# Patient Record
Sex: MALE | Race: WHITE | NOT HISPANIC OR LATINO | Employment: OTHER | URBAN - METROPOLITAN AREA
[De-identification: names, ages, dates, MRNs, and addresses within clinical notes are randomized per-mention and may not be internally consistent; named-entity substitution may affect disease eponyms.]

---

## 2017-01-05 ENCOUNTER — LAB VISIT (OUTPATIENT)
Dept: LAB | Facility: HOSPITAL | Age: 52
End: 2017-01-05
Attending: UROLOGY
Payer: COMMERCIAL

## 2017-01-05 DIAGNOSIS — E29.1 MALE HYPOGONADISM: ICD-10-CM

## 2017-01-05 DIAGNOSIS — N40.0 BENIGN PROSTATIC HYPERPLASIA, PRESENCE OF LOWER URINARY TRACT SYMPTOMS UNSPECIFIED, UNSPECIFIED MORPHOLOGY: ICD-10-CM

## 2017-01-05 LAB
PROSTATE SPECIFIC ANTIGEN, TOTAL: 1.4 NG/ML
PSA FREE MFR SERPL: 10.71 %
PSA FREE SERPL-MCNC: 0.15 NG/ML
TESTOST SERPL-MCNC: 580 NG/DL

## 2017-01-05 PROCEDURE — 84403 ASSAY OF TOTAL TESTOSTERONE: CPT

## 2017-01-05 PROCEDURE — 84153 ASSAY OF PSA TOTAL: CPT

## 2017-01-05 PROCEDURE — 36415 COLL VENOUS BLD VENIPUNCTURE: CPT | Mod: PO

## 2017-01-05 PROCEDURE — 84402 ASSAY OF FREE TESTOSTERONE: CPT

## 2017-01-09 ENCOUNTER — TELEPHONE (OUTPATIENT)
Dept: UROLOGY | Facility: CLINIC | Age: 52
End: 2017-01-09

## 2017-01-09 LAB — TESTOST FREE SERPL-MCNC: 10.4 PG/ML

## 2017-01-09 NOTE — TELEPHONE ENCOUNTER
Spoke to pt and moved the apt for him and informed him of the date and time. He verbalized understanding

## 2017-01-09 NOTE — TELEPHONE ENCOUNTER
----- Message from Pierce Huerta sent at 1/9/2017 11:18 AM CST -----  Contact: Wife/Naomi Salgado called in for her  (patient-Rodrigo) and he needs to cancel his nurse appt for his injection that is now down for 1/18/17 as he will be out of town.      Patient would like to reschedule for the following week and call back number is [phone]

## 2017-01-17 RX ORDER — LOSARTAN POTASSIUM AND HYDROCHLOROTHIAZIDE 25; 100 MG/1; MG/1
TABLET ORAL
Qty: 30 TABLET | Refills: 5 | Status: SHIPPED | OUTPATIENT
Start: 2017-01-17 | End: 2017-07-12 | Stop reason: SDUPTHER

## 2017-01-26 ENCOUNTER — CLINICAL SUPPORT (OUTPATIENT)
Dept: UROLOGY | Facility: CLINIC | Age: 52
End: 2017-01-26
Payer: COMMERCIAL

## 2017-01-26 VITALS
WEIGHT: 231.25 LBS | DIASTOLIC BLOOD PRESSURE: 77 MMHG | HEIGHT: 73 IN | SYSTOLIC BLOOD PRESSURE: 118 MMHG | BODY MASS INDEX: 30.65 KG/M2 | HEART RATE: 78 BPM

## 2017-01-26 DIAGNOSIS — N52.01 ERECTILE DYSFUNCTION DUE TO ARTERIAL INSUFFICIENCY: ICD-10-CM

## 2017-01-26 DIAGNOSIS — E29.1 HYPOGONADISM IN MALE: Primary | ICD-10-CM

## 2017-01-26 PROCEDURE — 99213 OFFICE O/P EST LOW 20 MIN: CPT | Mod: 25,S$GLB,, | Performed by: UROLOGY

## 2017-01-26 PROCEDURE — 99999 PR PBB SHADOW E&M-EST. PATIENT-LVL III: CPT | Mod: PBBFAC,,, | Performed by: UROLOGY

## 2017-01-26 PROCEDURE — 96372 THER/PROPH/DIAG INJ SC/IM: CPT | Mod: S$GLB,,, | Performed by: UROLOGY

## 2017-01-26 RX ADMIN — TESTOSTERONE CYPIONATE 300 MG: 200 INJECTION, SOLUTION INTRAMUSCULAR at 04:01

## 2017-01-26 NOTE — PROGRESS NOTES
9 28 16     Urinalysis: col yellow, sg 25, pH 5, leuco -, nitrites -, prot trace, glucose 50, bili -, blood -     c-c R flank pain     Age 51, has noticed a pain off and on on the R flank and RUQ, has been there for about one month, the pain is felt for two or three days and it then goes away. It may be one week before it returns.   At times the pain occurs after eating or drinking. No fever. No nausea or vomiting.      Voids well, no problems with stream, nocturia x 1. No urgency or incontinence.      Give a hx of large kidney stone in the R kidney 20 yrs ago, underwent an eswl but the fragments apparently got stuck in the ureter and required open exploration for ureterolithotomy. Says the open surgery was complex and took 10 hours.  Pt did fine after that and has had no more problems. About 10 years later he passed another small stone and also he had one small one removed from the ureter by dr jag jensen.     At the current time he wonders whether the current pain he is having could be another stone causing trouble.     Pt is seen here for testosterone replacement and was seen by dr sharma. He comes here for testosterone shots every 3 weeks and does well with that.          IMPRESSION:  Hypogonadism, kidney stones, glycosuria, erectile dysfunction  viagra as needed, i am ordering renal ultrasound to see if there could be a stone exacerbation problem

## 2017-01-26 NOTE — MR AVS SNAPSHOT
[facility]  [address]  [facility] [address]  Phone: [phone]                  Cade Johnston   2017 2:45 PM   Clinical Support    Description:  Male : 1965   Provider:  Des Larson MD   Department:  [facility]           Reason for Visit     Follow-up     Results                To Do List           Future Appointments        Provider Department Dept Phone    2017 8:00 AM LAB, [place] [facility] [phone]    2017 10:00 AM LAB, [place] [facility] [phone]    2017 8:30 AM Joni Smith MD [facility] [phone]      Goals (5 Years of Data)     None      [facility]     [facility] Nurse [facility] -  Assistance  Registered nurses in the [facility] provide clinical advisement, health education, appointment booking, and other advisory services.  Call for this free service at [phone].             Medications           Message regarding Medications     Verify the changes and/or additions to your medication regime listed below are the same as discussed with your clinician today.  If any of these changes or additions are incorrect, please notify your healthcare provider.        STOP taking these medications     azelastine (ASTELIN) 137 mcg (0.1 %) nasal spray INHALE 1 SPRAY INTRANASALLY 2 TWO TIMES DAILY.           Verify that the below list of medications is an accurate representation of the medications you are currently taking.  If none reported, the list may be blank. If incorrect, please contact your healthcare provider. Carry this list with you in case of emergency.           Current Medications     aspirin (ENTERIC COATED ASPIRIN) 81 MG EC tablet Take 81 mg by mouth once daily. No Sig Provided    blood sugar diagnostic Strp Test q day    losartan-hydrochlorothiazide 100-25 mg (HYZAAR) 100-25 mg per tablet TAKE 1 TABLET BY MOUTH ONCE DAILY.    metformin  "(GLUCOPHAGE) 500 MG tablet TAKE 1 TABLET TWICE A DAY WITH MEALS    sildenafil (REVATIO) 20 mg Tab Take 20 mg by mouth.           Clinical Reference Information           Vital Signs - Last Recorded  Most recent update: 1/26/2017  3:15 PM by Dinora Lacy    BP Pulse Ht Wt BMI    118/77 (BP Location: Right arm, Patient Position: Sitting) 78 6' 1" (1.854 m) 104.9 kg (231 lb 4.2 oz) 30.51 kg/m2      Blood Pressure          Most Recent Value    BP  118/77      Allergies as of 1/26/2017     Codeine    Latex      Immunizations Administered on Date of Encounter - 1/26/2017     None      "

## 2017-01-26 NOTE — PROGRESS NOTES
UROLOGY [place]  1 26 17    c-c hypogonadism, nephrolithiasis    Age 51, feels generally well. Has no new issues.   Is getting testosterone replacement; comes here and gets testosterone cyp 300 mg im q 3 weeks.  Says the treatment has worked well for him.    He is also interested in using a pde5 inhibitor as needed.      Gives a hx of large kidney stone in the R kidney 20 yrs ago, underwent an eswl but the fragments apparently got stuck in the ureter and required open exploration for ureterolithotomy. Says the open surgery was complex and took 10 hours.  Pt did fine after that and has had no more problems. About 10 years later he passed another small stone and also he had one small one removed from the ureter by dr jag jensen.       psa 1.4 done three weeks ago       //IMPRESSION:  Hypogonadism, kidney stones, erectile dysfunction  Nurse can proceed with his injection  Can use revatio, since it is the same medication he had used before but more accessible economically.   RTC yearly

## 2017-02-10 RX ORDER — METFORMIN HYDROCHLORIDE 500 MG/1
TABLET ORAL
Qty: 180 TABLET | Refills: 1 | Status: SHIPPED | OUTPATIENT
Start: 2017-02-10 | End: 2017-08-14 | Stop reason: SDUPTHER

## 2017-03-17 ENCOUNTER — PATIENT OUTREACH (OUTPATIENT)
Dept: ADMINISTRATIVE | Facility: HOSPITAL | Age: 52
End: 2017-03-17
Payer: COMMERCIAL

## 2017-03-23 ENCOUNTER — CLINICAL SUPPORT (OUTPATIENT)
Dept: UROLOGY | Facility: CLINIC | Age: 52
End: 2017-03-23
Payer: COMMERCIAL

## 2017-03-23 DIAGNOSIS — E29.1 MALE HYPOGONADISM: Primary | ICD-10-CM

## 2017-03-23 PROCEDURE — 96372 THER/PROPH/DIAG INJ SC/IM: CPT | Mod: S$GLB,,, | Performed by: UROLOGY

## 2017-03-23 PROCEDURE — 99999 PR PBB SHADOW E&M-EST. PATIENT-LVL I: CPT | Mod: PBBFAC,,,

## 2017-03-23 RX ADMIN — TESTOSTERONE CYPIONATE 300 MG: 200 INJECTION, SOLUTION INTRAMUSCULAR at 08:03

## 2017-04-17 ENCOUNTER — CLINICAL SUPPORT (OUTPATIENT)
Dept: UROLOGY | Facility: CLINIC | Age: 52
End: 2017-04-17
Payer: COMMERCIAL

## 2017-04-17 DIAGNOSIS — E29.1 MALE HYPOGONADISM: Primary | ICD-10-CM

## 2017-04-17 PROCEDURE — 96372 THER/PROPH/DIAG INJ SC/IM: CPT | Mod: S$GLB,,, | Performed by: UROLOGY

## 2017-04-17 PROCEDURE — 99999 PR PBB SHADOW E&M-EST. PATIENT-LVL I: CPT | Mod: PBBFAC,,,

## 2017-04-17 RX ADMIN — TESTOSTERONE CYPIONATE 300 MG: 200 INJECTION, SOLUTION INTRAMUSCULAR at 08:04

## 2017-04-18 ENCOUNTER — PATIENT OUTREACH (OUTPATIENT)
Dept: ADMINISTRATIVE | Facility: HOSPITAL | Age: 52
End: 2017-04-18
Payer: COMMERCIAL

## 2017-04-18 NOTE — LETTER
April 18, 2017    Cade Johnston  [address] [address] [address]             [facility]  [address]  [facility] [address]  Phone: [phone] Dear Mr. Johnston:    We have tried to reach you by mychart unsuccessfully.    [facility] is committed to your overall health and would like to ensure that you are up to date on all your health maintenance testing.  Our records indicate that you are overdue for your  ANNUAL DILATED EYE EXAM.     If you have had this exam done at another facility, please let us know so we can update your record.  If you have a copy of these records, please provide a copy for us to scan into your chart, you are welcome to fax them to the fax number below.  If not, please provide that provider/facilities contact information so that we may obtain copies from that facility.   If you have an upcoming eye exam appointment at another facility, please provide them our fax number below so that they may fax the report to us.       Otherwise, please schedule this exam at your earliest convenience.     Also, you may be due for a Pneumonia immunization     REMINDER:  lab appointment 5/4/17    If you have any questions or concerns, please don't hesitate to call.    Sincerely,    Mirna Evans  Clinical Care Coordinator  [facility] [address] [place] [address]  Phone: [phone]   Fax: [phone]

## 2017-05-04 ENCOUNTER — LAB VISIT (OUTPATIENT)
Dept: LAB | Facility: HOSPITAL | Age: 52
End: 2017-05-04
Attending: INTERNAL MEDICINE
Payer: COMMERCIAL

## 2017-05-04 DIAGNOSIS — E78.5 DYSLIPIDEMIA: ICD-10-CM

## 2017-05-04 DIAGNOSIS — E11.9 CONTROLLED TYPE 2 DIABETES MELLITUS WITHOUT COMPLICATION, WITHOUT LONG-TERM CURRENT USE OF INSULIN: ICD-10-CM

## 2017-05-04 DIAGNOSIS — I10 ESSENTIAL HYPERTENSION: ICD-10-CM

## 2017-05-04 DIAGNOSIS — Z00.00 WELLNESS EXAMINATION: ICD-10-CM

## 2017-05-04 LAB
ALBUMIN SERPL BCP-MCNC: 3.8 G/DL
ALP SERPL-CCNC: 84 U/L
ALT SERPL W/O P-5'-P-CCNC: 30 U/L
ANION GAP SERPL CALC-SCNC: 10 MMOL/L
AST SERPL-CCNC: 23 U/L
BASOPHILS # BLD AUTO: 0.08 K/UL
BASOPHILS NFR BLD: 1.2 %
BILIRUB SERPL-MCNC: 1.8 MG/DL
BUN SERPL-MCNC: 17 MG/DL
CALCIUM SERPL-MCNC: 9.1 MG/DL
CHLORIDE SERPL-SCNC: 102 MMOL/L
CHOLEST/HDLC SERPL: 5.4 {RATIO}
CO2 SERPL-SCNC: 26 MMOL/L
COMPLEXED PSA SERPL-MCNC: 1.3 NG/ML
CREAT SERPL-MCNC: 1.1 MG/DL
DIFFERENTIAL METHOD: ABNORMAL
EOSINOPHIL # BLD AUTO: 0.2 K/UL
EOSINOPHIL NFR BLD: 2.8 %
ERYTHROCYTE [DISTWIDTH] IN BLOOD BY AUTOMATED COUNT: 14.5 %
EST. GFR  (AFRICAN AMERICAN): >60 ML/MIN/1.73 M^2
EST. GFR  (NON AFRICAN AMERICAN): >60 ML/MIN/1.73 M^2
GLUCOSE SERPL-MCNC: 153 MG/DL
HCT VFR BLD AUTO: 56.4 %
HDL/CHOLESTEROL RATIO: 18.4 %
HDLC SERPL-MCNC: 141 MG/DL
HDLC SERPL-MCNC: 26 MG/DL
HGB BLD-MCNC: 19.2 G/DL
LDLC SERPL CALC-MCNC: 79 MG/DL
LYMPHOCYTES # BLD AUTO: 1.3 K/UL
LYMPHOCYTES NFR BLD: 19.1 %
MCH RBC QN AUTO: 29.4 PG
MCHC RBC AUTO-ENTMCNC: 34 %
MCV RBC AUTO: 86 FL
MONOCYTES # BLD AUTO: 0.5 K/UL
MONOCYTES NFR BLD: 7.8 %
NEUTROPHILS # BLD AUTO: 4.7 K/UL
NEUTROPHILS NFR BLD: 68.4 %
NONHDLC SERPL-MCNC: 115 MG/DL
PLATELET # BLD AUTO: 136 K/UL
PMV BLD AUTO: 10.1 FL
POTASSIUM SERPL-SCNC: 4.7 MMOL/L
PROT SERPL-MCNC: 7.3 G/DL
RBC # BLD AUTO: 6.53 M/UL
SODIUM SERPL-SCNC: 138 MMOL/L
TRIGL SERPL-MCNC: 180 MG/DL
WBC # BLD AUTO: 6.81 K/UL

## 2017-05-04 PROCEDURE — 83036 HEMOGLOBIN GLYCOSYLATED A1C: CPT

## 2017-05-04 PROCEDURE — 36415 COLL VENOUS BLD VENIPUNCTURE: CPT | Mod: PO

## 2017-05-04 PROCEDURE — 80053 COMPREHEN METABOLIC PANEL: CPT

## 2017-05-04 PROCEDURE — 84153 ASSAY OF PSA TOTAL: CPT

## 2017-05-04 PROCEDURE — 80061 LIPID PANEL: CPT

## 2017-05-04 PROCEDURE — 85025 COMPLETE CBC W/AUTO DIFF WBC: CPT

## 2017-05-05 LAB
ESTIMATED AVG GLUCOSE: 140 MG/DL
HBA1C MFR BLD HPLC: 6.5 %

## 2017-05-11 ENCOUNTER — OFFICE VISIT (OUTPATIENT)
Dept: FAMILY MEDICINE | Facility: CLINIC | Age: 52
End: 2017-05-11
Payer: COMMERCIAL

## 2017-05-11 ENCOUNTER — HOSPITAL ENCOUNTER (OUTPATIENT)
Dept: RADIOLOGY | Facility: HOSPITAL | Age: 52
Discharge: HOME | End: 2017-05-11
Attending: INTERNAL MEDICINE
Payer: COMMERCIAL

## 2017-05-11 VITALS
HEART RATE: 69 BPM | WEIGHT: 234.13 LBS | BODY MASS INDEX: 31.03 KG/M2 | OXYGEN SATURATION: 98 % | HEIGHT: 73 IN | SYSTOLIC BLOOD PRESSURE: 126 MMHG | RESPIRATION RATE: 18 BRPM | DIASTOLIC BLOOD PRESSURE: 84 MMHG

## 2017-05-11 DIAGNOSIS — M79.671 PAIN IN BOTH FEET: ICD-10-CM

## 2017-05-11 DIAGNOSIS — M79.672 PAIN IN BOTH FEET: ICD-10-CM

## 2017-05-11 DIAGNOSIS — Z79.890 ENCOUNTER FOR MONITORING TESTOSTERONE REPLACEMENT THERAPY: ICD-10-CM

## 2017-05-11 DIAGNOSIS — M54.6 THORACIC BACK PAIN, UNSPECIFIED BACK PAIN LATERALITY, UNSPECIFIED CHRONICITY: Primary | ICD-10-CM

## 2017-05-11 DIAGNOSIS — E11.9 CONTROLLED TYPE 2 DIABETES MELLITUS WITHOUT COMPLICATION, WITHOUT LONG-TERM CURRENT USE OF INSULIN: ICD-10-CM

## 2017-05-11 DIAGNOSIS — E78.5 DYSLIPIDEMIA: ICD-10-CM

## 2017-05-11 DIAGNOSIS — R06.02 SOB (SHORTNESS OF BREATH): ICD-10-CM

## 2017-05-11 DIAGNOSIS — D86.0 SARCOIDOSIS OF LUNG: ICD-10-CM

## 2017-05-11 DIAGNOSIS — I10 ESSENTIAL HYPERTENSION: ICD-10-CM

## 2017-05-11 DIAGNOSIS — Z51.81 ENCOUNTER FOR MONITORING TESTOSTERONE REPLACEMENT THERAPY: ICD-10-CM

## 2017-05-11 DIAGNOSIS — M54.6 THORACIC BACK PAIN, UNSPECIFIED BACK PAIN LATERALITY, UNSPECIFIED CHRONICITY: ICD-10-CM

## 2017-05-11 DIAGNOSIS — D75.1 ERYTHROCYTOSIS: ICD-10-CM

## 2017-05-11 PROCEDURE — 3079F DIAST BP 80-89 MM HG: CPT | Mod: S$GLB,,, | Performed by: INTERNAL MEDICINE

## 2017-05-11 PROCEDURE — 4010F ACE/ARB THERAPY RXD/TAKEN: CPT | Mod: S$GLB,,, | Performed by: INTERNAL MEDICINE

## 2017-05-11 PROCEDURE — 99999 PR PBB SHADOW E&M-EST. PATIENT-LVL IV: CPT | Mod: PBBFAC,,, | Performed by: INTERNAL MEDICINE

## 2017-05-11 PROCEDURE — 72070 X-RAY EXAM THORAC SPINE 2VWS: CPT | Mod: 26,,, | Performed by: RADIOLOGY

## 2017-05-11 PROCEDURE — 72070 X-RAY EXAM THORAC SPINE 2VWS: CPT | Mod: TC,PO

## 2017-05-11 PROCEDURE — 3074F SYST BP LT 130 MM HG: CPT | Mod: S$GLB,,, | Performed by: INTERNAL MEDICINE

## 2017-05-11 PROCEDURE — 99214 OFFICE O/P EST MOD 30 MIN: CPT | Mod: S$GLB,,, | Performed by: INTERNAL MEDICINE

## 2017-05-11 PROCEDURE — 3044F HG A1C LEVEL LT 7.0%: CPT | Mod: S$GLB,,, | Performed by: INTERNAL MEDICINE

## 2017-05-11 PROCEDURE — 1160F RVW MEDS BY RX/DR IN RCRD: CPT | Mod: S$GLB,,, | Performed by: INTERNAL MEDICINE

## 2017-05-11 NOTE — MR AVS SNAPSHOT
[facility]  [address]  [place] [address]  Phone: [phone]  Fax: [phone]                  Cade Johnston   2017 8:30 AM   Office Visit    Description:  Male : 1965   Provider:  Joni Smith MD   Department:  [facility]           Reason for Visit     Neck Pain     Back Pain     Shoulder Pain     Shortness of Breath     Foot Pain           Diagnoses this Visit        Comments    Thoracic back pain, unspecified back pain laterality, unspecified chronicity    -  Primary     Pain in both feet         SOB (shortness of breath)         Sarcoidosis of lung         Controlled type 2 diabetes mellitus without complication, without long-term current use of insulin         Dyslipidemia         Essential hypertension         Encounter for monitoring testosterone replacement therapy         Erythrocytosis                To Do List           Future Appointments        Provider Department Dept Phone    2017 9:15 AM NS XR2 [facility] [phone]    5/15/2017 8:00 AM UROLOGY, NURSE [facility] [phone]    2017 7:30 AM Francis Resendiz DPM [facility] [phone]    2017 7:50 AM Joni Smith MD [facility] [phone]      Goals (5 Years of Data)     None      Follow-Up and Disposition     Return in about 8 weeks (around 2017).    Follow-up and Disposition History       These Medications        Disp Refills Start End    ipratropium-albuterol (COMBIVENT)  mcg/actuation inhaler 4 g 11 2017     Inhale 1 puff into the lungs every 4 (four) hours as needed for Shortness of Breath. Rescue - Inhalation    Pharmacy: [facility]/pharmacy #[id number] - [place] - [address] [facility]. AT [facility] [place] Ph #: [phone]         [facility]     [facility] Care Line -  Assistance  Unless otherwise directed by your provider, please contact [facility], our nurse care line that is available for 24/7 assistance.     Registered nurses in the [facility] provide: appointment scheduling, clinical advisement, health education, and other advisory services.  Call: [phone] (toll free)               Medications           Message regarding Medications     Verify the changes and/or additions to your medication regime listed below are the same as discussed with your clinician today.  If any of these changes or additions are incorrect, please notify your healthcare provider.        START taking these NEW medications        Refills    ipratropium-albuterol (COMBIVENT)  mcg/actuation inhaler 11    Sig: Inhale 1 puff into the lungs every 4 (four) hours as needed for Shortness of Breath. Rescue    Class: Normal    Route: Inhalation           Verify that the below list of medications is an accurate representation of the medications you are currently taking.  If none reported, the list may be blank. If incorrect, please contact your healthcare provider. Carry this list with you in case of emergency.           Current Medications     aspirin (ENTERIC COATED ASPIRIN) 81 MG EC tablet Take 81 mg by mouth once daily. No Sig Provided    blood sugar diagnostic Strp Test q day    losartan-hydrochlorothiazide 100-25 mg (HYZAAR) 100-25 mg per tablet TAKE 1 TABLET BY MOUTH ONCE DAILY.    metformin (GLUCOPHAGE) 500 MG tablet TAKE 1 TABLET TWICE A DAY WITH MEALS    sildenafil (REVATIO) 20 mg Tab Take 20 mg by mouth.    ipratropium-albuterol (COMBIVENT)  mcg/actuation inhaler Inhale 1 puff into the lungs every 4 (four) hours as needed for Shortness of Breath. Rescue           Clinical Reference Information           Your Vitals Were     BP Pulse Resp Height Weight SpO2    126/84 (BP Location: Left arm, Patient Position: Sitting, BP Method: Manual) 69 18 6' 1" (1.854 m) 106.2 kg (234 lb 2.1 oz) 98%    BMI                30.89 kg/m2          Blood Pressure      "     Most Recent Value    BP  126/84      Allergies as of 5/11/2017     Codeine    Latex      Immunizations Administered on Date of Encounter - 5/11/2017     None      Orders Placed During Today's Visit      Normal Orders This Visit    Ambulatory consult to Physical Therapy     Ambulatory consult to Podiatry     Future Labs/Procedures Expected by Expires    X-Ray Thoracic Spine AP Lateral  5/11/2017 5/11/2018    Phlebotomy therapeutic  As directed 5/11/2018      Language Assistance Services     ATTENTION: Language assistance services are available, free of charge. Please call [phone].      ATENCIÓN: Si habla jey, tiene a mills disposición servicios gratuitos de asistencia lingüística. Llame al [phone].     CHÚ Ý: N?u b?n nói Ti?ng Vi?t, có các d?ch v? h? tr? ngôn ng? mi?n phí dành cho b?n. G?i s? [phone].         [facility] complies with applicable Federal civil rights laws and does not discriminate on the basis of race, color, national origin, age, disability, or sex.

## 2017-05-11 NOTE — PROGRESS NOTES
Subjective:       Patient ID: Cade Johnston is a 51 y.o. male.    Chief Complaint: Neck Pain; Back Pain; Shoulder Pain (left); Shortness of Breath; and Foot Pain (bilat)    HPI Comments: Recall: Sarcoidosis, 45% lung fnx, sees pulmonary, low testosterone, Dr Larson; Sees Dr Duran cardiology.    DM -  controlled  HTN - controlled  HLD - uncontrolled ldl < 70 goal   BPH/ ED - sees Dr Larson    Erythrocytosis 2nd to testosterone and sarcoid- worsening  Complains of 6 mo upper back pain worse with sitting at computer and not improved with chiropractor  Complains of b/l foot pain      Neck Pain    Pertinent negatives include no chest pain, fever or trouble swallowing.   Back Pain   Pertinent negatives include no abdominal pain, chest pain or fever.   Shoulder Pain    Pertinent negatives include no fever.   Shortness of Breath   Associated symptoms include neck pain. Pertinent negatives include no abdominal pain, chest pain, fever, rash or vomiting.   Foot Pain   Associated symptoms include neck pain. Pertinent negatives include no abdominal pain, arthralgias, chest pain, fever, joint swelling, nausea, rash or vomiting.     Review of Systems   Constitutional: Negative for appetite change and fever.   HENT: Negative for nosebleeds and trouble swallowing.    Eyes: Negative for discharge and visual disturbance.   Respiratory: Positive for shortness of breath. Negative for choking.    Cardiovascular: Negative for chest pain and palpitations.   Gastrointestinal: Negative for abdominal pain, nausea and vomiting.   Musculoskeletal: Positive for back pain and neck pain. Negative for arthralgias and joint swelling.   Skin: Negative for rash and wound.   Neurological: Negative for dizziness and syncope.   Psychiatric/Behavioral: Negative for confusion and dysphoric mood.       Objective:      Vitals:    05/11/17 0847   BP: 126/84   Pulse:    Resp:      Physical Exam   Constitutional: He appears well-nourished.   Eyes: Conjunctivae  and EOM are normal.   Neck: Normal range of motion.   Cardiovascular: Normal rate and regular rhythm.    Pulses:       Dorsalis pedis pulses are 2+ on the right side, and 2+ on the left side.   Pulmonary/Chest: Effort normal and breath sounds normal.   Musculoskeletal:   Normal ROM bilateral   + TTP T1 spinal process   + TTP TCP b/l 1st and 2nd    Feet:   Right Foot:   Protective Sensation: 4 sites tested. 4 sites sensed.   Left Foot:   Protective Sensation: 4 sites tested. 4 sites sensed.   Neurological: No cranial nerve deficit (grossly intact).   Skin: Skin is warm and dry.   Psychiatric: He has a normal mood and affect.   Alert and orientated   Vitals reviewed.        Assessment:       1. Thoracic back pain, unspecified back pain laterality, unspecified chronicity    2. Pain in both feet    3. SOB (shortness of breath)    4. Sarcoidosis of lung    5. Controlled type 2 diabetes mellitus without complication, without long-term current use of insulin    6. Dyslipidemia    7. Essential hypertension    8. Encounter for monitoring testosterone replacement therapy    9. Erythrocytosis        Plan:       Thoracic back pain, unspecified back pain laterality, unspecified chronicity  -     Ambulatory consult to Physical Therapy  -     X-Ray Thoracic Spine AP Lateral; Future; Expected date: 5/11/17    Pain in both feet  -     Ambulatory consult to Podiatry    SOB (shortness of breath)  -     ipratropium-albuterol (COMBIVENT)  mcg/actuation inhaler; Inhale 1 puff into the lungs every 4 (four) hours as needed for Shortness of Breath. Rescue  Dispense: 4 g; Refill: 11    Sarcoidosis of lung  -     ipratropium-albuterol (COMBIVENT)  mcg/actuation inhaler; Inhale 1 puff into the lungs every 4 (four) hours as needed for Shortness of Breath. Rescue  Dispense: 4 g; Refill: 11    Controlled type 2 diabetes mellitus without complication, without long-term current use of insulin    Dyslipidemia    Essential  "hypertension    Encounter for monitoring testosterone replacement therapy  -     Phlebotomy therapeutic; Future    Erythrocytosis    Continue current plan of care  8 wk on acute issues  Will see his pulm  Rest lab at next visit for 4 mo later        Counseled on regular exercise, maintenance of a healthy weight, balanced diet rich in fruits/vegetables and lean protein, and avoidance of unhealthy habits like smoking and excessive alcohol intake.   Also, counseled on importance of being compliant with medication, health appointments, diet and exercise.     Return in about 8 weeks (around 7/6/2017).    "This note will not be shared with the patient."  "

## 2017-05-15 ENCOUNTER — CLINICAL SUPPORT (OUTPATIENT)
Dept: UROLOGY | Facility: CLINIC | Age: 52
End: 2017-05-15
Payer: COMMERCIAL

## 2017-05-15 DIAGNOSIS — R79.89 LOW SERUM TESTOSTERONE LEVEL: Primary | ICD-10-CM

## 2017-05-15 PROCEDURE — 96372 THER/PROPH/DIAG INJ SC/IM: CPT | Mod: S$GLB,,, | Performed by: UROLOGY

## 2017-05-15 PROCEDURE — 99999 PR PBB SHADOW E&M-EST. PATIENT-LVL I: CPT | Mod: PBBFAC,,,

## 2017-05-15 RX ADMIN — TESTOSTERONE CYPIONATE 300 MG: 200 INJECTION, SOLUTION INTRAMUSCULAR at 08:05

## 2017-05-16 ENCOUNTER — HOSPITAL ENCOUNTER (OUTPATIENT)
Dept: RADIOLOGY | Facility: HOSPITAL | Age: 52
Discharge: HOME | End: 2017-05-16
Attending: PODIATRIST
Payer: COMMERCIAL

## 2017-05-16 ENCOUNTER — OFFICE VISIT (OUTPATIENT)
Dept: PODIATRY | Facility: CLINIC | Age: 52
End: 2017-05-16
Payer: COMMERCIAL

## 2017-05-16 VITALS — BODY MASS INDEX: 31.01 KG/M2 | HEIGHT: 73 IN | WEIGHT: 234 LBS

## 2017-05-16 DIAGNOSIS — M79.672 FOOT PAIN, BILATERAL: ICD-10-CM

## 2017-05-16 DIAGNOSIS — M79.671 FOOT PAIN, BILATERAL: ICD-10-CM

## 2017-05-16 DIAGNOSIS — M77.9 CAPSULITIS: ICD-10-CM

## 2017-05-16 DIAGNOSIS — M21.6X9 ACQUIRED EQUINUS DEFORMITY OF FOOT, UNSPECIFIED LATERALITY: ICD-10-CM

## 2017-05-16 DIAGNOSIS — M77.9 CAPSULITIS: Primary | ICD-10-CM

## 2017-05-16 PROCEDURE — 99203 OFFICE O/P NEW LOW 30 MIN: CPT | Mod: 25,S$GLB,, | Performed by: PODIATRIST

## 2017-05-16 PROCEDURE — 1160F RVW MEDS BY RX/DR IN RCRD: CPT | Mod: S$GLB,,, | Performed by: PODIATRIST

## 2017-05-16 PROCEDURE — 73630 X-RAY EXAM OF FOOT: CPT | Mod: 50,TC,PO

## 2017-05-16 PROCEDURE — 73630 X-RAY EXAM OF FOOT: CPT | Mod: 26,50,, | Performed by: RADIOLOGY

## 2017-05-16 PROCEDURE — 29540 STRAPPING ANKLE &/FOOT: CPT | Mod: 50,S$GLB,, | Performed by: PODIATRIST

## 2017-05-16 PROCEDURE — 99999 PR PBB SHADOW E&M-EST. PATIENT-LVL III: CPT | Mod: PBBFAC,,, | Performed by: PODIATRIST

## 2017-05-16 RX ORDER — DICLOFENAC SODIUM 10 MG/G
2 GEL TOPICAL 4 TIMES DAILY
Qty: 100 G | Refills: 2 | Status: SHIPPED | OUTPATIENT
Start: 2017-05-16 | End: 2017-11-07

## 2017-05-16 NOTE — MR AVS SNAPSHOT
[facility]  [address]  [place] [address]  Phone: [phone]                  Cade Johnston   2017 7:30 AM   Office Visit    Description:  Male : 1965   Provider:  Francis Resendiz DPM   Department:  [place] [facility]           Reason for Visit     Foot Pain           Diagnoses this Visit        Comments    Capsulitis    -  Primary     Foot pain, bilateral         Acquired equinus deformity of foot, unspecified laterality                To Do List           Future Appointments        Provider Department Dept Phone    2017 8:45 AM [facility] XRFL1 [facility] [phone]    2017 8:00 AM UROLOGY, NURSE [facility] [phone]    2017 7:30 AM [facility] [phone]    2017 7:50 AM Joni Smith MD [facility] [phone]      Goals (5 Years of Data)     None      Follow-Up and Disposition     Return in about 1 month (around 2017).       These Medications        Disp Refills Start End    diclofenac sodium 1 % Gel 100 g 2 2017     Apply 2 g topically 4 (four) times daily. - Topical    Pharmacy: [facility]/pharmacy #[id number] - [place] - [address] [place] [address]. AT [facility] Ph #: [phone]         [facility] On Call     [facility] Nurse Care Line -  Assistance  Unless otherwise directed by your provider, please contact [facility], our nurse care line that is available for  assistance.     Registered nurses in the [facility] Center provide: appointment scheduling, clinical advisement, health education, and other advisory services.  Call: [phone] (toll free)               Medications           Message regarding Medications     Verify the changes and/or additions to your medication regime listed below are the same as discussed with your clinician today.  If any of these changes or additions are incorrect, please notify your  "healthcare provider.        START taking these NEW medications        Refills    diclofenac sodium 1 % Gel 2    Sig: Apply 2 g topically 4 (four) times daily.    Class: Normal    Route: Topical           Verify that the below list of medications is an accurate representation of the medications you are currently taking.  If none reported, the list may be blank. If incorrect, please contact your healthcare provider. Carry this list with you in case of emergency.           Current Medications     aspirin (ENTERIC COATED ASPIRIN) 81 MG EC tablet Take 81 mg by mouth once daily. No Sig Provided    blood sugar diagnostic Strp Test q day    ipratropium-albuterol (COMBIVENT)  mcg/actuation inhaler Inhale 1 puff into the lungs every 4 (four) hours as needed for Shortness of Breath. Rescue    losartan-hydrochlorothiazide 100-25 mg (HYZAAR) 100-25 mg per tablet TAKE 1 TABLET BY MOUTH ONCE DAILY.    metformin (GLUCOPHAGE) 500 MG tablet TAKE 1 TABLET TWICE A DAY WITH MEALS    sildenafil (REVATIO) 20 mg Tab Take 20 mg by mouth.    diclofenac sodium 1 % Gel Apply 2 g topically 4 (four) times daily.           Clinical Reference Information           Your Vitals Were     Height Weight BMI          6' 1" (1.854 m) 106.1 kg (234 lb) 30.87 kg/m2        Allergies as of 5/16/2017     Codeine    Latex      Immunizations Administered on Date of Encounter - 5/16/2017     None      Orders Placed During Today's Visit     Future Labs/Procedures Expected by Expires    X-Ray Foot Complete Bilateral  5/16/2017 5/17/2018      Language Assistance Services     ATTENTION: Language assistance services are available, free of charge. Please call [phone].      ATENCIÓN: Si nancy khanna, tiene a mills disposición servicios gratuitos de asistencia lingüística. Llame al [phone].     [facility] Ý: N?u b?n nói Ti?ng Vi?t, có các d?ch v? h? tr? ngôn ng? mi?n phí dành cho b?n. G?i s? [phone].         [facility] complies with " applicable Federal civil rights laws and does not discriminate on the basis of race, color, national origin, age, disability, or sex.

## 2017-05-16 NOTE — PROGRESS NOTES
Subjective:      Patient ID: Cade Johnston is a 51 y.o. male.    Chief Complaint: Foot Pain (bilateral)    Sharp aching deep pain both forefoot.  Gradual onset, worsening over past several weeks, aggravated by increased weight bearing, shoe gear, pressure.  No previous medical treatment.  OTC pain med not helping.      Review of Systems   Constitution: Negative for chills, diaphoresis, fever, malaise/fatigue and night sweats.   Cardiovascular: Negative for claudication, cyanosis, leg swelling and syncope.   Skin: Negative for color change, dry skin, nail changes, rash, suspicious lesions and unusual hair distribution.   Musculoskeletal: Positive for joint pain. Negative for falls, joint swelling, muscle cramps, muscle weakness and stiffness.   Gastrointestinal: Negative for constipation, diarrhea, nausea and vomiting.   Neurological: Negative for brief paralysis, disturbances in coordination, focal weakness, numbness, paresthesias, sensory change and tremors.           Objective:      Physical Exam   Constitutional: He appears well-developed and well-nourished. He is cooperative. No distress.   Cardiovascular:   Pulses:       Popliteal pulses are 2+ on the right side, and 2+ on the left side.        Dorsalis pedis pulses are 2+ on the right side, and 2+ on the left side.        Posterior tibial pulses are 2+ on the right side, and 2+ on the left side.   Capillary refill 3 seconds all toes/distal feet, all toes/both feet warm to touch.      Negative lymphadenopathy bilateral popliteal fossa and tarsal tunnel.      Negavie lower extremity edema bilateral.     Musculoskeletal:        Right ankle: Normal. He exhibits normal range of motion, no swelling, no ecchymosis, no deformity, no laceration and normal pulse. Achilles tendon normal. Achilles tendon exhibits no pain, no defect and normal Morrison's test results.   Pain to palpation inferior mtpj 2 left and right without evidence of trauma or infection.     Ankle  dorsiflexion decreased at <10 degrees bilateral with moderate increase with knee flexion bilateral.    Otherwise, Normal angle, base, station of gait. All ten toes without clubbing, cyanosis, or signs of ischemia.  No pain to palpation bilateral lower extremities.  Range of motion, stability, muscle strength, and muscle tone normal bilateral feet and legs.         Lymphadenopathy: No inguinal adenopathy noted on the right or left side.   Negative lymphadenopathy bilateral popliteal fossa and tarsal tunnel.   Neurological: He is alert. He has normal strength. He displays no atrophy and no tremor. No sensory deficit. He exhibits normal muscle tone. He displays no seizure activity. Gait normal.   Reflex Scores:       Patellar reflexes are 2+ on the right side and 2+ on the left side.       Achilles reflexes are 2+ on the right side and 2+ on the left side.  Negative tinel sign to percussion sural, superficial peroneal, deep peroneal, saphenous, and posterior tibial nerves right and left ankles and feet.     Skin: Skin is warm, dry and intact. No abrasion, no bruising, no burn, no ecchymosis, no laceration, no lesion and no rash noted. He is not diaphoretic. No cyanosis or erythema. No pallor. Nails show no clubbing.     Skin is normal age and health appropriate color, turgor, texture, and temperature bilateral lower extremities without ulceration, hyperpigmentation, discoloration, masses nodules or cords palpated.  No ecchymosis, erythema, edema, or cardinal signs of infection bilateral lower extremities.               Assessment:       Encounter Diagnoses   Name Primary?    Capsulitis Yes    Foot pain, bilateral     Acquired equinus deformity of foot, unspecified laterality          Plan:       Cade was seen today for foot pain.    Diagnoses and all orders for this visit:    Capsulitis  -     X-Ray Foot Complete Bilateral; Future    Foot pain, bilateral  -     X-Ray Foot Complete Bilateral; Future    Acquired  equinus deformity of foot, unspecified laterality  -     X-Ray Foot Complete Bilateral; Future    Other orders  -     diclofenac sodium 1 % Gel; Apply 2 g topically 4 (four) times daily.      I counseled the patient on his conditions, their implications and medical management.        Patient will stretch the tendo achilles complex three times daily as demonstrated in the office.  Literature was dispensed illustrating proper stretching technique.    I applied a plantar rest strapping to the patient's right and left foot to offload symptomatic area, support the arch, and relieve pain.    Patient will obtain over the counter arch supports and wear them in shoes whenever possible.  Athletic shoes intended for walking or running are usually best.    The patient was advised that NSAID-type medications have two very important potential side effects: gastrointestinal irritation including hemorrhage and renal injuries. He was asked to take the medication with food and to stop if he experiences any GI upset. I asked him to call for vomiting, abdominal pain or black/bloody stools. The patient expresses understanding of these issues and questions were answered.    Rx voltaren gel, xrays.    Discussed conservative treatment with shoes of adequate dimensions, material, and style to alleviate symptoms and delay or prevent surgical intervention.           Return in about 1 month (around 6/16/2017).

## 2017-05-16 NOTE — LETTER
May 16, 2017      Joni Smith MD  [address] [facility] [address] [place] [address]           [facility]  [address] [facility] [address] [place] [address]  Phone: [phone]          Patient: Cade Johnston   MR Number: [id number]   YOB: 1965   Date of Visit: 5/16/2017       Dear Dr. Joni Smith:    Thank you for referring Cade Johnston to me for evaluation. Attached you will find relevant portions of my assessment and plan of care.    If you have questions, please do not hesitate to call me. I look forward to following Cade Johnston along with you.    Sincerely,    Francis Resendiz, MUSTAPHA    Enclosure  CC:  No Recipients    If you would like to receive this communication electronically, please contact [email or web address] or [phone] to request more information on [employer] access.    For providers and/or their staff who would like to refer a patient to [facility], please contact us through our one-stop-shop provider referral line, Shai Botello, at [phone].    If you feel you have received this communication in error or would no longer like to receive these types of communications, please e-mail [email or web address]

## 2017-05-17 ENCOUNTER — TELEPHONE (OUTPATIENT)
Dept: FAMILY MEDICINE | Facility: CLINIC | Age: 52
End: 2017-05-17

## 2017-05-17 DIAGNOSIS — M54.2 NECK PAIN: Primary | ICD-10-CM

## 2017-05-17 NOTE — TELEPHONE ENCOUNTER
----- Message from Liv Morales sent at 5/17/2017  8:11 AM CDT -----  Contact: Rodrigo  Patient is calling as called yesterday and spoke to nurse; transferred call and call was disconnected; asking to be called to move forward with physical therapy. Please call [phone]. Thanks!

## 2017-05-17 NOTE — TELEPHONE ENCOUNTER
----- Message from Liliana Zaldivar sent at 5/17/2017  9:23 AM CDT -----  Contact: self   Patient want to inform your office he spoke with [facility] and they take his insurance will like for you to schedule his appointment with [facility] [place] [phone], any questions please call back at [phone]

## 2017-06-09 ENCOUNTER — TELEPHONE (OUTPATIENT)
Dept: UROLOGY | Facility: CLINIC | Age: 52
End: 2017-06-09

## 2017-06-09 NOTE — TELEPHONE ENCOUNTER
Spoke to pt and verified that the authorization is not approved yet and will call pt when we have authorization.

## 2017-06-13 ENCOUNTER — TELEPHONE (OUTPATIENT)
Dept: UROLOGY | Facility: CLINIC | Age: 52
End: 2017-06-13

## 2017-06-13 NOTE — TELEPHONE ENCOUNTER
----- Message from Pierce Huerta sent at 6/13/2017  9:43 AM CDT -----  Contact: Dede/Reconsideration Nurse for [employer]  Unsuccessful call placed to pod.  Dede called in regarding the attached patient and the testosterone injection that was denied which was scheduled for today.    Dede's call back number is [phone], extension [phone], Reference number: [id number]

## 2017-06-16 ENCOUNTER — TELEPHONE (OUTPATIENT)
Dept: UROLOGY | Facility: CLINIC | Age: 52
End: 2017-06-16

## 2017-06-16 NOTE — TELEPHONE ENCOUNTER
----- Message from Tanya Del Rio sent at 6/16/2017 12:05 PM CDT -----  Contact: Patient  Patient returned call. He can be contacted at [phone].    Thanks,  Tanya

## 2017-06-16 NOTE — TELEPHONE ENCOUNTER
Left message for pt to call office. He needs to be scheduled for an injections, insurance approved the shots.

## 2017-06-20 ENCOUNTER — OFFICE VISIT (OUTPATIENT)
Dept: PODIATRY | Facility: CLINIC | Age: 52
End: 2017-06-20
Payer: COMMERCIAL

## 2017-06-20 ENCOUNTER — CLINICAL SUPPORT (OUTPATIENT)
Dept: UROLOGY | Facility: CLINIC | Age: 52
End: 2017-06-20
Payer: COMMERCIAL

## 2017-06-20 VITALS — HEIGHT: 73 IN | BODY MASS INDEX: 31.03 KG/M2 | WEIGHT: 234.13 LBS

## 2017-06-20 DIAGNOSIS — M79.672 FOOT PAIN, BILATERAL: ICD-10-CM

## 2017-06-20 DIAGNOSIS — M77.9 CAPSULITIS: Primary | ICD-10-CM

## 2017-06-20 DIAGNOSIS — M79.671 FOOT PAIN, BILATERAL: ICD-10-CM

## 2017-06-20 DIAGNOSIS — E29.1 HYPOGONADISM IN MALE: Primary | ICD-10-CM

## 2017-06-20 DIAGNOSIS — M21.6X9 ACQUIRED EQUINUS DEFORMITY OF FOOT, UNSPECIFIED LATERALITY: ICD-10-CM

## 2017-06-20 PROCEDURE — 99213 OFFICE O/P EST LOW 20 MIN: CPT | Mod: S$GLB,,, | Performed by: PODIATRIST

## 2017-06-20 PROCEDURE — 99999 PR PBB SHADOW E&M-EST. PATIENT-LVL I: CPT | Mod: PBBFAC,,,

## 2017-06-20 PROCEDURE — 99999 PR PBB SHADOW E&M-EST. PATIENT-LVL III: CPT | Mod: PBBFAC,,, | Performed by: PODIATRIST

## 2017-06-20 PROCEDURE — 96372 THER/PROPH/DIAG INJ SC/IM: CPT | Mod: S$GLB,,, | Performed by: UROLOGY

## 2017-06-20 RX ORDER — MELOXICAM 15 MG/1
15 TABLET ORAL DAILY
Qty: 30 TABLET | Refills: 0 | Status: SHIPPED | OUTPATIENT
Start: 2017-06-20 | End: 2017-07-11

## 2017-06-20 RX ORDER — TESTOSTERONE CYPIONATE 200 MG/ML
300 INJECTION, SOLUTION INTRAMUSCULAR
Status: DISCONTINUED | OUTPATIENT
Start: 2017-07-11 | End: 2017-11-06 | Stop reason: DRUGHIGH

## 2017-06-20 RX ADMIN — TESTOSTERONE CYPIONATE 300 MG: 200 INJECTION, SOLUTION INTRAMUSCULAR at 08:06

## 2017-06-20 NOTE — PROGRESS NOTES
Subjective:      Patient ID: Cade Johnston is a 51 y.o. male.    Chief Complaint: Foot Pain (bilateral, Left is worse )    Sharp aching deep pain both forefoot.  Gradual onset, worsening over past several weeks, aggravated by increased weight bearing, shoe gear, pressure.  Very minimal improvement with stretches, otc inserts, nsaid gel  OTC pain med not helping.  Xray negative acute injury both feet.      Review of Systems   Constitution: Negative for chills, diaphoresis, fever, malaise/fatigue and night sweats.   Cardiovascular: Negative for claudication, cyanosis, leg swelling and syncope.   Skin: Negative for color change, dry skin, nail changes, rash, suspicious lesions and unusual hair distribution.   Musculoskeletal: Positive for joint pain. Negative for falls, joint swelling, muscle cramps, muscle weakness and stiffness.   Gastrointestinal: Negative for constipation, diarrhea, nausea and vomiting.   Neurological: Negative for brief paralysis, disturbances in coordination, focal weakness, numbness, paresthesias, sensory change and tremors.           Objective:      Physical Exam   Constitutional: He appears well-developed and well-nourished. He is cooperative. No distress.   Cardiovascular:   Pulses:       Popliteal pulses are 2+ on the right side, and 2+ on the left side.        Dorsalis pedis pulses are 2+ on the right side, and 2+ on the left side.        Posterior tibial pulses are 2+ on the right side, and 2+ on the left side.   Capillary refill 3 seconds all toes/distal feet, all toes/both feet warm to touch.      Negative lymphadenopathy bilateral popliteal fossa and tarsal tunnel.      Negavie lower extremity edema bilateral.     Musculoskeletal:        Right ankle: Normal. He exhibits normal range of motion, no swelling, no ecchymosis, no deformity, no laceration and normal pulse. Achilles tendon normal. Achilles tendon exhibits no pain, no defect and normal Morrison's test results.   Pain to  palpation inferior mtpj 2 left and right without evidence of trauma or infection.     Ankle dorsiflexion decreased at <10 degrees bilateral with moderate increase with knee flexion bilateral.    Otherwise, Normal angle, base, station of gait. All ten toes without clubbing, cyanosis, or signs of ischemia.  No pain to palpation bilateral lower extremities.  Range of motion, stability, muscle strength, and muscle tone normal bilateral feet and legs.         Lymphadenopathy: No inguinal adenopathy noted on the right or left side.   Negative lymphadenopathy bilateral popliteal fossa and tarsal tunnel.   Neurological: He is alert. He has normal strength. He displays no atrophy and no tremor. No sensory deficit. He exhibits normal muscle tone. He displays no seizure activity. Gait normal.   Reflex Scores:       Patellar reflexes are 2+ on the right side and 2+ on the left side.       Achilles reflexes are 2+ on the right side and 2+ on the left side.  Negative tinel sign to percussion sural, superficial peroneal, deep peroneal, saphenous, and posterior tibial nerves right and left ankles and feet.     Skin: Skin is warm, dry and intact. No abrasion, no bruising, no burn, no ecchymosis, no laceration, no lesion and no rash noted. He is not diaphoretic. No cyanosis or erythema. No pallor. Nails show no clubbing.     Skin is normal age and health appropriate color, turgor, texture, and temperature bilateral lower extremities without ulceration, hyperpigmentation, discoloration, masses nodules or cords palpated.  No ecchymosis, erythema, edema, or cardinal signs of infection bilateral lower extremities.               Assessment:       Encounter Diagnoses   Name Primary?    Capsulitis Yes    Foot pain, bilateral     Acquired equinus deformity of foot, unspecified laterality          Plan:       Cade was seen today for foot pain.    Diagnoses and all orders for this visit:    Capsulitis  -     ORTHOTIC DEVICE (DME)  -      Ambulatory consult to Physical Therapy    Foot pain, bilateral  -     ORTHOTIC DEVICE (DME)  -     Ambulatory consult to Physical Therapy    Acquired equinus deformity of foot, unspecified laterality  -     ORTHOTIC DEVICE (DME)  -     Ambulatory consult to Physical Therapy    Other orders  -     meloxicam (MOBIC) 15 MG tablet; Take 1 tablet (15 mg total) by mouth once daily.      I counseled the patient on his conditions, their implications and medical management.        Patient will stretch the tendo achilles complex three times daily as demonstrated in the office.  Literature was dispensed illustrating proper stretching technique.    Stop nsaid gel.    Continue stretches, inserts, athletic shoes.    Rx meloxicam, custom orthotics, PT.        Return in about 6 weeks (around 8/1/2017).

## 2017-06-30 ENCOUNTER — LAB VISIT (OUTPATIENT)
Dept: LAB | Facility: HOSPITAL | Age: 52
End: 2017-06-30
Attending: UROLOGY
Payer: COMMERCIAL

## 2017-06-30 DIAGNOSIS — E29.1 HYPOGONADISM IN MALE: ICD-10-CM

## 2017-06-30 LAB
ERYTHROCYTE [DISTWIDTH] IN BLOOD BY AUTOMATED COUNT: 13.6 %
HCT VFR BLD AUTO: 53 %
HGB BLD-MCNC: 18.2 G/DL
MCH RBC QN AUTO: 29.1 PG
MCHC RBC AUTO-ENTMCNC: 34.3 %
MCV RBC AUTO: 85 FL
PLATELET # BLD AUTO: 169 K/UL
PMV BLD AUTO: 10.2 FL
RBC # BLD AUTO: 6.25 M/UL
TESTOST SERPL-MCNC: 1235 NG/DL
WBC # BLD AUTO: 9.41 K/UL

## 2017-06-30 PROCEDURE — 84402 ASSAY OF FREE TESTOSTERONE: CPT

## 2017-06-30 PROCEDURE — 36415 COLL VENOUS BLD VENIPUNCTURE: CPT | Mod: PO

## 2017-06-30 PROCEDURE — 85027 COMPLETE CBC AUTOMATED: CPT

## 2017-06-30 PROCEDURE — 84403 ASSAY OF TOTAL TESTOSTERONE: CPT

## 2017-07-07 ENCOUNTER — OFFICE VISIT (OUTPATIENT)
Dept: FAMILY MEDICINE | Facility: CLINIC | Age: 52
End: 2017-07-07
Payer: COMMERCIAL

## 2017-07-07 VITALS
DIASTOLIC BLOOD PRESSURE: 78 MMHG | HEART RATE: 69 BPM | WEIGHT: 235.44 LBS | HEIGHT: 73 IN | RESPIRATION RATE: 18 BRPM | SYSTOLIC BLOOD PRESSURE: 138 MMHG | BODY MASS INDEX: 31.2 KG/M2 | OXYGEN SATURATION: 98 %

## 2017-07-07 DIAGNOSIS — I10 ESSENTIAL HYPERTENSION: Primary | ICD-10-CM

## 2017-07-07 DIAGNOSIS — E11.9 CONTROLLED TYPE 2 DIABETES MELLITUS WITHOUT COMPLICATION, WITHOUT LONG-TERM CURRENT USE OF INSULIN: ICD-10-CM

## 2017-07-07 DIAGNOSIS — R06.02 SOB (SHORTNESS OF BREATH): ICD-10-CM

## 2017-07-07 DIAGNOSIS — M54.2 NECK PAIN: ICD-10-CM

## 2017-07-07 DIAGNOSIS — E78.5 DYSLIPIDEMIA: ICD-10-CM

## 2017-07-07 PROCEDURE — 99999 PR PBB SHADOW E&M-EST. PATIENT-LVL III: CPT | Mod: PBBFAC,,, | Performed by: INTERNAL MEDICINE

## 2017-07-07 PROCEDURE — 3044F HG A1C LEVEL LT 7.0%: CPT | Mod: S$GLB,,, | Performed by: INTERNAL MEDICINE

## 2017-07-07 PROCEDURE — 4010F ACE/ARB THERAPY RXD/TAKEN: CPT | Mod: S$GLB,,, | Performed by: INTERNAL MEDICINE

## 2017-07-07 PROCEDURE — 99214 OFFICE O/P EST MOD 30 MIN: CPT | Mod: S$GLB,,, | Performed by: INTERNAL MEDICINE

## 2017-07-07 NOTE — PROGRESS NOTES
Subjective:       Patient ID: Cade Johnston is a 51 y.o. male.    Chief Complaint: Diabetes (f/u with labs prior)    Recall: Sarcoidosis, 45% lung fnx, sees pulmonary, low testosterone, Dr Larson; Sees Dr Duran cardiology.    DM -  controlled  HTN - controlled  HLD - uncontrolled ldl < 70 goal   BPH/ ED - sees Dr Larson    Erythrocytosis 2nd to testosterone and sarcoid- worsening- has apt w Dr Larson on 7/11   Upper back pain improving with PT  Complains of b/l foot pain - has apt w podiatry next week      Review of Systems   Constitutional: Negative for appetite change and fever.   HENT: Negative for nosebleeds and trouble swallowing.    Eyes: Negative for discharge and visual disturbance.   Respiratory: Negative for choking and shortness of breath.    Cardiovascular: Negative for chest pain and palpitations.   Gastrointestinal: Negative for abdominal pain, nausea and vomiting.   Musculoskeletal: Negative for arthralgias and joint swelling.   Skin: Negative for rash and wound.   Neurological: Negative for dizziness and syncope.   Psychiatric/Behavioral: Negative for confusion and dysphoric mood.       Objective:      Vitals:    07/07/17 0813   BP: 138/78   Pulse:    Resp:      Physical Exam   Constitutional: He appears well-nourished.   Eyes: Conjunctivae and EOM are normal.   Neck: Normal range of motion.   Cardiovascular: Normal rate and regular rhythm.    Pulmonary/Chest: Effort normal and breath sounds normal.   Musculoskeletal:   Normal ROM bilateral    Neurological: No cranial nerve deficit (grossly intact).   Skin: Skin is warm and dry.   Psychiatric: He has a normal mood and affect.   Alert and orientated   Vitals reviewed.        Assessment:       1. Essential hypertension    2. Controlled type 2 diabetes mellitus without complication, without long-term current use of insulin    3. Dyslipidemia    4. SOB (shortness of breath)    5. Neck pain        Plan:       Essential hypertension  -     Comprehensive  "metabolic panel; Future; Expected date: 11/05/2017    Controlled type 2 diabetes mellitus without complication, without long-term current use of insulin  -     Hemoglobin A1c; Future; Expected date: 11/05/2017    Dyslipidemia  -     Lipid panel; Future; Expected date: 11/05/2017    SOB (shortness of breath)    Neck pain    Continue current plan of care  Defer to specialist          Counseled on regular exercise, maintenance of a healthy weight, balanced diet rich in fruits/vegetables and lean protein, and avoidance of unhealthy habits like smoking and excessive alcohol intake.   Also, counseled on importance of being compliant with medication, health appointments, diet and exercise.     Return in about 4 months (around 11/7/2017).    "This note will not be shared with the patient."  "

## 2017-07-11 ENCOUNTER — CLINICAL SUPPORT (OUTPATIENT)
Dept: UROLOGY | Facility: CLINIC | Age: 52
End: 2017-07-11
Payer: COMMERCIAL

## 2017-07-11 ENCOUNTER — OFFICE VISIT (OUTPATIENT)
Dept: UROLOGY | Facility: CLINIC | Age: 52
End: 2017-07-11
Payer: COMMERCIAL

## 2017-07-11 VITALS
HEIGHT: 73 IN | DIASTOLIC BLOOD PRESSURE: 80 MMHG | SYSTOLIC BLOOD PRESSURE: 120 MMHG | WEIGHT: 231.5 LBS | HEART RATE: 87 BPM | BODY MASS INDEX: 30.68 KG/M2

## 2017-07-11 DIAGNOSIS — E29.1 HYPOGONADISM MALE: Primary | ICD-10-CM

## 2017-07-11 DIAGNOSIS — R79.89 LOW SERUM TESTOSTERONE LEVEL: Primary | ICD-10-CM

## 2017-07-11 DIAGNOSIS — N20.0 NEPHROLITHIASIS: ICD-10-CM

## 2017-07-11 PROCEDURE — 96372 THER/PROPH/DIAG INJ SC/IM: CPT | Mod: S$GLB,,, | Performed by: UROLOGY

## 2017-07-11 PROCEDURE — 99999 PR PBB SHADOW E&M-EST. PATIENT-LVL III: CPT | Mod: PBBFAC,,, | Performed by: UROLOGY

## 2017-07-11 PROCEDURE — 99214 OFFICE O/P EST MOD 30 MIN: CPT | Mod: 25,S$GLB,, | Performed by: UROLOGY

## 2017-07-11 RX ADMIN — TESTOSTERONE CYPIONATE 300 MG: 200 INJECTION, SOLUTION INTRAMUSCULAR at 03:07

## 2017-07-11 NOTE — PROGRESS NOTES
[facility]Y [place]  7 11 17    c-c     Age 71, comes in as urologic f/u. His voiding flow is good, no intermittency or need to strain. No back pain, no dysuria, no hematuria. Nocturia x 1    Has a hx of nephrolithiasis, with a hx of large kidney stone in the R kidney 20 yrs ago, underwent an eswl but the fragments apparently got stuck in the ureter and required open exploration for ureterolithotomy. Says the open surgery was complex and took 10 hours. Pt did fine after that and has had no more problems. About 10 years later he passed another small stone and also he had one small one removed from the ureter by dr jag jensen.     Pt is seen in this office for testosterone replacement and used to be seen by dr sharma. He comes here for testosterone shots every 3 weeks and does well with that.    [facility]    Surgical:  has a past surgical history that includes Ureteral exploration (1996); elbow spur removal; Extracorporeal shock wave lithotripsy (1996); removal of skin cancer; and Circumcision.    Medical:  has a past medical history of DJD (degenerative joint disease) of lumbar spine; DM type 2 (diabetes mellitus, type 2); Fatty liver; GERD (gastroesophageal reflux disease); Hepatosplenomegaly; HTN (hypertension); Mixed hyperlipidemia; Nephrolithiasis; Pulmonary sarcoidosis; Sarcoidosis of lung; and Ventricular hypokinesis.    Familial: father with CAD, mother with hypertension    Social:  for [employer]    Current Outpatient Prescriptions on File Prior to Visit   Medication Sig Dispense Refill    aspirin (ENTERIC COATED ASPIRIN) 81 MG EC tablet Take 81 mg by mouth once daily. No Sig Provided      blood sugar diagnostic Strp Test q day 100 strip 3    diclofenac sodium 1 % Gel Apply 2 g topically 4 (four) times daily. 100 g 2    ipratropium-albuterol (COMBIVENT)  mcg/actuation inhaler Inhale 1 puff into the lungs every 4 (four) hours as needed for Shortness of Breath. Rescue 4 g 11     losartan-hydrochlorothiazide 100-25 mg (HYZAAR) 100-25 mg per tablet TAKE 1 TABLET BY MOUTH ONCE DAILY. 30 tablet 5    metformin (GLUCOPHAGE) 500 MG tablet TAKE 1 TABLET TWICE A DAY WITH MEALS 180 tablet 1    sildenafil (REVATIO) 20 mg Tab Take 20 mg by mouth.       Current Facility-Administered Medications on File Prior to Visit   Medication Dose Route Frequency Provider Last Rate Last Dose    testosterone cypionate injection 300 mg  300 mg Intramuscular Q21 Days Des Larson MD   300 mg at 07/11/17 1555       REVIEW OF SYSTEMS  GENERAL: has complaints of fatigue and does a lot better on testosterone. No headaches or dizzy spells.   HEENT: vision preserved. Sinuses: has frequent congestion   CARDIOPULMONARY: No swelling of the legs; no chest pain. No shortness of breath, no wheezing.   GASTROINTESTINAL: No heartburn. Denies diarrhea; has some constipation, no blood or mucus in stools.   GENITOURINARY: Denies dysuria, bleeding or incontinence.   MUSCULOSKELETAL: some arthritic complaints in neck and feet   PSYCHIATRIC: No history of depression or anxiety.   ENDOCRINOLOGIC: No reports of sweating, cold or heat intolerance. No polyuria or polydipsia.   NEUROLOGICAL: No headache, dizziness, syncope, paralysis, ataxia, numbness or tingling in the extremities.  LYMPHATICS: No enlarged nodes. No history of splenectomy.    Pt alert, oriented, cooperative, no distress  HEENT:  wnl.  Neck: supple, no JVD, no lymphadenopathy  Chest: CV NSR, no murmurs  Lungs: normal auscultation  Abdomen flat, nontender, no organomegaly, no masses.  No hernias  Penis nl, meatus nl  Testes nl, epi nl, scrotum nl  BRIDGER: anus nl, sphincter nl tone, mucosa without lesions, prostate 30 gm, symmetric, no nodules or indurations  Extremities: no edema, peripheral pulses nl  Neuro: preserved          IMPRESSION:  Hypogonadism, kidney stones, erectile dysfunction  viagra as needed  RTC yearly

## 2017-07-12 RX ORDER — LOSARTAN POTASSIUM AND HYDROCHLOROTHIAZIDE 25; 100 MG/1; MG/1
1 TABLET ORAL DAILY
Qty: 90 TABLET | Refills: 2 | Status: SHIPPED | OUTPATIENT
Start: 2017-07-12 | End: 2018-04-04 | Stop reason: SDUPTHER

## 2017-07-14 LAB — TESTOST FREE SERPL-MCNC: 25.6 PG/ML

## 2017-08-01 ENCOUNTER — OFFICE VISIT (OUTPATIENT)
Dept: PODIATRY | Facility: CLINIC | Age: 52
End: 2017-08-01
Payer: COMMERCIAL

## 2017-08-01 ENCOUNTER — CLINICAL SUPPORT (OUTPATIENT)
Dept: UROLOGY | Facility: CLINIC | Age: 52
End: 2017-08-01
Payer: COMMERCIAL

## 2017-08-01 VITALS — WEIGHT: 231 LBS | BODY MASS INDEX: 30.62 KG/M2 | HEIGHT: 73 IN

## 2017-08-01 DIAGNOSIS — M79.671 FOOT PAIN, BILATERAL: ICD-10-CM

## 2017-08-01 DIAGNOSIS — E29.1 MALE HYPOGONADISM: Primary | ICD-10-CM

## 2017-08-01 DIAGNOSIS — M77.8 CAPSULITIS OF FOOT, UNSPECIFIED LATERALITY: Primary | ICD-10-CM

## 2017-08-01 DIAGNOSIS — M79.672 FOOT PAIN, BILATERAL: ICD-10-CM

## 2017-08-01 PROCEDURE — 96372 THER/PROPH/DIAG INJ SC/IM: CPT | Mod: S$GLB,,, | Performed by: UROLOGY

## 2017-08-01 PROCEDURE — 99999 PR PBB SHADOW E&M-EST. PATIENT-LVL I: CPT | Mod: PBBFAC,,,

## 2017-08-01 PROCEDURE — 99212 OFFICE O/P EST SF 10 MIN: CPT | Mod: S$GLB,,, | Performed by: PODIATRIST

## 2017-08-01 PROCEDURE — 99999 PR PBB SHADOW E&M-EST. PATIENT-LVL III: CPT | Mod: PBBFAC,,, | Performed by: PODIATRIST

## 2017-08-01 RX ADMIN — TESTOSTERONE CYPIONATE 300 MG: 200 INJECTION, SOLUTION INTRAMUSCULAR at 08:08

## 2017-08-01 NOTE — PROGRESS NOTES
Subjective:      Patient ID: Cade Johnston is a 51 y.o. male.    Chief Complaint: Foot Pain (bilateral)    Sharp aching deep pain both forefoot.  Gradual onset, worsening over past several weeks, aggravated by increased weight bearing, shoe gear, pressure.  Modest improvement with stretches, otc inserts, nsaid gel  OTC pain med not helping.  Xray negative acute injury both feet.  Didn't attend PT.      Review of Systems   Constitution: Negative for chills, diaphoresis, fever, malaise/fatigue and night sweats.   Cardiovascular: Negative for claudication, cyanosis, leg swelling and syncope.   Skin: Negative for color change, dry skin, nail changes, rash, suspicious lesions and unusual hair distribution.   Musculoskeletal: Positive for joint pain. Negative for falls, joint swelling, muscle cramps, muscle weakness and stiffness.   Gastrointestinal: Negative for constipation, diarrhea, nausea and vomiting.   Neurological: Negative for brief paralysis, disturbances in coordination, focal weakness, numbness, paresthesias, sensory change and tremors.           Objective:      Physical Exam   Constitutional: He appears well-developed and well-nourished. He is cooperative. No distress.   Cardiovascular:   Pulses:       Popliteal pulses are 2+ on the right side, and 2+ on the left side.        Dorsalis pedis pulses are 2+ on the right side, and 2+ on the left side.        Posterior tibial pulses are 2+ on the right side, and 2+ on the left side.   Capillary refill 3 seconds all toes/distal feet, all toes/both feet warm to touch.      Negative lymphadenopathy bilateral popliteal fossa and tarsal tunnel.      Negavie lower extremity edema bilateral.     Musculoskeletal:        Right ankle: Normal. He exhibits normal range of motion, no swelling, no ecchymosis, no deformity, no laceration and normal pulse. Achilles tendon normal. Achilles tendon exhibits no pain, no defect and normal Morrison's test results.   Minor pain to  palpation inferior mtpj 2-4 left and right without evidence of trauma or infection.     Ankle dorsiflexion decreased at <10 degrees bilateral with moderate increase with knee flexion bilateral.    Otherwise, Normal angle, base, station of gait. All ten toes without clubbing, cyanosis, or signs of ischemia.  No pain to palpation bilateral lower extremities.  Range of motion, stability, muscle strength, and muscle tone normal bilateral feet and legs.         Lymphadenopathy: No inguinal adenopathy noted on the right or left side.   Negative lymphadenopathy bilateral popliteal fossa and tarsal tunnel.   Neurological: He is alert. He has normal strength. He displays no atrophy and no tremor. No sensory deficit. He exhibits normal muscle tone. He displays no seizure activity. Gait normal.   Reflex Scores:       Patellar reflexes are 2+ on the right side and 2+ on the left side.       Achilles reflexes are 2+ on the right side and 2+ on the left side.  Negative tinel sign to percussion sural, superficial peroneal, deep peroneal, saphenous, and posterior tibial nerves right and left ankles and feet.     Skin: Skin is warm, dry and intact. No abrasion, no bruising, no burn, no ecchymosis, no laceration, no lesion and no rash noted. He is not diaphoretic. No cyanosis or erythema. No pallor. Nails show no clubbing.     Skin is normal age and health appropriate color, turgor, texture, and temperature bilateral lower extremities without ulceration, hyperpigmentation, discoloration, masses nodules or cords palpated.  No ecchymosis, erythema, edema, or cardinal signs of infection bilateral lower extremities.               Assessment:       Encounter Diagnoses   Name Primary?    Capsulitis of foot, unspecified laterality Yes    Foot pain, bilateral          Plan:       Cade was seen today for foot pain.    Diagnoses and all orders for this visit:    Capsulitis of foot, unspecified laterality  -     ORTHOTIC DEVICE  (DME)    Foot pain, bilateral      I counseled the patient on his conditions, their implications and medical management.         continue stretches, inserts, nsaid gel prn, athletic shoes when possible.    Return to PT if worsening/dissatisfied with progress/pain    Rx custom orthotics.    Return if symptoms worsen or fail to improve.

## 2017-08-14 RX ORDER — METFORMIN HYDROCHLORIDE 500 MG/1
TABLET ORAL
Qty: 180 TABLET | Refills: 1 | Status: SHIPPED | OUTPATIENT
Start: 2017-08-14 | End: 2018-02-16 | Stop reason: SDUPTHER

## 2017-08-18 ENCOUNTER — TELEPHONE (OUTPATIENT)
Dept: UROLOGY | Facility: CLINIC | Age: 52
End: 2017-08-18

## 2017-08-18 NOTE — TELEPHONE ENCOUNTER
Patient was given the billing number for inquiries. This message was also be forwarded to Margoth Jaimes LPN. He stated that she has been helping him with some insurance issues.

## 2017-08-18 NOTE — TELEPHONE ENCOUNTER
----- Message from Marie Rodriguez sent at 8/18/2017  1:00 PM CDT -----  Contact: CHRISTIAN MAYERS [[id number]]  x_  1st Request  _  2nd Request  _  3rd Request        Who: CHRISTIAN MAYERS [[id number]]    Why: patient states he would like to speak with staff in regards to his insurance     What Number to Call Back: [phone]    When to Expect a call back: (Before the end of the day)   -- if call after 3:00 call back will be tomorrow.

## 2017-08-29 ENCOUNTER — CLINICAL SUPPORT (OUTPATIENT)
Dept: UROLOGY | Facility: CLINIC | Age: 52
End: 2017-08-29
Payer: COMMERCIAL

## 2017-08-29 DIAGNOSIS — E29.1 HYPOGONADISM MALE: Primary | ICD-10-CM

## 2017-08-29 PROCEDURE — 99999 PR PBB SHADOW E&M-EST. PATIENT-LVL I: CPT | Mod: PBBFAC,,,

## 2017-08-29 PROCEDURE — 96372 THER/PROPH/DIAG INJ SC/IM: CPT | Mod: S$GLB,,, | Performed by: UROLOGY

## 2017-08-29 RX ADMIN — TESTOSTERONE CYPIONATE 300 MG: 200 INJECTION, SOLUTION INTRAMUSCULAR at 07:08

## 2017-08-29 NOTE — PROGRESS NOTES
Testosterone 300 mg injection given, see MARS. Educated patient about Adverse Medication Reaction and instructed patient to wait for 15 minutes after injection.  Patient declined to wait.

## 2017-09-26 ENCOUNTER — CLINICAL SUPPORT (OUTPATIENT)
Dept: UROLOGY | Facility: CLINIC | Age: 52
End: 2017-09-26
Payer: COMMERCIAL

## 2017-09-26 DIAGNOSIS — E29.1 HYPOGONADISM MALE: Primary | ICD-10-CM

## 2017-09-26 PROCEDURE — 96372 THER/PROPH/DIAG INJ SC/IM: CPT | Mod: S$GLB,,, | Performed by: UROLOGY

## 2017-09-26 PROCEDURE — 99999 PR PBB SHADOW E&M-EST. PATIENT-LVL I: CPT | Mod: PBBFAC,,,

## 2017-09-26 RX ADMIN — TESTOSTERONE CYPIONATE 300 MG: 200 INJECTION, SOLUTION INTRAMUSCULAR at 08:09

## 2017-10-16 ENCOUNTER — CLINICAL SUPPORT (OUTPATIENT)
Dept: UROLOGY | Facility: CLINIC | Age: 52
End: 2017-10-16
Payer: COMMERCIAL

## 2017-10-16 DIAGNOSIS — E29.1 HYPOGONADISM MALE: Primary | ICD-10-CM

## 2017-10-16 PROCEDURE — 99999 PR PBB SHADOW E&M-EST. PATIENT-LVL I: CPT | Mod: PBBFAC,,,

## 2017-10-16 PROCEDURE — 96372 THER/PROPH/DIAG INJ SC/IM: CPT | Mod: S$GLB,,, | Performed by: UROLOGY

## 2017-10-16 RX ADMIN — TESTOSTERONE CYPIONATE 300 MG: 200 INJECTION, SOLUTION INTRAMUSCULAR at 08:10

## 2017-10-16 NOTE — PROGRESS NOTES
Testosterone injection given, see MARS. Educated patient about Adverse Medication Reaction and instructed patient to wait for 15 minutes after injection.  Patient had no adverse reaction.

## 2017-10-31 ENCOUNTER — LAB VISIT (OUTPATIENT)
Dept: LAB | Facility: HOSPITAL | Age: 52
End: 2017-10-31
Attending: INTERNAL MEDICINE
Payer: COMMERCIAL

## 2017-10-31 DIAGNOSIS — E78.5 DYSLIPIDEMIA: ICD-10-CM

## 2017-10-31 DIAGNOSIS — I10 ESSENTIAL HYPERTENSION: ICD-10-CM

## 2017-10-31 DIAGNOSIS — E11.9 CONTROLLED TYPE 2 DIABETES MELLITUS WITHOUT COMPLICATION, WITHOUT LONG-TERM CURRENT USE OF INSULIN: ICD-10-CM

## 2017-10-31 LAB
ALBUMIN SERPL BCP-MCNC: 3.7 G/DL
ALP SERPL-CCNC: 89 U/L
ALT SERPL W/O P-5'-P-CCNC: 27 U/L
ANION GAP SERPL CALC-SCNC: 9 MMOL/L
AST SERPL-CCNC: 23 U/L
BILIRUB SERPL-MCNC: 1.5 MG/DL
BUN SERPL-MCNC: 14 MG/DL
CALCIUM SERPL-MCNC: 9.1 MG/DL
CHLORIDE SERPL-SCNC: 102 MMOL/L
CHOLEST SERPL-MCNC: 159 MG/DL
CHOLEST/HDLC SERPL: 5 {RATIO}
CO2 SERPL-SCNC: 28 MMOL/L
CREAT SERPL-MCNC: 1 MG/DL
EST. GFR  (AFRICAN AMERICAN): >60 ML/MIN/1.73 M^2
EST. GFR  (NON AFRICAN AMERICAN): >60 ML/MIN/1.73 M^2
ESTIMATED AVG GLUCOSE: 140 MG/DL
GLUCOSE SERPL-MCNC: 123 MG/DL
HBA1C MFR BLD HPLC: 6.5 %
HDLC SERPL-MCNC: 32 MG/DL
HDLC SERPL: 20.1 %
LDLC SERPL CALC-MCNC: 101.2 MG/DL
NONHDLC SERPL-MCNC: 127 MG/DL
POTASSIUM SERPL-SCNC: 4.2 MMOL/L
PROT SERPL-MCNC: 7.4 G/DL
SODIUM SERPL-SCNC: 139 MMOL/L
TRIGL SERPL-MCNC: 129 MG/DL

## 2017-10-31 PROCEDURE — 36415 COLL VENOUS BLD VENIPUNCTURE: CPT | Mod: PO

## 2017-10-31 PROCEDURE — 80053 COMPREHEN METABOLIC PANEL: CPT

## 2017-10-31 PROCEDURE — 83036 HEMOGLOBIN GLYCOSYLATED A1C: CPT

## 2017-10-31 PROCEDURE — 80061 LIPID PANEL: CPT

## 2017-11-06 ENCOUNTER — CLINICAL SUPPORT (OUTPATIENT)
Dept: UROLOGY | Facility: CLINIC | Age: 52
End: 2017-11-06
Payer: COMMERCIAL

## 2017-11-06 DIAGNOSIS — E29.1 MALE HYPOGONADISM: Primary | ICD-10-CM

## 2017-11-06 PROCEDURE — 96372 THER/PROPH/DIAG INJ SC/IM: CPT | Mod: S$GLB,,, | Performed by: UROLOGY

## 2017-11-06 PROCEDURE — 99999 PR PBB SHADOW E&M-EST. PATIENT-LVL II: CPT | Mod: PBBFAC,,,

## 2017-11-06 RX ORDER — TESTOSTERONE CYPIONATE 200 MG/ML
300 INJECTION, SOLUTION INTRAMUSCULAR
Status: COMPLETED | OUTPATIENT
Start: 2017-11-27 | End: 2018-03-12

## 2017-11-06 RX ADMIN — TESTOSTERONE CYPIONATE 300 MG: 200 INJECTION, SOLUTION INTRAMUSCULAR at 08:11

## 2017-11-07 ENCOUNTER — OFFICE VISIT (OUTPATIENT)
Dept: FAMILY MEDICINE | Facility: CLINIC | Age: 52
End: 2017-11-07
Payer: COMMERCIAL

## 2017-11-07 VITALS
WEIGHT: 233.44 LBS | RESPIRATION RATE: 18 BRPM | BODY MASS INDEX: 30.94 KG/M2 | DIASTOLIC BLOOD PRESSURE: 70 MMHG | HEART RATE: 69 BPM | SYSTOLIC BLOOD PRESSURE: 135 MMHG | HEIGHT: 73 IN | OXYGEN SATURATION: 96 %

## 2017-11-07 DIAGNOSIS — E78.5 DYSLIPIDEMIA: ICD-10-CM

## 2017-11-07 DIAGNOSIS — R09.82 PND (POST-NASAL DRIP): ICD-10-CM

## 2017-11-07 DIAGNOSIS — D75.1 ERYTHROCYTOSIS: ICD-10-CM

## 2017-11-07 DIAGNOSIS — E11.9 CONTROLLED TYPE 2 DIABETES MELLITUS WITHOUT COMPLICATION, WITHOUT LONG-TERM CURRENT USE OF INSULIN: ICD-10-CM

## 2017-11-07 DIAGNOSIS — Z23 NEED FOR PNEUMOCOCCAL VACCINE: ICD-10-CM

## 2017-11-07 DIAGNOSIS — I10 ESSENTIAL HYPERTENSION: Primary | ICD-10-CM

## 2017-11-07 PROCEDURE — 90686 IIV4 VACC NO PRSV 0.5 ML IM: CPT | Mod: S$GLB,,, | Performed by: INTERNAL MEDICINE

## 2017-11-07 PROCEDURE — 99999 PR PBB SHADOW E&M-EST. PATIENT-LVL III: CPT | Mod: PBBFAC,,, | Performed by: INTERNAL MEDICINE

## 2017-11-07 PROCEDURE — 90471 IMMUNIZATION ADMIN: CPT | Mod: S$GLB,,, | Performed by: INTERNAL MEDICINE

## 2017-11-07 PROCEDURE — 90732 PPSV23 VACC 2 YRS+ SUBQ/IM: CPT | Mod: S$GLB,,, | Performed by: INTERNAL MEDICINE

## 2017-11-07 PROCEDURE — 99214 OFFICE O/P EST MOD 30 MIN: CPT | Mod: 25,S$GLB,, | Performed by: INTERNAL MEDICINE

## 2017-11-07 PROCEDURE — 90472 IMMUNIZATION ADMIN EACH ADD: CPT | Mod: S$GLB,,, | Performed by: INTERNAL MEDICINE

## 2017-11-07 RX ORDER — FLUTICASONE PROPIONATE 50 MCG
2 SPRAY, SUSPENSION (ML) NASAL DAILY
Qty: 16 G | Refills: 11 | Status: SHIPPED | OUTPATIENT
Start: 2017-11-07 | End: 2020-05-12

## 2017-11-07 RX ORDER — MONTELUKAST SODIUM 10 MG/1
10 TABLET ORAL NIGHTLY
Qty: 30 TABLET | Refills: 11 | Status: SHIPPED | OUTPATIENT
Start: 2017-11-07 | End: 2018-10-30 | Stop reason: SDUPTHER

## 2017-11-07 NOTE — PROGRESS NOTES
Subjective:       Patient ID: Cade Johnston is a 51 y.o. male.    Chief Complaint: Hypertension    Recall: Sarcoidosis, 45% lung fnx, sees pulmonary, low testosterone, Dr Larson; Sees Dr Duran cardiology.    DM -  controlled  HTN - controlled  HLD - uncontrolled ldl < 70 goal   BPH/ ED - sees Dr Larson    Erythrocytosis 2nd to testosterone and sarcoid- worsening- has done a phlebotomy in past.       Review of Systems   Constitutional: Negative for appetite change and fever.   HENT: Negative for nosebleeds and trouble swallowing.    Eyes: Negative for discharge and visual disturbance.   Respiratory: Negative for choking and shortness of breath.    Cardiovascular: Negative for chest pain and palpitations.   Gastrointestinal: Negative for abdominal pain, nausea and vomiting.   Musculoskeletal: Negative for arthralgias and joint swelling.   Skin: Negative for rash and wound.   Neurological: Negative for dizziness and syncope.   Psychiatric/Behavioral: Negative for confusion and dysphoric mood.       Objective:      Vitals:    11/07/17 0724   BP: 135/70   Pulse: 69   Resp: 18     Physical Exam   Constitutional: He appears well-nourished.   Eyes: Conjunctivae and EOM are normal.   Neck: Normal range of motion.   Cardiovascular: Normal rate and regular rhythm.    Pulmonary/Chest: Effort normal and breath sounds normal.   Musculoskeletal:   Normal ROM bilateral    Neurological: No cranial nerve deficit (grossly intact).   Skin: Skin is warm and dry.   Psychiatric: He has a normal mood and affect.   Alert and orientated   Vitals reviewed.        Assessment:       1. Essential hypertension    2. Controlled type 2 diabetes mellitus without complication, without long-term current use of insulin    3. Dyslipidemia    4. Erythrocytosis    5. PND (post-nasal drip)        Plan:       Essential hypertension  -     Comprehensive metabolic panel; Future; Expected date: 05/06/2018    Controlled type 2 diabetes mellitus without  "complication, without long-term current use of insulin  -     Hemoglobin A1c; Future; Expected date: 05/06/2018  -     Microalbumin/creatinine urine ratio; Future; Expected date: 05/06/2018    Dyslipidemia  -     Lipid panel; Future; Expected date: 05/06/2018    Erythrocytosis  -     CBC auto differential; Future; Expected date: 05/06/2018    PND (post-nasal drip)  -     montelukast (SINGULAIR) 10 mg tablet; Take 1 tablet (10 mg total) by mouth every evening.  Dispense: 30 tablet; Refill: 11  -     fluticasone (FLONASE) 50 mcg/actuation nasal spray; 2 sprays by Each Nare route once daily.  Dispense: 16 g; Refill: 11    Need for pneumococcal vaccine  -     Pneumococcal Polysaccharide Vaccine (23 Valent) (SQ/IM)    Continue current plan of care          Counseled on regular exercise, maintenance of a healthy weight, balanced diet rich in fruits/vegetables and lean protein, and avoidance of unhealthy habits like smoking and excessive alcohol intake.   Also, counseled on importance of being compliant with medication, health appointments, diet and exercise.     Return in about 6 months (around 5/7/2018). annual    "This note will not be shared with the patient."  "

## 2017-11-27 ENCOUNTER — CLINICAL SUPPORT (OUTPATIENT)
Dept: UROLOGY | Facility: CLINIC | Age: 52
End: 2017-11-27
Payer: COMMERCIAL

## 2017-11-27 DIAGNOSIS — E29.1 MALE HYPOGONADISM: Primary | ICD-10-CM

## 2017-11-27 PROCEDURE — 99999 PR PBB SHADOW E&M-EST. PATIENT-LVL II: CPT | Mod: PBBFAC,,,

## 2017-11-27 PROCEDURE — 96372 THER/PROPH/DIAG INJ SC/IM: CPT | Mod: S$GLB,,, | Performed by: UROLOGY

## 2017-11-27 RX ADMIN — TESTOSTERONE CYPIONATE 300 MG: 200 INJECTION, SOLUTION INTRAMUSCULAR at 08:11

## 2017-11-27 NOTE — PROGRESS NOTES
Pt received 300 mg testosterone injection IM in LUQ of buttocks. Pt tolerated procedure well. Pt refused to stay for observation.

## 2017-12-18 ENCOUNTER — CLINICAL SUPPORT (OUTPATIENT)
Dept: UROLOGY | Facility: CLINIC | Age: 52
End: 2017-12-18
Payer: COMMERCIAL

## 2017-12-18 DIAGNOSIS — E29.1 MALE HYPOGONADISM: Primary | ICD-10-CM

## 2017-12-18 PROCEDURE — 99999 PR PBB SHADOW E&M-EST. PATIENT-LVL II: CPT | Mod: PBBFAC,,,

## 2017-12-18 PROCEDURE — 96372 THER/PROPH/DIAG INJ SC/IM: CPT | Mod: S$GLB,,, | Performed by: UROLOGY

## 2017-12-18 RX ADMIN — TESTOSTERONE CYPIONATE 300 MG: 200 INJECTION, SOLUTION INTRAMUSCULAR at 08:12

## 2017-12-18 NOTE — PROGRESS NOTES
Pt received 300 mg testosterone injection IM in RUQ of buttocks. Pt tolerated procedure well. Pt refused to stay for observation. Pt verbalized understand of the risk with leaving the clinic.

## 2018-01-08 ENCOUNTER — CLINICAL SUPPORT (OUTPATIENT)
Dept: UROLOGY | Facility: CLINIC | Age: 53
End: 2018-01-08
Payer: COMMERCIAL

## 2018-01-08 ENCOUNTER — TELEPHONE (OUTPATIENT)
Dept: UROLOGY | Facility: CLINIC | Age: 53
End: 2018-01-08

## 2018-01-08 DIAGNOSIS — E29.1 MALE HYPOGONADISM: Primary | ICD-10-CM

## 2018-01-08 PROCEDURE — 96372 THER/PROPH/DIAG INJ SC/IM: CPT | Mod: S$GLB,,, | Performed by: UROLOGY

## 2018-01-08 PROCEDURE — 99999 PR PBB SHADOW E&M-EST. PATIENT-LVL II: CPT | Mod: PBBFAC,,,

## 2018-01-08 RX ADMIN — TESTOSTERONE CYPIONATE 300 MG: 200 INJECTION, SOLUTION INTRAMUSCULAR at 02:01

## 2018-01-08 NOTE — TELEPHONE ENCOUNTER
----- Message from Richelle Herron sent at 1/8/2018 11:28 AM CST -----  Contact: Pt  Pt is calling to r/s injection and can be reached at [phone].    Thank you

## 2018-01-29 ENCOUNTER — CLINICAL SUPPORT (OUTPATIENT)
Dept: UROLOGY | Facility: CLINIC | Age: 53
End: 2018-01-29
Payer: COMMERCIAL

## 2018-01-29 DIAGNOSIS — E29.1 MALE HYPOGONADISM: Primary | ICD-10-CM

## 2018-01-29 PROCEDURE — 96372 THER/PROPH/DIAG INJ SC/IM: CPT | Mod: S$GLB,,, | Performed by: UROLOGY

## 2018-01-29 PROCEDURE — 99999 PR PBB SHADOW E&M-EST. PATIENT-LVL II: CPT | Mod: PBBFAC,,,

## 2018-01-29 RX ADMIN — TESTOSTERONE CYPIONATE 300 MG: 200 INJECTION, SOLUTION INTRAMUSCULAR at 08:01

## 2018-01-29 NOTE — PROGRESS NOTES
Pt received 300 mg testosterone injection IM in LUQ of buttocks. Pt tolerated procedure well. Pt refused to stay for observation. Pt verbalized understand of the risk with leaving the clinic.

## 2018-02-19 ENCOUNTER — CLINICAL SUPPORT (OUTPATIENT)
Dept: UROLOGY | Facility: CLINIC | Age: 53
End: 2018-02-19
Payer: COMMERCIAL

## 2018-02-19 DIAGNOSIS — E29.1 MALE HYPOGONADISM: Primary | ICD-10-CM

## 2018-02-19 PROCEDURE — 99999 PR PBB SHADOW E&M-EST. PATIENT-LVL II: CPT | Mod: PBBFAC,,,

## 2018-02-19 PROCEDURE — 96372 THER/PROPH/DIAG INJ SC/IM: CPT | Mod: S$GLB,,, | Performed by: UROLOGY

## 2018-02-19 RX ORDER — METFORMIN HYDROCHLORIDE 500 MG/1
TABLET ORAL
Qty: 180 TABLET | Refills: 1 | Status: SHIPPED | OUTPATIENT
Start: 2018-02-19 | End: 2018-08-26 | Stop reason: SDUPTHER

## 2018-02-19 RX ADMIN — TESTOSTERONE CYPIONATE 300 MG: 200 INJECTION, SOLUTION INTRAMUSCULAR at 08:02

## 2018-03-12 ENCOUNTER — CLINICAL SUPPORT (OUTPATIENT)
Dept: UROLOGY | Facility: CLINIC | Age: 53
End: 2018-03-12
Payer: COMMERCIAL

## 2018-03-12 DIAGNOSIS — E29.1 MALE HYPOGONADISM: Primary | ICD-10-CM

## 2018-03-12 PROCEDURE — 99999 PR PBB SHADOW E&M-EST. PATIENT-LVL II: CPT | Mod: PBBFAC,,,

## 2018-03-12 PROCEDURE — 96372 THER/PROPH/DIAG INJ SC/IM: CPT | Mod: S$GLB,,, | Performed by: UROLOGY

## 2018-03-12 RX ORDER — TESTOSTERONE CYPIONATE 200 MG/ML
300 INJECTION, SOLUTION INTRAMUSCULAR
Status: SHIPPED | OUTPATIENT
Start: 2018-04-02 | End: 2018-08-06

## 2018-03-12 RX ADMIN — TESTOSTERONE CYPIONATE 300 MG: 200 INJECTION, SOLUTION INTRAMUSCULAR at 09:03

## 2018-04-02 ENCOUNTER — CLINICAL SUPPORT (OUTPATIENT)
Dept: UROLOGY | Facility: CLINIC | Age: 53
End: 2018-04-02
Payer: COMMERCIAL

## 2018-04-02 DIAGNOSIS — E29.1 MALE HYPOGONADISM: Primary | ICD-10-CM

## 2018-04-02 PROCEDURE — 96372 THER/PROPH/DIAG INJ SC/IM: CPT | Mod: S$GLB,,, | Performed by: UROLOGY

## 2018-04-02 PROCEDURE — 99999 PR PBB SHADOW E&M-EST. PATIENT-LVL II: CPT | Mod: PBBFAC,,,

## 2018-04-02 RX ADMIN — TESTOSTERONE CYPIONATE 300 MG: 200 INJECTION, SOLUTION INTRAMUSCULAR at 08:04

## 2018-04-05 RX ORDER — LOSARTAN POTASSIUM AND HYDROCHLOROTHIAZIDE 25; 100 MG/1; MG/1
1 TABLET ORAL DAILY
Qty: 90 TABLET | Refills: 2 | Status: SHIPPED | OUTPATIENT
Start: 2018-04-05 | End: 2018-12-23 | Stop reason: SDUPTHER

## 2018-04-23 ENCOUNTER — CLINICAL SUPPORT (OUTPATIENT)
Dept: UROLOGY | Facility: CLINIC | Age: 53
End: 2018-04-23
Payer: COMMERCIAL

## 2018-04-23 DIAGNOSIS — E29.1 MALE HYPOGONADISM: Primary | ICD-10-CM

## 2018-04-23 PROCEDURE — 96372 THER/PROPH/DIAG INJ SC/IM: CPT | Mod: S$GLB,,, | Performed by: UROLOGY

## 2018-04-23 PROCEDURE — 99999 PR PBB SHADOW E&M-EST. PATIENT-LVL II: CPT | Mod: PBBFAC,,,

## 2018-04-23 RX ORDER — MONTELUKAST SODIUM 10 MG/1
10 TABLET ORAL NIGHTLY
Refills: 11 | COMMUNITY
Start: 2018-03-08 | End: 2018-11-07 | Stop reason: SDUPTHER

## 2018-04-23 RX ADMIN — TESTOSTERONE CYPIONATE 300 MG: 200 INJECTION, SOLUTION INTRAMUSCULAR at 08:04

## 2018-04-24 ENCOUNTER — PATIENT OUTREACH (OUTPATIENT)
Dept: ADMINISTRATIVE | Facility: HOSPITAL | Age: 53
End: 2018-04-24

## 2018-04-24 NOTE — PROGRESS NOTES
Health Maintenance Due   Topic Date Due    Low Dose Statin  11/08/1984    Eye Exam  04/20/2018    Hemoglobin A1c  04/30/2018    Foot Exam  05/11/2018

## 2018-04-27 DIAGNOSIS — Z13.5 DIABETIC RETINOPATHY SCREENING: ICD-10-CM

## 2018-05-01 ENCOUNTER — LAB VISIT (OUTPATIENT)
Dept: LAB | Facility: HOSPITAL | Age: 53
End: 2018-05-01
Attending: INTERNAL MEDICINE
Payer: COMMERCIAL

## 2018-05-01 DIAGNOSIS — I10 ESSENTIAL HYPERTENSION: ICD-10-CM

## 2018-05-01 DIAGNOSIS — E11.9 CONTROLLED TYPE 2 DIABETES MELLITUS WITHOUT COMPLICATION, WITHOUT LONG-TERM CURRENT USE OF INSULIN: ICD-10-CM

## 2018-05-01 DIAGNOSIS — E78.5 DYSLIPIDEMIA: ICD-10-CM

## 2018-05-01 DIAGNOSIS — D75.1 ERYTHROCYTOSIS: ICD-10-CM

## 2018-05-01 LAB
ALBUMIN SERPL BCP-MCNC: 3.8 G/DL
ALP SERPL-CCNC: 83 U/L
ALT SERPL W/O P-5'-P-CCNC: 21 U/L
ANION GAP SERPL CALC-SCNC: 9 MMOL/L
AST SERPL-CCNC: 17 U/L
BASOPHILS # BLD AUTO: 0.09 K/UL
BASOPHILS NFR BLD: 1.2 %
BILIRUB SERPL-MCNC: 1.6 MG/DL
BUN SERPL-MCNC: 19 MG/DL
CALCIUM SERPL-MCNC: 9.3 MG/DL
CHLORIDE SERPL-SCNC: 102 MMOL/L
CHOLEST SERPL-MCNC: 148 MG/DL
CHOLEST/HDLC SERPL: 5.3 {RATIO}
CO2 SERPL-SCNC: 30 MMOL/L
CREAT SERPL-MCNC: 1 MG/DL
DIFFERENTIAL METHOD: ABNORMAL
EOSINOPHIL # BLD AUTO: 0.2 K/UL
EOSINOPHIL NFR BLD: 2.4 %
ERYTHROCYTE [DISTWIDTH] IN BLOOD BY AUTOMATED COUNT: 15 %
EST. GFR  (AFRICAN AMERICAN): >60 ML/MIN/1.73 M^2
EST. GFR  (NON AFRICAN AMERICAN): >60 ML/MIN/1.73 M^2
ESTIMATED AVG GLUCOSE: 140 MG/DL
GLUCOSE SERPL-MCNC: 137 MG/DL
HBA1C MFR BLD HPLC: 6.5 %
HCT VFR BLD AUTO: 59 %
HDLC SERPL-MCNC: 28 MG/DL
HDLC SERPL: 18.9 %
HGB BLD-MCNC: 19.6 G/DL
IMM GRANULOCYTES # BLD AUTO: 0.07 K/UL
IMM GRANULOCYTES NFR BLD AUTO: 0.9 %
LDLC SERPL CALC-MCNC: 95.2 MG/DL
LYMPHOCYTES # BLD AUTO: 1.2 K/UL
LYMPHOCYTES NFR BLD: 16.5 %
MCH RBC QN AUTO: 28.1 PG
MCHC RBC AUTO-ENTMCNC: 33.2 G/DL
MCV RBC AUTO: 85 FL
MONOCYTES # BLD AUTO: 0.7 K/UL
MONOCYTES NFR BLD: 9.5 %
NEUTROPHILS # BLD AUTO: 5.2 K/UL
NEUTROPHILS NFR BLD: 69.5 %
NONHDLC SERPL-MCNC: 120 MG/DL
NRBC BLD-RTO: 0 /100 WBC
PLATELET # BLD AUTO: 188 K/UL
PMV BLD AUTO: 10.2 FL
POTASSIUM SERPL-SCNC: 5.1 MMOL/L
PROT SERPL-MCNC: 7.2 G/DL
RBC # BLD AUTO: 6.97 M/UL
SODIUM SERPL-SCNC: 141 MMOL/L
TRIGL SERPL-MCNC: 124 MG/DL
WBC # BLD AUTO: 7.46 K/UL

## 2018-05-01 PROCEDURE — 85025 COMPLETE CBC W/AUTO DIFF WBC: CPT

## 2018-05-01 PROCEDURE — 80053 COMPREHEN METABOLIC PANEL: CPT

## 2018-05-01 PROCEDURE — 80061 LIPID PANEL: CPT

## 2018-05-01 PROCEDURE — 83036 HEMOGLOBIN GLYCOSYLATED A1C: CPT

## 2018-05-01 PROCEDURE — 36415 COLL VENOUS BLD VENIPUNCTURE: CPT | Mod: PO

## 2018-05-08 ENCOUNTER — OFFICE VISIT (OUTPATIENT)
Dept: FAMILY MEDICINE | Facility: CLINIC | Age: 53
End: 2018-05-08
Payer: COMMERCIAL

## 2018-05-08 ENCOUNTER — HOSPITAL ENCOUNTER (OUTPATIENT)
Dept: RADIOLOGY | Facility: HOSPITAL | Age: 53
Discharge: HOME | End: 2018-05-08
Attending: INTERNAL MEDICINE
Payer: COMMERCIAL

## 2018-05-08 VITALS
HEART RATE: 67 BPM | OXYGEN SATURATION: 97 % | WEIGHT: 233.69 LBS | SYSTOLIC BLOOD PRESSURE: 138 MMHG | RESPIRATION RATE: 18 BRPM | BODY MASS INDEX: 30.97 KG/M2 | DIASTOLIC BLOOD PRESSURE: 84 MMHG | HEIGHT: 73 IN

## 2018-05-08 DIAGNOSIS — M25.561 RIGHT KNEE PAIN, UNSPECIFIED CHRONICITY: ICD-10-CM

## 2018-05-08 DIAGNOSIS — E78.5 DYSLIPIDEMIA: ICD-10-CM

## 2018-05-08 DIAGNOSIS — E11.9 CONTROLLED TYPE 2 DIABETES MELLITUS WITHOUT COMPLICATION, WITHOUT LONG-TERM CURRENT USE OF INSULIN: ICD-10-CM

## 2018-05-08 DIAGNOSIS — I10 ESSENTIAL HYPERTENSION: Primary | ICD-10-CM

## 2018-05-08 PROCEDURE — 3044F HG A1C LEVEL LT 7.0%: CPT | Mod: CPTII,S$GLB,, | Performed by: INTERNAL MEDICINE

## 2018-05-08 PROCEDURE — 99214 OFFICE O/P EST MOD 30 MIN: CPT | Mod: S$GLB,,, | Performed by: INTERNAL MEDICINE

## 2018-05-08 PROCEDURE — 99999 PR PBB SHADOW E&M-EST. PATIENT-LVL IV: CPT | Mod: PBBFAC,,, | Performed by: INTERNAL MEDICINE

## 2018-05-08 PROCEDURE — 3079F DIAST BP 80-89 MM HG: CPT | Mod: CPTII,S$GLB,, | Performed by: INTERNAL MEDICINE

## 2018-05-08 PROCEDURE — 73564 X-RAY EXAM KNEE 4 OR MORE: CPT | Mod: 26,50,, | Performed by: RADIOLOGY

## 2018-05-08 PROCEDURE — 3075F SYST BP GE 130 - 139MM HG: CPT | Mod: CPTII,S$GLB,, | Performed by: INTERNAL MEDICINE

## 2018-05-08 PROCEDURE — 73564 X-RAY EXAM KNEE 4 OR MORE: CPT | Mod: TC,50,FY,PO

## 2018-05-08 PROCEDURE — 3008F BODY MASS INDEX DOCD: CPT | Mod: CPTII,S$GLB,, | Performed by: INTERNAL MEDICINE

## 2018-05-08 RX ORDER — AMLODIPINE BESYLATE 2.5 MG/1
2.5 TABLET ORAL DAILY
Qty: 30 TABLET | Refills: 11 | Status: SHIPPED | OUTPATIENT
Start: 2018-05-08 | End: 2019-03-09 | Stop reason: SDUPTHER

## 2018-05-08 NOTE — PROGRESS NOTES
Subjective:       Patient ID: Cade Johnston is a 52 y.o. male.    Chief Complaint: Hypertension    Recall: Sarcoidosis, 45% lung fnx, sees pulmonary, low testosterone, Dr Larson; Sees Dr Duran cardiology.    DM -  controlled  HTN - controlled  HLD - uncontrolled ldl < 70 goal; refusing statin  BPH/ ED - sees Dr Larson    Erythrocytosis 2nd to testosterone and sarcoid- worsening- has done a phlebotomy in past - will discuss 5/14 w Dr Larson      Review of Systems   Constitutional: Negative for appetite change and fever.   HENT: Negative for nosebleeds and trouble swallowing.    Eyes: Negative for discharge and visual disturbance.   Respiratory: Negative for choking and shortness of breath.    Cardiovascular: Negative for chest pain and palpitations.   Gastrointestinal: Negative for abdominal pain, nausea and vomiting.   Musculoskeletal: Positive for arthralgias. Negative for joint swelling.   Skin: Negative for rash and wound.   Neurological: Negative for dizziness and syncope.   Psychiatric/Behavioral: Negative for confusion and dysphoric mood.       Objective:      Vitals:    05/08/18 0756   BP: 138/84   Pulse: 67   Resp: 18     Physical Exam   Constitutional: He appears well-nourished.   Eyes: Conjunctivae and EOM are normal.   Neck: Normal range of motion.   Cardiovascular: Normal rate and regular rhythm.    Pulmonary/Chest: Effort normal and breath sounds normal.   Musculoskeletal:   Normal ROM bilateral   Right knee + TP on lower medial side   Neurological: No cranial nerve deficit (grossly intact).   Skin: Skin is warm and dry.   Psychiatric: He has a normal mood and affect.   Alert and orientated   Vitals reviewed.        Assessment:       1. Essential hypertension    2. Controlled type 2 diabetes mellitus without complication, without long-term current use of insulin    3. Dyslipidemia    4. Right knee pain, unspecified chronicity        Plan:       Essential hypertension  -     Comprehensive metabolic  "panel; Future; Expected date: 11/04/2018  -     amLODIPine (NORVASC) 2.5 MG tablet; Take 1 tablet (2.5 mg total) by mouth once daily.  Dispense: 30 tablet; Refill: 11    Controlled type 2 diabetes mellitus without complication, without long-term current use of insulin  -     Hemoglobin A1c; Future; Expected date: 11/04/2018    Dyslipidemia  -     Lipid panel; Future; Expected date: 11/04/2018    Right knee pain, unspecified chronicity  -     X-ray Knee Ortho Bilateral with Flexion; Future; Expected date: 05/08/2018  -     Ambulatory referral to Physical Medicine Rehab            Medication List with Changes/Refills   New Medications    AMLODIPINE (NORVASC) 2.5 MG TABLET    Take 1 tablet (2.5 mg total) by mouth once daily.   Current Medications    ASPIRIN (ENTERIC COATED ASPIRIN) 81 MG EC TABLET    Take 81 mg by mouth once daily. No Sig Provided    BLOOD SUGAR DIAGNOSTIC STRP    Test q day    FLUTICASONE (FLONASE) 50 MCG/ACTUATION NASAL SPRAY    2 sprays by Each Nare route once daily.    IPRATROPIUM-ALBUTEROL (COMBIVENT)  MCG/ACTUATION INHALER    Inhale 1 puff into the lungs every 4 (four) hours as needed for Shortness of Breath. Rescue    LOSARTAN-HYDROCHLOROTHIAZIDE 100-25 MG (HYZAAR) 100-25 MG PER TABLET    TAKE 1 TABLET BY MOUTH ONCE DAILY.    METFORMIN (GLUCOPHAGE) 500 MG TABLET    TAKE 1 TABLET TWICE A DAY WITH MEALS    MONTELUKAST (SINGULAIR) 10 MG TABLET    Take 10 mg by mouth every evening.    SILDENAFIL (REVATIO) 20 MG TAB    Take 20 mg by mouth.       Continue current management    Counseled on regular exercise, maintenance of a healthy weight, balanced diet rich in fruits/vegetables and lean protein, and avoidance of unhealthy habits like smoking and excessive alcohol intake.   Also, counseled on importance of being compliant with medication, health appointments, diet and exercise.     Follow-up in about 6 months (around 11/8/2018).     "This note will not be shared with the patient."  "

## 2018-05-14 ENCOUNTER — CLINICAL SUPPORT (OUTPATIENT)
Dept: UROLOGY | Facility: CLINIC | Age: 53
End: 2018-05-14
Payer: COMMERCIAL

## 2018-05-14 DIAGNOSIS — E29.1 MALE HYPOGONADISM: Primary | ICD-10-CM

## 2018-05-14 PROCEDURE — 96372 THER/PROPH/DIAG INJ SC/IM: CPT | Mod: S$GLB,,, | Performed by: UROLOGY

## 2018-05-14 PROCEDURE — 99999 PR PBB SHADOW E&M-EST. PATIENT-LVL I: CPT | Mod: PBBFAC,,,

## 2018-05-14 RX ADMIN — TESTOSTERONE CYPIONATE 300 MG: 200 INJECTION, SOLUTION INTRAMUSCULAR at 08:05

## 2018-05-15 ENCOUNTER — TELEPHONE (OUTPATIENT)
Dept: UROLOGY | Facility: CLINIC | Age: 53
End: 2018-05-15

## 2018-05-15 NOTE — TELEPHONE ENCOUNTER
----- Message from RT Jaison sent at 5/15/2018  2:22 PM CDT -----  Contact: pt    Called pod, pt , returned Nurse Isidro's missed call, thanks.

## 2018-05-21 ENCOUNTER — TELEPHONE (OUTPATIENT)
Dept: FAMILY MEDICINE | Facility: CLINIC | Age: 53
End: 2018-05-21

## 2018-05-21 NOTE — TELEPHONE ENCOUNTER
----- Message from Fanta Ayala sent at 5/21/2018  9:29 AM CDT -----  Contact: self  Patient requesting call back regarding his change in medication. Please call [phone]

## 2018-06-06 ENCOUNTER — TELEPHONE (OUTPATIENT)
Dept: UROLOGY | Facility: CLINIC | Age: 53
End: 2018-06-06

## 2018-06-06 NOTE — TELEPHONE ENCOUNTER
----- Message from Liv Saravia sent at 6/6/2018  3:05 PM CDT -----  Contact: SELF  [phone]  Pt is calling to reschedule appt for injection      Thank you!

## 2018-06-07 ENCOUNTER — CLINICAL SUPPORT (OUTPATIENT)
Dept: UROLOGY | Facility: CLINIC | Age: 53
End: 2018-06-07
Payer: COMMERCIAL

## 2018-06-07 DIAGNOSIS — R79.89 LOW SERUM TESTOSTERONE LEVEL: Primary | ICD-10-CM

## 2018-06-07 PROCEDURE — 96372 THER/PROPH/DIAG INJ SC/IM: CPT | Mod: S$GLB,,, | Performed by: UROLOGY

## 2018-06-07 PROCEDURE — 99999 PR PBB SHADOW E&M-EST. PATIENT-LVL I: CPT | Mod: PBBFAC,,,

## 2018-06-07 RX ADMIN — TESTOSTERONE CYPIONATE 300 MG: 200 INJECTION, SOLUTION INTRAMUSCULAR at 09:06

## 2018-06-11 ENCOUNTER — TELEPHONE (OUTPATIENT)
Dept: FAMILY MEDICINE | Facility: CLINIC | Age: 53
End: 2018-06-11

## 2018-06-11 NOTE — TELEPHONE ENCOUNTER
Patient stated his BP was 105/68 over weekend and ran low all weekend patient states its because of the amLODIPine (NORVASC) 2.5 MG tablet. - please advise

## 2018-06-11 NOTE — TELEPHONE ENCOUNTER
sbp 107 is acceptable unless dizziness with standing.  Have patient take half (pill cutter) daily and monitor

## 2018-06-11 NOTE — TELEPHONE ENCOUNTER
----- Message from Pierce Huerta sent at 6/11/2018  8:37 AM CDT -----  Contact: same  Patient called in and stated his blood pressure meds were recently changed and patient had some issues over the week end.  Patient would like a call back from nurse to see what he should do about these meds.    Patient call back number is [phone]

## 2018-06-28 ENCOUNTER — CLINICAL SUPPORT (OUTPATIENT)
Dept: UROLOGY | Facility: CLINIC | Age: 53
End: 2018-06-28
Payer: COMMERCIAL

## 2018-06-28 DIAGNOSIS — E29.1 MALE HYPOGONADISM: Primary | ICD-10-CM

## 2018-06-28 PROCEDURE — 99999 PR PBB SHADOW E&M-EST. PATIENT-LVL I: CPT | Mod: PBBFAC,,,

## 2018-06-28 PROCEDURE — 96372 THER/PROPH/DIAG INJ SC/IM: CPT | Mod: S$GLB,,, | Performed by: UROLOGY

## 2018-06-28 RX ADMIN — TESTOSTERONE CYPIONATE 300 MG: 200 INJECTION, SOLUTION INTRAMUSCULAR at 08:06

## 2018-07-09 ENCOUNTER — LAB VISIT (OUTPATIENT)
Dept: LAB | Facility: HOSPITAL | Age: 53
End: 2018-07-09
Attending: UROLOGY
Payer: COMMERCIAL

## 2018-07-09 DIAGNOSIS — E29.1 MALE HYPOGONADISM: ICD-10-CM

## 2018-07-09 LAB
ERYTHROCYTE [DISTWIDTH] IN BLOOD BY AUTOMATED COUNT: 15.1 %
HCT VFR BLD AUTO: 59.8 %
HGB BLD-MCNC: 19.3 G/DL
MCH RBC QN AUTO: 28.3 PG
MCHC RBC AUTO-ENTMCNC: 32.3 G/DL
MCV RBC AUTO: 88 FL
PLATELET # BLD AUTO: 179 K/UL
PMV BLD AUTO: 11 FL
RBC # BLD AUTO: 6.81 M/UL
TESTOST SERPL-MCNC: 672 NG/DL
WBC # BLD AUTO: 8.56 K/UL

## 2018-07-09 PROCEDURE — 84403 ASSAY OF TOTAL TESTOSTERONE: CPT

## 2018-07-09 PROCEDURE — 85027 COMPLETE CBC AUTOMATED: CPT

## 2018-07-09 PROCEDURE — 36415 COLL VENOUS BLD VENIPUNCTURE: CPT | Mod: PO

## 2018-07-17 ENCOUNTER — OFFICE VISIT (OUTPATIENT)
Dept: UROLOGY | Facility: CLINIC | Age: 53
End: 2018-07-17
Payer: COMMERCIAL

## 2018-07-17 VITALS
HEIGHT: 73 IN | DIASTOLIC BLOOD PRESSURE: 92 MMHG | BODY MASS INDEX: 30.62 KG/M2 | WEIGHT: 231 LBS | SYSTOLIC BLOOD PRESSURE: 139 MMHG | HEART RATE: 79 BPM

## 2018-07-17 DIAGNOSIS — D75.1 ERYTHROCYTOSIS: ICD-10-CM

## 2018-07-17 DIAGNOSIS — E29.1 HYPOGONADISM MALE: ICD-10-CM

## 2018-07-17 DIAGNOSIS — R10.9 RIGHT FLANK PAIN: Primary | ICD-10-CM

## 2018-07-17 PROCEDURE — 3075F SYST BP GE 130 - 139MM HG: CPT | Mod: CPTII,S$GLB,, | Performed by: UROLOGY

## 2018-07-17 PROCEDURE — 3080F DIAST BP >= 90 MM HG: CPT | Mod: CPTII,S$GLB,, | Performed by: UROLOGY

## 2018-07-17 PROCEDURE — 3008F BODY MASS INDEX DOCD: CPT | Mod: CPTII,S$GLB,, | Performed by: UROLOGY

## 2018-07-17 PROCEDURE — 99999 PR PBB SHADOW E&M-EST. PATIENT-LVL III: CPT | Mod: PBBFAC,,, | Performed by: UROLOGY

## 2018-07-17 PROCEDURE — 99215 OFFICE O/P EST HI 40 MIN: CPT | Mod: S$GLB,,, | Performed by: UROLOGY

## 2018-07-17 NOTE — PROGRESS NOTES
UROLOGY Petros  7 17 18     c-c hypogonadism       Age 52. Is on testosterone replacement for hypogonadism. Our nurses give him the testosterone shots, has been on it for two years, feels that it has helped his general energy and his sexual interest.     His voiding flow is good, no intermittency or need to strain. No dysuria, no hematuria. Nocturia x 1     Lately he feels a pressure pain on the R flank. This is the side where he had surgery before. Has a hx of nephrolithiasis, with a hx of large kidney stone in the R kidney 20 yrs ago, underwent an eswl but the fragments apparently got stuck in the ureter and required open exploration for ureterolithotomy (juana/jorge). Says the open surgery was complex and took 10 hours. Pt did fine after that and has had no more problems. About 10 years later he passed another small stone and also he had one small one removed from the ureter by dr jag jensen.    Testosterone two weeks ago 672 ng; cbc shows erythrocytosis 59.8 percent        PMH     Surgical:  has a past surgical history that includes Ureteral exploration (1996); elbow spur removal; Extracorporeal shock wave lithotripsy (1996); removal of skin cancer; and Circumcision.     Medical:  has a past medical history of DJD (degenerative joint disease) of lumbar spine; DM type 2 (diabetes mellitus, type 2); Fatty liver; GERD (gastroesophageal reflux disease); Hepatosplenomegaly; HTN (hypertension); Mixed hyperlipidemia; Nephrolithiasis; Pulmonary sarcoidosis; Sarcoidosis of lung; and Ventricular hypokinesis.     Familial: father with CAD, mother with hypertension     Social:  for [employer]            Current Outpatient Prescriptions on File Prior to Visit   Medication Sig Dispense Refill    aspirin (ENTERIC COATED ASPIRIN) 81 MG EC tablet Take 81 mg by mouth once daily        blood sugar diagnostic Strp Test q day 100 strip 3    diclofenac sodium 1 % Gel Apply 2 g topically 100 g 2     ipratropium-albuterol (COMBIVENT) 20-10 Inhale 1 puff into the lungs every 4 4 g 11    losartan-hydrochlorothiazide 100-25 mg (HYZAAR) 100-25 mg per tablet TAKE 1 TABLET BY MOUTH ONCE DAILY. 30 tablet 5    metformin (GLUCOPH TAKE 1  tablet 1    sildenafil (REVATIO) 20 mg Tab Take 20 mg by mouth.                    Current Facility-Administered Medications on File Prior to Visit   Medication Dose Route Frequency Provider Last Rate Last Dose    testosterone cypionate injection 300 mg  300 mg Intramuscular Q21 Days Des Larson MD   300 mg at 07/11/17 1555         REVIEW OF SYSTEMS  GENERAL: has complaints of fatigue less often now that he gets the testosterone treatment. No headaches or dizzy spells.   HEENT: vision preserved. Sinuses: has frequent congestion   CARDIOPULMONARY: No swelling of the legs; no chest pain. No shortness of breath, no wheezing.   GASTROINTESTINAL: No heartburn. Denies diarrhea; has some constipation, no blood or mucus in stools.   GENITOURINARY: Denies dysuria, bleeding or incontinence.   MUSCULOSKELETAL: some arthritic complaints in neck and feet   PSYCHIATRIC: No history of depression or anxiety.   ENDOCRINOLOGIC: No reports of sweating, cold or heat intolerance. No polyuria or polydipsia.   NEUROLOGICAL: No headache, dizziness, syncope, paralysis, ataxia, numbness or tingling in the extremities.  LYMPHATICS: No enlarged nodes. No history of splenectomy.  ==     Pt alert, oriented, no distress  HEENT:  wnl.  Neck: supple, no JVD, no lymphadenopathy  Chest: CV NSR  Lungs: normal chest expansion  Abdomen flat, nontender, no organomegaly, no masses.  No hernias. Has large RLQ surgical scar from his R ureterolithotomy, well healed. Also has a small scar from the stab incision for penrose drain  Penis circumcised, meatus nl  Testes nl, epi nl, scrotum nl  BRIDGER: anus nl, sphincter nl tone, mucosa without lesions, prostate 30 gm, symmetric, no nodules or indurations  Extremities:  no edema, peripheral pulses nl  Neuro: preserved          IMPRESSION:  Hypogonadism, kidney stones, erectile dysfunction  Can continue on testosterone replacement. Pt is pleased with the regime of 400 mg im q 28 days  Pt has erythrocytosis, and this puts him at greater risk of an intravascular clot; needs to donate blood or do a therapeutic phlebotomy, pt has done it before and is scheduled next week to do so.   R flank discomfort, I am ordering a renal ultrasound owing to his hx of nephrolithiasis  viagra as needed  RTC yearly

## 2018-07-20 ENCOUNTER — HOSPITAL ENCOUNTER (OUTPATIENT)
Dept: RADIOLOGY | Facility: HOSPITAL | Age: 53
Discharge: HOME | End: 2018-07-20
Attending: UROLOGY
Payer: COMMERCIAL

## 2018-07-20 DIAGNOSIS — R10.9 RIGHT FLANK PAIN: ICD-10-CM

## 2018-07-20 PROCEDURE — 76770 US EXAM ABDO BACK WALL COMP: CPT | Mod: 26,,, | Performed by: RADIOLOGY

## 2018-07-20 PROCEDURE — 76770 US EXAM ABDO BACK WALL COMP: CPT | Mod: TC,PO

## 2018-07-20 NOTE — TELEPHONE ENCOUNTER
----- Message from Migdalia Mcfadden sent at 7/20/2018  2:52 PM CDT -----  Contact: self   Patient need a refill for rx sildenafil (REVATIO) 20 mg Tab, please send to [facility]. Please call back at  [facility] [phone]

## 2018-07-20 NOTE — TELEPHONE ENCOUNTER
Please refill Revatio 20 mg for patient to [facility] [place] [facility].

## 2018-07-23 RX ORDER — SILDENAFIL CITRATE 20 MG/1
20 TABLET ORAL DAILY PRN
Qty: 30 TABLET | Refills: 11 | Status: SHIPPED | OUTPATIENT
Start: 2018-07-23 | End: 2022-01-31 | Stop reason: SDUPTHER

## 2018-08-13 DIAGNOSIS — E29.1 HYPOGONADISM IN MALE: Primary | ICD-10-CM

## 2018-08-13 RX ORDER — TESTOSTERONE CYPIONATE 200 MG/ML
400 INJECTION, SOLUTION INTRAMUSCULAR
Status: COMPLETED | OUTPATIENT
Start: 2018-08-14 | End: 2019-01-03

## 2018-08-14 ENCOUNTER — CLINICAL SUPPORT (OUTPATIENT)
Dept: UROLOGY | Facility: CLINIC | Age: 53
End: 2018-08-14
Payer: COMMERCIAL

## 2018-08-14 DIAGNOSIS — E29.1 HYPOGONADISM MALE: Primary | ICD-10-CM

## 2018-08-14 PROCEDURE — 96372 THER/PROPH/DIAG INJ SC/IM: CPT | Mod: S$GLB,,, | Performed by: UROLOGY

## 2018-08-14 PROCEDURE — 99999 PR PBB SHADOW E&M-EST. PATIENT-LVL I: CPT | Mod: PBBFAC,,,

## 2018-08-14 RX ADMIN — TESTOSTERONE CYPIONATE 400 MG: 200 INJECTION, SOLUTION INTRAMUSCULAR at 08:08

## 2018-08-14 NOTE — PROGRESS NOTES
Testosterone 400 mg injection given, see MARS. Educated patient about Adverse Medication Reaction and instructed patient to wait for 15 minutes after injection.  Patient declined to wait.

## 2018-08-20 ENCOUNTER — OFFICE VISIT (OUTPATIENT)
Dept: URGENT CARE | Facility: CLINIC | Age: 53
End: 2018-08-20
Payer: COMMERCIAL

## 2018-08-20 VITALS
OXYGEN SATURATION: 96 % | HEART RATE: 92 BPM | BODY MASS INDEX: 31.54 KG/M2 | DIASTOLIC BLOOD PRESSURE: 90 MMHG | SYSTOLIC BLOOD PRESSURE: 143 MMHG | WEIGHT: 238 LBS | TEMPERATURE: 99 F | HEIGHT: 73 IN

## 2018-08-20 DIAGNOSIS — J32.9 SINUSITIS, UNSPECIFIED CHRONICITY, UNSPECIFIED LOCATION: Primary | ICD-10-CM

## 2018-08-20 PROCEDURE — 3008F BODY MASS INDEX DOCD: CPT | Mod: CPTII,S$GLB,, | Performed by: NURSE PRACTITIONER

## 2018-08-20 PROCEDURE — 99214 OFFICE O/P EST MOD 30 MIN: CPT | Mod: S$GLB,,, | Performed by: NURSE PRACTITIONER

## 2018-08-20 PROCEDURE — 3080F DIAST BP >= 90 MM HG: CPT | Mod: CPTII,S$GLB,, | Performed by: NURSE PRACTITIONER

## 2018-08-20 PROCEDURE — 3077F SYST BP >= 140 MM HG: CPT | Mod: CPTII,S$GLB,, | Performed by: NURSE PRACTITIONER

## 2018-08-20 RX ORDER — AZITHROMYCIN 250 MG/1
TABLET, FILM COATED ORAL
Qty: 6 TABLET | Refills: 0 | Status: SHIPPED | OUTPATIENT
Start: 2018-08-20 | End: 2018-11-07 | Stop reason: ALTCHOICE

## 2018-08-21 NOTE — PATIENT INSTRUCTIONS
Please follow up with your Primary care provider within 2-5 days if your signs and symptoms have not resolved or worsen.     If your condition worsens or fails to improve we recommend that you receive another evaluation at the emergency room immediately or contact your primary medical clinic to discuss your concerns.   You must understand that you have received an Urgent Care treatment only and that you may be released before all of your medical problems are known or treated. You, the patient, will arrange for follow up care as instructed.       Symptomatic treatment: (consider the following unless you are allergic or cannot take the following suggestions)  Alternate Tylenol (not to exceed 4000 mg per 24 hours) and Ibuprofen (400 mg every 3 hours) for pain and fever   * do not take tylenol if you have liver disease or issues with your liver   *do not take motrin if you have kidney disease or on blood thinners   For a sore throat try salt water gargles and honey/lemon water to soothe throat  Cepachol spray helps to numb the discomfort in throat  Elderberry to reduce duration of URI symptoms  Warm face compresses/hot showers as often as you can to open sinuses   Vicks vapor rub at night  Flonase or Nasacort (nasal steroid over the counter, 1-2 squirts to each nare)  Nasal saline spray reduces inflammation and dryness  Stay hydrated with increased water intake and simple foods like chicken noodle soup help hydrate and soothe the throat  Drink pedialyte, gatorade or propel.   Delsym helps with coughing at night  Zyrtec, Allegra, or Claritin during the day for allergy relief  Benadryl at night may help if allergy component- do not use with phenergan because both medications can make you drowsy)  You can try breathe right strips at night to help you breathe  A cool mist humidifier in bedroom may help with cough and relieve stuffy nose.   Zantac will help if there is reflux from the post nasal drip  REST as much as you  can    Sinus rinses DO NOT USE TAP WATER, if you must, water must be a rolling boil for 1 minute, let it cool, then use.  May use distilled water, or over the counter nasal saline rinses.  Vics vapor rub in shower to help open nasal passages.  May use nasal gel to keep passages moisturized.  May use Nasal saline sprays during the day for added relief of congestion.   For those who go to the gym, please do not use the sauna or steam room now to clear sinuses.    During pollen season, change shirt if you are outside for a while when you go in.  Also wash your face.  Do not touch your face with your hands.  Wash your hands often in general while ill, avoid face contact with hands.     Your symptoms will likely last 5-7 days, maybe longer depending on how it affects your body.  You are contagious 5-7, so minimize contact with others to reduce the spread to others and stay home from work or school as we discussed. Dehydration is preventable but is one of the main reasons why you will feel so badly.  Antibiotics are not needed unless a complication (such as Otitis Media, Bacterial sinus infection or pneumonia). Taking antibiotics for Flu/Cold is not supported by evidence based medicine and can expose you to unnecessary side effects of the medication, such as anaphylaxis.   If you experience any:  Chest pain, shortness of breath, wheezing or difficulty breathing  Severe headache, face, neck or ear pain  New rash  Fever over 101.5º F (38.6 C) for more than three days  Confusion, behavior change or seizure  Severe weakness or dizziness  Go to ER

## 2018-08-21 NOTE — PROGRESS NOTES
"Subjective:       Patient ID: Cade Johnston is a 52 y.o. male.    Vitals:  height is 6' 1" (1.854 m) and weight is 108 kg (238 lb). His oral temperature is 98.7 °F (37.1 °C). His blood pressure is 143/90 (abnormal) and his pulse is 92. His oxygen saturation is 96%.     Chief Complaint: URI    Pt states started feeling unwell Friday. Sinus pressure, dry slight cough, congestion. Pt took tylenol that seemed to help some but not enough.   Feeling as if he is wheezing at night      URI    This is a new problem. The current episode started in the past 7 days. The problem has been gradually worsening. There has been no fever. Associated symptoms include congestion, coughing, ear pain (pressure) and wheezing. Pertinent negatives include no abdominal pain, chest pain, headaches, nausea or sore throat. He has tried acetaminophen for the symptoms. The treatment provided mild relief.     Review of Systems   Constitution: Negative for chills, fever and malaise/fatigue.   HENT: Positive for congestion, ear pain (pressure) and hoarse voice. Negative for sore throat.    Eyes: Negative for discharge and redness.   Cardiovascular: Negative for chest pain, dyspnea on exertion and leg swelling.   Respiratory: Positive for cough, shortness of breath and wheezing. Negative for sputum production.    Musculoskeletal: Negative for myalgias.   Gastrointestinal: Negative for abdominal pain and nausea.   Neurological: Negative for headaches.       Objective:      Physical Exam   Constitutional: He is oriented to person, place, and time. He appears well-developed and well-nourished. He is cooperative.  Non-toxic appearance. He does not appear ill. No distress.   HENT:   Head: Normocephalic and atraumatic.   Right Ear: Hearing, external ear and ear canal normal. A middle ear effusion is present.   Left Ear: Hearing, external ear and ear canal normal. A middle ear effusion is present.   Nose: Mucosal edema present. No rhinorrhea. No epistaxis. " Right sinus exhibits no maxillary sinus tenderness.   Mouth/Throat: Uvula is midline and mucous membranes are normal. No trismus in the jaw. Normal dentition. No uvula swelling. Posterior oropharyngeal erythema present.   Eyes: Conjunctivae and lids are normal. No scleral icterus.   Sclera clear bilat   Neck: Trachea normal, full passive range of motion without pain and phonation normal. Neck supple.   Cardiovascular: Normal rate, regular rhythm, normal heart sounds, intact distal pulses and normal pulses.   Pulmonary/Chest: Effort normal and breath sounds normal. No respiratory distress.   Abdominal: Normal appearance.   Musculoskeletal: Normal range of motion. He exhibits no edema or deformity.   Neurological: He is alert and oriented to person, place, and time. He exhibits normal muscle tone. Coordination normal.   Skin: Skin is warm, dry and intact. He is not diaphoretic. No pallor.   Psychiatric: He has a normal mood and affect. His speech is normal and behavior is normal. Judgment and thought content normal. Cognition and memory are normal.   Nursing note and vitals reviewed.      Assessment:       1. Sinusitis, unspecified chronicity, unspecified location        Plan:         Sinusitis, unspecified chronicity, unspecified location    Other orders  -     azithromycin (ZITHROMAX) 250 MG tablet; Take 2 pills on day one and then one pill daily for 4 days  Dispense: 6 tablet; Refill: 0    due to hx of patient lung issues will cover with z chas   He will use his inhalers at home as needed  Stay away from decongestants       Patient Instructions   Please follow up with your Primary care provider within 2-5 days if your signs and symptoms have not resolved or worsen.     If your condition worsens or fails to improve we recommend that you receive another evaluation at the emergency room immediately or contact your primary medical clinic to discuss your concerns.   You must understand that you have received an Urgent  Care treatment only and that you may be released before all of your medical problems are known or treated. You, the patient, will arrange for follow up care as instructed.       Symptomatic treatment: (consider the following unless you are allergic or cannot take the following suggestions)  Alternate Tylenol (not to exceed 4000 mg per 24 hours) and Ibuprofen (400 mg every 3 hours) for pain and fever   * do not take tylenol if you have liver disease or issues with your liver   *do not take motrin if you have kidney disease or on blood thinners   For a sore throat try salt water gargles and honey/lemon water to soothe throat  Cepachol spray helps to numb the discomfort in throat  Elderberry to reduce duration of URI symptoms  Warm face compresses/hot showers as often as you can to open sinuses   Vicks vapor rub at night  Flonase or Nasacort (nasal steroid over the counter, 1-2 squirts to each nare)  Nasal saline spray reduces inflammation and dryness  Stay hydrated with increased water intake and simple foods like chicken noodle soup help hydrate and soothe the throat  Drink pedialyte, gatorade or propel.   Delsym helps with coughing at night  Zyrtec, Allegra, or Claritin during the day for allergy relief  Benadryl at night may help if allergy component- do not use with phenergan because both medications can make you drowsy)  You can try breathe right strips at night to help you breathe  A cool mist humidifier in bedroom may help with cough and relieve stuffy nose.   Zantac will help if there is reflux from the post nasal drip  REST as much as you can    Sinus rinses DO NOT USE TAP WATER, if you must, water must be a rolling boil for 1 minute, let it cool, then use.  May use distilled water, or over the counter nasal saline rinses.  Vics vapor rub in shower to help open nasal passages.  May use nasal gel to keep passages moisturized.  May use Nasal saline sprays during the day for added relief of congestion.   For  those who go to the gym, please do not use the sauna or steam room now to clear sinuses.    During pollen season, change shirt if you are outside for a while when you go in.  Also wash your face.  Do not touch your face with your hands.  Wash your hands often in general while ill, avoid face contact with hands.     Your symptoms will likely last 5-7 days, maybe longer depending on how it affects your body.  You are contagious 5-7, so minimize contact with others to reduce the spread to others and stay home from work or school as we discussed. Dehydration is preventable but is one of the main reasons why you will feel so badly.  Antibiotics are not needed unless a complication (such as Otitis Media, Bacterial sinus infection or pneumonia). Taking antibiotics for Flu/Cold is not supported by evidence based medicine and can expose you to unnecessary side effects of the medication, such as anaphylaxis.   If you experience any:  Chest pain, shortness of breath, wheezing or difficulty breathing  Severe headache, face, neck or ear pain  New rash  Fever over 101.5º F (38.6 C) for more than three days  Confusion, behavior change or seizure  Severe weakness or dizziness  Go to ER

## 2018-08-23 ENCOUNTER — TELEPHONE (OUTPATIENT)
Dept: URGENT CARE | Facility: CLINIC | Age: 53
End: 2018-08-23

## 2018-08-28 RX ORDER — METFORMIN HYDROCHLORIDE 500 MG/1
TABLET ORAL
Qty: 180 TABLET | Refills: 1 | Status: SHIPPED | OUTPATIENT
Start: 2018-08-28 | End: 2018-11-07

## 2018-09-11 ENCOUNTER — CLINICAL SUPPORT (OUTPATIENT)
Dept: UROLOGY | Facility: CLINIC | Age: 53
End: 2018-09-11
Payer: COMMERCIAL

## 2018-09-11 DIAGNOSIS — E29.1 HYPOGONADISM MALE: Primary | ICD-10-CM

## 2018-09-11 PROCEDURE — 99999 PR PBB SHADOW E&M-EST. PATIENT-LVL III: CPT | Mod: PBBFAC,,,

## 2018-09-11 PROCEDURE — 96372 THER/PROPH/DIAG INJ SC/IM: CPT | Mod: S$GLB,,, | Performed by: UROLOGY

## 2018-09-11 RX ADMIN — TESTOSTERONE CYPIONATE 400 MG: 200 INJECTION, SOLUTION INTRAMUSCULAR at 08:09

## 2018-10-09 ENCOUNTER — CLINICAL SUPPORT (OUTPATIENT)
Dept: UROLOGY | Facility: CLINIC | Age: 53
End: 2018-10-09
Payer: COMMERCIAL

## 2018-10-09 DIAGNOSIS — E29.1 MALE HYPOGONADISM: Primary | ICD-10-CM

## 2018-10-09 PROCEDURE — 96372 THER/PROPH/DIAG INJ SC/IM: CPT | Mod: S$GLB,,, | Performed by: UROLOGY

## 2018-10-09 PROCEDURE — 99999 PR PBB SHADOW E&M-EST. PATIENT-LVL I: CPT | Mod: PBBFAC,,,

## 2018-10-09 RX ADMIN — TESTOSTERONE CYPIONATE 400 MG: 200 INJECTION, SOLUTION INTRAMUSCULAR at 08:10

## 2018-10-09 NOTE — PROGRESS NOTES
Pt received 400 mg testosterone injection IM in LUQ of buttocks. Pt tolerated procedure well. Pt refused to stay for observation. Pt verbalized understand of the risk with leaving the clinic.

## 2018-10-24 ENCOUNTER — PATIENT OUTREACH (OUTPATIENT)
Dept: ADMINISTRATIVE | Facility: HOSPITAL | Age: 53
End: 2018-10-24

## 2018-10-24 NOTE — PROGRESS NOTES
Health Maintenance Due   Topic Date Due    Low Dose Statin  11/08/1984    Eye Exam  04/20/2018    Foot Exam  05/11/2018    Influenza Vaccine  08/01/2018

## 2018-10-30 ENCOUNTER — LAB VISIT (OUTPATIENT)
Dept: LAB | Facility: HOSPITAL | Age: 53
End: 2018-10-30
Attending: INTERNAL MEDICINE
Payer: COMMERCIAL

## 2018-10-30 DIAGNOSIS — E78.5 DYSLIPIDEMIA: ICD-10-CM

## 2018-10-30 DIAGNOSIS — R09.82 PND (POST-NASAL DRIP): ICD-10-CM

## 2018-10-30 DIAGNOSIS — E11.9 CONTROLLED TYPE 2 DIABETES MELLITUS WITHOUT COMPLICATION, WITHOUT LONG-TERM CURRENT USE OF INSULIN: ICD-10-CM

## 2018-10-30 DIAGNOSIS — I10 ESSENTIAL HYPERTENSION: ICD-10-CM

## 2018-10-30 LAB
ALBUMIN SERPL BCP-MCNC: 3.9 G/DL
ALP SERPL-CCNC: 75 U/L
ALT SERPL W/O P-5'-P-CCNC: 32 U/L
ANION GAP SERPL CALC-SCNC: 6 MMOL/L
AST SERPL-CCNC: 21 U/L
BILIRUB SERPL-MCNC: 2.1 MG/DL
BUN SERPL-MCNC: 22 MG/DL
CALCIUM SERPL-MCNC: 9.6 MG/DL
CHLORIDE SERPL-SCNC: 101 MMOL/L
CHOLEST SERPL-MCNC: 155 MG/DL
CHOLEST/HDLC SERPL: 4.4 {RATIO}
CO2 SERPL-SCNC: 31 MMOL/L
CREAT SERPL-MCNC: 0.9 MG/DL
EST. GFR  (AFRICAN AMERICAN): >60 ML/MIN/1.73 M^2
EST. GFR  (NON AFRICAN AMERICAN): >60 ML/MIN/1.73 M^2
ESTIMATED AVG GLUCOSE: 154 MG/DL
GLUCOSE SERPL-MCNC: 150 MG/DL
HBA1C MFR BLD HPLC: 7 %
HDLC SERPL-MCNC: 35 MG/DL
HDLC SERPL: 22.6 %
LDLC SERPL CALC-MCNC: 91.6 MG/DL
NONHDLC SERPL-MCNC: 120 MG/DL
POTASSIUM SERPL-SCNC: 4.3 MMOL/L
PROT SERPL-MCNC: 7.5 G/DL
SODIUM SERPL-SCNC: 138 MMOL/L
TRIGL SERPL-MCNC: 142 MG/DL

## 2018-10-30 PROCEDURE — 83036 HEMOGLOBIN GLYCOSYLATED A1C: CPT

## 2018-10-30 PROCEDURE — 80061 LIPID PANEL: CPT

## 2018-10-30 PROCEDURE — 36415 COLL VENOUS BLD VENIPUNCTURE: CPT | Mod: PO

## 2018-10-30 PROCEDURE — 80053 COMPREHEN METABOLIC PANEL: CPT

## 2018-10-30 RX ORDER — MONTELUKAST SODIUM 10 MG/1
10 TABLET ORAL NIGHTLY
Qty: 30 TABLET | Refills: 11 | Status: SHIPPED | OUTPATIENT
Start: 2018-10-30 | End: 2019-11-06 | Stop reason: SDUPTHER

## 2018-11-01 ENCOUNTER — PATIENT OUTREACH (OUTPATIENT)
Dept: ADMINISTRATIVE | Facility: HOSPITAL | Age: 53
End: 2018-11-01

## 2018-11-01 NOTE — LETTER
November 1, 2018    Cade Johnston  [address] [address] [id number] [facility]  [address]  [facility] [address]  Phone: [phone] Dear Mr. Johnston:    We have tried to reach you by [facility] email unsuccessfully.      [facility] is committed to your overall health.  To help you get the most out of each of your visits, we will review your information to make sure you are up to date on all of your recommended tests and/or procedures.       Joni Smith MD   has found that your chart shows you may be due for the following:     Annual Dilated Eye Exam   Diabetic Foot Exam   Influenza Vaccine     If you have had any of the above done at another facility, please bring the records or information with you so that your record at [facility] will be complete.  If you would like to schedule any of these, please contact me.      If you are currently taking medication , please bring it with you to your appointment for review.     Sincerely,    Mirna Evans  Clinical Care Coordinator  [facility] [address] [place] [address]  Phone: [phone]   Fax: [phone]

## 2018-11-01 NOTE — PROGRESS NOTES
Health Maintenance Due   Topic Date Due    Low Dose Statin  11/08/1984    Eye Exam  04/20/2018    Foot Exam  05/11/2018    Influenza Vaccine  08/01/2018     Portal outreach un-read by patient.  Outreach mailed today

## 2018-11-07 ENCOUNTER — CLINICAL SUPPORT (OUTPATIENT)
Dept: UROLOGY | Facility: CLINIC | Age: 53
End: 2018-11-07
Payer: COMMERCIAL

## 2018-11-07 ENCOUNTER — CLINICAL SUPPORT (OUTPATIENT)
Dept: FAMILY MEDICINE | Facility: CLINIC | Age: 53
End: 2018-11-07
Attending: INTERNAL MEDICINE
Payer: COMMERCIAL

## 2018-11-07 ENCOUNTER — OFFICE VISIT (OUTPATIENT)
Dept: FAMILY MEDICINE | Facility: CLINIC | Age: 53
End: 2018-11-07
Payer: COMMERCIAL

## 2018-11-07 VITALS
WEIGHT: 232.81 LBS | OXYGEN SATURATION: 97 % | TEMPERATURE: 99 F | DIASTOLIC BLOOD PRESSURE: 78 MMHG | BODY MASS INDEX: 30.85 KG/M2 | SYSTOLIC BLOOD PRESSURE: 114 MMHG | RESPIRATION RATE: 18 BRPM | HEIGHT: 73 IN | HEART RATE: 70 BPM

## 2018-11-07 DIAGNOSIS — Z13.5 DIABETIC RETINOPATHY SCREENING: ICD-10-CM

## 2018-11-07 DIAGNOSIS — I10 ESSENTIAL HYPERTENSION: ICD-10-CM

## 2018-11-07 DIAGNOSIS — E29.1 MALE HYPOGONADISM: Primary | ICD-10-CM

## 2018-11-07 DIAGNOSIS — D86.9 SARCOIDOSIS: ICD-10-CM

## 2018-11-07 DIAGNOSIS — Z00.00 ROUTINE PHYSICAL EXAMINATION: Primary | ICD-10-CM

## 2018-11-07 DIAGNOSIS — G47.33 OSA (OBSTRUCTIVE SLEEP APNEA): ICD-10-CM

## 2018-11-07 DIAGNOSIS — J06.9 UPPER RESPIRATORY TRACT INFECTION, UNSPECIFIED TYPE: ICD-10-CM

## 2018-11-07 DIAGNOSIS — E78.5 DYSLIPIDEMIA: ICD-10-CM

## 2018-11-07 PROCEDURE — 3045F PR MOST RECENT HEMOGLOBIN A1C LEVEL 7.0-9.0%: CPT | Mod: CPTII,S$GLB,, | Performed by: INTERNAL MEDICINE

## 2018-11-07 PROCEDURE — 3074F SYST BP LT 130 MM HG: CPT | Mod: CPTII,S$GLB,, | Performed by: INTERNAL MEDICINE

## 2018-11-07 PROCEDURE — 96372 THER/PROPH/DIAG INJ SC/IM: CPT | Mod: S$GLB,,, | Performed by: UROLOGY

## 2018-11-07 PROCEDURE — 99999 PR PBB SHADOW E&M-EST. PATIENT-LVL I: CPT | Mod: PBBFAC,,,

## 2018-11-07 PROCEDURE — 92250 FUNDUS PHOTOGRAPHY W/I&R: CPT | Mod: S$GLB,,, | Performed by: OPTOMETRIST

## 2018-11-07 PROCEDURE — 3078F DIAST BP <80 MM HG: CPT | Mod: CPTII,S$GLB,, | Performed by: INTERNAL MEDICINE

## 2018-11-07 PROCEDURE — 99396 PREV VISIT EST AGE 40-64: CPT | Mod: S$GLB,,, | Performed by: INTERNAL MEDICINE

## 2018-11-07 PROCEDURE — 99999 PR PBB SHADOW E&M-EST. PATIENT-LVL III: CPT | Mod: PBBFAC,,,

## 2018-11-07 PROCEDURE — 99999 PR PBB SHADOW E&M-EST. PATIENT-LVL IV: CPT | Mod: PBBFAC,,, | Performed by: INTERNAL MEDICINE

## 2018-11-07 RX ORDER — BENZONATATE 100 MG/1
CAPSULE ORAL
Qty: 45 CAPSULE | Refills: 0 | Status: SHIPPED | OUTPATIENT
Start: 2018-11-07 | End: 2019-01-15 | Stop reason: ALTCHOICE

## 2018-11-07 RX ORDER — CEFUROXIME AXETIL 500 MG/1
500 TABLET ORAL 2 TIMES DAILY
Qty: 20 TABLET | Refills: 0 | Status: SHIPPED | OUTPATIENT
Start: 2018-11-07 | End: 2018-11-17

## 2018-11-07 RX ORDER — METFORMIN HYDROCHLORIDE 500 MG/1
1000 TABLET, EXTENDED RELEASE ORAL 2 TIMES DAILY WITH MEALS
Qty: 360 TABLET | Refills: 3 | Status: SHIPPED | OUTPATIENT
Start: 2018-11-07 | End: 2019-10-31 | Stop reason: SDUPTHER

## 2018-11-07 RX ADMIN — TESTOSTERONE CYPIONATE 400 MG: 200 INJECTION, SOLUTION INTRAMUSCULAR at 08:11

## 2018-11-07 NOTE — PROGRESS NOTES
Subjective:       Patient ID: Cade Johnston is a 52 y.o. male.    Chief Complaint: Annual Exam    Here for routine health maintenance.    Recall: Sarcoidosis, 45% lung fnx, sees pulmonary, low testosterone, Dr Larson; Sees Dr Duran cardiology.   VÍCTOR - + fatigue; could not tolerate cpap 10 yr ago.     DM -  Borderline uncontrolled  HTN - controlled  HLD - uncontrolled ldl < 70 goal; refusing statin  BPH/ ED - sees Dr Larson    Low testosterone - controlled; Dr Larson      Review of Systems   Constitutional: Negative for appetite change and fever.   HENT: Positive for rhinorrhea and sinus pain. Negative for nosebleeds and trouble swallowing.    Eyes: Negative for discharge and visual disturbance.   Respiratory: Positive for cough. Negative for choking and shortness of breath.    Cardiovascular: Negative for chest pain and palpitations.   Gastrointestinal: Negative for abdominal pain, nausea and vomiting.   Musculoskeletal: Negative for arthralgias and joint swelling.   Skin: Negative for rash and wound.   Neurological: Negative for dizziness and syncope.   Psychiatric/Behavioral: Negative for confusion and dysphoric mood.       Objective:      Vitals:    11/07/18 0748   BP: 114/78   Pulse: 70   Resp: 18   Temp: 98.6 °F (37 °C)     Physical Exam   Constitutional: He appears well-nourished.   Eyes: Conjunctivae and EOM are normal.   Neck: Trachea normal and normal range of motion. No thyromegaly present.   Cardiovascular: Normal heart sounds.   Pulses:       Dorsalis pedis pulses are 2+ on the right side, and 2+ on the left side.   Edema negative   Pulmonary/Chest: Effort normal and breath sounds normal.   Abdominal: Soft. There is no hepatomegaly.   Musculoskeletal:   ROM normal bilateral  Strength normal bilateral   Feet:   Right Foot:   Protective Sensation: 4 sites tested. 4 sites sensed.   Left Foot:   Protective Sensation: 4 sites tested. 4 sites sensed.   Neurological: He has normal reflexes. No cranial nerve  deficit.   Skin: Skin is warm, dry and intact.   Psychiatric: He has a normal mood and affect.   Alert and Oriented    Vitals reviewed.        Assessment:       1. Routine physical examination    2. Uncontrolled type 2 diabetes mellitus without complication, without long-term current use of insulin    3. Essential hypertension    4. Dyslipidemia    5. Upper respiratory tract infection, unspecified type    6. Sarcoidosis    7. VÍCTOR (obstructive sleep apnea)        Plan:       Routine physical examination  -     Hemoglobin A1c; Future; Expected date: 05/06/2019  -     Comprehensive metabolic panel; Future; Expected date: 05/06/2019  -     Lipid panel; Future; Expected date: 05/06/2019  -     PSA, Screening; Future; Expected date: 05/06/2019    Uncontrolled type 2 diabetes mellitus without complication, without long-term current use of insulin  -     Diabetic Eye Screening Photo; Future  -     Hemoglobin A1c; Future; Expected date: 05/06/2019  -     Microalbumin/creatinine urine ratio; Future; Expected date: 05/06/2019    Essential hypertension  -     Comprehensive metabolic panel; Future; Expected date: 05/06/2019    Dyslipidemia  -     Lipid panel; Future; Expected date: 05/06/2019    Upper respiratory tract infection, unspecified type  -     cefUROXime (CEFTIN) 500 MG tablet; Take 1 tablet (500 mg total) by mouth 2 (two) times daily. for 10 days  Dispense: 20 tablet; Refill: 0  -     benzonatate (TESSALON) 100 MG capsule; 1 - 2 po every 6 hours prn cough  Dispense: 45 capsule; Refill: 0    Sarcoidosis  -     Ambulatory consult to Sleep Disorders    VÍCTOR (obstructive sleep apnea)  -     Ambulatory consult to Sleep Disorders               Medication List           Accurate as of 11/7/18  8:11 AM. If you have any questions, ask your nurse or doctor.               START taking these medications    benzonatate 100 MG capsule  Commonly known as:  TESSALON  1 - 2 po every 6 hours prn cough  Started by:  Joni Smith MD      cefUROXime 500 MG tablet  Commonly known as:  CEFTIN  Take 1 tablet (500 mg total) by mouth 2 (two) times daily. for 10 days  Started by:  Joni Smith MD        CHANGE how you take these medications    fluticasone 50 mcg/actuation nasal spray  Commonly known as:  FLONASE  2 sprays by Each Nare route once daily.  What changed:    · when to take this  · reasons to take this     montelukast 10 mg tablet  Commonly known as:  SINGULAIR  TAKE 1 TABLET (10 MG TOTAL) BY MOUTH EVERY EVENING.  What changed:  Another medication with the same name was removed. Continue taking this medication, and follow the directions you see here.  Changed by:  Joni Smith MD        CONTINUE taking these medications    amLODIPine 2.5 MG tablet  Commonly known as:  NORVASC  Take 1 tablet (2.5 mg total) by mouth once daily.     blood sugar diagnostic Strp  Test q day     ENTERIC COATED ASPIRIN 81 MG EC tablet  Generic drug:  aspirin     ipratropium-albuterol  mcg/actuation inhaler  Commonly known as:  COMBIVENT  Inhale 1 puff into the lungs every 4 (four) hours as needed for Shortness of Breath. Rescue     losartan-hydrochlorothiazide 100-25 mg 100-25 mg per tablet  Commonly known as:  HYZAAR  TAKE 1 TABLET BY MOUTH ONCE DAILY.     metFORMIN 500 MG tablet  Commonly known as:  GLUCOPHAGE  TAKE 1 TABLET TWICE A DAY WITH MEALS     sildenafil 20 mg Tab  Commonly known as:  REVATIO  Take 1 tablet (20 mg total) by mouth daily as needed. 5 tablets 1 hour prior to sexual intercourse.        STOP taking these medications    azithromycin 250 MG tablet  Commonly known as:  ZITHROMAX  Stopped by:  Joni Smith MD           Where to Get Your Medications      These medications were sent to [id number] - [place] - [address] AT [address], [facility] [address]    Phone:  [phone]   · benzonatate 100 MG capsule  · cefUROXime 500 MG tablet       Wellness reviewed  Continue current  "management    Counseled on regular exercise, maintenance of a healthy weight, balanced diet rich in fruits/vegetables and lean protein, and avoidance of unhealthy habits like smoking and excessive alcohol intake.   Also, counseled on importance of being compliant with medication, health appointments, diet and exercise.     Follow-up in about 6 months (around 5/7/2019).    "This note will not be shared with the patient."  "

## 2018-11-07 NOTE — PROGRESS NOTES
Cade Johnston is a 52 y.o. male here for a diabetic eye screening with non-dilated fundus photos per Dr. Smith.    Patient cooperative?: Yes  Small pupils?:   Last eye exam: 4/20/17    For exam results, see Encounter Report.

## 2018-11-07 NOTE — PATIENT INSTRUCTIONS
Patient advised to use:  - intranasal steroid   - Over the counter 24h Allegra (no drowsiness); generic ok.   - Benadryl at night  Use Mucinex or quaifenesin daily to thin mucous congestion.  Patient advised to take Mucinex BID, use nasal saline spray or wash (distilled water through a Neti Pot using  salt water (1/2 - 1 table spoon per 8 - 10 oz of cool water), increase oral fluids, and try warm steam inhalation.  Use over the counter dextromethorphan to help prevent your cough.

## 2018-12-05 ENCOUNTER — CLINICAL SUPPORT (OUTPATIENT)
Dept: UROLOGY | Facility: CLINIC | Age: 53
End: 2018-12-05
Payer: COMMERCIAL

## 2018-12-05 DIAGNOSIS — E29.1 MALE HYPOGONADISM: Primary | ICD-10-CM

## 2018-12-05 PROCEDURE — 96372 THER/PROPH/DIAG INJ SC/IM: CPT | Mod: S$GLB,,, | Performed by: UROLOGY

## 2018-12-05 PROCEDURE — 99999 PR PBB SHADOW E&M-EST. PATIENT-LVL I: CPT | Mod: PBBFAC,,,

## 2018-12-05 RX ADMIN — TESTOSTERONE CYPIONATE 400 MG: 200 INJECTION, SOLUTION INTRAMUSCULAR at 08:12

## 2018-12-26 RX ORDER — LOSARTAN POTASSIUM AND HYDROCHLOROTHIAZIDE 25; 100 MG/1; MG/1
1 TABLET ORAL DAILY
Qty: 90 TABLET | Refills: 2 | Status: SHIPPED | OUTPATIENT
Start: 2018-12-26 | End: 2019-07-30 | Stop reason: SDUPTHER

## 2019-01-03 ENCOUNTER — CLINICAL SUPPORT (OUTPATIENT)
Dept: UROLOGY | Facility: CLINIC | Age: 54
End: 2019-01-03
Payer: COMMERCIAL

## 2019-01-03 DIAGNOSIS — E29.1 MALE HYPOGONADISM: Primary | ICD-10-CM

## 2019-01-03 PROCEDURE — 96372 THER/PROPH/DIAG INJ SC/IM: CPT | Mod: S$GLB,,, | Performed by: UROLOGY

## 2019-01-03 PROCEDURE — 99999 PR PBB SHADOW E&M-EST. PATIENT-LVL III: CPT | Mod: PBBFAC,,,

## 2019-01-03 PROCEDURE — 96372 PR INJECTION,THERAP/PROPH/DIAG2ST, IM OR SUBCUT: ICD-10-PCS | Mod: S$GLB,,, | Performed by: UROLOGY

## 2019-01-03 PROCEDURE — 99999 PR PBB SHADOW E&M-EST. PATIENT-LVL III: ICD-10-PCS | Mod: PBBFAC,,,

## 2019-01-03 RX ORDER — TESTOSTERONE CYPIONATE 200 MG/ML
400 INJECTION, SOLUTION INTRAMUSCULAR
Status: COMPLETED | OUTPATIENT
Start: 2019-01-31 | End: 2019-03-29

## 2019-01-03 RX ADMIN — TESTOSTERONE CYPIONATE 400 MG: 200 INJECTION, SOLUTION INTRAMUSCULAR at 08:01

## 2019-01-15 ENCOUNTER — HOSPITAL ENCOUNTER (OUTPATIENT)
Dept: RADIOLOGY | Facility: HOSPITAL | Age: 54
Discharge: HOME | End: 2019-01-15
Attending: UROLOGY
Payer: COMMERCIAL

## 2019-01-15 ENCOUNTER — OFFICE VISIT (OUTPATIENT)
Dept: UROLOGY | Facility: CLINIC | Age: 54
End: 2019-01-15
Payer: COMMERCIAL

## 2019-01-15 VITALS
BODY MASS INDEX: 31.24 KG/M2 | HEART RATE: 86 BPM | SYSTOLIC BLOOD PRESSURE: 134 MMHG | DIASTOLIC BLOOD PRESSURE: 84 MMHG | HEIGHT: 73 IN | WEIGHT: 235.69 LBS

## 2019-01-15 DIAGNOSIS — R10.9 FLANK PAIN: Primary | ICD-10-CM

## 2019-01-15 DIAGNOSIS — R10.9 FLANK PAIN: ICD-10-CM

## 2019-01-15 LAB
BILIRUB SERPL-MCNC: ABNORMAL MG/DL
BLOOD URINE, POC: ABNORMAL
COLOR, POC UA: YELLOW
GLUCOSE UR QL STRIP: 100
KETONES UR QL STRIP: ABNORMAL
LEUKOCYTE ESTERASE URINE, POC: ABNORMAL
NITRITE, POC UA: ABNORMAL
PH, POC UA: 7
PROTEIN, POC: ABNORMAL
SPECIFIC GRAVITY, POC UA: 1.01
UROBILINOGEN, POC UA: ABNORMAL

## 2019-01-15 PROCEDURE — 81002 POCT URINE DIPSTICK WITHOUT MICROSCOPE: ICD-10-PCS | Mod: S$GLB,,, | Performed by: UROLOGY

## 2019-01-15 PROCEDURE — 3075F PR MOST RECENT SYSTOLIC BLOOD PRESS GE 130-139MM HG: ICD-10-PCS | Mod: CPTII,S$GLB,, | Performed by: UROLOGY

## 2019-01-15 PROCEDURE — 74178 CT UROGRAM ABD PELVIS W WO: ICD-10-PCS | Mod: 26,,, | Performed by: RADIOLOGY

## 2019-01-15 PROCEDURE — 3079F PR MOST RECENT DIASTOLIC BLOOD PRESSURE 80-89 MM HG: ICD-10-PCS | Mod: CPTII,S$GLB,, | Performed by: UROLOGY

## 2019-01-15 PROCEDURE — 3075F SYST BP GE 130 - 139MM HG: CPT | Mod: CPTII,S$GLB,, | Performed by: UROLOGY

## 2019-01-15 PROCEDURE — 99215 PR OFFICE/OUTPT VISIT, EST, LEVL V, 40-54 MIN: ICD-10-PCS | Mod: 25,S$GLB,, | Performed by: UROLOGY

## 2019-01-15 PROCEDURE — 99999 PR PBB SHADOW E&M-EST. PATIENT-LVL III: ICD-10-PCS | Mod: PBBFAC,,, | Performed by: UROLOGY

## 2019-01-15 PROCEDURE — 25500020 PHARM REV CODE 255: Mod: PO | Performed by: UROLOGY

## 2019-01-15 PROCEDURE — 3008F PR BODY MASS INDEX (BMI) DOCUMENTED: ICD-10-PCS | Mod: CPTII,S$GLB,, | Performed by: UROLOGY

## 2019-01-15 PROCEDURE — 81002 URINALYSIS NONAUTO W/O SCOPE: CPT | Mod: S$GLB,,, | Performed by: UROLOGY

## 2019-01-15 PROCEDURE — 74178 CT ABD&PLV WO CNTR FLWD CNTR: CPT | Mod: 26,,, | Performed by: RADIOLOGY

## 2019-01-15 PROCEDURE — 3079F DIAST BP 80-89 MM HG: CPT | Mod: CPTII,S$GLB,, | Performed by: UROLOGY

## 2019-01-15 PROCEDURE — 74178 CT ABD&PLV WO CNTR FLWD CNTR: CPT | Mod: TC,PO

## 2019-01-15 PROCEDURE — 3008F BODY MASS INDEX DOCD: CPT | Mod: CPTII,S$GLB,, | Performed by: UROLOGY

## 2019-01-15 PROCEDURE — 99999 PR PBB SHADOW E&M-EST. PATIENT-LVL III: CPT | Mod: PBBFAC,,, | Performed by: UROLOGY

## 2019-01-15 PROCEDURE — 99215 OFFICE O/P EST HI 40 MIN: CPT | Mod: 25,S$GLB,, | Performed by: UROLOGY

## 2019-01-15 RX ADMIN — IOHEXOL 120 ML: 350 INJECTION, SOLUTION INTRAVENOUS at 01:01

## 2019-01-15 NOTE — PROGRESS NOTES
UROLOGY [place]  1 15 19       c-c pain R flank        Age 53, having pain on the R flank off and on. Says he had a hx of kidney stones and knows how the pain of passing a kidney stone feels, and he doesn't believe it is a case of passing a kidney stones. There is no radiation to the pain. No nausea or vomiting. Pain gets worse if he moves his body certain ways. L flank never hurting. Voiding function is good, with no need to strain to void. Nocturia x 2. No pains or burning when voiding.     Is on testosterone replacement for hypogonadism. Our nurses give him the testosterone shots, has been on it for 3 years, feels that it has helped his general energy and his sexual interest.      The R flank is the side where he had surgery before. Has a hx of nephrolithiasis, with a hx of large kidney stone in the R kidney 20 yrs ago, underwent an eswl but the fragments apparently got stuck in the ureter and required open exploration for ureterolithotomy (juana/jorge). Says the open surgery was complex and took 10 hours. Pt did fine after that and has had no more problems. About 10 years later he passed another small stone and also he had one small one removed from the ureter by dr jag jensen.     Testosterone in July 2018 was 672 ng; cbc showed erythrocytosis 59.8 percent then; had a therapeutic phlebotomy done at the blood bank        [facility]     Surgical:  has a past surgical history that includes Ureteral exploration (1996); elbow spur removal; Extracorporeal shock wave lithotripsy (1996); removal of skin cancer; and Circumcision.     Medical:  has a past medical history of DJD (degenerative joint disease) of lumbar spine; DM type 2 (diabetes mellitus, type 2); Fatty liver; GERD (gastroesophageal reflux disease); Hepatosplenomegaly; HTN (hypertension); Mixed hyperlipidemia; Nephrolithiasis; Pulmonary sarcoidosis; Sarcoidosis of lung; and Ventricular hypokinesis.     Familial: father with CAD, mother with  hypertension     Social:  for [employer]                 Current Outpatient Prescriptions on File Prior to Visit   Medication Sig Dispense Refill    aspirin (ENTERIC COATED ASPIRIN) 81 MG EC tablet Take 81 mg by mouth once daily        blood sugar diagnostic Strp Test q day 100 strip 3    diclofenac sodium 1 % Gel Apply 2 g topically 100 g 2    ipratropium-albuterol (COMBIVENT) 20-10 Inhale 1 puff into the lungs every 4 4 g 11    losartan-hydrochlorothiazide 100-25 mg (HYZAAR) 100-25 mg per tablet TAKE 1 TABLET BY MOUTH ONCE DAILY. 30 tablet 5    metformin (GLUCOPH TAKE 1  tablet 1    sildenafil (REVATIO) 20 mg Tab Take 20 mg by mouth.                            Current Facility-Administered Medications on File Prior to Visit   Medication Dose Route Frequency Provider Last Rate Last Dose    testosterone cypionate injection 300 mg  300 mg Intramuscular Q21 Days Des Larson MD   300 mg at 07/11/17 1555         REVIEW OF SYSTEMS  GENERAL: has complaints of fatigue less often now that he gets the testosterone treatment. No headaches or dizzy spells.   HEENT: vision preserved. Sinuses: has frequent congestion   CARDIOPULMONARY: No swelling of the legs; no chest pain. No shortness of breath, no wheezing.   GASTROINTESTINAL: No heartburn. Denies diarrhea; has some constipation, no blood or mucus in stools.   GENITOURINARY: Denies dysuria, bleeding or incontinence.   MUSCULOSKELETAL: some arthritic complaints in neck and feet   PSYCHIATRIC: No history of depression or anxiety.   ENDOCRINOLOGIC: No reports of sweating, cold or heat intolerance. No polyuria or polydipsia.   NEUROLOGICAL: No headache, dizziness, syncope, paralysis, ataxia, numbness or tingling in the extremities.  LYMPHATICS: No enlarged nodes. No history of splenectomy.  ==     Pt alert, oriented, no distress  HEENT:  wnl.  Neck: supple, no JVD, no lymphadenopathy  Chest: CV NSR  Lungs: normal chest expansion  Abdomen flat,  nontender, no organomegaly, no masses.  Costovertebral angles strictly negative to deep palpation. Positive for palpation below R costovertebral angle.  No hernias. Has large RLQ surgical scar from his R ureterolithotomy, well healed. Also has a small scar from the stab incision for penrose drain  Penis circumcised, meatus nl  Testes nl, epi nl, scrotum nl  BRIDGER: anus nl, sphincter nl tone, mucosa without lesions, prostate 30 gm, symmetric, no nodules or indurations  Extremities: no edema, peripheral pulses nl  Neuro: preserved          IMPRESSION:    R flank pain, characteristics of pain suggest musculoskeletal nature (pain on moving)  Hypogonadism, kidney stones, erectile dysfunction  Can continue on testosterone replacement. Pt is pleased with the regime of 400 mg im q 28 days  Pt has erythrocytosis, and this puts him at greater risk of an intravascular clot; needs to donate blood or do a therapeutic phlebotomy, pt has done it before and is scheduled next week to do so.   R flank discomfort, I am ordering a renal ultrasound owing to his hx of nephrolithiasis  viagra as needed  RTC yearly

## 2019-01-17 ENCOUNTER — TELEPHONE (OUTPATIENT)
Dept: UROLOGY | Facility: CLINIC | Age: 54
End: 2019-01-17

## 2019-01-17 NOTE — TELEPHONE ENCOUNTER
The patient called and would like to know his results from his CT scan on 1-15-19.  He is planning to go out of town and would like to know if the test results before.

## 2019-01-21 ENCOUNTER — TELEPHONE (OUTPATIENT)
Dept: UROLOGY | Facility: CLINIC | Age: 54
End: 2019-01-21

## 2019-01-21 NOTE — TELEPHONE ENCOUNTER
----- Message from Rylee Ball sent at 1/21/2019  9:29 AM CST -----  Contact: pt  Pt states had a CT Scan 1/15 and he is calling to see about getting those results,please..[phone] (home)

## 2019-01-31 ENCOUNTER — CLINICAL SUPPORT (OUTPATIENT)
Dept: UROLOGY | Facility: CLINIC | Age: 54
End: 2019-01-31
Payer: COMMERCIAL

## 2019-01-31 DIAGNOSIS — E29.1 MALE HYPOGONADISM: Primary | ICD-10-CM

## 2019-01-31 PROCEDURE — 99999 PR PBB SHADOW E&M-EST. PATIENT-LVL III: CPT | Mod: PBBFAC,,,

## 2019-01-31 PROCEDURE — 96372 PR INJECTION,THERAP/PROPH/DIAG2ST, IM OR SUBCUT: ICD-10-PCS | Mod: S$GLB,,, | Performed by: UROLOGY

## 2019-01-31 PROCEDURE — 96372 THER/PROPH/DIAG INJ SC/IM: CPT | Mod: S$GLB,,, | Performed by: UROLOGY

## 2019-01-31 PROCEDURE — 99999 PR PBB SHADOW E&M-EST. PATIENT-LVL III: ICD-10-PCS | Mod: PBBFAC,,,

## 2019-01-31 RX ADMIN — TESTOSTERONE CYPIONATE 400 MG: 200 INJECTION, SOLUTION INTRAMUSCULAR at 08:01

## 2019-03-01 ENCOUNTER — CLINICAL SUPPORT (OUTPATIENT)
Dept: UROLOGY | Facility: CLINIC | Age: 54
End: 2019-03-01
Payer: COMMERCIAL

## 2019-03-01 DIAGNOSIS — E29.1 MALE HYPOGONADISM: Primary | ICD-10-CM

## 2019-03-01 PROCEDURE — 99999 PR PBB SHADOW E&M-EST. PATIENT-LVL III: CPT | Mod: PBBFAC,,,

## 2019-03-01 PROCEDURE — 96372 THER/PROPH/DIAG INJ SC/IM: CPT | Mod: S$GLB,,, | Performed by: UROLOGY

## 2019-03-01 PROCEDURE — 96372 PR INJECTION,THERAP/PROPH/DIAG2ST, IM OR SUBCUT: ICD-10-PCS | Mod: S$GLB,,, | Performed by: UROLOGY

## 2019-03-01 PROCEDURE — 99999 PR PBB SHADOW E&M-EST. PATIENT-LVL III: ICD-10-PCS | Mod: PBBFAC,,,

## 2019-03-01 RX ADMIN — TESTOSTERONE CYPIONATE 400 MG: 200 INJECTION, SOLUTION INTRAMUSCULAR at 08:03

## 2019-03-09 DIAGNOSIS — I10 ESSENTIAL HYPERTENSION: ICD-10-CM

## 2019-03-12 RX ORDER — AMLODIPINE BESYLATE 2.5 MG/1
2.5 TABLET ORAL DAILY
Qty: 30 TABLET | Refills: 3 | Status: SHIPPED | OUTPATIENT
Start: 2019-03-12 | End: 2019-06-12 | Stop reason: SDUPTHER

## 2019-03-29 ENCOUNTER — CLINICAL SUPPORT (OUTPATIENT)
Dept: UROLOGY | Facility: CLINIC | Age: 54
End: 2019-03-29
Payer: COMMERCIAL

## 2019-03-29 DIAGNOSIS — E29.1 MALE HYPOGONADISM: Primary | ICD-10-CM

## 2019-03-29 PROCEDURE — 99999 PR PBB SHADOW E&M-EST. PATIENT-LVL III: CPT | Mod: PBBFAC,,,

## 2019-03-29 PROCEDURE — 99999 PR PBB SHADOW E&M-EST. PATIENT-LVL III: ICD-10-PCS | Mod: PBBFAC,,,

## 2019-03-29 PROCEDURE — 96372 PR INJECTION,THERAP/PROPH/DIAG2ST, IM OR SUBCUT: ICD-10-PCS | Mod: S$GLB,,, | Performed by: UROLOGY

## 2019-03-29 PROCEDURE — 96372 THER/PROPH/DIAG INJ SC/IM: CPT | Mod: S$GLB,,, | Performed by: UROLOGY

## 2019-03-29 RX ORDER — TESTOSTERONE CYPIONATE 200 MG/ML
400 INJECTION, SOLUTION INTRAMUSCULAR
Status: COMPLETED | OUTPATIENT
Start: 2019-04-26 | End: 2019-09-20

## 2019-03-29 RX ADMIN — TESTOSTERONE CYPIONATE 400 MG: 200 INJECTION, SOLUTION INTRAMUSCULAR at 08:03

## 2019-04-25 ENCOUNTER — PATIENT OUTREACH (OUTPATIENT)
Dept: ADMINISTRATIVE | Facility: HOSPITAL | Age: 54
End: 2019-04-25

## 2019-04-25 NOTE — LETTER
May 6, 2019    Cade Johnston  [address] [address] [address]             [facility]  [address]  [facility] [address]  Phone: [phone] Dear Mr. Johnston:    We have tried to reach you by mychart unsuccessfully.    [facility] is committed to your overall health.  To help you get the most out of each of your visits, we will review your information to make sure you are up to date on all of your recommended tests and/or procedures.       Joni Smith MD   has found that your chart shows you may be due for the following:     Influenza Vaccine   Hemoglobin A1c, scheduled     Future Appointments   5/4/2019   8:00 AM              [facility]       [place]   5/8/2019   7:50 AM    Joni Smith MD          [facility]     If you have had any of the above done at another facility, please bring the records with you or fax them to [phone] so that your record at [facility] will be complete. If you have not had any of these tests or procedures done recently and would like to complete this testing ,  please call [phone] or send a message through your [facility] portal to your provider's office.     If you have an upcoming scheduled appointment for the above test and/or procedures, please disregard this letter.     If you are currently taking medication, please bring it with you to your appointment for review.     Sincerely,     Your [facility],   Joni Smith MD   [facility]   Clinical Care Coordinator   [facility]         If you have any questions or concerns, please don't hesitate to call.

## 2019-04-25 NOTE — PROGRESS NOTES
Health Maintenance Due   Topic Date Due    Low Dose Statin  11/08/1984    Influenza Vaccine  08/01/2018    Hemoglobin A1c  04/30/2019     Future Appointments   Date Time Provider Department Center   4/26/2019  8:00 AM UROLOGY, NURSE [facility] UROLOGY [place]   5/4/2019  7:30 AM URINE [facility] [place]   5/4/2019  8:00 AM LABERYN [facility] [place]   5/8/2019  7:50 AM Joni Smith MD [facility]

## 2019-04-26 ENCOUNTER — CLINICAL SUPPORT (OUTPATIENT)
Dept: UROLOGY | Facility: CLINIC | Age: 54
End: 2019-04-26
Payer: COMMERCIAL

## 2019-04-26 DIAGNOSIS — E29.1 MALE HYPOGONADISM: Primary | ICD-10-CM

## 2019-04-26 PROCEDURE — 99999 PR PBB SHADOW E&M-EST. PATIENT-LVL I: CPT | Mod: PBBFAC,,,

## 2019-04-26 PROCEDURE — 96372 THER/PROPH/DIAG INJ SC/IM: CPT | Mod: S$GLB,,, | Performed by: UROLOGY

## 2019-04-26 PROCEDURE — 96372 PR INJECTION,THERAP/PROPH/DIAG2ST, IM OR SUBCUT: ICD-10-PCS | Mod: S$GLB,,, | Performed by: UROLOGY

## 2019-04-26 PROCEDURE — 99999 PR PBB SHADOW E&M-EST. PATIENT-LVL I: ICD-10-PCS | Mod: PBBFAC,,,

## 2019-04-26 RX ADMIN — TESTOSTERONE CYPIONATE 400 MG: 200 INJECTION, SOLUTION INTRAMUSCULAR at 08:04

## 2019-04-26 NOTE — PROGRESS NOTES
Pt received 400 mg testosterone injection IM in RUQ of buttocks. Pt tolerated procedure well. Pt refused to stay for observation. Pt verbalized understand of the risk with leaving the clinic.

## 2019-05-04 ENCOUNTER — LAB VISIT (OUTPATIENT)
Dept: LAB | Facility: HOSPITAL | Age: 54
End: 2019-05-04
Attending: INTERNAL MEDICINE
Payer: COMMERCIAL

## 2019-05-04 DIAGNOSIS — Z00.00 ROUTINE PHYSICAL EXAMINATION: ICD-10-CM

## 2019-05-04 DIAGNOSIS — E78.5 DYSLIPIDEMIA: ICD-10-CM

## 2019-05-04 DIAGNOSIS — I10 ESSENTIAL HYPERTENSION: ICD-10-CM

## 2019-05-04 LAB
ALBUMIN SERPL BCP-MCNC: 3.6 G/DL (ref 3.5–5.2)
ALP SERPL-CCNC: 80 U/L (ref 55–135)
ALT SERPL W/O P-5'-P-CCNC: 36 U/L (ref 10–44)
ANION GAP SERPL CALC-SCNC: 13 MMOL/L (ref 8–16)
AST SERPL-CCNC: 26 U/L (ref 10–40)
BILIRUB SERPL-MCNC: 1.4 MG/DL (ref 0.1–1)
BUN SERPL-MCNC: 20 MG/DL (ref 6–20)
CALCIUM SERPL-MCNC: 9.4 MG/DL (ref 8.7–10.5)
CHLORIDE SERPL-SCNC: 100 MMOL/L (ref 95–110)
CHOLEST SERPL-MCNC: 137 MG/DL (ref 120–199)
CHOLEST/HDLC SERPL: 4.6 {RATIO} (ref 2–5)
CO2 SERPL-SCNC: 25 MMOL/L (ref 23–29)
COMPLEXED PSA SERPL-MCNC: 1.2 NG/ML (ref 0–4)
CREAT SERPL-MCNC: 1 MG/DL (ref 0.5–1.4)
EST. GFR  (AFRICAN AMERICAN): >60 ML/MIN/1.73 M^2
EST. GFR  (NON AFRICAN AMERICAN): >60 ML/MIN/1.73 M^2
ESTIMATED AVG GLUCOSE: 140 MG/DL (ref 68–131)
GLUCOSE SERPL-MCNC: 140 MG/DL (ref 70–110)
HBA1C MFR BLD HPLC: 6.5 % (ref 4–5.6)
HDLC SERPL-MCNC: 30 MG/DL (ref 40–75)
HDLC SERPL: 21.9 % (ref 20–50)
LDLC SERPL CALC-MCNC: 38.2 MG/DL (ref 63–159)
NONHDLC SERPL-MCNC: 107 MG/DL
POTASSIUM SERPL-SCNC: 3.7 MMOL/L (ref 3.5–5.1)
PROT SERPL-MCNC: 7.1 G/DL (ref 6–8.4)
SODIUM SERPL-SCNC: 138 MMOL/L (ref 136–145)
TRIGL SERPL-MCNC: 344 MG/DL (ref 30–150)

## 2019-05-04 PROCEDURE — 80053 COMPREHEN METABOLIC PANEL: CPT

## 2019-05-04 PROCEDURE — 83036 HEMOGLOBIN GLYCOSYLATED A1C: CPT

## 2019-05-04 PROCEDURE — 84153 ASSAY OF PSA TOTAL: CPT

## 2019-05-04 PROCEDURE — 36415 COLL VENOUS BLD VENIPUNCTURE: CPT | Mod: PO

## 2019-05-04 PROCEDURE — 80061 LIPID PANEL: CPT

## 2019-05-08 ENCOUNTER — OFFICE VISIT (OUTPATIENT)
Dept: FAMILY MEDICINE | Facility: CLINIC | Age: 54
End: 2019-05-08
Payer: COMMERCIAL

## 2019-05-08 VITALS
RESPIRATION RATE: 16 BRPM | OXYGEN SATURATION: 95 % | WEIGHT: 234.81 LBS | BODY MASS INDEX: 31.12 KG/M2 | DIASTOLIC BLOOD PRESSURE: 76 MMHG | HEART RATE: 74 BPM | HEIGHT: 73 IN | SYSTOLIC BLOOD PRESSURE: 128 MMHG

## 2019-05-08 DIAGNOSIS — F43.9 SITUATIONAL STRESS: ICD-10-CM

## 2019-05-08 DIAGNOSIS — E78.5 DYSLIPIDEMIA: ICD-10-CM

## 2019-05-08 DIAGNOSIS — M62.838 MUSCLE SPASM: ICD-10-CM

## 2019-05-08 DIAGNOSIS — I10 ESSENTIAL HYPERTENSION: Primary | ICD-10-CM

## 2019-05-08 DIAGNOSIS — E11.9 CONTROLLED TYPE 2 DIABETES MELLITUS WITHOUT COMPLICATION, WITHOUT LONG-TERM CURRENT USE OF INSULIN: ICD-10-CM

## 2019-05-08 DIAGNOSIS — D86.9 SARCOIDOSIS: ICD-10-CM

## 2019-05-08 PROCEDURE — 2024F 7 FLD RTA PHOTO EVC RTNOPTHY: CPT | Mod: S$GLB,,, | Performed by: INTERNAL MEDICINE

## 2019-05-08 PROCEDURE — 2024F PR 7 FIELD PHOTOS WITH INTERP/ REVIEW: ICD-10-PCS | Mod: S$GLB,,, | Performed by: INTERNAL MEDICINE

## 2019-05-08 PROCEDURE — 3078F DIAST BP <80 MM HG: CPT | Mod: CPTII,S$GLB,, | Performed by: INTERNAL MEDICINE

## 2019-05-08 PROCEDURE — 3044F HG A1C LEVEL LT 7.0%: CPT | Mod: CPTII,S$GLB,, | Performed by: INTERNAL MEDICINE

## 2019-05-08 PROCEDURE — 99214 PR OFFICE/OUTPT VISIT, EST, LEVL IV, 30-39 MIN: ICD-10-PCS | Mod: S$GLB,,, | Performed by: INTERNAL MEDICINE

## 2019-05-08 PROCEDURE — 99214 OFFICE O/P EST MOD 30 MIN: CPT | Mod: S$GLB,,, | Performed by: INTERNAL MEDICINE

## 2019-05-08 PROCEDURE — 3074F SYST BP LT 130 MM HG: CPT | Mod: CPTII,S$GLB,, | Performed by: INTERNAL MEDICINE

## 2019-05-08 PROCEDURE — 3008F PR BODY MASS INDEX (BMI) DOCUMENTED: ICD-10-PCS | Mod: CPTII,S$GLB,, | Performed by: INTERNAL MEDICINE

## 2019-05-08 PROCEDURE — 3074F PR MOST RECENT SYSTOLIC BLOOD PRESSURE < 130 MM HG: ICD-10-PCS | Mod: CPTII,S$GLB,, | Performed by: INTERNAL MEDICINE

## 2019-05-08 PROCEDURE — 99999 PR PBB SHADOW E&M-EST. PATIENT-LVL III: CPT | Mod: PBBFAC,,, | Performed by: INTERNAL MEDICINE

## 2019-05-08 PROCEDURE — 99999 PR PBB SHADOW E&M-EST. PATIENT-LVL III: ICD-10-PCS | Mod: PBBFAC,,, | Performed by: INTERNAL MEDICINE

## 2019-05-08 PROCEDURE — 3078F PR MOST RECENT DIASTOLIC BLOOD PRESSURE < 80 MM HG: ICD-10-PCS | Mod: CPTII,S$GLB,, | Performed by: INTERNAL MEDICINE

## 2019-05-08 PROCEDURE — 3008F BODY MASS INDEX DOCD: CPT | Mod: CPTII,S$GLB,, | Performed by: INTERNAL MEDICINE

## 2019-05-08 PROCEDURE — 3044F PR MOST RECENT HEMOGLOBIN A1C LEVEL <7.0%: ICD-10-PCS | Mod: CPTII,S$GLB,, | Performed by: INTERNAL MEDICINE

## 2019-05-08 RX ORDER — FLUTICASONE FUROATE AND VILANTEROL 200; 25 UG/1; UG/1
1 POWDER RESPIRATORY (INHALATION) DAILY
Qty: 30 EACH | Refills: 11
Start: 2019-05-08 | End: 2022-11-03 | Stop reason: SDUPTHER

## 2019-05-08 RX ORDER — METHOCARBAMOL 500 MG/1
500 TABLET, FILM COATED ORAL 4 TIMES DAILY
Qty: 40 TABLET | Refills: 0 | Status: SHIPPED | OUTPATIENT
Start: 2019-05-08 | End: 2019-05-18

## 2019-05-08 NOTE — PROGRESS NOTES
Subjective:       Patient ID: Cade Johnston is a 53 y.o. male.    Chief Complaint: Hypertension    Recall:   Sarcoidosis, 45% lung fnx, sees pulmonary - increased sob - now with talking.  States lungs slightly worse.  On new inhaler Breo from pulm.  pulm recommended retiring.    low testosterone, Dr Larson;  Secondary erythrocytosis - gets frequent phlebotomies.  2nd to testosterone and sarcoidosis    Sees Dr Duran cardiology.   VÍCTOR - + fatigue; could not tolerate cpap 10 yr ago.     DM - controlled  HTN - controlled  HLD - controlled ldl < 70 goal; refusing statin in past.  High triglycerides  BPH/ ED - sees Dr Larson    Feels stressed with senior living decision, brother likely on drugs and elderly parents living with him     Complains of left upper rib pain after PFT      Review of Systems   Constitutional: Negative for appetite change and fever.   HENT: Negative for nosebleeds and trouble swallowing.    Eyes: Negative for discharge and visual disturbance.   Respiratory: Negative for choking and shortness of breath.    Cardiovascular: Negative for chest pain and palpitations.   Gastrointestinal: Negative for abdominal pain, nausea and vomiting.   Musculoskeletal: Positive for back pain. Negative for arthralgias and joint swelling.   Skin: Negative for rash and wound.   Neurological: Negative for dizziness and syncope.   Psychiatric/Behavioral: Negative for confusion and dysphoric mood.       Objective:      Vitals:    05/08/19 0756   BP: 128/76   Pulse: 74   Resp: 16     Physical Exam   Constitutional: He appears well-nourished.   Eyes: Conjunctivae and EOM are normal.   Neck: Normal range of motion.   Cardiovascular: Normal rate and regular rhythm.   Pulmonary/Chest: Effort normal and breath sounds normal.   Musculoskeletal:   Normal ROM bilateral   Right thoracic paraspinal/intercostal muscle + TTP (massage improves pain)   Neurological: No cranial nerve deficit (grossly intact).   Skin: Skin is warm and dry.    Psychiatric: He has a normal mood and affect.   Alert and orientated   Vitals reviewed.        Assessment:       1. Essential hypertension    2. Sarcoidosis    3. Dyslipidemia    4. Controlled type 2 diabetes mellitus without complication, without long-term current use of insulin    5. Situational stress    6. Muscle spasm        Plan:       Essential hypertension  -     Comprehensive metabolic panel; Future; Expected date: 11/04/2019    Sarcoidosis  -     fluticasone furoate-vilanterol (BREO ELLIPTA) 200-25 mcg/dose DsDv diskus inhaler; Inhale 1 puff into the lungs once daily. Controller  Dispense: 30 each; Refill: 11    Dyslipidemia  -     Lipid panel; Future; Expected date: 11/04/2019    Controlled type 2 diabetes mellitus without complication, without long-term current use of insulin  -     Hemoglobin A1c; Future; Expected date: 11/04/2019    Situational stress    Muscle spasm  -     methocarbamol (ROBAXIN) 500 MG Tab; Take 1 tablet (500 mg total) by mouth 4 (four) times daily. for 10 days  Dispense: 40 tablet; Refill: 0            Medication List with Changes/Refills   New Medications    FLUTICASONE FUROATE-VILANTEROL (BREO ELLIPTA) 200-25 MCG/DOSE DSDV DISKUS INHALER    Inhale 1 puff into the lungs once daily. Controller   Current Medications    AMLODIPINE (NORVASC) 2.5 MG TABLET    TAKE 1 TABLET (2.5 MG TOTAL) BY MOUTH ONCE DAILY.    ASPIRIN (ENTERIC COATED ASPIRIN) 81 MG EC TABLET    Take 81 mg by mouth once daily. No Sig Provided    BLOOD SUGAR DIAGNOSTIC STRP    Test q day    FLUTICASONE (FLONASE) 50 MCG/ACTUATION NASAL SPRAY    2 sprays by Each Nare route once daily.    IPRATROPIUM-ALBUTEROL (COMBIVENT)  MCG/ACTUATION INHALER    Inhale 1 puff into the lungs every 4 (four) hours as needed for Shortness of Breath. Rescue    LOSARTAN-HYDROCHLOROTHIAZIDE 100-25 MG (HYZAAR) 100-25 MG PER TABLET    TAKE 1 TABLET BY MOUTH ONCE DAILY.    METFORMIN (GLUCOPHAGE-XR) 500 MG 24 HR TABLET    Take 2 tablets  (1,000 mg total) by mouth 2 (two) times daily with meals.    SILDENAFIL (REVATIO) 20 MG TAB    Take 1 tablet (20 mg total) by mouth daily as needed. 5 tablets 1 hour prior to sexual intercourse.     Will let me know if want psych or ssri   Continue current management and monitor.    Counseled on regular exercise, maintenance of a healthy weight, balanced diet rich in fruits/vegetables and lean protein, and avoidance of unhealthy habits like smoking and excessive alcohol intake.   Also, counseled on importance of being compliant with medication, health appointments, diet and exercise.     Follow up in about 6 months (around 11/8/2019).

## 2019-05-23 ENCOUNTER — CLINICAL SUPPORT (OUTPATIENT)
Dept: UROLOGY | Facility: CLINIC | Age: 54
End: 2019-05-23
Payer: COMMERCIAL

## 2019-05-23 DIAGNOSIS — E29.1 MALE HYPOGONADISM: Primary | ICD-10-CM

## 2019-05-23 PROCEDURE — 96372 THER/PROPH/DIAG INJ SC/IM: CPT | Mod: S$GLB,,, | Performed by: UROLOGY

## 2019-05-23 PROCEDURE — 99999 PR PBB SHADOW E&M-EST. PATIENT-LVL III: CPT | Mod: PBBFAC,,,

## 2019-05-23 PROCEDURE — 96372 PR INJECTION,THERAP/PROPH/DIAG2ST, IM OR SUBCUT: ICD-10-PCS | Mod: S$GLB,,, | Performed by: UROLOGY

## 2019-05-23 PROCEDURE — 99999 PR PBB SHADOW E&M-EST. PATIENT-LVL III: ICD-10-PCS | Mod: PBBFAC,,,

## 2019-05-23 RX ADMIN — TESTOSTERONE CYPIONATE 400 MG: 200 INJECTION, SOLUTION INTRAMUSCULAR at 08:05

## 2019-06-12 DIAGNOSIS — I10 ESSENTIAL HYPERTENSION: ICD-10-CM

## 2019-06-12 RX ORDER — AMLODIPINE BESYLATE 2.5 MG/1
TABLET ORAL
Qty: 30 TABLET | Refills: 11 | Status: SHIPPED | OUTPATIENT
Start: 2019-06-12 | End: 2019-08-13

## 2019-06-19 ENCOUNTER — OFFICE VISIT (OUTPATIENT)
Dept: URGENT CARE | Facility: CLINIC | Age: 54
End: 2019-06-19
Payer: COMMERCIAL

## 2019-06-19 VITALS
DIASTOLIC BLOOD PRESSURE: 82 MMHG | OXYGEN SATURATION: 97 % | HEIGHT: 73 IN | SYSTOLIC BLOOD PRESSURE: 140 MMHG | RESPIRATION RATE: 16 BRPM | TEMPERATURE: 98 F | HEART RATE: 74 BPM | BODY MASS INDEX: 31.01 KG/M2 | WEIGHT: 234 LBS

## 2019-06-19 DIAGNOSIS — D86.0 PULMONARY SARCOIDOSIS: ICD-10-CM

## 2019-06-19 DIAGNOSIS — R06.2 WHEEZING: Primary | ICD-10-CM

## 2019-06-19 PROCEDURE — 3079F DIAST BP 80-89 MM HG: CPT | Mod: CPTII,S$GLB,, | Performed by: FAMILY MEDICINE

## 2019-06-19 PROCEDURE — 3077F SYST BP >= 140 MM HG: CPT | Mod: CPTII,S$GLB,, | Performed by: FAMILY MEDICINE

## 2019-06-19 PROCEDURE — 3077F PR MOST RECENT SYSTOLIC BLOOD PRESSURE >= 140 MM HG: ICD-10-PCS | Mod: CPTII,S$GLB,, | Performed by: FAMILY MEDICINE

## 2019-06-19 PROCEDURE — 99214 OFFICE O/P EST MOD 30 MIN: CPT | Mod: S$GLB,,, | Performed by: FAMILY MEDICINE

## 2019-06-19 PROCEDURE — 3079F PR MOST RECENT DIASTOLIC BLOOD PRESSURE 80-89 MM HG: ICD-10-PCS | Mod: CPTII,S$GLB,, | Performed by: FAMILY MEDICINE

## 2019-06-19 PROCEDURE — 3008F PR BODY MASS INDEX (BMI) DOCUMENTED: ICD-10-PCS | Mod: CPTII,S$GLB,, | Performed by: FAMILY MEDICINE

## 2019-06-19 PROCEDURE — 3008F BODY MASS INDEX DOCD: CPT | Mod: CPTII,S$GLB,, | Performed by: FAMILY MEDICINE

## 2019-06-19 PROCEDURE — 94640 AIRWAY INHALATION TREATMENT: CPT | Mod: S$GLB,,, | Performed by: FAMILY MEDICINE

## 2019-06-19 PROCEDURE — 94640 PR INHAL RX, AIRWAY OBST/DX SPUTUM INDUCT: ICD-10-PCS | Mod: S$GLB,,, | Performed by: FAMILY MEDICINE

## 2019-06-19 PROCEDURE — 99214 PR OFFICE/OUTPT VISIT, EST, LEVL IV, 30-39 MIN: ICD-10-PCS | Mod: S$GLB,,, | Performed by: FAMILY MEDICINE

## 2019-06-19 RX ORDER — IPRATROPIUM BROMIDE 0.5 MG/2.5ML
0.5 SOLUTION RESPIRATORY (INHALATION)
Status: COMPLETED | OUTPATIENT
Start: 2019-06-19 | End: 2019-06-19

## 2019-06-19 RX ORDER — ALBUTEROL SULFATE 90 UG/1
2 AEROSOL, METERED RESPIRATORY (INHALATION) EVERY 6 HOURS PRN
Qty: 1 INHALER | Refills: 2 | Status: SHIPPED | OUTPATIENT
Start: 2019-06-19

## 2019-06-19 RX ORDER — PREDNISONE 20 MG/1
TABLET ORAL
Qty: 10 TABLET | Refills: 1 | Status: SHIPPED | OUTPATIENT
Start: 2019-06-19 | End: 2020-05-12

## 2019-06-19 RX ORDER — IPRATROPIUM BROMIDE AND ALBUTEROL SULFATE 2.5; .5 MG/3ML; MG/3ML
3 SOLUTION RESPIRATORY (INHALATION)
Status: DISCONTINUED | OUTPATIENT
Start: 2019-06-19 | End: 2019-06-19

## 2019-06-19 RX ORDER — ALBUTEROL SULFATE 0.83 MG/ML
2.5 SOLUTION RESPIRATORY (INHALATION)
Status: COMPLETED | OUTPATIENT
Start: 2019-06-19 | End: 2019-06-19

## 2019-06-19 RX ADMIN — IPRATROPIUM BROMIDE 0.5 MG: 0.5 SOLUTION RESPIRATORY (INHALATION) at 06:06

## 2019-06-19 RX ADMIN — ALBUTEROL SULFATE 2.5 MG: 0.83 SOLUTION RESPIRATORY (INHALATION) at 06:06

## 2019-06-19 NOTE — PROGRESS NOTES
"Subjective:       Patient ID: Cade Johnston is a 53 y.o. male.    Vitals:  height is 6' 1" (1.854 m) and weight is 106.1 kg (234 lb). His oral temperature is 97.5 °F (36.4 °C). His blood pressure is 140/82 (abnormal) and his pulse is 74. His respiration is 16 and oxygen saturation is 97%.     Chief Complaint: Wheezing    Pt reports difficulty breathing, cough and wheezing for two days. Pt states he has pulmonary sarcoidosis.  Pt is been taking zyrtec and no relief. Last combivent this am    Wheezing    This is a new problem. The current episode started in the past 7 days. The problem occurs constantly. The problem has been gradually worsening. Associated symptoms include coughing and sputum production. Pertinent negatives include no chills, ear pain, fever, hemoptysis, rash, shortness of breath, sore throat or vomiting. Nothing aggravates the symptoms. Treatments tried: zyrtec. The treatment provided no relief. His past medical history is significant for pneumonia.       Constitution: Negative for chills, sweating, fatigue and fever.   HENT: Negative for ear pain, congestion, sinus pain, sinus pressure, sore throat and voice change.    Neck: Negative for painful lymph nodes.   Eyes: Negative for eye redness.   Respiratory: Positive for cough, sputum production and wheezing. Negative for chest tightness, bloody sputum, COPD, shortness of breath, stridor and asthma.    Gastrointestinal: Negative for nausea and vomiting.   Musculoskeletal: Negative for muscle ache.   Skin: Negative for rash.   Allergic/Immunologic: Negative for seasonal allergies and asthma.   Neurological: Positive for dizziness.   Hematologic/Lymphatic: Negative for swollen lymph nodes.       Objective:      Physical Exam   Constitutional: He is oriented to person, place, and time. He appears well-developed and well-nourished. He is cooperative.  Non-toxic appearance. He does not appear ill. No distress.   HENT:   Head: Normocephalic and atraumatic. "   Right Ear: Hearing, tympanic membrane, external ear and ear canal normal.   Left Ear: Hearing, tympanic membrane, external ear and ear canal normal.   Nose: Nose normal. No mucosal edema, rhinorrhea or nasal deformity. No epistaxis. Right sinus exhibits no maxillary sinus tenderness and no frontal sinus tenderness. Left sinus exhibits no maxillary sinus tenderness and no frontal sinus tenderness.   Mouth/Throat: Uvula is midline, oropharynx is clear and moist and mucous membranes are normal. No trismus in the jaw. Normal dentition. No uvula swelling. No posterior oropharyngeal erythema.   Eyes: Conjunctivae and lids are normal. No scleral icterus.   Sclera clear bilat   Neck: Trachea normal, full passive range of motion without pain and phonation normal. Neck supple.   Cardiovascular: Normal rate, regular rhythm, normal heart sounds, intact distal pulses and normal pulses.   Pulmonary/Chest: Effort normal. No respiratory distress. He has wheezes.   Abdominal: Soft. Normal appearance and bowel sounds are normal. He exhibits no distension. There is no tenderness.   Musculoskeletal: Normal range of motion. He exhibits no edema or deformity.   Neurological: He is alert and oriented to person, place, and time. He exhibits normal muscle tone. Coordination normal.   Skin: Skin is warm, dry and intact. He is not diaphoretic. No pallor.   Psychiatric: He has a normal mood and affect. His speech is normal and behavior is normal. Judgment and thought content normal. Cognition and memory are normal.   Nursing note and vitals reviewed.      Assessment:       1. Wheezing    2. Pulmonary sarcoidosis        Plan:       Improvement of sx post neb tx.       Wheezing    Pulmonary sarcoidosis    Other orders  -     Discontinue: albuterol-ipratropium 2.5 mg-0.5 mg/3 mL nebulizer solution 3 mL  -     albuterol (PROVENTIL/VENTOLIN HFA) 90 mcg/actuation inhaler; Inhale 2 puffs into the lungs every 6 (six) hours as needed for Wheezing.  Rescue  Dispense: 1 Inhaler; Refill: 2  -     albuterol nebulizer solution 2.5 mg  -     ipratropium 0.02 % nebulizer solution 0.5 mg  -     predniSONE (DELTASONE) 20 MG tablet; Take 40mg x2 days, 30 mg x2 days, 20mg x2 days, 10mg x2 days  Dispense: 10 tablet; Refill: 1        d/w pt regarding xray. Deferred. Has appt with pulm in 1 week. Advised on use of albuterol btwn combivent usage. Pt v/u

## 2019-06-22 ENCOUNTER — TELEPHONE (OUTPATIENT)
Dept: URGENT CARE | Facility: CLINIC | Age: 54
End: 2019-06-22

## 2019-06-24 ENCOUNTER — CLINICAL SUPPORT (OUTPATIENT)
Dept: UROLOGY | Facility: CLINIC | Age: 54
End: 2019-06-24
Payer: COMMERCIAL

## 2019-06-24 DIAGNOSIS — E29.1 MALE HYPOGONADISM: Primary | ICD-10-CM

## 2019-06-24 PROCEDURE — 96372 PR INJECTION,THERAP/PROPH/DIAG2ST, IM OR SUBCUT: ICD-10-PCS | Mod: S$GLB,,, | Performed by: UROLOGY

## 2019-06-24 PROCEDURE — 99999 PR PBB SHADOW E&M-EST. PATIENT-LVL III: CPT | Mod: PBBFAC,,,

## 2019-06-24 PROCEDURE — 96372 THER/PROPH/DIAG INJ SC/IM: CPT | Mod: S$GLB,,, | Performed by: UROLOGY

## 2019-06-24 PROCEDURE — 99999 PR PBB SHADOW E&M-EST. PATIENT-LVL III: ICD-10-PCS | Mod: PBBFAC,,,

## 2019-06-24 RX ADMIN — TESTOSTERONE CYPIONATE 400 MG: 200 INJECTION, SOLUTION INTRAMUSCULAR at 08:06

## 2019-07-15 ENCOUNTER — LAB VISIT (OUTPATIENT)
Dept: LAB | Facility: HOSPITAL | Age: 54
End: 2019-07-15
Attending: NURSE PRACTITIONER
Payer: COMMERCIAL

## 2019-07-15 DIAGNOSIS — R25.2 CRAMP OF LIMB: Primary | ICD-10-CM

## 2019-07-15 LAB
ALBUMIN SERPL BCP-MCNC: 3.8 G/DL (ref 3.5–5.2)
ALP SERPL-CCNC: 81 U/L (ref 55–135)
ALT SERPL W/O P-5'-P-CCNC: 31 U/L (ref 10–44)
ANION GAP SERPL CALC-SCNC: 10 MMOL/L (ref 8–16)
AST SERPL-CCNC: 17 U/L (ref 10–40)
BASOPHILS # BLD AUTO: 0.09 K/UL (ref 0–0.2)
BASOPHILS NFR BLD: 1.2 % (ref 0–1.9)
BILIRUB SERPL-MCNC: 1.4 MG/DL (ref 0.1–1)
BUN SERPL-MCNC: 13 MG/DL (ref 6–20)
CALCIUM SERPL-MCNC: 9.7 MG/DL (ref 8.7–10.5)
CHLORIDE SERPL-SCNC: 100 MMOL/L (ref 95–110)
CO2 SERPL-SCNC: 29 MMOL/L (ref 23–29)
CREAT SERPL-MCNC: 1 MG/DL (ref 0.5–1.4)
DIFFERENTIAL METHOD: ABNORMAL
EOSINOPHIL # BLD AUTO: 0.2 K/UL (ref 0–0.5)
EOSINOPHIL NFR BLD: 2.5 % (ref 0–8)
ERYTHROCYTE [DISTWIDTH] IN BLOOD BY AUTOMATED COUNT: 14.2 % (ref 11.5–14.5)
EST. GFR  (AFRICAN AMERICAN): >60 ML/MIN/1.73 M^2
EST. GFR  (NON AFRICAN AMERICAN): >60 ML/MIN/1.73 M^2
GLUCOSE SERPL-MCNC: 136 MG/DL (ref 70–110)
HCT VFR BLD AUTO: 57.4 % (ref 40–54)
HGB BLD-MCNC: 19.1 G/DL (ref 14–18)
IMM GRANULOCYTES # BLD AUTO: 0.06 K/UL (ref 0–0.04)
IMM GRANULOCYTES NFR BLD AUTO: 0.8 % (ref 0–0.5)
LYMPHOCYTES # BLD AUTO: 1.2 K/UL (ref 1–4.8)
LYMPHOCYTES NFR BLD: 15.2 % (ref 18–48)
MAGNESIUM SERPL-MCNC: 1.7 MG/DL (ref 1.6–2.6)
MCH RBC QN AUTO: 28.9 PG (ref 27–31)
MCHC RBC AUTO-ENTMCNC: 33.3 G/DL (ref 32–36)
MCV RBC AUTO: 87 FL (ref 82–98)
MONOCYTES # BLD AUTO: 0.6 K/UL (ref 0.3–1)
MONOCYTES NFR BLD: 7.6 % (ref 4–15)
NEUTROPHILS # BLD AUTO: 5.5 K/UL (ref 1.8–7.7)
NEUTROPHILS NFR BLD: 72.7 % (ref 38–73)
NRBC BLD-RTO: 0 /100 WBC
PLATELET # BLD AUTO: 178 K/UL (ref 150–350)
PMV BLD AUTO: 10.5 FL (ref 9.2–12.9)
POTASSIUM SERPL-SCNC: 4.4 MMOL/L (ref 3.5–5.1)
PROT SERPL-MCNC: 7.3 G/DL (ref 6–8.4)
RBC # BLD AUTO: 6.6 M/UL (ref 4.6–6.2)
SODIUM SERPL-SCNC: 139 MMOL/L (ref 136–145)
WBC # BLD AUTO: 7.62 K/UL (ref 3.9–12.7)

## 2019-07-15 PROCEDURE — 83735 ASSAY OF MAGNESIUM: CPT

## 2019-07-15 PROCEDURE — 85025 COMPLETE CBC W/AUTO DIFF WBC: CPT

## 2019-07-15 PROCEDURE — 80053 COMPREHEN METABOLIC PANEL: CPT

## 2019-07-15 PROCEDURE — 36415 COLL VENOUS BLD VENIPUNCTURE: CPT | Mod: PO

## 2019-07-22 ENCOUNTER — CLINICAL SUPPORT (OUTPATIENT)
Dept: UROLOGY | Facility: CLINIC | Age: 54
End: 2019-07-22
Payer: COMMERCIAL

## 2019-07-22 ENCOUNTER — LAB VISIT (OUTPATIENT)
Dept: LAB | Facility: HOSPITAL | Age: 54
End: 2019-07-22
Attending: NURSE PRACTITIONER
Payer: COMMERCIAL

## 2019-07-22 ENCOUNTER — OFFICE VISIT (OUTPATIENT)
Dept: FAMILY MEDICINE | Facility: CLINIC | Age: 54
End: 2019-07-22
Payer: COMMERCIAL

## 2019-07-22 VITALS
WEIGHT: 230.19 LBS | RESPIRATION RATE: 20 BRPM | TEMPERATURE: 99 F | HEART RATE: 77 BPM | OXYGEN SATURATION: 99 % | HEIGHT: 73 IN | BODY MASS INDEX: 30.51 KG/M2 | SYSTOLIC BLOOD PRESSURE: 124 MMHG | DIASTOLIC BLOOD PRESSURE: 76 MMHG

## 2019-07-22 DIAGNOSIS — Z79.52 LONG TERM (CURRENT) USE OF SYSTEMIC STEROIDS: ICD-10-CM

## 2019-07-22 DIAGNOSIS — E11.42 TYPE 2 DIABETES MELLITUS WITH DIABETIC POLYNEUROPATHY, WITHOUT LONG-TERM CURRENT USE OF INSULIN: Primary | ICD-10-CM

## 2019-07-22 DIAGNOSIS — D86.0 SARCOIDOSIS OF LUNG: ICD-10-CM

## 2019-07-22 DIAGNOSIS — E66.9 CLASS 1 OBESITY WITH SERIOUS COMORBIDITY AND BODY MASS INDEX (BMI) OF 30.0 TO 30.9 IN ADULT, UNSPECIFIED OBESITY TYPE: ICD-10-CM

## 2019-07-22 DIAGNOSIS — Z79.51 CURRENT CHRONIC USE OF INHALED STEROID: ICD-10-CM

## 2019-07-22 DIAGNOSIS — E11.65 TYPE 2 DIABETES MELLITUS WITH HYPERGLYCEMIA, WITHOUT LONG-TERM CURRENT USE OF INSULIN: ICD-10-CM

## 2019-07-22 DIAGNOSIS — E29.1 MALE HYPOGONADISM: Primary | ICD-10-CM

## 2019-07-22 DIAGNOSIS — I10 ESSENTIAL HYPERTENSION: ICD-10-CM

## 2019-07-22 DIAGNOSIS — Z12.11 ENCOUNTER FOR SCREENING FOR MALIGNANT NEOPLASM OF COLON: ICD-10-CM

## 2019-07-22 LAB
ALBUMIN SERPL BCP-MCNC: 4.1 G/DL (ref 3.5–5.2)
ALP SERPL-CCNC: 89 U/L (ref 55–135)
ALT SERPL W/O P-5'-P-CCNC: 39 U/L (ref 10–44)
ANION GAP SERPL CALC-SCNC: 9 MMOL/L (ref 8–16)
AST SERPL-CCNC: 24 U/L (ref 10–40)
BILIRUB SERPL-MCNC: 1.6 MG/DL (ref 0.1–1)
BUN SERPL-MCNC: 13 MG/DL (ref 6–20)
CALCIUM SERPL-MCNC: 9.5 MG/DL (ref 8.7–10.5)
CHLORIDE SERPL-SCNC: 101 MMOL/L (ref 95–110)
CO2 SERPL-SCNC: 27 MMOL/L (ref 23–29)
CREAT SERPL-MCNC: 1 MG/DL (ref 0.5–1.4)
EST. GFR  (AFRICAN AMERICAN): >60 ML/MIN/1.73 M^2
EST. GFR  (NON AFRICAN AMERICAN): >60 ML/MIN/1.73 M^2
ESTIMATED AVG GLUCOSE: 146 MG/DL (ref 68–131)
GLUCOSE SERPL-MCNC: 116 MG/DL (ref 70–110)
HBA1C MFR BLD HPLC: 6.7 % (ref 4–5.6)
POTASSIUM SERPL-SCNC: 4.6 MMOL/L (ref 3.5–5.1)
PROT SERPL-MCNC: 7.6 G/DL (ref 6–8.4)
SODIUM SERPL-SCNC: 137 MMOL/L (ref 136–145)

## 2019-07-22 PROCEDURE — 3008F PR BODY MASS INDEX (BMI) DOCUMENTED: ICD-10-PCS | Mod: CPTII,S$GLB,, | Performed by: NURSE PRACTITIONER

## 2019-07-22 PROCEDURE — 3044F PR MOST RECENT HEMOGLOBIN A1C LEVEL <7.0%: ICD-10-PCS | Mod: CPTII,S$GLB,, | Performed by: NURSE PRACTITIONER

## 2019-07-22 PROCEDURE — 99999 PR PBB SHADOW E&M-EST. PATIENT-LVL III: CPT | Mod: PBBFAC,,,

## 2019-07-22 PROCEDURE — 83036 HEMOGLOBIN GLYCOSYLATED A1C: CPT

## 2019-07-22 PROCEDURE — 80053 COMPREHEN METABOLIC PANEL: CPT

## 2019-07-22 PROCEDURE — 99999 PR PBB SHADOW E&M-EST. PATIENT-LVL III: ICD-10-PCS | Mod: PBBFAC,,,

## 2019-07-22 PROCEDURE — 96372 THER/PROPH/DIAG INJ SC/IM: CPT | Mod: S$GLB,,, | Performed by: UROLOGY

## 2019-07-22 PROCEDURE — 3074F SYST BP LT 130 MM HG: CPT | Mod: CPTII,S$GLB,, | Performed by: NURSE PRACTITIONER

## 2019-07-22 PROCEDURE — 99999 PR PBB SHADOW E&M-EST. PATIENT-LVL V: ICD-10-PCS | Mod: PBBFAC,,, | Performed by: NURSE PRACTITIONER

## 2019-07-22 PROCEDURE — 99999 PR PBB SHADOW E&M-EST. PATIENT-LVL V: CPT | Mod: PBBFAC,,, | Performed by: NURSE PRACTITIONER

## 2019-07-22 PROCEDURE — 3008F BODY MASS INDEX DOCD: CPT | Mod: CPTII,S$GLB,, | Performed by: NURSE PRACTITIONER

## 2019-07-22 PROCEDURE — 3074F PR MOST RECENT SYSTOLIC BLOOD PRESSURE < 130 MM HG: ICD-10-PCS | Mod: CPTII,S$GLB,, | Performed by: NURSE PRACTITIONER

## 2019-07-22 PROCEDURE — 2024F PR 7 FIELD PHOTOS WITH INTERP/ REVIEW: ICD-10-PCS | Mod: S$GLB,,, | Performed by: NURSE PRACTITIONER

## 2019-07-22 PROCEDURE — 36415 COLL VENOUS BLD VENIPUNCTURE: CPT | Mod: PO

## 2019-07-22 PROCEDURE — 2024F 7 FLD RTA PHOTO EVC RTNOPTHY: CPT | Mod: S$GLB,,, | Performed by: NURSE PRACTITIONER

## 2019-07-22 PROCEDURE — 3044F HG A1C LEVEL LT 7.0%: CPT | Mod: CPTII,S$GLB,, | Performed by: NURSE PRACTITIONER

## 2019-07-22 PROCEDURE — 99214 OFFICE O/P EST MOD 30 MIN: CPT | Mod: S$GLB,,, | Performed by: NURSE PRACTITIONER

## 2019-07-22 PROCEDURE — 3078F DIAST BP <80 MM HG: CPT | Mod: CPTII,S$GLB,, | Performed by: NURSE PRACTITIONER

## 2019-07-22 PROCEDURE — 96372 PR INJECTION,THERAP/PROPH/DIAG2ST, IM OR SUBCUT: ICD-10-PCS | Mod: S$GLB,,, | Performed by: UROLOGY

## 2019-07-22 PROCEDURE — 3078F PR MOST RECENT DIASTOLIC BLOOD PRESSURE < 80 MM HG: ICD-10-PCS | Mod: CPTII,S$GLB,, | Performed by: NURSE PRACTITIONER

## 2019-07-22 PROCEDURE — 99214 PR OFFICE/OUTPT VISIT, EST, LEVL IV, 30-39 MIN: ICD-10-PCS | Mod: S$GLB,,, | Performed by: NURSE PRACTITIONER

## 2019-07-22 RX ORDER — GABAPENTIN 100 MG/1
100 CAPSULE ORAL 3 TIMES DAILY PRN
Qty: 90 CAPSULE | Refills: 1 | Status: SHIPPED | OUTPATIENT
Start: 2019-07-22 | End: 2019-08-20 | Stop reason: SDUPTHER

## 2019-07-22 RX ADMIN — TESTOSTERONE CYPIONATE 400 MG: 200 INJECTION, SOLUTION INTRAMUSCULAR at 08:07

## 2019-07-22 NOTE — PROGRESS NOTES
Subjective:       Patient ID: Cade Johnston is a 53 y.o. male.    Mr. Johnston is a new patient to me.     Chief Complaint: Hyperglycemia (has been steriod injections and its raising his blood sugar )    HPI   Pulmonary sarcoidosis followed by Dr. Ervin. 45% lung function.  Symptoms worsening. Receives steroid injections and dosepacks frequently causing temporary hyperglycemia. He is currently on metformin 1000mgBID. Last A1C 6.5 (5/4/19)  Reports numbness/tingling to feet    He is planning to retire as per Dr. Ervin's advice. He would like to see specialists now in case insurance changes.   Due for colonoscopy   Vitals:    07/22/19 0832   BP: 124/76   Pulse: 77   Resp: 20   Temp: 98.5 °F (36.9 °C)     Review of Systems   Constitutional: Negative for diaphoresis and fever.   HENT: Negative for facial swelling and trouble swallowing.    Eyes: Negative for discharge and redness.   Respiratory: Negative for cough and shortness of breath.    Cardiovascular: Negative for chest pain and palpitations.   Gastrointestinal: Negative for vomiting.   Genitourinary: Negative for difficulty urinating.   Musculoskeletal: Negative for gait problem.   Skin: Negative for pallor and rash.   Neurological: Positive for numbness. Negative for facial asymmetry and speech difficulty.   Psychiatric/Behavioral: Negative for confusion. The patient is not nervous/anxious.        Past Medical History:   Diagnosis Date    DJD (degenerative joint disease) of lumbar spine     DM type 2 (diabetes mellitus, type 2)     with diet alone    Fatty liver     GERD (gastroesophageal reflux disease)     Hepatosplenomegaly     HTN (hypertension)     Mixed hyperlipidemia     resolved with diet    Nephrolithiasis     last stone 1/11    Pulmonary sarcoidosis     Sarcoidosis of lung     Ventricular hypokinesis     angiogram with mild LAD stenosis     Objective:      Physical Exam   Constitutional: He is oriented to person, place, and time. He does  not have a sickly appearance. No distress.   HENT:   Head: Normocephalic.   Right Ear: Hearing normal.   Left Ear: Hearing normal.   Nose: Nose normal.   Eyes: Conjunctivae and lids are normal.   Neck: No JVD present. No tracheal deviation present.   Cardiovascular: Normal rate and normal heart sounds.   Pulmonary/Chest: Effort normal. He has decreased breath sounds.   Abdominal: Normal appearance. He exhibits no distension.   Musculoskeletal: He exhibits no deformity.   Neurological: He is alert and oriented to person, place, and time.   Skin: He is not diaphoretic. No pallor.   Psychiatric: He has a normal mood and affect. His speech is normal and behavior is normal. Judgment and thought content normal. Cognition and memory are normal.   Nursing note and vitals reviewed.      Assessment:       1. Type 2 diabetes mellitus with diabetic polyneuropathy, without long-term current use of insulin    2. Sarcoidosis of lung    3. Essential hypertension    4. Class 1 obesity with serious comorbidity and body mass index (BMI) of 30.0 to 30.9 in adult, unspecified obesity type    5. Current chronic use of inhaled steroid    6. Long term (current) use of systemic steroids    7. Encounter for screening for malignant neoplasm of colon        Plan:       Type 2 diabetes mellitus with diabetic polyneuropathy, without long-term current use of insulin  -     Hemoglobin A1c; Future; Expected date: 07/22/2019  -     Cancel: Ambulatory Referral to Diabetes Education  -     Comprehensive metabolic panel; Future; Expected date: 07/22/2019  -     gabapentin (NEURONTIN) 100 MG capsule; Take 1 capsule (100 mg total) by mouth 3 (three) times daily as needed.  Dispense: 90 capsule; Refill: 1  -     Ambulatory Referral to Diabetes Education    Sarcoidosis of lung    Essential hypertension    Class 1 obesity with serious comorbidity and body mass index (BMI) of 30.0 to 30.9 in adult, unspecified obesity type    Current chronic use of inhaled  steroid  -     Ambulatory Referral to Diabetes Education    Long term (current) use of systemic steroids  -     Ambulatory Referral to Diabetes Education    Encounter for screening for malignant neoplasm of colon  -     Case request GI: COLONOSCOPY        Follow up for workup as ordered, routinely with PCP and with specialists .    Medication List with Changes/Refills   New Medications    GABAPENTIN (NEURONTIN) 100 MG CAPSULE    Take 1 capsule (100 mg total) by mouth 3 (three) times daily as needed.   Current Medications    ALBUTEROL (PROVENTIL/VENTOLIN HFA) 90 MCG/ACTUATION INHALER    Inhale 2 puffs into the lungs every 6 (six) hours as needed for Wheezing. Rescue    AMLODIPINE (NORVASC) 2.5 MG TABLET    TAKE 1 TABLET BY MOUTH EVERY DAY    ASPIRIN (ENTERIC COATED ASPIRIN) 81 MG EC TABLET    Take 81 mg by mouth once daily. No Sig Provided    BLOOD SUGAR DIAGNOSTIC STRP    Test q day    FLUTICASONE (FLONASE) 50 MCG/ACTUATION NASAL SPRAY    2 sprays by Each Nare route once daily.    FLUTICASONE FUROATE-VILANTEROL (BREO ELLIPTA) 200-25 MCG/DOSE DSDV DISKUS INHALER    Inhale 1 puff into the lungs once daily. Controller    IPRATROPIUM-ALBUTEROL (COMBIVENT)  MCG/ACTUATION INHALER    Inhale 1 puff into the lungs every 4 (four) hours as needed for Shortness of Breath. Rescue    LOSARTAN-HYDROCHLOROTHIAZIDE 100-25 MG (HYZAAR) 100-25 MG PER TABLET    TAKE 1 TABLET BY MOUTH ONCE DAILY.    METFORMIN (GLUCOPHAGE-XR) 500 MG 24 HR TABLET    Take 2 tablets (1,000 mg total) by mouth 2 (two) times daily with meals.    PREDNISONE (DELTASONE) 20 MG TABLET    Take 40mg x2 days, 30 mg x2 days, 20mg x2 days, 10mg x2 days    SILDENAFIL (REVATIO) 20 MG TAB    Take 1 tablet (20 mg total) by mouth daily as needed. 5 tablets 1 hour prior to sexual intercourse.

## 2019-07-30 RX ORDER — LOSARTAN POTASSIUM AND HYDROCHLOROTHIAZIDE 25; 100 MG/1; MG/1
TABLET ORAL
Qty: 90 TABLET | Refills: 2 | Status: SHIPPED | OUTPATIENT
Start: 2019-07-30 | End: 2020-05-04 | Stop reason: SDUPTHER

## 2019-07-31 ENCOUNTER — OFFICE VISIT (OUTPATIENT)
Dept: PODIATRY | Facility: CLINIC | Age: 54
End: 2019-07-31
Payer: COMMERCIAL

## 2019-07-31 VITALS
SYSTOLIC BLOOD PRESSURE: 127 MMHG | WEIGHT: 230.19 LBS | DIASTOLIC BLOOD PRESSURE: 80 MMHG | HEIGHT: 73 IN | BODY MASS INDEX: 30.51 KG/M2 | HEART RATE: 83 BPM

## 2019-07-31 DIAGNOSIS — G57.63 NEUROMA OF SECOND INTERSPACE OF BOTH FEET: ICD-10-CM

## 2019-07-31 DIAGNOSIS — E11.49 TYPE II DIABETES MELLITUS WITH NEUROLOGICAL MANIFESTATIONS: Primary | ICD-10-CM

## 2019-07-31 DIAGNOSIS — M21.6X2 ACQUIRED EQUINUS DEFORMITY OF BOTH FEET: ICD-10-CM

## 2019-07-31 DIAGNOSIS — B35.1 ONYCHOMYCOSIS DUE TO DERMATOPHYTE: ICD-10-CM

## 2019-07-31 DIAGNOSIS — M21.6X1 ACQUIRED EQUINUS DEFORMITY OF BOTH FEET: ICD-10-CM

## 2019-07-31 PROCEDURE — 3074F SYST BP LT 130 MM HG: CPT | Mod: CPTII,S$GLB,, | Performed by: PODIATRIST

## 2019-07-31 PROCEDURE — 99999 PR PBB SHADOW E&M-EST. PATIENT-LVL III: CPT | Mod: PBBFAC,,, | Performed by: PODIATRIST

## 2019-07-31 PROCEDURE — 3074F PR MOST RECENT SYSTOLIC BLOOD PRESSURE < 130 MM HG: ICD-10-PCS | Mod: CPTII,S$GLB,, | Performed by: PODIATRIST

## 2019-07-31 PROCEDURE — 3008F PR BODY MASS INDEX (BMI) DOCUMENTED: ICD-10-PCS | Mod: CPTII,S$GLB,, | Performed by: PODIATRIST

## 2019-07-31 PROCEDURE — 3044F PR MOST RECENT HEMOGLOBIN A1C LEVEL <7.0%: ICD-10-PCS | Mod: CPTII,S$GLB,, | Performed by: PODIATRIST

## 2019-07-31 PROCEDURE — 99999 PR PBB SHADOW E&M-EST. PATIENT-LVL III: ICD-10-PCS | Mod: PBBFAC,,, | Performed by: PODIATRIST

## 2019-07-31 PROCEDURE — 2024F PR 7 FIELD PHOTOS WITH INTERP/ REVIEW: ICD-10-PCS | Mod: S$GLB,,, | Performed by: PODIATRIST

## 2019-07-31 PROCEDURE — 3079F DIAST BP 80-89 MM HG: CPT | Mod: CPTII,S$GLB,, | Performed by: PODIATRIST

## 2019-07-31 PROCEDURE — 3044F HG A1C LEVEL LT 7.0%: CPT | Mod: CPTII,S$GLB,, | Performed by: PODIATRIST

## 2019-07-31 PROCEDURE — 3008F BODY MASS INDEX DOCD: CPT | Mod: CPTII,S$GLB,, | Performed by: PODIATRIST

## 2019-07-31 PROCEDURE — 99214 PR OFFICE/OUTPT VISIT, EST, LEVL IV, 30-39 MIN: ICD-10-PCS | Mod: S$GLB,,, | Performed by: PODIATRIST

## 2019-07-31 PROCEDURE — 99214 OFFICE O/P EST MOD 30 MIN: CPT | Mod: S$GLB,,, | Performed by: PODIATRIST

## 2019-07-31 PROCEDURE — 3079F PR MOST RECENT DIASTOLIC BLOOD PRESSURE 80-89 MM HG: ICD-10-PCS | Mod: CPTII,S$GLB,, | Performed by: PODIATRIST

## 2019-07-31 PROCEDURE — 2024F 7 FLD RTA PHOTO EVC RTNOPTHY: CPT | Mod: S$GLB,,, | Performed by: PODIATRIST

## 2019-07-31 NOTE — LETTER
July 31, 2019      Apurva Duenas, NP  [address] [facility] [address] [place] [address]           [facility]  [address] [facility] [address] [place] [address]  Phone: [phone]          Patient: aCde Johnston   MR Number: [id number]   YOB: 1965   Date of Visit: 7/31/2019       Dear Apurva Duenas:    Thank you for referring Cade Johnston to me for evaluation. Attached you will find relevant portions of my assessment and plan of care.    If you have questions, please do not hesitate to call me. I look forward to following Cade Johnston along with you.    Sincerely,    Chung Peterson, MUSTAPHA    Enclosure  CC:  No Recipients    If you would like to receive this communication electronically, please contact [email or web address] or [phone] to request more information on [facility] Link access.    For providers and/or their staff who would like to refer a patient to [facility], please contact us through our one-stop-shop provider referral line, Shai Botello, at [phone].    If you feel you have received this communication in error or would no longer like to receive these types of communications, please e-mail [email or web address]

## 2019-07-31 NOTE — PROGRESS NOTES
Subjective:      Patient ID: Cade Johnston is a 53 y.o. male.    Chief Complaint: Diabetic Foot Exam (Neuropathy, PCP-(SUMEET [facility])-07/22/2019); Diabetes Mellitus (A1c-6.7-07/22/2019); Ingrown Toenail; and Foot Pain (ball of foot, PF)    Cade is a 53 y.o. male who presents to the clinic upon referral from Dr. Duenas  for evaluation and treatment of diabetic feet. Cade has a past medical history of DJD (degenerative joint disease) of lumbar spine, DM type 2 (diabetes mellitus, type 2), Fatty liver, GERD (gastroesophageal reflux disease), Hepatosplenomegaly, HTN (hypertension), Mixed hyperlipidemia, Nephrolithiasis, Pulmonary sarcoidosis, Sarcoidosis of lung, and Ventricular hypokinesis. Patient relates pain to the ball of the foot bilateral with a feeling like he is walking on a balled up sock all the time but nothing is there. He relates numbness and tingling to the entire foot as well bilateral the is intermittent and not related to weight bearing. He was seen by Dr. Resendiz 2 years ago and had some custom orthotic inserts made which helps some but the pain is still there.    CC#2 Also relates he has three nails that are painful thick and always ingrowing and would like to know treatment options going forward    PCP: Joni Smith MD    Date Last Seen by PCP: 7/22/19    Current shoe gear: Tennis shoes    Hemoglobin A1C   Date Value Ref Range Status   07/22/2019 6.7 (H) 4.0 - 5.6 % Final     Comment:     ADA Screening Guidelines:  5.7-6.4%  Consistent with prediabetes  >or=6.5%  Consistent with diabetes  High levels of fetal hemoglobin interfere with the HbA1C  assay. Heterozygous hemoglobin variants (HbS, HgC, etc)do  not significantly interfere with this assay.   However, presence of multiple variants may affect accuracy.     05/04/2019 6.5 (H) 4.0 - 5.6 % Final     Comment:     ADA Screening Guidelines:  5.7-6.4%  Consistent with prediabetes  >or=6.5%  Consistent with diabetes  High levels of  fetal hemoglobin interfere with the HbA1C  assay. Heterozygous hemoglobin variants (HbS, HgC, etc)do  not significantly interfere with this assay.   However, presence of multiple variants may affect accuracy.     10/30/2018 7.0 (H) 4.0 - 5.6 % Final     Comment:     ADA Screening Guidelines:  5.7-6.4%  Consistent with prediabetes  >or=6.5%  Consistent with diabetes  High levels of fetal hemoglobin interfere with the HbA1C  assay. Heterozygous hemoglobin variants (HbS, HgC, etc)do  not significantly interfere with this assay.   However, presence of multiple variants may affect accuracy.             Review of Systems   Constitution: Negative for chills and fever.   Cardiovascular: Positive for leg swelling. Negative for claudication.   Respiratory: Negative for shortness of breath.    Skin: Positive for nail changes. Negative for itching and rash.   Musculoskeletal: Positive for arthritis. Negative for muscle cramps, muscle weakness and myalgias.   Gastrointestinal: Negative for nausea and vomiting.   Neurological: Positive for numbness and paresthesias. Negative for focal weakness and loss of balance.           Objective:      Physical Exam   Constitutional: He is oriented to person, place, and time. He appears well-developed and well-nourished. No distress.   Cardiovascular:   Pulses:       Dorsalis pedis pulses are 2+ on the right side, and 2+ on the left side.        Posterior tibial pulses are 2+ on the right side, and 2+ on the left side.   < 3 sec capillary refill time to toes 1-5 bilateral. Feet are warm to touch proximally with distal cooling b/l. There is no hair growth on the feet and toes b/l. There is mild edema b/l with varicosities present b/l.      Musculoskeletal:   Equinus noted b/l ankles with < 5 deg DF noted. MMT 5/5 in DF/PF/Inv/Ev resistance with no reproduction of pain in any direction. Passive range of motion of ankle and pedal joints is painless b/l.     There is pain on palpation of the  second  intermetatarsal space with a positive Raj's click. Minimal tenderness to palpation of the adjacent metatarsal heads.       Feet:   Right Foot:   Protective Sensation: 10 sites tested. 6 sites sensed.   Left Foot:   Protective Sensation: 10 sites tested. 5 sites sensed.   Neurological: He is alert and oriented to person, place, and time. He has normal strength. He displays no atrophy and no tremor. A sensory deficit is present. He exhibits normal muscle tone.   Negative tinel sign bilateral. Diminished vibratory and protective sensation bilateral   Skin: Skin is warm, dry and intact. No abrasion, no bruising, no burn, no ecchymosis, no laceration, no lesion, no petechiae and no rash noted. He is not diaphoretic. No cyanosis or erythema. No pallor. Nails show no clubbing.   Skin is thin and atrophic bilateral    Thick and discolored nails with subungual debris left 1-2 and right hallux with pain to palpation       Psychiatric: He has a normal mood and affect. His behavior is normal.             Assessment:       Encounter Diagnoses   Name Primary?   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    Patient will wear the arch supports and wear them in shoes whenever possible.  Athletic shoes intended for walking or running are usually best.    Fabricated, dispensed and fitted with metatarsal pad both shoes    Discussed injection  but due to recent injections and prednisone use his blood sugar has been running high, will forego this for now    Discussed MRI and surgical resection of neuroma if needed    Discussed nail removal permanent, he would like to have this done to the three affected nails, will return for this procedure on another day    30 minutes spent with patient 20 face to face reviewing options and answering questions about above diagnosis    Chung Peterson DPM

## 2019-08-01 ENCOUNTER — CLINICAL SUPPORT (OUTPATIENT)
Dept: DIABETES | Facility: CLINIC | Age: 54
End: 2019-08-01
Payer: COMMERCIAL

## 2019-08-01 VITALS — WEIGHT: 232.56 LBS | HEIGHT: 73 IN | BODY MASS INDEX: 30.82 KG/M2

## 2019-08-01 DIAGNOSIS — E11.8 TYPE 2 DIABETES MELLITUS WITH COMPLICATION, WITHOUT LONG-TERM CURRENT USE OF INSULIN: Primary | ICD-10-CM

## 2019-08-01 PROCEDURE — 99999 PR PBB SHADOW E&M-EST. PATIENT-LVL I: CPT | Mod: PBBFAC,,,

## 2019-08-01 PROCEDURE — G0108 DIAB MANAGE TRN  PER INDIV: HCPCS | Mod: S$GLB,,, | Performed by: DIETITIAN, REGISTERED

## 2019-08-01 PROCEDURE — 99999 PR PBB SHADOW E&M-EST. PATIENT-LVL I: ICD-10-PCS | Mod: PBBFAC,,,

## 2019-08-01 PROCEDURE — G0108 PR DIAB MANAGE TRN  PER INDIV: ICD-10-PCS | Mod: S$GLB,,, | Performed by: DIETITIAN, REGISTERED

## 2019-08-01 NOTE — PROGRESS NOTES
Diabetes Education  Author: Helen Edmondson RD  Date: 8/1/2019    Diabetes Care Management Summary  Diabetes Education Record Assessment/Progress: Initial  Current Diabetes Risk Level: Low     Diabetes Type  Diabetes Type : Type II    Diabetes History  Diabetes Diagnosis: 0-1 year  Current Treatment: Oral Medication(metformin 1000 mg BID)  Reviewed Problem List with Patient: Yes    Health Maintenance was reviewed today with patient. Discussed with patient importance of routine eye exams, foot exams/foot care, blood work (i.e.: A1c, microalbumin, and lipid), dental visits, yearly flu vaccine, and pneumonia vaccine as indicated by PCP. Patient verbalized understanding.     Health Maintenance Topics with due status: Not Due       Topic Last Completion Date    TETANUS VACCINE 09/25/2009    Eye Exam 11/13/2018    Lipid Panel 05/04/2019    Hemoglobin A1c 07/22/2019    Foot Exam 07/31/2019     There are no preventive care reminders to display for this patient.    Nutrition  Meal Planning: water, 3 meals per day, diet drinks, eats out often  What type of sweetener do you use?: Splenda  What type of beverages do you drink?: diet soda/tea, water, other (see comments)(coffee with Splenda)  Meal Plan 24 Hour Recall - Breakfast: eggs, menjivar, coffee OR eggs, grits, coffee  Meal Plan 24 Hour Recall - Lunch: Subway wrap, Coke Zero OR fried chicken, french fries, diet soda  Meal Plan 24 Hour Recall - Dinner: white beans, rice, sausage, garlic bread    Monitoring   Monitoring: Contour Next EZ--reports meter is approx 2 years old and he has plenty of glucose testing strips and lancets.    Self Monitoring : Checking once daily--will increase frequency of checking after a steroid injection as glucose readings more variable  Blood Glucose Logs: No  Do you use a personal continuous glucose monitor?: No  In the last month, how often have you had a low blood sugar reaction?: once  What are your symptoms of low blood sugar?: shakiness,  weak, sweating  How do you treat low blood sugar?: juice or fun size Snickers bar  Can you tell when your blood sugar is too high?: no    Exercise   Exercise Type: none    Current Diabetes Treatment   Current Treatment: Oral Medication(metformin 1000 mg BID)    Social History  Preferred Learning Method: Face to Face  Primary Support: Self  Smoking Status: Ex Smoker  Alcohol Use: Never    Barriers to Change  Barriers to Change: None  Learning Challenges : None    Readiness to Learn   Readiness to Learn : Acceptance    Cultural Influences  Cultural Influences: None    Diabetes Education Assessment/Progress  Diabetes Disease Process (diabetes disease process and treatment options): Discussion, Comprehends Key Points.  Diagnosed approximately a year ago--last Hgb A1c 6.7% (7/22/19).  Patient with frequent steroid injections or steroid dose packs due to lung issues--causing increased fluctuation in glucose readings.     Nutrition (Incorporating nutritional management into one's lifestyle): Discussion, Demonstrates Understanding/Competency (verbalizes/demonstrates), Written Materials Provided.  Per patient request, he would like to learn as much about what/how he should eat.  Reviewed food sources of CHO. Practiced reading food labels for total CHO content. Patient given a CHO budget for meals and snacks.  Practiced meal planning with foods typically eaten.  Educational pamphlet provided to patient to assist with meal planning.  Patient was trying to avoid CHO altogether--discussed instead of avoiding, would recommend balancing CHO throughout the day.  Will work to add more non starchy vegetables at lunch and dinner.       Physical Activity (incorporating physical activity into one's lifestyle): Instructed, Comprehends Key Points.  Due to heat and air quality--patient would benefit from indoor exercising.  Is considering getting a stationary bicycle for home use or joining a gym with his son.  Recommend 20-30 minutes of  aerobic exercise at least 3 days a week.  Patient having food pain---so needs recumbent exercise or water.       Medications (states correct name, dose, onset, peak, duration, side effects & timing of meds): Discussion, Comprehends Key Points, Written Materials Provided.  Patient tolerating 1000 mg metformin BID.  Denies any GI issues and is compliant with medication as prescribed.      Monitoring (monitoring blood glucose/other parameters & using results): Discussion, Instructed, Comprehends Key Points, Written Materials Provided.  Glucose goal given to patient.  Recommend checking glucose once daily unless patient is on oral steroids or has gotten a steroid injection--may need to increase frequency of monitoring glucose to 3 times daily at those times.      Acute Complications (preventing, detecting, and treating acute complications): Discussion, Instructed, Comprehends Key Points, Written Materials Provided. Reviewed signs and symptoms of hypoglycemia (glucose < 70) and proper methods to treat hypoglycemic events if occur.  15-15 rule: patient to eat 15 gm simple, concentrated CHO (such as 4 glucose tablets, 4 oz fruit juice/regular soda, or 1 Tbsp honey/sugar) and wait 15 minutes and recheck home glucose.  Written information provided to patient.  Patient to call if more than 2 hypoglycemic events occur.      Chronic Complications (preventing, detecting, and treating chronic complications): Discussion, Comprehends Key Points    Clinical (diabetes, other pertinent medical history, and relevant comorbidities reviewed during visit): Discussion, Comprehends Key Points.  History of neuropathy, erthrocytosis, hypertension, hyperlipidemia, sarcoidosis of lung, bronchitis    Cognitive (knowledge of self-management skills, functional health literacy): Discussion, Demonstrates Understanding/Competency (verbalizes/demonstrates)    Psychosocial (emotional response to diabetes): Discussion, Comprehends Key Points.  Patient  would like to continue to manage his diabetes--does not want uncontrolled diabetes.      Diabetes Distress and Support Systems: Discussion, Comprehends Key Points    Behavioral (readiness for change, lifestyle practices, self-care behaviors): Discussion, Comprehends Key Points    Goals  Patient has selected/evaluated goals during today's session: Yes, selected  Physical Activity: Set  Start Date: 08/01/19(Start riding stationary bicycle indoors at least 30 minutes 3 days a week)  Target Date: 11/01/19  Monitoring: In Progress  Medications: In Progress     Diabetes Care Plan/Intervention  Education Plan/Intervention:   1.  Continue monitoring home glucose once daily--alternating fasting and before supper or at bedtime.  When on oral or injectable steroids, recommend increasing frequency of monitoring to 3 times daily.    2.  Initiate indoors aerobic exercise (patient needs to exercise indoors due to lung issues)--goal of riding stationary bicycle for 30 minutes at least 3 days a week.    3.  Avoid concentrated sweets--patient doing this for the most part.  4.  Balanced meals--overall has a decent diet, recommend adding more non starchy vegetables at lunch and dinner.  5.  Gradual weight loss.     Diabetes Meal Plan  Calories: 2000  Carbohydrate Per Meal: 45-60g  Carbohydrate Per Snack : 15-20g    Today's Self-Management Care Plan was developed with the patient's input and is based on barriers identified during today's assessment.    The long and short-term goals in the care plan were written with the patient/caregiver's input. The patient has agreed to work toward these goals to improve his overall diabetes control.      The patient received a copy of today's self-management plan and verbalized understanding of the care plan, goals, and all of today's instructions.      The patient was encouraged to communicate with his physician and care team regarding his condition(s) and treatment.  I provided the patient with my  contact information today and encouraged him to contact me via phone or patient portal as needed.     Education Units of Time   Time Spent: 45 min

## 2019-08-09 ENCOUNTER — OFFICE VISIT (OUTPATIENT)
Dept: PODIATRY | Facility: CLINIC | Age: 54
End: 2019-08-09
Payer: COMMERCIAL

## 2019-08-09 VITALS
DIASTOLIC BLOOD PRESSURE: 82 MMHG | WEIGHT: 232 LBS | HEART RATE: 83 BPM | HEIGHT: 73 IN | SYSTOLIC BLOOD PRESSURE: 134 MMHG | BODY MASS INDEX: 30.75 KG/M2

## 2019-08-09 DIAGNOSIS — B35.1 PAIN DUE TO ONYCHOMYCOSIS OF NAIL: Primary | ICD-10-CM

## 2019-08-09 DIAGNOSIS — M79.609 PAIN DUE TO ONYCHOMYCOSIS OF NAIL: Primary | ICD-10-CM

## 2019-08-09 PROCEDURE — 11750 NAIL REMOVAL: ICD-10-PCS | Mod: 51,T1,S$GLB, | Performed by: PODIATRIST

## 2019-08-09 PROCEDURE — 99999 PR PBB SHADOW E&M-EST. PATIENT-LVL III: ICD-10-PCS | Mod: PBBFAC,,, | Performed by: PODIATRIST

## 2019-08-09 PROCEDURE — 11750 EXCISION NAIL&NAIL MATRIX: CPT | Mod: TA,S$GLB,, | Performed by: PODIATRIST

## 2019-08-09 PROCEDURE — 99499 UNLISTED E&M SERVICE: CPT | Mod: S$GLB,,, | Performed by: PODIATRIST

## 2019-08-09 PROCEDURE — 99499 NO LOS: ICD-10-PCS | Mod: S$GLB,,, | Performed by: PODIATRIST

## 2019-08-09 PROCEDURE — 99999 PR PBB SHADOW E&M-EST. PATIENT-LVL III: CPT | Mod: PBBFAC,,, | Performed by: PODIATRIST

## 2019-08-09 NOTE — PROCEDURES
Nail Removal  Date/Time: 8/9/2019 7:40 AM  Performed by: Chung Peterson DPM  Authorized by: Chung Peterson DPM     Consent Done?:  Yes (Written)    Location:  Right foot  Location detail:  Right big toe  Anesthesia:  Digital block  Local anesthetic: lidocaine 1% without epinephrine  Anesthetic total (ml):  5  Preparation:  Skin prepped with Betadine and skin prepped with alcohol    Amount removed:  Complete  Wedge excision of skin of nail fold: No    Nail bed sutured?: No    Nail matrix removed:  Complete  Dressing: neosporin, 4x4, and coban.  Patient tolerance:  Patient tolerated the procedure well with no immediate complications

## 2019-08-09 NOTE — PROGRESS NOTES
Subjective:      Patient ID: Cade Johnston is a 53 y.o. male.    Chief Complaint: Ingrown Toenail (nail removal Dr Smith 6.7 7/2019)    Cade is a 53 y.o. male who presents to the clinic upon referral from Dr. Shahnaz umana. provider found  for evaluation and treatment of diabetic feet. Cade has a past medical history of DJD (degenerative joint disease) of lumbar spine, DM type 2 (diabetes mellitus, type 2), Fatty liver, GERD (gastroesophageal reflux disease), Hepatosplenomegaly, HTN (hypertension), Mixed hyperlipidemia, Nephrolithiasis, Pulmonary sarcoidosis, Sarcoidosis of lung, and Ventricular hypokinesis. Patient relates pain to the ball of the foot bilateral with a feeling like he is walking on a balled up sock all the time but nothing is there. He relates numbness and tingling to the entire foot as well bilateral the is intermittent and not related to weight bearing. He was seen by Dr. Resendiz 2 years ago and had some custom orthotic inserts made which helps some but the pain is still there.    CC#2 Also relates he has three nails that are painful thick and always ingrowing and would like to know treatment options going forward    8/9/19: Patient presents for bilateral hallux nail removals and left second nail removal permanent as discussed at last appointment    PCP: Joni Smith MD    Date Last Seen by PCP: 7/22/19    Current shoe gear: Tennis shoes    Hemoglobin A1C   Date Value Ref Range Status   07/22/2019 6.7 (H) 4.0 - 5.6 % Final     Comment:     ADA Screening Guidelines:  5.7-6.4%  Consistent with prediabetes  >or=6.5%  Consistent with diabetes  High levels of fetal hemoglobin interfere with the HbA1C  assay. Heterozygous hemoglobin variants (HbS, HgC, etc)do  not significantly interfere with this assay.   However, presence of multiple variants may affect accuracy.     05/04/2019 6.5 (H) 4.0 - 5.6 % Final     Comment:     ADA Screening Guidelines:  5.7-6.4%  Consistent with prediabetes  >or=6.5%   Consistent with diabetes  High levels of fetal hemoglobin interfere with the HbA1C  assay. Heterozygous hemoglobin variants (HbS, HgC, etc)do  not significantly interfere with this assay.   However, presence of multiple variants may affect accuracy.     10/30/2018 7.0 (H) 4.0 - 5.6 % Final     Comment:     ADA Screening Guidelines:  5.7-6.4%  Consistent with prediabetes  >or=6.5%  Consistent with diabetes  High levels of fetal hemoglobin interfere with the HbA1C  assay. Heterozygous hemoglobin variants (HbS, HgC, etc)do  not significantly interfere with this assay.   However, presence of multiple variants may affect accuracy.             Review of Systems   Constitution: Negative for chills and fever.   Cardiovascular: Positive for leg swelling. Negative for claudication.   Respiratory: Negative for shortness of breath.    Skin: Positive for nail changes. Negative for itching and rash.   Musculoskeletal: Positive for arthritis. Negative for muscle cramps, muscle weakness and myalgias.   Gastrointestinal: Negative for nausea and vomiting.   Neurological: Positive for numbness and paresthesias. Negative for focal weakness and loss of balance.           Objective:      Physical Exam   Constitutional: He is oriented to person, place, and time. He appears well-developed and well-nourished. No distress.   Cardiovascular:   Pulses:       Dorsalis pedis pulses are 2+ on the right side, and 2+ on the left side.        Posterior tibial pulses are 2+ on the right side, and 2+ on the left side.   < 3 sec capillary refill time to toes 1-5 bilateral. Feet are warm to touch proximally with distal cooling b/l. There is no hair growth on the feet and toes b/l. There is mild edema b/l with varicosities present b/l.      Musculoskeletal:   Equinus noted b/l ankles with < 5 deg DF noted. MMT 5/5 in DF/PF/Inv/Ev resistance with no reproduction of pain in any direction. Passive range of motion of ankle and pedal joints is painless b/l.      There is pain on palpation of the second  intermetatarsal space with a positive Raj's click. Minimal tenderness to palpation of the adjacent metatarsal heads.       Feet:   Right Foot:   Protective Sensation: 10 sites tested. 6 sites sensed.   Left Foot:   Protective Sensation: 10 sites tested. 5 sites sensed.   Neurological: He is alert and oriented to person, place, and time. He has normal strength. He displays no atrophy and no tremor. A sensory deficit is present. He exhibits normal muscle tone.   Negative tinel sign bilateral. Diminished vibratory and protective sensation bilateral   Skin: Skin is warm, dry and intact. No abrasion, no bruising, no burn, no ecchymosis, no laceration, no lesion, no petechiae and no rash noted. He is not diaphoretic. No cyanosis or erythema. No pallor. Nails show no clubbing.   Skin is thin and atrophic bilateral    Thick and discolored nails with subungual debris left 1-2 and right hallux with pain to palpation       Psychiatric: He has a normal mood and affect. His behavior is normal.             Assessment:       Encounter Diagnosis   Name Primary?    Pain due to onychomycosis of nail Yes         Plan:       Cade was seen today for ingrown toenail.    Diagnoses and all orders for this visit:    Pain due to onychomycosis of nail  -     Nail Removal  -     Nail Removal  -     Nail Removal      I counseled the patient on his conditions, their implications and medical management.    Upon reviewing the risks/benefits,  the planned procedure, the surgical goal, and the postoperative course for the bilateral hallux and left second toenail removals with phenol matrixectomy, the written consent was signed, timed, and dated by the patient with a witness present.      Attached procedure note    Written post op instructions dispensed    Return in 10 days follow up post op    Chung Peterson DPM

## 2019-08-09 NOTE — PROCEDURES
Nail Removal  Date/Time: 8/9/2019 7:42 AM  Performed by: Chung Peterson DPM  Authorized by: Chung Peterson DPM       Location:  Left foot  Location detail:  Left big toe  Anesthesia:  Digital block  Local anesthetic: lidocaine 1% without epinephrine  Anesthetic total (ml):  5  Preparation:  Skin prepped with alcohol and skin prepped with Betadine    Amount removed:  Complete  Wedge excision of skin of nail fold: No    Nail bed sutured?: No    Nail matrix removed:  Complete  Dressing: neosporin gauze and coban.  Patient tolerance:  Patient tolerated the procedure well with no immediate complications

## 2019-08-09 NOTE — PROCEDURES
Nail Removal  Date/Time: 8/9/2019 7:43 AM  Performed by: Chung Peterson DPM  Authorized by: Chung Peterson DPM       Location:  Right foot  Location detail:  Right second toe  Anesthesia:  Digital block  Local anesthetic: lidocaine 1% without epinephrine  Anesthetic total (ml):  3  Preparation:  Skin prepped with alcohol and skin prepped with Betadine    Amount removed:  Complete  Wedge excision of skin of nail fold: No    Nail bed sutured?: No    Nail matrix removed:  Complete  Dressing: neosporin gauze and coban.  Patient tolerance:  Patient tolerated the procedure well with no immediate complications

## 2019-08-09 NOTE — LETTER
August 9, 2019    Cade Johnston  [address] [address] [address]         [facility]  [address] [facility] [place] [address]  Phone: [phone] August 9, 2019     Patient: Cade Johnston   YOB: 1965   Date of Visit: 8/9/2019       To Whom It May Concern:    It is my medical opinion that Cade Johnston should remain out of work until 8/20/19.    If you have any questions or concerns, please don't hesitate to call.    Sincerely,        Chung Peterson DPM

## 2019-08-13 ENCOUNTER — LAB VISIT (OUTPATIENT)
Dept: LAB | Facility: HOSPITAL | Age: 54
End: 2019-08-13
Attending: UROLOGY
Payer: COMMERCIAL

## 2019-08-13 ENCOUNTER — OFFICE VISIT (OUTPATIENT)
Dept: CARDIOLOGY | Facility: CLINIC | Age: 54
End: 2019-08-13
Payer: COMMERCIAL

## 2019-08-13 VITALS
BODY MASS INDEX: 30.21 KG/M2 | DIASTOLIC BLOOD PRESSURE: 82 MMHG | HEIGHT: 73 IN | HEART RATE: 80 BPM | WEIGHT: 227.94 LBS | SYSTOLIC BLOOD PRESSURE: 134 MMHG

## 2019-08-13 DIAGNOSIS — E78.5 DYSLIPIDEMIA: ICD-10-CM

## 2019-08-13 DIAGNOSIS — E29.1 MALE HYPOGONADISM: ICD-10-CM

## 2019-08-13 DIAGNOSIS — E11.42 TYPE 2 DIABETES MELLITUS WITH DIABETIC POLYNEUROPATHY, WITHOUT LONG-TERM CURRENT USE OF INSULIN: ICD-10-CM

## 2019-08-13 DIAGNOSIS — I10 ESSENTIAL HYPERTENSION: Primary | ICD-10-CM

## 2019-08-13 LAB — TESTOST SERPL-MCNC: 158 NG/DL (ref 304–1227)

## 2019-08-13 PROCEDURE — 99999 PR PBB SHADOW E&M-EST. PATIENT-LVL III: CPT | Mod: PBBFAC,,, | Performed by: INTERNAL MEDICINE

## 2019-08-13 PROCEDURE — 99999 PR PBB SHADOW E&M-EST. PATIENT-LVL III: ICD-10-PCS | Mod: PBBFAC,,, | Performed by: INTERNAL MEDICINE

## 2019-08-13 PROCEDURE — 84402 ASSAY OF FREE TESTOSTERONE: CPT

## 2019-08-13 PROCEDURE — 84403 ASSAY OF TOTAL TESTOSTERONE: CPT

## 2019-08-13 PROCEDURE — 99244 OFF/OP CNSLTJ NEW/EST MOD 40: CPT | Mod: S$GLB,,, | Performed by: INTERNAL MEDICINE

## 2019-08-13 PROCEDURE — 99244 PR OFFICE CONSULTATION,LEVEL IV: ICD-10-PCS | Mod: S$GLB,,, | Performed by: INTERNAL MEDICINE

## 2019-08-13 PROCEDURE — 36415 COLL VENOUS BLD VENIPUNCTURE: CPT | Mod: PO

## 2019-08-13 RX ORDER — GABAPENTIN 100 MG/1
CAPSULE ORAL
Qty: 90 CAPSULE | Refills: 1 | OUTPATIENT
Start: 2019-08-13

## 2019-08-13 RX ORDER — VERAPAMIL HYDROCHLORIDE 100 MG/1
100 CAPSULE, EXTENDED RELEASE ORAL NIGHTLY
Qty: 90 CAPSULE | Refills: 4 | Status: SHIPPED | OUTPATIENT
Start: 2019-08-13 | End: 2019-08-19

## 2019-08-13 NOTE — PROGRESS NOTES
Subjective:    Patient ID:  Cade Johnston is a 53 y.o. male who presents for evaluation of Shortness of Breath and Establish Care      HPI  Here to re-establish care last seen  2016 for palpitation/HTN/DLP. C/o increased palpitations lasting sec. No syncope. Has been increased  Steroids and bronchodilator use. No CP. Admits to increasing KOROMA.     Review of Systems   Constitution: Negative for malaise/fatigue.   Eyes: Negative for blurred vision.   Cardiovascular: Negative for chest pain, claudication, cyanosis, dyspnea on exertion, irregular heartbeat, leg swelling, near-syncope, orthopnea, palpitations, paroxysmal nocturnal dyspnea and syncope.   Respiratory: Negative for cough and shortness of breath.    Hematologic/Lymphatic: Does not bruise/bleed easily.   Musculoskeletal: Negative for back pain, falls, joint pain, muscle cramps, muscle weakness and myalgias.   Gastrointestinal: Negative for abdominal pain, change in bowel habit, nausea and vomiting.   Genitourinary: Negative for urgency.   Neurological: Negative for dizziness, focal weakness and light-headedness.       Past Medical History:   Diagnosis Date    DJD (degenerative joint disease) of lumbar spine     DM type 2 (diabetes mellitus, type 2)     with diet alone    Dyslipidemia 8/13/2019    Essential hypertension 12/29/2015    Fatty liver     GERD (gastroesophageal reflux disease)     Hepatosplenomegaly     HTN (hypertension)     Hypogonadism male 2/17/2015    Mixed hyperlipidemia     resolved with diet    Nephrolithiasis     last stone 1/11    Palpitations 3/9/2016    Pulmonary sarcoidosis     Sarcoidosis of lung     Ventricular hypokinesis     angiogram with mild LAD stenosis          Objective:     Vitals:    08/13/19 1257   BP: 134/82   Pulse: 80        Physical Exam   Constitutional: He is oriented to person, place, and time. He appears well-developed and well-nourished. He is cooperative.   HENT:   Head: Normocephalic and  atraumatic.   Eyes: Conjunctivae are normal. Right eye exhibits no exudate. Left eye exhibits no exudate.   Neck: Normal range of motion. Neck supple. Normal carotid pulses and no JVD present. Carotid bruit is not present. No thyromegaly present.   Cardiovascular: Normal rate, regular rhythm, normal heart sounds and intact distal pulses.   Pulses:       Carotid pulses are 2+ on the right side, and 2+ on the left side.       Radial pulses are 2+ on the right side, and 2+ on the left side.        Dorsalis pedis pulses are 2+ on the right side, and 2+ on the left side.        Posterior tibial pulses are 2+ on the right side, and 2+ on the left side.   Pulmonary/Chest: Effort normal and breath sounds normal.   Abdominal: Soft. Bowel sounds are normal.   Musculoskeletal: Normal range of motion. He exhibits no edema.   Neurological: He is alert and oriented to person, place, and time. Gait normal.   Skin: Skin is warm, dry and intact. No cyanosis. Nails show no clubbing.   Psychiatric: He has a normal mood and affect. His speech is normal and behavior is normal. Judgment and thought content normal.   Nursing note and vitals reviewed.            ..    Chemistry        Component Value Date/Time     07/22/2019 0940    K 4.6 07/22/2019 0940     07/22/2019 0940    CO2 27 07/22/2019 0940    BUN 13 07/22/2019 0940    CREATININE 1.0 07/22/2019 0940     (H) 07/22/2019 0940        Component Value Date/Time    CALCIUM 9.5 07/22/2019 0940    ALKPHOS 89 07/22/2019 0940    AST 24 07/22/2019 0940    ALT 39 07/22/2019 0940    BILITOT 1.6 (H) 07/22/2019 0940    ESTGFRAFRICA >60.0 07/22/2019 0940    EGFRNONAA >60.0 07/22/2019 0940            ..  Lab Results   Component Value Date    CHOL 137 05/04/2019    CHOL 155 10/30/2018    CHOL 148 05/01/2018     Lab Results   Component Value Date    HDL 30 (L) 05/04/2019    HDL 35 (L) 10/30/2018    HDL 28 (L) 05/01/2018     Lab Results   Component Value Date    LDLCALC 38.2 (L)  05/04/2019    LDLCALC 91.6 10/30/2018    LDLCALC 95.2 05/01/2018     Lab Results   Component Value Date    TRIG 344 (H) 05/04/2019    TRIG 142 10/30/2018    TRIG 124 05/01/2018     Lab Results   Component Value Date    CHOLHDL 21.9 05/04/2019    CHOLHDL 22.6 10/30/2018    CHOLHDL 18.9 (L) 05/01/2018     ..  Lab Results   Component Value Date    WBC 7.62 07/15/2019    HGB 19.1 (H) 07/15/2019    HCT 57.4 (H) 07/15/2019    MCV 87 07/15/2019     07/15/2019       Test(s) Reviewed  I have reviewed the following in detail:  [x] Stress test   [] Angiography   [x] Echocardiogram   [x] Labs   [x] Other:       Assessment:         ICD-10-CM ICD-9-CM   1. Essential hypertension I10 401.9   2. Dyslipidemia E78.5 272.4     Problem List Items Addressed This Visit     Essential hypertension - Primary    Dyslipidemia           Plan:           Return to clinic 4 months   Aerobic exercise 5x's/wk. Heart healthy diet and risk factor modification.    See labs and med orders.  della nuc/cfd change to verapamil follow for constipation    Portions of this note may have been created with voice recognition software.  Grammatical, syntax and spelling errors may be inevitable.

## 2019-08-13 NOTE — LETTER
August 13, 2019      Apurva Duenas, SUMEET  [address] [place] [address]           [facility]  [address] [facility] [place] [address]  Phone: [phone]          Patient: Cade Johnston   MR Number: [id number]   YOB: 1965   Date of Visit: 8/13/2019       Dear Apurva Duenas:    Thank you for referring Cade Johnston to me for evaluation. Attached you will find relevant portions of my assessment and plan of care.    If you have questions, please do not hesitate to call me. I look forward to following Cade Jonhston along with you.    Sincerely,    Amador Duran MD    Enclosure  CC:  No Recipients    If you would like to receive this communication electronically, please contact [email or web address] or [phone] to request more information on [employer] Link access.    For providers and/or their staff who would like to refer a patient to [facility], please contact us through our one-stop-shop provider referral line, Shai Botello, at [phone].    If you feel you have received this communication in error or would no longer like to receive these types of communications, please e-mail [email or web address]

## 2019-08-16 LAB — TESTOST FREE SERPL-MCNC: 3.5 PG/ML

## 2019-08-16 NOTE — TELEPHONE ENCOUNTER
----- Message from Sunitha Tineo sent at 8/16/2019  3:58 PM CDT -----  Contact: Patient  Type: Needs Medical Advice    Who Called:  Patient  Pharmacy name and phone #:    [id number] - [place] - [address] AT [facility]  [address]  [place] [address]  Phone: [phone] Fax: [phone]    Best Call Back Number: [phone]  Additional Information: Patient is stating that he needs to see if there is a generic brand for the verapamil (VERELAN) 100 MG CPCT because it is expensive with his insurance. Also wants to clarify what medications he need to stop or continue taking.Please call back and advise.

## 2019-08-19 ENCOUNTER — OFFICE VISIT (OUTPATIENT)
Dept: PODIATRY | Facility: CLINIC | Age: 54
End: 2019-08-19
Payer: COMMERCIAL

## 2019-08-19 VITALS
HEART RATE: 72 BPM | SYSTOLIC BLOOD PRESSURE: 123 MMHG | WEIGHT: 227.94 LBS | HEIGHT: 73 IN | BODY MASS INDEX: 30.21 KG/M2 | DIASTOLIC BLOOD PRESSURE: 80 MMHG

## 2019-08-19 DIAGNOSIS — Z98.890 STATUS POST NAIL SURGERY: Primary | ICD-10-CM

## 2019-08-19 PROCEDURE — 99999 PR PBB SHADOW E&M-EST. PATIENT-LVL III: CPT | Mod: PBBFAC,,, | Performed by: PODIATRIST

## 2019-08-19 PROCEDURE — 99999 PR PBB SHADOW E&M-EST. PATIENT-LVL III: ICD-10-PCS | Mod: PBBFAC,,, | Performed by: PODIATRIST

## 2019-08-19 PROCEDURE — 99024 PR POST-OP FOLLOW-UP VISIT: ICD-10-PCS | Mod: S$GLB,,, | Performed by: PODIATRIST

## 2019-08-19 PROCEDURE — 99024 POSTOP FOLLOW-UP VISIT: CPT | Mod: S$GLB,,, | Performed by: PODIATRIST

## 2019-08-19 RX ORDER — VERAPAMIL HYDROCHLORIDE 120 MG/1
120 CAPSULE, EXTENDED RELEASE ORAL DAILY
Qty: 30 CAPSULE | Refills: 4 | Status: SHIPPED | OUTPATIENT
Start: 2019-08-19 | End: 2019-11-17 | Stop reason: SDUPTHER

## 2019-08-19 NOTE — PROGRESS NOTES
Subjective:      Patient ID: Cade Johnston is a 53 y.o. male.    Chief Complaint: Post-op Evaluation (toenail removal) and Diabetes Mellitus (6.7 7/22/19 Luis 7.22/19)    Cade is a 53 y.o. male who presents to the clinic upon referral from Dr. Shahnaz umana. provider found  for evaluation and treatment of diabetic feet. aCde has a past medical history of DJD (degenerative joint disease) of lumbar spine, DM type 2 (diabetes mellitus, type 2), Dyslipidemia (8/13/2019), Essential hypertension (12/29/2015), Fatty liver, GERD (gastroesophageal reflux disease), Hepatosplenomegaly, HTN (hypertension), Hypogonadism male (2/17/2015), Mixed hyperlipidemia, Nephrolithiasis, Palpitations (3/9/2016), Pulmonary sarcoidosis, Sarcoidosis of lung, and Ventricular hypokinesis. Patient relates pain to the ball of the foot bilateral with a feeling like he is walking on a balled up sock all the time but nothing is there. He relates numbness and tingling to the entire foot as well bilateral the is intermittent and not related to weight bearing. He was seen by Dr. Resendiz 2 years ago and had some custom orthotic inserts made which helps some but the pain is still there.    CC#2 Also relates he has three nails that are painful thick and always ingrowing and would like to know treatment options going forward    8/9/19: Patient presents for bilateral hallux nail removals and left second nail removal permanent as discussed at last appointment    8/19/19: Patient returns for 1 week post op bilateral hallux and left second nail removals with phenol, covering daily with band aid and neosporin, no new pedal complaints.     PCP: Joni Smith MD    Date Last Seen by PCP: 7/22/19    Current shoe gear: Tennis shoes    Hemoglobin A1C   Date Value Ref Range Status   07/22/2019 6.7 (H) 4.0 - 5.6 % Final     Comment:     ADA Screening Guidelines:  5.7-6.4%  Consistent with prediabetes  >or=6.5%  Consistent with diabetes  High levels of fetal hemoglobin  interfere with the HbA1C  assay. Heterozygous hemoglobin variants (HbS, HgC, etc)do  not significantly interfere with this assay.   However, presence of multiple variants may affect accuracy.     05/04/2019 6.5 (H) 4.0 - 5.6 % Final     Comment:     ADA Screening Guidelines:  5.7-6.4%  Consistent with prediabetes  >or=6.5%  Consistent with diabetes  High levels of fetal hemoglobin interfere with the HbA1C  assay. Heterozygous hemoglobin variants (HbS, HgC, etc)do  not significantly interfere with this assay.   However, presence of multiple variants may affect accuracy.     10/30/2018 7.0 (H) 4.0 - 5.6 % Final     Comment:     ADA Screening Guidelines:  5.7-6.4%  Consistent with prediabetes  >or=6.5%  Consistent with diabetes  High levels of fetal hemoglobin interfere with the HbA1C  assay. Heterozygous hemoglobin variants (HbS, HgC, etc)do  not significantly interfere with this assay.   However, presence of multiple variants may affect accuracy.             Review of Systems   Constitution: Negative for chills and fever.   Cardiovascular: Positive for leg swelling. Negative for claudication.   Respiratory: Negative for shortness of breath.    Skin: Positive for nail changes. Negative for itching and rash.   Musculoskeletal: Positive for arthritis. Negative for muscle cramps, muscle weakness and myalgias.   Gastrointestinal: Negative for nausea and vomiting.   Neurological: Positive for numbness and paresthesias. Negative for focal weakness and loss of balance.           Objective:      Physical Exam   Constitutional: He is oriented to person, place, and time. He appears well-developed and well-nourished. No distress.   Cardiovascular:   Pulses:       Dorsalis pedis pulses are 2+ on the right side, and 2+ on the left side.        Posterior tibial pulses are 2+ on the right side, and 2+ on the left side.   < 3 sec capillary refill time to toes 1-5 bilateral. Feet are warm to touch proximally with distal cooling b/l.  There is no hair growth on the feet and toes b/l. There is mild edema b/l with varicosities present b/l.      Musculoskeletal:   Equinus noted b/l ankles with < 5 deg DF noted. MMT 5/5 in DF/PF/Inv/Ev resistance with no reproduction of pain in any direction. Passive range of motion of ankle and pedal joints is painless b/l.     There is pain on palpation of the second  intermetatarsal space with a positive Raj's click. Minimal tenderness to palpation of the adjacent metatarsal heads.       Feet:   Right Foot:   Protective Sensation: 10 sites tested. 6 sites sensed.   Left Foot:   Protective Sensation: 10 sites tested. 5 sites sensed.   Neurological: He is alert and oriented to person, place, and time. He has normal strength. He displays no atrophy and no tremor. A sensory deficit is present. He exhibits normal muscle tone.   Negative tinel sign bilateral. Diminished vibratory and protective sensation bilateral   Skin: Skin is warm, dry and intact. No abrasion, no bruising, no burn, no ecchymosis, no laceration, no lesion, no petechiae and no rash noted. He is not diaphoretic. No cyanosis. No pallor. Nails show no clubbing.   Skin is thin and atrophic bilateral    Left 1-2 and right hallux nails removed with nail bed hard eschar covering minimal erythema and no drainage.        Psychiatric: He has a normal mood and affect. His behavior is normal.             Assessment:       Encounter Diagnosis   Name Primary?    Status post nail surgery Yes         Plan:       Cade was seen today for post-op evaluation and diabetes mellitus.    Diagnoses and all orders for this visit:    Status post nail surgery      I counseled the patient on his conditions, their implications and medical management.    Continue to cover areas with dry band aid    Will let me know if the redness gets worse if there is an increase in drainage or pain    Return DEVON Peterson DPM

## 2019-08-19 NOTE — TELEPHONE ENCOUNTER
spoke to pt and advised sending a different form of verapamil to pharmacy. Pt expressed understanding

## 2019-08-20 DIAGNOSIS — E11.42 TYPE 2 DIABETES MELLITUS WITH DIABETIC POLYNEUROPATHY, WITHOUT LONG-TERM CURRENT USE OF INSULIN: ICD-10-CM

## 2019-08-20 RX ORDER — GABAPENTIN 100 MG/1
100 CAPSULE ORAL 3 TIMES DAILY PRN
Qty: 90 CAPSULE | Refills: 1 | Status: SHIPPED | OUTPATIENT
Start: 2019-08-20 | End: 2019-12-13 | Stop reason: SDUPTHER

## 2019-08-22 ENCOUNTER — OFFICE VISIT (OUTPATIENT)
Dept: UROLOGY | Facility: CLINIC | Age: 54
End: 2019-08-22
Payer: COMMERCIAL

## 2019-08-22 VITALS
SYSTOLIC BLOOD PRESSURE: 117 MMHG | HEIGHT: 73 IN | HEART RATE: 77 BPM | BODY MASS INDEX: 30.45 KG/M2 | DIASTOLIC BLOOD PRESSURE: 75 MMHG | WEIGHT: 229.75 LBS

## 2019-08-22 DIAGNOSIS — N13.8 BENIGN PROSTATIC HYPERPLASIA WITH URINARY OBSTRUCTION: ICD-10-CM

## 2019-08-22 DIAGNOSIS — R79.89 LOW SERUM TESTOSTERONE LEVEL: Primary | ICD-10-CM

## 2019-08-22 DIAGNOSIS — N40.1 BENIGN PROSTATIC HYPERPLASIA WITH URINARY OBSTRUCTION: ICD-10-CM

## 2019-08-22 LAB
BILIRUB SERPL-MCNC: NORMAL MG/DL
BLOOD URINE, POC: NORMAL
COLOR, POC UA: NORMAL
GLUCOSE UR QL STRIP: 250
KETONES UR QL STRIP: NORMAL
LEUKOCYTE ESTERASE URINE, POC: NORMAL
NITRITE, POC UA: NORMAL
PH, POC UA: 6.5
PROTEIN, POC: NORMAL
SPECIFIC GRAVITY, POC UA: 1.01
UROBILINOGEN, POC UA: 0.2

## 2019-08-22 PROCEDURE — 99214 PR OFFICE/OUTPT VISIT, EST, LEVL IV, 30-39 MIN: ICD-10-PCS | Mod: 25,S$GLB,, | Performed by: UROLOGY

## 2019-08-22 PROCEDURE — 99999 PR PBB SHADOW E&M-EST. PATIENT-LVL III: ICD-10-PCS | Mod: PBBFAC,,, | Performed by: UROLOGY

## 2019-08-22 PROCEDURE — 96372 THER/PROPH/DIAG INJ SC/IM: CPT | Mod: S$GLB,,, | Performed by: UROLOGY

## 2019-08-22 PROCEDURE — 96372 PR INJECTION,THERAP/PROPH/DIAG2ST, IM OR SUBCUT: ICD-10-PCS | Mod: S$GLB,,, | Performed by: UROLOGY

## 2019-08-22 PROCEDURE — 81002 POCT URINE DIPSTICK WITHOUT MICROSCOPE: ICD-10-PCS | Mod: S$GLB,,, | Performed by: UROLOGY

## 2019-08-22 PROCEDURE — 99999 PR PBB SHADOW E&M-EST. PATIENT-LVL III: CPT | Mod: PBBFAC,,, | Performed by: UROLOGY

## 2019-08-22 PROCEDURE — 3008F BODY MASS INDEX DOCD: CPT | Mod: CPTII,S$GLB,, | Performed by: UROLOGY

## 2019-08-22 PROCEDURE — 3008F PR BODY MASS INDEX (BMI) DOCUMENTED: ICD-10-PCS | Mod: CPTII,S$GLB,, | Performed by: UROLOGY

## 2019-08-22 PROCEDURE — 3074F PR MOST RECENT SYSTOLIC BLOOD PRESSURE < 130 MM HG: ICD-10-PCS | Mod: CPTII,S$GLB,, | Performed by: UROLOGY

## 2019-08-22 PROCEDURE — 3078F DIAST BP <80 MM HG: CPT | Mod: CPTII,S$GLB,, | Performed by: UROLOGY

## 2019-08-22 PROCEDURE — 81002 URINALYSIS NONAUTO W/O SCOPE: CPT | Mod: S$GLB,,, | Performed by: UROLOGY

## 2019-08-22 PROCEDURE — 3078F PR MOST RECENT DIASTOLIC BLOOD PRESSURE < 80 MM HG: ICD-10-PCS | Mod: CPTII,S$GLB,, | Performed by: UROLOGY

## 2019-08-22 PROCEDURE — 3074F SYST BP LT 130 MM HG: CPT | Mod: CPTII,S$GLB,, | Performed by: UROLOGY

## 2019-08-22 PROCEDURE — 99214 OFFICE O/P EST MOD 30 MIN: CPT | Mod: 25,S$GLB,, | Performed by: UROLOGY

## 2019-08-22 RX ADMIN — TESTOSTERONE CYPIONATE 400 MG: 200 INJECTION, SOLUTION INTRAMUSCULAR at 02:08

## 2019-08-22 NOTE — PROGRESS NOTES
UROLOGY Clint  8 22 18    Cc hypogonadism    Age 53, is here to follow up on hypogonadism. Is on testosterone replacement. Our nurses give him the testosterone shots, has been on it for 4 years, feels that it has helped his general energy and his sexual interest. he gets 400 mg im q 28 d.    Also has hx of kidney stones, with a large kidney stone treated in 1994 with eswl, with some fragments stuck in the ureter that then required open ureterolithotomy (juana/jorge). Pt saysthe open surgery was complex and took 10 hours. Pt did fine after that and has had no more problems. About 10 years later he passed another small stone and also he had one small one removed from the ureter by dr jag jensen.    Voiding function is good, with no need to strain to void. Nocturia x 2. No pains or burning when voiding.    Lately has had some issues with sarcoidosis, and is using steroids. This has thrown his diabetes off balance and made it harder to keep in control.         [facility]     Surgical:  has a past surgical history that includes Ureteral exploration (1996); elbow spur removal; Extracorporeal shock wave lithotripsy (1996); removal of skin cancer; and Circumcision.     Medical:  has a past medical history of DJD (degenerative joint disease) of lumbar spine; DM type 2 (diabetes mellitus, type 2); Fatty liver; GERD (gastroesophageal reflux disease); Hepatosplenomegaly; HTN (hypertension); Mixed hyperlipidemia; Nephrolithiasis; Pulmonary sarcoidosis; Sarcoidosis of lung; and Ventricular hypokinesis.     Familial: father with CAD, mother with hypertension     Social:  for [employer]                 Current Outpatient Prescriptions on File Prior to Visit   Medication Sig Dispense Refill    aspirin (ENTERIC COATED ASPIRIN) 81 MG EC tablet Take 81 mg by mouth once daily        blood sugar diagnostic Strp Test q day 100 strip 3    diclofenac sodium 1 % Gel Apply 2 g topically 100 g 2    ipratropium-albuterol  (COMBIVENT) 20-10 Inhale 1 puff into the lungs every 4 4 g 11    losartan-hydrochlorothiazide 100-25 mg (HYZAAR) 100-25 mg per tablet TAKE 1 TABLET BY MOUTH ONCE DAILY. 30 tablet 5    metformin (GLUCOPH TAKE 1  tablet 1    sildenafil (REVATIO) 20 mg Tab Take 20 mg by mouth.                            Current Facility-Administered Medications on File Prior to Visit   Medication Dose Route Frequency Provider Last Rate Last Dose    testosterone cypionate injection 300 mg  300 mg Intramuscular Q21 Days Des Larson MD   300 mg at 07/11/17 1555         REVIEW OF SYSTEMS  GENERAL: has complaints of fatigue less often now that he gets the testosterone treatment. No headaches or dizzy spells.   HEENT: vision preserved. Sinuses: has frequent congestion   CARDIOPULMONARY: No swelling of the legs; no chest pain. No shortness of breath, no wheezing.   GASTROINTESTINAL: No heartburn. Denies diarrhea; has some constipation, no blood or mucus in stools.   GENITOURINARY: Denies dysuria, bleeding or incontinence.   MUSCULOSKELETAL: some arthritic complaints in neck and feet   PSYCHIATRIC: No history of depression or anxiety.   ENDOCRINOLOGIC: No reports of sweating, cold or heat intolerance. No polyuria or polydipsia.   NEUROLOGICAL: No headache, dizziness, syncope, paralysis, ataxia, numbness or tingling in the extremities.  LYMPHATICS: No enlarged nodes. No history of splenectomy.  ==     Pt alert, oriented, no distress  HEENT:  wnl.  Neck: supple, no JVD, no lymphadenopathy  Chest: CV NSR  Lungs: normal chest expansion  Abdomen flat, nontender, no organomegaly, no masses.  cva's neg  No hernias. Has large RLQ surgical scar from his R ureterolithotomy, well healed. Also has a small scar from the stab incision for penrose drain  Penis circumcised, meatus nl  Testes nl, epi nl, scrotum nl  BRIDGER: anus nl, sphincter nl tone, mucosa without lesions, prostate 30 gm, symmetric, no nodules or indurations  Extremities: no  edema, peripheral pulses nl  Neuro: preserved          IMPRESSION:     Hypogonadism, on testosterone replacement.   Can continue with same replacement treatment.     bph on observation    Nephrolithiasis, having passed stones and having had surgical procedures for them    Erectile dysfunction on pde5 inhibitors    Pt has erythrocytosis Hct 57, pt was told needed to get a therapeutic phlebotomy.    RTC yearly

## 2019-09-10 ENCOUNTER — CLINICAL SUPPORT (OUTPATIENT)
Dept: CARDIOLOGY | Facility: CLINIC | Age: 54
End: 2019-09-10
Attending: INTERNAL MEDICINE
Payer: COMMERCIAL

## 2019-09-10 ENCOUNTER — HOSPITAL ENCOUNTER (OUTPATIENT)
Dept: RADIOLOGY | Facility: HOSPITAL | Age: 54
Discharge: HOME | End: 2019-09-10
Attending: INTERNAL MEDICINE
Payer: COMMERCIAL

## 2019-09-10 VITALS
SYSTOLIC BLOOD PRESSURE: 130 MMHG | BODY MASS INDEX: 30.35 KG/M2 | HEART RATE: 66 BPM | DIASTOLIC BLOOD PRESSURE: 80 MMHG | HEIGHT: 73 IN | WEIGHT: 229 LBS

## 2019-09-10 VITALS — BODY MASS INDEX: 30.35 KG/M2 | HEIGHT: 73 IN | WEIGHT: 229 LBS

## 2019-09-10 DIAGNOSIS — I10 ESSENTIAL HYPERTENSION: ICD-10-CM

## 2019-09-10 DIAGNOSIS — E78.5 DYSLIPIDEMIA: ICD-10-CM

## 2019-09-10 LAB
ASCENDING AORTA: 2.57 CM
AV INDEX (PROSTH): 0.95
AV MEAN GRADIENT: 3 MMHG
AV PEAK GRADIENT: 5 MMHG
AV VALVE AREA: 3.65 CM2
AV VELOCITY RATIO: 1.06
BSA FOR ECHO PROCEDURE: 2.31 M2
CV ECHO LV RWT: 0.46 CM
DOP CALC AO PEAK VEL: 1.12 M/S
DOP CALC AO VTI: 22.41 CM
DOP CALC LVOT AREA: 3.8 CM2
DOP CALC LVOT DIAMETER: 2.21 CM
DOP CALC LVOT PEAK VEL: 1.19 M/S
DOP CALC LVOT STROKE VOLUME: 81.74 CM3
DOP CALCLVOT PEAK VEL VTI: 21.32 CM
E WAVE DECELERATION TIME: 137.33 MSEC
E/A RATIO: 0.96
E/E' RATIO: 6.78 M/S
ECHO LV POSTERIOR WALL: 1.01 CM (ref 0.6–1.1)
FRACTIONAL SHORTENING: 29 % (ref 28–44)
INTERVENTRICULAR SEPTUM: 1.2 CM (ref 0.6–1.1)
IVRT: 0.08 MSEC
LA MAJOR: 4.04 CM
LA MINOR: 4.18 CM
LA WIDTH: 3 CM
LEFT ATRIUM SIZE: 3.38 CM
LEFT ATRIUM VOLUME INDEX: 15.5 ML/M2
LEFT ATRIUM VOLUME: 35.41 CM3
LEFT INTERNAL DIMENSION IN SYSTOLE: 3.09 CM (ref 2.1–4)
LEFT VENTRICLE DIASTOLIC VOLUME INDEX: 37.62 ML/M2
LEFT VENTRICLE DIASTOLIC VOLUME: 85.75 ML
LEFT VENTRICLE MASS INDEX: 74 G/M2
LEFT VENTRICLE SYSTOLIC VOLUME INDEX: 16.5 ML/M2
LEFT VENTRICLE SYSTOLIC VOLUME: 37.59 ML
LEFT VENTRICULAR INTERNAL DIMENSION IN DIASTOLE: 4.36 CM (ref 3.5–6)
LEFT VENTRICULAR MASS: 167.59 G
LV LATERAL E/E' RATIO: 5.57 M/S
LV SEPTAL E/E' RATIO: 8.67 M/S
MV PEAK A VEL: 0.81 M/S
MV PEAK E VEL: 0.78 M/S
PISA TR MAX VEL: 2.97 M/S
PULM VEIN S/D RATIO: 1.03
PV PEAK D VEL: 0.67 M/S
PV PEAK S VEL: 0.69 M/S
RA MAJOR: 4.02 CM
RA PRESSURE: 3 MMHG
RA WIDTH: 3.06 CM
RIGHT VENTRICULAR END-DIASTOLIC DIMENSION: 3.46 CM
SINUS: 2.94 CM
STJ: 2.59 CM
TDI LATERAL: 0.14 M/S
TDI SEPTAL: 0.09 M/S
TDI: 0.12 M/S
TR MAX PG: 35 MMHG
TRICUSPID ANNULAR PLANE SYSTOLIC EXCURSION: 1.88 CM
TV REST PULMONARY ARTERY PRESSURE: 38 MMHG

## 2019-09-10 PROCEDURE — 78452 STRESS TEST WITH MYOCARDIAL PERFUSION (CUPID ONLY): ICD-10-PCS | Mod: 26,,, | Performed by: INTERNAL MEDICINE

## 2019-09-10 PROCEDURE — 93306 TRANSTHORACIC ECHO (TTE) COMPLETE (CUPID ONLY): ICD-10-PCS | Mod: S$GLB,,, | Performed by: INTERNAL MEDICINE

## 2019-09-10 PROCEDURE — 99999 PR PBB SHADOW E&M-EST. PATIENT-LVL II: ICD-10-PCS | Mod: PBBFAC,,,

## 2019-09-10 PROCEDURE — 78452 HT MUSCLE IMAGE SPECT MULT: CPT | Mod: 26,,, | Performed by: INTERNAL MEDICINE

## 2019-09-10 PROCEDURE — 99999 PR PBB SHADOW E&M-EST. PATIENT-LVL I: CPT | Mod: PBBFAC,,,

## 2019-09-10 PROCEDURE — 93015 STRESS TEST WITH MYOCARDIAL PERFUSION (CUPID ONLY): ICD-10-PCS | Mod: ,,, | Performed by: INTERNAL MEDICINE

## 2019-09-10 PROCEDURE — 93015 CV STRESS TEST SUPVJ I&R: CPT | Mod: ,,, | Performed by: INTERNAL MEDICINE

## 2019-09-10 PROCEDURE — 99999 PR PBB SHADOW E&M-EST. PATIENT-LVL II: CPT | Mod: PBBFAC,,,

## 2019-09-10 PROCEDURE — 93306 TTE W/DOPPLER COMPLETE: CPT | Mod: S$GLB,,, | Performed by: INTERNAL MEDICINE

## 2019-09-10 PROCEDURE — 99999 PR PBB SHADOW E&M-EST. PATIENT-LVL I: ICD-10-PCS | Mod: PBBFAC,,,

## 2019-09-11 LAB
CV STRESS BASE HR: 66 BPM
DIASTOLIC BLOOD PRESSURE: 84 MMHG
NUC REST DIASTOLIC VOLUME INDEX: 88
NUC REST EJECTION FRACTION: 69
NUC REST SYSTOLIC VOLUME INDEX: 26
OHS CV CPX 1 MINUTE RECOVERY HEART RATE: 88 BPM
OHS CV CPX 85 PERCENT MAX PREDICTED HEART RATE MALE: 140
OHS CV CPX MAX PREDICTED HEART RATE: 165
OHS CV CPX PATIENT IS FEMALE: 0
OHS CV CPX PATIENT IS MALE: 1
OHS CV CPX PEAK DIASTOLIC BLOOD PRESSURE: 84 MMHG
OHS CV CPX PEAK HEAR RATE: 90 BPM
OHS CV CPX PEAK RATE PRESSURE PRODUCT: NORMAL
OHS CV CPX PEAK SYSTOLIC BLOOD PRESSURE: 145 MMHG
OHS CV CPX PERCENT MAX PREDICTED HEART RATE ACHIEVED: 55
OHS CV CPX RATE PRESSURE PRODUCT PRESENTING: 9570
STRESS ECHO TARGET HR: 140.25 BPM
SYSTOLIC BLOOD PRESSURE: 145 MMHG

## 2019-09-13 DIAGNOSIS — E11.42 TYPE 2 DIABETES MELLITUS WITH DIABETIC POLYNEUROPATHY, WITHOUT LONG-TERM CURRENT USE OF INSULIN: ICD-10-CM

## 2019-09-13 RX ORDER — GABAPENTIN 100 MG/1
CAPSULE ORAL
Qty: 90 CAPSULE | Refills: 1 | OUTPATIENT
Start: 2019-09-13

## 2019-09-17 ENCOUNTER — TELEPHONE (OUTPATIENT)
Dept: PODIATRY | Facility: CLINIC | Age: 54
End: 2019-09-17

## 2019-09-17 RX ORDER — CLINDAMYCIN HYDROCHLORIDE 300 MG/1
300 CAPSULE ORAL 3 TIMES DAILY
Qty: 21 CAPSULE | Refills: 0 | Status: SHIPPED | OUTPATIENT
Start: 2019-09-17 | End: 2019-09-24

## 2019-09-17 NOTE — TELEPHONE ENCOUNTER
----- Message from Erica Martinez sent at 9/17/2019  4:04 PM CDT -----  Contact: self [phone]  He is calling to follow up on his earlier call regarding his toe.  Please call him.  Thank you!

## 2019-09-17 NOTE — TELEPHONE ENCOUNTER
Patient stated that the toe that the procedure was done on is sore and throbbing, has clear/bloody drainage.  Asking if he needs to be seen or if an antibiotic can be sent to his pharmacy ([facility].).  Please advise.

## 2019-09-17 NOTE — TELEPHONE ENCOUNTER
Per Dr Peterson:  Patient informed that an antibiotic has been sent in, and that if the toe does not start to clear with the antibiotic then the he should come in to see Dr Peterson.  Patient verbalized understanding.      Chung Peterson, MUSTAPHA Romero LPN   Caller: Unspecified (Today,  2:26 PM)             I am sending in an antibiotic if it does not start to clear with the antibiotic then come see me.

## 2019-09-17 NOTE — TELEPHONE ENCOUNTER
----- Message from Nataliia Liz sent at 9/17/2019 12:37 PM CDT -----  Contact: Patient  Type: Needs Medical Advice    Who Called:  Patient  Symptoms (please be specific): painful toenail on left foot   Pharmacy name and phone #:    [id number] [place] - [address] AT [facility]  [address] [place] [address]  [place] [address]  Phone: [phone] Fax: [phone]  Best Call Back Number: [phone]  Additional Information: Patient is having trouble with a toenail on his left foot that is possibly infected. Wants to know if he needs to come in of if he can just be prescribed antibiotics.

## 2019-09-20 ENCOUNTER — OFFICE VISIT (OUTPATIENT)
Dept: PODIATRY | Facility: CLINIC | Age: 54
End: 2019-09-20
Payer: COMMERCIAL

## 2019-09-20 ENCOUNTER — CLINICAL SUPPORT (OUTPATIENT)
Dept: UROLOGY | Facility: CLINIC | Age: 54
End: 2019-09-20
Payer: COMMERCIAL

## 2019-09-20 VITALS — HEIGHT: 73 IN | BODY MASS INDEX: 30.35 KG/M2 | WEIGHT: 229 LBS

## 2019-09-20 DIAGNOSIS — L97.522 TOE ULCER, LEFT, WITH FAT LAYER EXPOSED: Primary | ICD-10-CM

## 2019-09-20 DIAGNOSIS — E29.1 MALE HYPOGONADISM: Primary | ICD-10-CM

## 2019-09-20 PROCEDURE — 99999 PR PBB SHADOW E&M-EST. PATIENT-LVL III: ICD-10-PCS | Mod: PBBFAC,,, | Performed by: PODIATRIST

## 2019-09-20 PROCEDURE — 99213 PR OFFICE/OUTPT VISIT, EST, LEVL III, 20-29 MIN: ICD-10-PCS | Mod: S$GLB,,, | Performed by: PODIATRIST

## 2019-09-20 PROCEDURE — 99213 OFFICE O/P EST LOW 20 MIN: CPT | Mod: S$GLB,,, | Performed by: PODIATRIST

## 2019-09-20 PROCEDURE — 3008F PR BODY MASS INDEX (BMI) DOCUMENTED: ICD-10-PCS | Mod: CPTII,S$GLB,, | Performed by: PODIATRIST

## 2019-09-20 PROCEDURE — 3008F BODY MASS INDEX DOCD: CPT | Mod: CPTII,S$GLB,, | Performed by: PODIATRIST

## 2019-09-20 PROCEDURE — 87070 CULTURE OTHR SPECIMN AEROBIC: CPT

## 2019-09-20 PROCEDURE — 87075 CULTR BACTERIA EXCEPT BLOOD: CPT

## 2019-09-20 PROCEDURE — 99999 PR PBB SHADOW E&M-EST. PATIENT-LVL III: CPT | Mod: PBBFAC,,, | Performed by: PODIATRIST

## 2019-09-20 PROCEDURE — 87077 CULTURE AEROBIC IDENTIFY: CPT

## 2019-09-20 PROCEDURE — 99999 PR PBB SHADOW E&M-EST. PATIENT-LVL III: CPT | Mod: PBBFAC,,,

## 2019-09-20 PROCEDURE — 87186 SC STD MICRODIL/AGAR DIL: CPT

## 2019-09-20 PROCEDURE — 99999 PR PBB SHADOW E&M-EST. PATIENT-LVL III: ICD-10-PCS | Mod: PBBFAC,,,

## 2019-09-20 RX ADMIN — TESTOSTERONE CYPIONATE 400 MG: 200 INJECTION, SOLUTION INTRAMUSCULAR at 09:09

## 2019-09-20 NOTE — PROGRESS NOTES
Testosterone injection given 400mg, see MAR. Patient tolerated well, recommended to wait 20 minutes after injection to observe for adverse reaction, patient declined to wait.

## 2019-09-24 ENCOUNTER — TELEPHONE (OUTPATIENT)
Dept: PODIATRY | Facility: CLINIC | Age: 54
End: 2019-09-24

## 2019-09-24 LAB
BACTERIA SPEC AEROBE CULT: ABNORMAL
BACTERIA SPEC ANAEROBE CULT: NORMAL

## 2019-09-24 RX ORDER — CIPROFLOXACIN 500 MG/1
500 TABLET ORAL EVERY 12 HOURS
Qty: 14 TABLET | Refills: 0 | Status: SHIPPED | OUTPATIENT
Start: 2019-09-24 | End: 2019-10-01

## 2019-09-24 NOTE — TELEPHONE ENCOUNTER
----- Message from Shaka Seymour sent at 9/24/2019 11:35 AM CDT -----  Contact: pt  Type:  Patient Returning Call    Who Called:  pt  Who Left Message for Patient:  Joie  Does the patient know what this is regarding?:  Test results and medication  Best Call Back Number:  [phone]  Additional Information:

## 2019-09-30 NOTE — PROGRESS NOTES
Subjective:      Patient ID: Cade Johnston is a 53 y.o. male.    Chief Complaint: Wound Check (L foot great toe pain,  Dr Smith 2019 6.7 7/2019)    Cade is a 53 y.o. male who presents to the clinic upon referral from Dr. Christie ref. provider found  for evaluation and treatment of diabetic feet. Cade has a past medical history of DJD (degenerative joint disease) of lumbar spine, DM type 2 (diabetes mellitus, type 2), Dyslipidemia (8/13/2019), Essential hypertension (12/29/2015), Fatty liver, GERD (gastroesophageal reflux disease), Hepatosplenomegaly, HTN (hypertension), Hypogonadism male (2/17/2015), Mixed hyperlipidemia, Nephrolithiasis, Palpitations (3/9/2016), Pulmonary sarcoidosis, Sarcoidosis of lung, and Ventricular hypokinesis. Patient relates pain to the ball of the foot bilateral with a feeling like he is walking on a balled up sock all the time but nothing is there. He relates numbness and tingling to the entire foot as well bilateral the is intermittent and not related to weight bearing. He was seen by Dr. Resendiz 2 years ago and had some custom orthotic inserts made which helps some but the pain is still there.    CC#2 Also relates he has three nails that are painful thick and always ingrowing and would like to know treatment options going forward    8/9/19: Patient presents for bilateral hallux nail removals and left second nail removal permanent as discussed at last appointment    8/19/19: Patient returns for 1 week post op bilateral hallux and left second nail removals with phenol, covering daily with band aid and neosporin, no new pedal complaints.     9/20/19: Patient returns for follow up bilateral hallux nail removals with phenol, the left great toe nail bed has not fully healed and would like it looked at, no redness there is minor drainage.    PCP: Joni Smith MD    Date Last Seen by PCP: 7/22/19    Current shoe gear: Tennis shoes    Hemoglobin A1C   Date Value Ref Range Status   07/22/2019  6.7 (H) 4.0 - 5.6 % Final     Comment:     ADA Screening Guidelines:  5.7-6.4%  Consistent with prediabetes  >or=6.5%  Consistent with diabetes  High levels of fetal hemoglobin interfere with the HbA1C  assay. Heterozygous hemoglobin variants (HbS, HgC, etc)do  not significantly interfere with this assay.   However, presence of multiple variants may affect accuracy.     05/04/2019 6.5 (H) 4.0 - 5.6 % Final     Comment:     ADA Screening Guidelines:  5.7-6.4%  Consistent with prediabetes  >or=6.5%  Consistent with diabetes  High levels of fetal hemoglobin interfere with the HbA1C  assay. Heterozygous hemoglobin variants (HbS, HgC, etc)do  not significantly interfere with this assay.   However, presence of multiple variants may affect accuracy.     10/30/2018 7.0 (H) 4.0 - 5.6 % Final     Comment:     ADA Screening Guidelines:  5.7-6.4%  Consistent with prediabetes  >or=6.5%  Consistent with diabetes  High levels of fetal hemoglobin interfere with the HbA1C  assay. Heterozygous hemoglobin variants (HbS, HgC, etc)do  not significantly interfere with this assay.   However, presence of multiple variants may affect accuracy.             Review of Systems   Constitution: Negative for chills and fever.   Cardiovascular: Positive for leg swelling. Negative for claudication.   Respiratory: Negative for shortness of breath.    Skin: Positive for nail changes. Negative for itching and rash.   Musculoskeletal: Positive for arthritis. Negative for muscle cramps, muscle weakness and myalgias.   Gastrointestinal: Negative for nausea and vomiting.   Neurological: Positive for numbness and paresthesias. Negative for focal weakness and loss of balance.           Objective:      Physical Exam   Constitutional: He is oriented to person, place, and time. He appears well-developed and well-nourished. No distress.   Cardiovascular:   Pulses:       Dorsalis pedis pulses are 2+ on the right side, and 2+ on the left side.        Posterior  tibial pulses are 2+ on the right side, and 2+ on the left side.   < 3 sec capillary refill time to toes 1-5 bilateral. Feet are warm to touch proximally with distal cooling b/l. There is no hair growth on the feet and toes b/l. There is mild edema b/l with varicosities present b/l.      Musculoskeletal:   Equinus noted b/l ankles with < 5 deg DF noted. MMT 5/5 in DF/PF/Inv/Ev resistance with no reproduction of pain in any direction. Passive range of motion of ankle and pedal joints is painless b/l.     There is pain on palpation of the second  intermetatarsal space with a positive Raj's click. Minimal tenderness to palpation of the adjacent metatarsal heads.       Feet:   Right Foot:   Protective Sensation: 10 sites tested. 6 sites sensed.   Left Foot:   Protective Sensation: 10 sites tested. 5 sites sensed.   Neurological: He is alert and oriented to person, place, and time. He has normal strength. He displays no atrophy and no tremor. A sensory deficit is present. He exhibits normal muscle tone.   Negative tinel sign bilateral. Diminished vibratory and protective sensation bilateral   Skin: Skin is warm, dry and intact. No abrasion, no bruising, no burn, no ecchymosis, no laceration, no lesion, no petechiae and no rash noted. He is not diaphoretic. No cyanosis. No pallor. Nails show no clubbing.   Skin is thin and atrophic bilateral    Left 1-2 and right hallux nails removed with nail bed hard eschar covering minimal erythema and no drainage.        Psychiatric: He has a normal mood and affect. His behavior is normal.             Assessment:       Encounter Diagnosis   Name Primary?    Toe ulcer, left, with fat layer exposed Yes         Plan:       Cade was seen today for wound check.    Diagnoses and all orders for this visit:    Toe ulcer, left, with fat layer exposed  -     Aerobic culture  -     Culture, Anaerobic      I counseled the patient on his conditions, their implications and medical  management.    Continue to cover areas with dry band aid    Cultures taken    Continue antibiotics I prescribed earlier this week    I will call and add an antibiotic if necessary from cultures    Return 2 weeks ensure area has healed    Chung Peterson DPM

## 2019-10-18 ENCOUNTER — CLINICAL SUPPORT (OUTPATIENT)
Dept: UROLOGY | Facility: CLINIC | Age: 54
End: 2019-10-18
Payer: COMMERCIAL

## 2019-10-18 DIAGNOSIS — E29.1 MALE HYPOGONADISM: Primary | ICD-10-CM

## 2019-10-18 PROCEDURE — 99999 PR PBB SHADOW E&M-EST. PATIENT-LVL III: ICD-10-PCS | Mod: PBBFAC,,,

## 2019-10-18 PROCEDURE — 99999 PR PBB SHADOW E&M-EST. PATIENT-LVL III: CPT | Mod: PBBFAC,,,

## 2019-10-18 PROCEDURE — 96372 THER/PROPH/DIAG INJ SC/IM: CPT | Mod: S$GLB,,, | Performed by: UROLOGY

## 2019-10-18 PROCEDURE — 96372 PR INJECTION,THERAP/PROPH/DIAG2ST, IM OR SUBCUT: ICD-10-PCS | Mod: S$GLB,,, | Performed by: UROLOGY

## 2019-10-18 RX ORDER — TESTOSTERONE CYPIONATE 200 MG/ML
400 INJECTION, SOLUTION INTRAMUSCULAR
Status: COMPLETED | OUTPATIENT
Start: 2019-10-18 | End: 2020-03-09

## 2019-10-18 RX ADMIN — TESTOSTERONE CYPIONATE 400 MG: 200 INJECTION, SOLUTION INTRAMUSCULAR at 09:10

## 2019-10-24 ENCOUNTER — PATIENT OUTREACH (OUTPATIENT)
Dept: ADMINISTRATIVE | Facility: HOSPITAL | Age: 54
End: 2019-10-24

## 2019-10-31 NOTE — PROGRESS NOTES
Refill Authorization Note     is requesting a refill authorization.    Brief assessment and rationale for refill: APPROVE: prr   Name and strength of medication: METFORMIN HCL  MG TABLET       Medication Therapy Plan: Dm2-LCO(Yulissa); LOV(5/19); labs wnl; approve 3 more     Medication reconciliation completed: No              How patient will take medication: t2t po bid          Comments:   Requested Prescriptions   Pending Prescriptions Disp Refills    metFORMIN (GLUCOPHAGE-XR) 500 MG 24 hr tablet [Pharmacy Med Name: METFORMIN HCL  MG TABLET] 360 tablet 0     Sig: TAKE 2 TABLETS BY MOUTH TWICE A DAY WITH MEALS       Endocrinology:  Diabetes - Biguanides Passed - 10/31/2019  6:55 AM        Passed - Patient is at least 18 years old        Passed - Valid encounter within last 12 months     Recent Outpatient Visits            1 month ago Toe ulcer, left, with fat layer exposed    [facility] Chung Peterson DPM    2 months ago Low serum testosterone level    [facility] Des Larson MD    2 months ago Status post nail surgery    [place] - Podiatrmarcia Peterson DPM    2 months ago Essential hypertension    [facility] Card[facility] Amador Duran MD    2 months ago Pain due to onychomycosis of nail    [place] - Podiatrmarcia Peterson DPM          Future Appointments              Tomorrow LAB, [place] [facility]                Passed - Cr is 1.3 or below and within 360 days     Creatinine   Date Value Ref Range Status   07/22/2019 1.0 0.5 - 1.4 mg/dL Final   07/15/2019 1.0 0.5 - 1.4 mg/dL Final   05/04/2019 1.0 0.5 - 1.4 mg/dL Final              Passed - HBA1C is 7.9 or below and within 180 days     Hemoglobin A1C   Date Value Ref Range Status   07/22/2019 6.7 (H) 4.0 - 5.6 % Final     Comment:     ADA Screening Guidelines:  5.7-6.4%  Consistent with prediabetes  >or=6.5%  Consistent with diabetes  High levels of fetal  hemoglobin interfere with the HbA1C  assay. Heterozygous hemoglobin variants (HbS, HgC, etc)do  not significantly interfere with this assay.   However, presence of multiple variants may affect accuracy.     05/04/2019 6.5 (H) 4.0 - 5.6 % Final     Comment:     ADA Screening Guidelines:  5.7-6.4%  Consistent with prediabetes  >or=6.5%  Consistent with diabetes  High levels of fetal hemoglobin interfere with the HbA1C  assay. Heterozygous hemoglobin variants (HbS, HgC, etc)do  not significantly interfere with this assay.   However, presence of multiple variants may affect accuracy.     10/30/2018 7.0 (H) 4.0 - 5.6 % Final     Comment:     ADA Screening Guidelines:  5.7-6.4%  Consistent with prediabetes  >or=6.5%  Consistent with diabetes  High levels of fetal hemoglobin interfere with the HbA1C  assay. Heterozygous hemoglobin variants (HbS, HgC, etc)do  not significantly interfere with this assay.   However, presence of multiple variants may affect accuracy.                Passed - eGFR is 30 or above and within 360 days     eGFR if non    Date Value Ref Range Status   07/22/2019 >60.0 >60 mL/min/1.73 m^2 Final     Comment:     Calculation used to obtain the estimated glomerular filtration  rate (eGFR) is the CKD-EPI equation.      07/15/2019 >60.0 >60 mL/min/1.73 m^2 Final     Comment:     Calculation used to obtain the estimated glomerular filtration  rate (eGFR) is the CKD-EPI equation.      05/04/2019 >60.0 >60 mL/min/1.73 m^2 Final     Comment:     Calculation used to obtain the estimated glomerular filtration  rate (eGFR) is the CKD-EPI equation.                   Appointments past 12m or future 3m    Date Provider   Last Visit   5/8/2019 Joni Smith MD   Next Visit   11/8/2019 Joni Smith MD

## 2019-11-01 ENCOUNTER — LAB VISIT (OUTPATIENT)
Dept: LAB | Facility: HOSPITAL | Age: 54
End: 2019-11-01
Attending: INTERNAL MEDICINE
Payer: COMMERCIAL

## 2019-11-01 DIAGNOSIS — E11.9 CONTROLLED TYPE 2 DIABETES MELLITUS WITHOUT COMPLICATION, WITHOUT LONG-TERM CURRENT USE OF INSULIN: ICD-10-CM

## 2019-11-01 DIAGNOSIS — I10 ESSENTIAL HYPERTENSION: ICD-10-CM

## 2019-11-01 DIAGNOSIS — E78.5 DYSLIPIDEMIA: ICD-10-CM

## 2019-11-01 LAB
ALBUMIN SERPL BCP-MCNC: 3.9 G/DL (ref 3.5–5.2)
ALP SERPL-CCNC: 76 U/L (ref 55–135)
ALT SERPL W/O P-5'-P-CCNC: 36 U/L (ref 10–44)
ANION GAP SERPL CALC-SCNC: 11 MMOL/L (ref 8–16)
AST SERPL-CCNC: 34 U/L (ref 10–40)
BILIRUB SERPL-MCNC: 1.5 MG/DL (ref 0.1–1)
BUN SERPL-MCNC: 16 MG/DL (ref 6–20)
CALCIUM SERPL-MCNC: 9.5 MG/DL (ref 8.7–10.5)
CHLORIDE SERPL-SCNC: 103 MMOL/L (ref 95–110)
CHOLEST SERPL-MCNC: 140 MG/DL (ref 120–199)
CHOLEST/HDLC SERPL: 4.7 {RATIO} (ref 2–5)
CO2 SERPL-SCNC: 24 MMOL/L (ref 23–29)
CREAT SERPL-MCNC: 1.1 MG/DL (ref 0.5–1.4)
EST. GFR  (AFRICAN AMERICAN): >60 ML/MIN/1.73 M^2
EST. GFR  (NON AFRICAN AMERICAN): >60 ML/MIN/1.73 M^2
ESTIMATED AVG GLUCOSE: 137 MG/DL (ref 68–131)
GLUCOSE SERPL-MCNC: 130 MG/DL (ref 70–110)
HBA1C MFR BLD HPLC: 6.4 % (ref 4–5.6)
HDLC SERPL-MCNC: 30 MG/DL (ref 40–75)
HDLC SERPL: 21.4 % (ref 20–50)
LDLC SERPL CALC-MCNC: 73.2 MG/DL (ref 63–159)
NONHDLC SERPL-MCNC: 110 MG/DL
POTASSIUM SERPL-SCNC: 4.9 MMOL/L (ref 3.5–5.1)
PROT SERPL-MCNC: 7.2 G/DL (ref 6–8.4)
SODIUM SERPL-SCNC: 138 MMOL/L (ref 136–145)
TRIGL SERPL-MCNC: 184 MG/DL (ref 30–150)

## 2019-11-01 PROCEDURE — 36415 COLL VENOUS BLD VENIPUNCTURE: CPT | Mod: PO

## 2019-11-01 PROCEDURE — 83036 HEMOGLOBIN GLYCOSYLATED A1C: CPT

## 2019-11-01 PROCEDURE — 80061 LIPID PANEL: CPT

## 2019-11-01 PROCEDURE — 80053 COMPREHEN METABOLIC PANEL: CPT

## 2019-11-01 RX ORDER — METFORMIN HYDROCHLORIDE 500 MG/1
TABLET, EXTENDED RELEASE ORAL
Qty: 360 TABLET | Refills: 0 | Status: SHIPPED | OUTPATIENT
Start: 2019-11-01 | End: 2020-01-30

## 2019-11-06 DIAGNOSIS — R09.82 PND (POST-NASAL DRIP): ICD-10-CM

## 2019-11-06 NOTE — PROGRESS NOTES
Refill Routing Note    Medication(s) are appropriate for refill Outside of protocol  Medication(s) are on the active medication list NO  Refills and Dispense Quantity have been updated NO      Requested Prescriptions   Pending Prescriptions Disp Refills    montelukast (SINGULAIR) 10 mg tablet [Pharmacy Med Name: MONTELUKAST SOD 10 MG TABLET] 90 tablet 3     Sig: TAKE 1 TABLET BY MOUTH EVERY DAY IN THE EVENING       Pulmonology:  Leukotriene Inhibitors Failed - 11/6/2019  2:10 AM        Failed - An appropriate indication is on the problem list     Allergic Rhinitis  Sinusitis  Seasonal Allergies   Asthma              Passed - Patient is at least 18 years old        Passed - Office visit in past 12 months or future 90 days     Recent Outpatient Visits            1 month ago Toe ulcer, left, with fat layer exposed    [place] - Podiatrmarcia Peterson DPM    2 months ago Low serum testosterone level    [facility] Des Larson MD    2 months ago Status post nail surgery    [place] - Podiatrmarcia Peterson DPM    2 months ago Essential hypertension    [place] - Cardiology Amador Duran MD    2 months ago Pain due to onychomycosis of nail    [place] - Podiatry Chung Peterson, MUSTAPHA          Future Appointments              In 2 days [facility]    In 2 months [facility] Duran [facility] [place]  [facility]

## 2019-11-07 RX ORDER — MONTELUKAST SODIUM 10 MG/1
TABLET ORAL
Qty: 90 TABLET | Refills: 3 | Status: SHIPPED | OUTPATIENT
Start: 2019-11-07 | End: 2021-01-19

## 2019-11-11 ENCOUNTER — CLINICAL SUPPORT (OUTPATIENT)
Dept: FAMILY MEDICINE | Facility: CLINIC | Age: 54
End: 2019-11-11
Attending: INTERNAL MEDICINE
Payer: COMMERCIAL

## 2019-11-11 ENCOUNTER — OFFICE VISIT (OUTPATIENT)
Dept: FAMILY MEDICINE | Facility: CLINIC | Age: 54
End: 2019-11-11
Payer: COMMERCIAL

## 2019-11-11 VITALS
DIASTOLIC BLOOD PRESSURE: 82 MMHG | SYSTOLIC BLOOD PRESSURE: 134 MMHG | HEIGHT: 73 IN | HEART RATE: 85 BPM | OXYGEN SATURATION: 97 % | WEIGHT: 235.31 LBS | BODY MASS INDEX: 31.18 KG/M2

## 2019-11-11 DIAGNOSIS — M79.604 PAIN IN BOTH LOWER EXTREMITIES: ICD-10-CM

## 2019-11-11 DIAGNOSIS — M79.605 PAIN IN BOTH LOWER EXTREMITIES: ICD-10-CM

## 2019-11-11 DIAGNOSIS — I10 ESSENTIAL HYPERTENSION: ICD-10-CM

## 2019-11-11 DIAGNOSIS — E11.9 CONTROLLED TYPE 2 DIABETES MELLITUS WITHOUT COMPLICATION, WITHOUT LONG-TERM CURRENT USE OF INSULIN: ICD-10-CM

## 2019-11-11 DIAGNOSIS — Z00.00 ROUTINE PHYSICAL EXAMINATION: Primary | ICD-10-CM

## 2019-11-11 DIAGNOSIS — E78.5 DYSLIPIDEMIA: ICD-10-CM

## 2019-11-11 PROCEDURE — 99999 PR PBB SHADOW E&M-EST. PATIENT-LVL III: ICD-10-PCS | Mod: PBBFAC,,, | Performed by: INTERNAL MEDICINE

## 2019-11-11 PROCEDURE — 99999 PR PBB SHADOW E&M-EST. PATIENT-LVL III: CPT | Mod: PBBFAC,,, | Performed by: INTERNAL MEDICINE

## 2019-11-11 PROCEDURE — 92250 DIABETIC EYE SCREENING PHOTO: ICD-10-PCS | Mod: TC,S$GLB,, | Performed by: INTERNAL MEDICINE

## 2019-11-11 PROCEDURE — 3075F PR MOST RECENT SYSTOLIC BLOOD PRESS GE 130-139MM HG: ICD-10-PCS | Mod: CPTII,S$GLB,, | Performed by: INTERNAL MEDICINE

## 2019-11-11 PROCEDURE — 3044F PR MOST RECENT HEMOGLOBIN A1C LEVEL <7.0%: ICD-10-PCS | Mod: CPTII,S$GLB,, | Performed by: INTERNAL MEDICINE

## 2019-11-11 PROCEDURE — 99999 PR PBB SHADOW E&M-EST. PATIENT-LVL I: CPT | Mod: PBBFAC,,,

## 2019-11-11 PROCEDURE — 92250 DIABETIC EYE SCREENING PHOTO: ICD-10-PCS | Mod: 26,S$GLB,, | Performed by: OPTOMETRIST

## 2019-11-11 PROCEDURE — 3044F HG A1C LEVEL LT 7.0%: CPT | Mod: CPTII,S$GLB,, | Performed by: INTERNAL MEDICINE

## 2019-11-11 PROCEDURE — 3079F DIAST BP 80-89 MM HG: CPT | Mod: CPTII,S$GLB,, | Performed by: INTERNAL MEDICINE

## 2019-11-11 PROCEDURE — 3075F SYST BP GE 130 - 139MM HG: CPT | Mod: CPTII,S$GLB,, | Performed by: INTERNAL MEDICINE

## 2019-11-11 PROCEDURE — 92250 FUNDUS PHOTOGRAPHY W/I&R: CPT | Mod: 26,S$GLB,, | Performed by: OPTOMETRIST

## 2019-11-11 PROCEDURE — 99396 PREV VISIT EST AGE 40-64: CPT | Mod: S$GLB,,, | Performed by: INTERNAL MEDICINE

## 2019-11-11 PROCEDURE — 99999 PR PBB SHADOW E&M-EST. PATIENT-LVL I: ICD-10-PCS | Mod: PBBFAC,,,

## 2019-11-11 PROCEDURE — 3079F PR MOST RECENT DIASTOLIC BLOOD PRESSURE 80-89 MM HG: ICD-10-PCS | Mod: CPTII,S$GLB,, | Performed by: INTERNAL MEDICINE

## 2019-11-11 PROCEDURE — 92250 FUNDUS PHOTOGRAPHY W/I&R: CPT | Mod: TC,S$GLB,, | Performed by: INTERNAL MEDICINE

## 2019-11-11 PROCEDURE — 99396 PR PREVENTIVE VISIT,EST,40-64: ICD-10-PCS | Mod: S$GLB,,, | Performed by: INTERNAL MEDICINE

## 2019-11-11 NOTE — PROGRESS NOTES
Subjective:       Patient ID: Cade Johnston is a 54 y.o. male.    Chief Complaint: Annual Exam    Here for routine health maintenance.      Will likely retire with disability in January as unable to perform his job.    Complains of b/l thigh pain at a certain distance walking.  Improves with rest.  Getting worse.     Recall:   Sarcoidosis, 45% lung fnx, sees pulmonary - increased sob - now with talking.  States lungs slightly worse.  On new inhaler Breo from pulm.  pulm recommended retiring.    low testosterone, Dr Larson;  Secondary erythrocytosis - gets frequent phlebotomies.  2nd to testosterone and sarcoidosis    Sees Dr Duran cardiology.   VÍCTOR - + fatigue; could not tolerate cpap 10 yr ago.     DM - controlled  HTN - controlled  HLD - controlled ldl < 70 goal; refusing statin in past.  High triglycerides  BPH/ ED - sees Dr Larson    Mood good.      Review of Systems   Constitutional: Negative for appetite change and fever.   HENT: Negative for nosebleeds and trouble swallowing.    Eyes: Negative for discharge and visual disturbance.   Respiratory: Positive for shortness of breath. Negative for choking.    Cardiovascular: Negative for chest pain and palpitations.   Gastrointestinal: Negative for abdominal pain, nausea and vomiting.   Musculoskeletal: Negative for arthralgias and joint swelling.   Skin: Negative for rash and wound.   Neurological: Negative for dizziness and syncope.   Psychiatric/Behavioral: Negative for confusion and dysphoric mood.       Objective:      Vitals:    11/11/19 1405   BP: 134/82   Pulse: 85     Physical Exam   Constitutional: He appears well-nourished.   Eyes: Conjunctivae and EOM are normal.   Neck: Trachea normal and normal range of motion. No thyromegaly present.   Cardiovascular: Normal heart sounds.   Edema negative   Pulmonary/Chest: Effort normal and breath sounds normal.   Abdominal: Soft. There is no hepatomegaly.   Musculoskeletal:   ROM normal bilateral  Strength normal  bilateral   Neurological: He has normal reflexes. No cranial nerve deficit.   Skin: Skin is warm, dry and intact.   Psychiatric: He has a normal mood and affect.   Alert and Oriented    Vitals reviewed.        Assessment:       1. Routine physical examination    2. Controlled type 2 diabetes mellitus without complication, without long-term current use of insulin    3. Essential hypertension    4. Dyslipidemia    5. Pain in both lower extremities        Plan:       Routine physical examination  -     Comprehensive metabolic panel; Future; Expected date: 05/09/2020  -     Hemoglobin A1c; Future; Expected date: 05/09/2020  -     Lipid panel; Future; Expected date: 05/09/2020  -     PSA, Screening; Future; Expected date: 05/09/2020    Controlled type 2 diabetes mellitus without complication, without long-term current use of insulin  -     Diabetic Eye Screening Photo; Future    Essential hypertension    Dyslipidemia    Pain in both lower extremities  -     CV Ankle Brachial Indices W Stress W Toe Tracings; Future            Medication List with Changes/Refills   Current Medications    ALBUTEROL (PROVENTIL/VENTOLIN HFA) 90 MCG/ACTUATION INHALER    Inhale 2 puffs into the lungs every 6 (six) hours as needed for Wheezing. Rescue    ASPIRIN (ENTERIC COATED ASPIRIN) 81 MG EC TABLET    Take 81 mg by mouth once daily. No Sig Provided    BLOOD SUGAR DIAGNOSTIC STRP    Test q day    FLUTICASONE (FLONASE) 50 MCG/ACTUATION NASAL SPRAY    2 sprays by Each Nare route once daily.    FLUTICASONE FUROATE-VILANTEROL (BREO ELLIPTA) 200-25 MCG/DOSE DSDV DISKUS INHALER    Inhale 1 puff into the lungs once daily. Controller    GABAPENTIN (NEURONTIN) 100 MG CAPSULE    Take 1 capsule (100 mg total) by mouth 3 (three) times daily as needed.    IPRATROPIUM-ALBUTEROL (COMBIVENT)  MCG/ACTUATION INHALER    Inhale 1 puff into the lungs every 4 (four) hours as needed for Shortness of Breath. Rescue    LOSARTAN-HYDROCHLOROTHIAZIDE 100-25 MG  (HYZAAR) 100-25 MG PER TABLET    TAKE 1 TABLET BY MOUTH EVERY DAY    METFORMIN (GLUCOPHAGE-XR) 500 MG 24 HR TABLET    TAKE 2 TABLETS BY MOUTH TWICE A DAY WITH MEALS    MONTELUKAST (SINGULAIR) 10 MG TABLET    TAKE 1 TABLET BY MOUTH EVERY DAY IN THE EVENING    PREDNISONE (DELTASONE) 20 MG TABLET    Take 40mg x2 days, 30 mg x2 days, 20mg x2 days, 10mg x2 days    SILDENAFIL (REVATIO) 20 MG TAB    Take 1 tablet (20 mg total) by mouth daily as needed. 5 tablets 1 hour prior to sexual intercourse.    VERAPAMIL (VERELAN) 120 MG C24P    Take 1 capsule (120 mg total) by mouth once daily.     Wellness reviewed  HORACIO, neg - consider EMG  Continue current management and monitor.    Counseled on regular exercise, maintenance of a healthy weight, balanced diet rich in fruits/vegetables and lean protein, and avoidance of unhealthy habits like smoking and excessive alcohol intake.   Also, counseled on importance of being compliant with medication, health appointments, diet and exercise.     Follow up in about 6 months (around 5/11/2020).

## 2019-11-11 NOTE — PROGRESS NOTES
Cade Johnston is a 54 y.o. male here for a diabetic eye screening with non-dilated fundus photos per Dr. Smith.    Patient cooperative?: Yes  Small pupils?: Yes  Last eye exam: 11/13/18    For exam results, see Encounter Report.

## 2019-11-12 ENCOUNTER — CLINICAL SUPPORT (OUTPATIENT)
Dept: CARDIOLOGY | Facility: CLINIC | Age: 54
End: 2019-11-12
Attending: INTERNAL MEDICINE
Payer: COMMERCIAL

## 2019-11-12 DIAGNOSIS — M79.605 PAIN IN BOTH LOWER EXTREMITIES: ICD-10-CM

## 2019-11-12 DIAGNOSIS — M79.604 PAIN IN BOTH LOWER EXTREMITIES: ICD-10-CM

## 2019-11-12 LAB
ABI CLAUDICATION TIME: 1.2 MIN
IMMEDIATE ARM BP: 168 MMHG
IMMEDIATE LEFT ABI: 1.36
IMMEDIATE LEFT TIBIAL: 229 MMHG
IMMEDIATE RIGHT ABI: 0.97
IMMEDIATE RIGHT TIBIAL: 163 MMHG
LEFT ABI: 1.55
LEFT ARM BP: 126 MMHG
LEFT DORSALIS PEDIS: 179 MMHG
LEFT POSTERIOR TIBIAL: 195 MMHG
RIGHT ABI: 1.23
RIGHT ARM BP: 119 MMHG
RIGHT DORSALIS PEDIS: 146 MMHG
RIGHT POSTERIOR TIBIAL: 155 MMHG
TREADMILL GRADE: 12 %
TREADMILL SPEED: 2 MPH
TREADMILL TIME: 2.35 MIN

## 2019-11-12 PROCEDURE — 93924 LWR XTR VASC STDY BILAT: CPT | Mod: S$GLB,,, | Performed by: INTERNAL MEDICINE

## 2019-11-12 PROCEDURE — 93924 CV US ANKLE / BRACHIAL INDICES W/ EXERCISE STRESS (CUPID ONLY): ICD-10-PCS | Mod: S$GLB,,, | Performed by: INTERNAL MEDICINE

## 2019-11-14 ENCOUNTER — TELEPHONE (OUTPATIENT)
Dept: CARDIOLOGY | Facility: CLINIC | Age: 54
End: 2019-11-14

## 2019-11-14 NOTE — TELEPHONE ENCOUNTER
Spoke with patient scheduled next available appointment with Dr Duran patient accepted date and time.

## 2019-11-14 NOTE — TELEPHONE ENCOUNTER
----- Message from Porsha Knight sent at 11/14/2019  1:11 PM CST -----  Pt is requesting a call back to come in and talk about test results.Please call and advise        Thank you

## 2019-11-15 ENCOUNTER — CLINICAL SUPPORT (OUTPATIENT)
Dept: UROLOGY | Facility: CLINIC | Age: 54
End: 2019-11-15
Payer: COMMERCIAL

## 2019-11-15 DIAGNOSIS — E29.1 MALE HYPOGONADISM: Primary | ICD-10-CM

## 2019-11-15 PROCEDURE — 96372 THER/PROPH/DIAG INJ SC/IM: CPT | Mod: S$GLB,,, | Performed by: UROLOGY

## 2019-11-15 PROCEDURE — 96372 PR INJECTION,THERAP/PROPH/DIAG2ST, IM OR SUBCUT: ICD-10-PCS | Mod: S$GLB,,, | Performed by: UROLOGY

## 2019-11-15 PROCEDURE — 99999 PR PBB SHADOW E&M-EST. PATIENT-LVL III: ICD-10-PCS | Mod: PBBFAC,,,

## 2019-11-15 PROCEDURE — 99999 PR PBB SHADOW E&M-EST. PATIENT-LVL III: CPT | Mod: PBBFAC,,,

## 2019-11-15 RX ADMIN — TESTOSTERONE CYPIONATE 400 MG: 200 INJECTION, SOLUTION INTRAMUSCULAR at 09:11

## 2019-11-18 RX ORDER — VERAPAMIL HYDROCHLORIDE 120 MG/1
CAPSULE, EXTENDED RELEASE ORAL
Qty: 90 CAPSULE | Refills: 4 | Status: SHIPPED | OUTPATIENT
Start: 2019-11-18 | End: 2020-11-30 | Stop reason: SDUPTHER

## 2019-12-04 ENCOUNTER — TELEPHONE (OUTPATIENT)
Dept: UROLOGY | Facility: CLINIC | Age: 54
End: 2019-12-04

## 2019-12-04 ENCOUNTER — OFFICE VISIT (OUTPATIENT)
Dept: CARDIOLOGY | Facility: CLINIC | Age: 54
End: 2019-12-04
Payer: COMMERCIAL

## 2019-12-04 VITALS
HEART RATE: 75 BPM | DIASTOLIC BLOOD PRESSURE: 89 MMHG | BODY MASS INDEX: 31.32 KG/M2 | SYSTOLIC BLOOD PRESSURE: 148 MMHG | HEIGHT: 73 IN | WEIGHT: 236.31 LBS

## 2019-12-04 DIAGNOSIS — E78.5 DYSLIPIDEMIA: Primary | ICD-10-CM

## 2019-12-04 DIAGNOSIS — R06.09 DOE (DYSPNEA ON EXERTION): ICD-10-CM

## 2019-12-04 DIAGNOSIS — I10 ESSENTIAL HYPERTENSION: ICD-10-CM

## 2019-12-04 DIAGNOSIS — R00.2 PALPITATIONS: ICD-10-CM

## 2019-12-04 PROCEDURE — 3008F PR BODY MASS INDEX (BMI) DOCUMENTED: ICD-10-PCS | Mod: CPTII,S$GLB,, | Performed by: INTERNAL MEDICINE

## 2019-12-04 PROCEDURE — 3008F BODY MASS INDEX DOCD: CPT | Mod: CPTII,S$GLB,, | Performed by: INTERNAL MEDICINE

## 2019-12-04 PROCEDURE — 99214 OFFICE O/P EST MOD 30 MIN: CPT | Mod: S$GLB,,, | Performed by: INTERNAL MEDICINE

## 2019-12-04 PROCEDURE — 99999 PR PBB SHADOW E&M-EST. PATIENT-LVL III: CPT | Mod: PBBFAC,,, | Performed by: INTERNAL MEDICINE

## 2019-12-04 PROCEDURE — 99999 PR PBB SHADOW E&M-EST. PATIENT-LVL III: ICD-10-PCS | Mod: PBBFAC,,, | Performed by: INTERNAL MEDICINE

## 2019-12-04 PROCEDURE — 3077F SYST BP >= 140 MM HG: CPT | Mod: CPTII,S$GLB,, | Performed by: INTERNAL MEDICINE

## 2019-12-04 PROCEDURE — 3079F DIAST BP 80-89 MM HG: CPT | Mod: CPTII,S$GLB,, | Performed by: INTERNAL MEDICINE

## 2019-12-04 PROCEDURE — 3079F PR MOST RECENT DIASTOLIC BLOOD PRESSURE 80-89 MM HG: ICD-10-PCS | Mod: CPTII,S$GLB,, | Performed by: INTERNAL MEDICINE

## 2019-12-04 PROCEDURE — 3077F PR MOST RECENT SYSTOLIC BLOOD PRESSURE >= 140 MM HG: ICD-10-PCS | Mod: CPTII,S$GLB,, | Performed by: INTERNAL MEDICINE

## 2019-12-04 PROCEDURE — 99214 PR OFFICE/OUTPT VISIT, EST, LEVL IV, 30-39 MIN: ICD-10-PCS | Mod: S$GLB,,, | Performed by: INTERNAL MEDICINE

## 2019-12-04 RX ORDER — SODIUM CHLORIDE 9 MG/ML
INJECTION, SOLUTION INTRAVENOUS CONTINUOUS
Status: CANCELLED | OUTPATIENT
Start: 2019-12-04

## 2019-12-04 RX ORDER — NITROGLYCERIN 0.4 MG/1
0.4 TABLET SUBLINGUAL EVERY 5 MIN PRN
Status: CANCELLED | OUTPATIENT
Start: 2019-12-04

## 2019-12-04 NOTE — PROGRESS NOTES
Subjective:    Patient ID:  Cade Johnston is a 54 y.o. male who presents for follow-up of Essential hypertension; Results; Numbness; and Shortness of Breath      HPI  Here for follow up of 2016 for palpitation/HTN/DLP.  C/o marked decreased exertional tolerance. Now SOB with chest presurre 1 flight no improvement with inhaler.      Review of Systems   Constitution: Negative for malaise/fatigue.   Eyes: Negative for blurred vision.   Cardiovascular: Negative for chest pain, claudication, cyanosis, dyspnea on exertion, irregular heartbeat, leg swelling, near-syncope, orthopnea, palpitations, paroxysmal nocturnal dyspnea and syncope.   Respiratory: Negative for cough and shortness of breath.    Hematologic/Lymphatic: Does not bruise/bleed easily.   Musculoskeletal: Negative for back pain, falls, joint pain, muscle cramps, muscle weakness and myalgias.   Gastrointestinal: Negative for abdominal pain, change in bowel habit, nausea and vomiting.   Genitourinary: Negative for urgency.   Neurological: Negative for dizziness, focal weakness and light-headedness.       Past Medical History:   Diagnosis Date    DJD (degenerative joint disease) of lumbar spine     DM type 2 (diabetes mellitus, type 2)     with diet alone    Dyslipidemia 8/13/2019    Essential hypertension 12/29/2015    Fatty liver     GERD (gastroesophageal reflux disease)     Hepatosplenomegaly     HTN (hypertension)     Hypogonadism male 2/17/2015    Mixed hyperlipidemia     resolved with diet    Nephrolithiasis     last stone 1/11    Palpitations 3/9/2016    Pulmonary sarcoidosis     Sarcoidosis of lung     Type II or unspecified type diabetes mellitus without mention of complication, not stated as uncontrolled 4/27/2014    Ventricular hypokinesis     angiogram with mild LAD stenosis          Objective:     Vitals:    12/04/19 0850   BP: (!) 148/89   Pulse: 75        Physical Exam   Constitutional: He is oriented to person, place, and time.  He appears well-developed and well-nourished. He is cooperative.   HENT:   Head: Normocephalic and atraumatic.   Eyes: Conjunctivae are normal. Right eye exhibits no exudate. Left eye exhibits no exudate.   Neck: Normal range of motion. Neck supple. Normal carotid pulses and no JVD present. Carotid bruit is not present. No thyromegaly present.   Cardiovascular: Normal rate, regular rhythm, normal heart sounds and intact distal pulses.   Pulses:       Carotid pulses are 2+ on the right side, and 2+ on the left side.       Radial pulses are 2+ on the right side, and 2+ on the left side.        Dorsalis pedis pulses are 2+ on the right side, and 2+ on the left side.        Posterior tibial pulses are 2+ on the right side, and 2+ on the left side.   Pulmonary/Chest: Effort normal and breath sounds normal.   Abdominal: Soft. Bowel sounds are normal.   Musculoskeletal: Normal range of motion. He exhibits no edema.   Neurological: He is alert and oriented to person, place, and time. Gait normal.   Skin: Skin is warm, dry and intact. No cyanosis. Nails show no clubbing.   Psychiatric: He has a normal mood and affect. His speech is normal and behavior is normal. Judgment and thought content normal.   Nursing note and vitals reviewed.            ..    Chemistry        Component Value Date/Time     11/01/2019 0831    K 4.9 11/01/2019 0831     11/01/2019 0831    CO2 24 11/01/2019 0831    BUN 16 11/01/2019 0831    CREATININE 1.1 11/01/2019 0831     (H) 11/01/2019 0831        Component Value Date/Time    CALCIUM 9.5 11/01/2019 0831    ALKPHOS 76 11/01/2019 0831    AST 34 11/01/2019 0831    ALT 36 11/01/2019 0831    BILITOT 1.5 (H) 11/01/2019 0831    ESTGFRAFRICA >60.0 11/01/2019 0831    EGFRNONAA >60.0 11/01/2019 0831            ..  Lab Results   Component Value Date    CHOL 140 11/01/2019    CHOL 139 09/10/2019    CHOL 137 05/04/2019     Lab Results   Component Value Date    HDL 30 (L) 11/01/2019    HDL 31 (L)  09/10/2019    HDL 30 (L) 05/04/2019     Lab Results   Component Value Date    LDLCALC 73.2 11/01/2019    LDLCALC 80.0 09/10/2019    LDLCALC 38.2 (L) 05/04/2019     Lab Results   Component Value Date    TRIG 184 (H) 11/01/2019    TRIG 140 09/10/2019    TRIG 344 (H) 05/04/2019     Lab Results   Component Value Date    CHOLHDL 21.4 11/01/2019    CHOLHDL 22.3 09/10/2019    CHOLHDL 21.9 05/04/2019     ..  Lab Results   Component Value Date    WBC 7.62 07/15/2019    HGB 19.1 (H) 07/15/2019    HCT 57.4 (H) 07/15/2019    MCV 87 07/15/2019     07/15/2019       Test(s) Reviewed  I have reviewed the following in detail:  [] Stress test   [] Angiography   [x] Echocardiogram   [x] Labs   [] Other:       Assessment:         ICD-10-CM ICD-9-CM   1. Dyslipidemia E78.5 272.4   2. Essential hypertension I10 401.9   3. Palpitations R00.2 785.1     Problem List Items Addressed This Visit     Palpitations    Essential hypertension    Dyslipidemia - Primary           Plan:           Return to clinic 6 months   Low level/low impact aerobic exercise 5x's/wk. Heart healthy diet and risk factor modification.    See labs and med orders.  [facility] rt radial access due to CCS 3 sx's.       Portions of this note may have been created with voice recognition software.  Grammatical, syntax and spelling errors may be inevitable.

## 2019-12-04 NOTE — PATIENT INSTRUCTIONS
Angiogram with Dr. Duran    Arrive for procedure at: [facility] on Friday December 13th 2019. Your procedure will start at 9 a.m.     You will receive a phone call from [facility] [place] Pre-Op Department with arrival time and further instructions prior to your scheduled procedure.    Notify the nurse if you are ALLERGIC TO IODINE.    FASTING: You MAY NOT have anything to eat or drink AFTER MIDNIGHT the day before your procedure.    MEDICATIONS: You may take your regular morning medications with water. If there are any medications that you should not take, you will be instructed to hold them for that morning.    ? CARDIOLOGY PRE-PROCEDURE MEDICATION ORDERS:  ** Please hold any medications that are checked below:    HOLD   # OF DAYS TO HOLD  ? Metformin    Day before procedure & morning of procedure    CONTINUE the Following Medications     ? Aspirin    WHAT TO EXPECT:    How long will the procedure take?  The procedure will take an average of 1 - 2 hours to perform.  After the procedure, you will need to lay flat for around 4 - 6 hours to minimize bleeding from the puncture site. If the wrist is accessed you will need to keep your arm still as instructed by the nurse.    When can I go home?  You may be able to be discharged home that same afternoon if there were no complications.  If you have one of the following: balloon; stent; pacemaker or defibrillator procedures, you may spend one night for observation.  Your doctor will determine your discharge based upon your progress.  The results of your procedure will be discussed with you before you are discharged.  Any further testing or procedures will be scheduled for you either before you leave or you will be instructed to call for a future appointment.      TRANSPORTATION:  PLEASE ARRANGE TO HAVE SOMEONE DRIVE YOU HOME FOLLOWING YOUR PROCEDURE, YOU WILL NOT BE ALLOWED TO DRIVE.

## 2019-12-04 NOTE — TELEPHONE ENCOUNTER
----- Message from Cammy Chandra sent at 12/4/2019  9:36 AM CST -----  Contact: Self  Patient needs to have nurse call to reschedule his injection the 12/13     Patient has a conflict with that day     Please call back [phone] (home)

## 2019-12-12 ENCOUNTER — CLINICAL SUPPORT (OUTPATIENT)
Dept: UROLOGY | Facility: CLINIC | Age: 54
End: 2019-12-12
Payer: COMMERCIAL

## 2019-12-12 DIAGNOSIS — E29.1 MALE HYPOGONADISM: Primary | ICD-10-CM

## 2019-12-12 PROCEDURE — 99999 PR PBB SHADOW E&M-EST. PATIENT-LVL III: CPT | Mod: PBBFAC,,,

## 2019-12-12 PROCEDURE — 99999 PR PBB SHADOW E&M-EST. PATIENT-LVL III: ICD-10-PCS | Mod: PBBFAC,,,

## 2019-12-12 PROCEDURE — 96372 PR INJECTION,THERAP/PROPH/DIAG2ST, IM OR SUBCUT: ICD-10-PCS | Mod: S$GLB,,, | Performed by: UROLOGY

## 2019-12-12 PROCEDURE — 96372 THER/PROPH/DIAG INJ SC/IM: CPT | Mod: S$GLB,,, | Performed by: UROLOGY

## 2019-12-12 RX ADMIN — TESTOSTERONE CYPIONATE 400 MG: 200 INJECTION, SOLUTION INTRAMUSCULAR at 08:12

## 2019-12-13 DIAGNOSIS — E11.42 TYPE 2 DIABETES MELLITUS WITH DIABETIC POLYNEUROPATHY, WITHOUT LONG-TERM CURRENT USE OF INSULIN: ICD-10-CM

## 2019-12-13 NOTE — PROGRESS NOTES
Refill Routing Note     Medication(s) are appropriate for refill:    Medication Outside of Protocol    Appointments  past 15m or future 3m with PCP    Date Provider   Last Visit   11/11/2019 Joni Smith MD   Next Visit   5/11/2020 Joni Smith MD       Automatic Epic Protocol Generated Data:    Requested Prescriptions   Pending Prescriptions Disp Refills    gabapentin (NEURONTIN) 100 MG capsule [Pharmacy Med Name: GABAPENTIN 100 MG CAPSULE] 90 capsule 1     Sig: TAKE 1 CAPSULE BY MOUTH THREE TIMES A DAY AS NEEDED       Anticonvulsants Protocol Passed - 12/13/2019  1:35 PM        Passed - Visit with Authorizing provider in past 9 months or upcoming 90 days

## 2019-12-16 RX ORDER — GABAPENTIN 100 MG/1
CAPSULE ORAL
Qty: 90 CAPSULE | Refills: 1 | Status: SHIPPED | OUTPATIENT
Start: 2019-12-16 | End: 2020-01-16

## 2020-01-01 NOTE — PROGRESS NOTES
Testosterone injection given, see MAR. Patient tolerated well, recommended to wait 20 minutes after injection to observe for adverse reaction, patient declined to wait.     Leigh Ann Garcia  (RN)  2020 11:49:18

## 2020-01-10 ENCOUNTER — CLINICAL SUPPORT (OUTPATIENT)
Dept: UROLOGY | Facility: CLINIC | Age: 55
End: 2020-01-10
Payer: COMMERCIAL

## 2020-01-10 DIAGNOSIS — E29.1 MALE HYPOGONADISM: Primary | ICD-10-CM

## 2020-01-10 PROCEDURE — 99999 PR PBB SHADOW E&M-EST. PATIENT-LVL III: ICD-10-PCS | Mod: PBBFAC,,,

## 2020-01-10 PROCEDURE — 96372 THER/PROPH/DIAG INJ SC/IM: CPT | Mod: S$GLB,,, | Performed by: UROLOGY

## 2020-01-10 PROCEDURE — 99999 PR PBB SHADOW E&M-EST. PATIENT-LVL III: CPT | Mod: PBBFAC,,,

## 2020-01-10 PROCEDURE — 96372 PR INJECTION,THERAP/PROPH/DIAG2ST, IM OR SUBCUT: ICD-10-PCS | Mod: S$GLB,,, | Performed by: UROLOGY

## 2020-01-10 RX ADMIN — TESTOSTERONE CYPIONATE 400 MG: 200 INJECTION, SOLUTION INTRAMUSCULAR at 08:01

## 2020-01-15 DIAGNOSIS — E11.42 TYPE 2 DIABETES MELLITUS WITH DIABETIC POLYNEUROPATHY, WITHOUT LONG-TERM CURRENT USE OF INSULIN: ICD-10-CM

## 2020-01-15 NOTE — PROGRESS NOTES
Refill Routing Note     Medication(s) are not appropriate for processing by [facility]:    Medication Outside of Protocol    Appointments  past 12m or future 3m with PCP    Date Provider   Last Visit   11/11/2019 Joni Smith MD   Next Visit   5/11/2020 Joni Smith MD           Automatic Epic Protocol Generated Data:    Requested Prescriptions   Pending Prescriptions Disp Refills    gabapentin (NEURONTIN) 100 MG capsule [Pharmacy Med Name: GABAPENTIN 100 MG CAPSULE] 90 capsule 1     Sig: TAKE 1 CAPSULE BY MOUTH THREE TIMES A DAY AS NEEDED       Anticonvulsants Protocol Passed - 1/15/2020  8:37 AM        Passed - Visit with Authorizing provider in past 9 months or upcoming 90 days

## 2020-01-16 ENCOUNTER — LAB VISIT (OUTPATIENT)
Dept: LAB | Facility: HOSPITAL | Age: 55
End: 2020-01-16
Attending: INTERNAL MEDICINE
Payer: COMMERCIAL

## 2020-01-16 DIAGNOSIS — R06.02 SOB (SHORTNESS OF BREATH): ICD-10-CM

## 2020-01-16 LAB — D DIMER PPP IA.FEU-MCNC: 0.26 MG/L FEU

## 2020-01-16 PROCEDURE — 36415 COLL VENOUS BLD VENIPUNCTURE: CPT | Mod: PO

## 2020-01-16 PROCEDURE — 85379 FIBRIN DEGRADATION QUANT: CPT

## 2020-01-16 RX ORDER — GABAPENTIN 100 MG/1
CAPSULE ORAL
Qty: 90 CAPSULE | Refills: 1 | Status: SHIPPED | OUTPATIENT
Start: 2020-01-16 | End: 2020-02-10

## 2020-01-27 ENCOUNTER — TELEPHONE (OUTPATIENT)
Dept: GASTROENTEROLOGY | Facility: CLINIC | Age: 55
End: 2020-01-27

## 2020-01-27 NOTE — TELEPHONE ENCOUNTER
Spoke with pt and he states he will call us back to get procedure scheduled at a later date. Phone number provided for scheduling.

## 2020-01-30 RX ORDER — METFORMIN HYDROCHLORIDE 500 MG/1
TABLET, EXTENDED RELEASE ORAL
Qty: 360 TABLET | Refills: 1 | Status: SHIPPED | OUTPATIENT
Start: 2020-01-30 | End: 2020-05-12 | Stop reason: SDUPTHER

## 2020-01-30 NOTE — PROGRESS NOTES
Refill Authorization Note     is requesting a refill authorization.    Brief assessment and rationale for refill: APPROVE: prrr  Name and strength of medication: metFORMIN (GLUCOPHAGE-XR) 500 MG 24 hr tablet            Medication reconciliation completed: No                         Comments:   Requested Prescriptions   Pending Prescriptions Disp Refills    metFORMIN (GLUCOPHAGE-XR) 500 MG 24 hr tablet [Pharmacy Med Name: METFORMIN HCL  MG TABLET] 360 tablet 1     Sig: TAKE 2 TABLETS BY MOUTH TWICE A DAY WITH MEALS       Endocrinology:  Diabetes - Biguanides Passed - 1/30/2020  2:46 PM        Passed - Patient is at least 18 years old        Passed - Office visit in past 12 months or future 90 days     Recent Outpatient Visits            1 month ago Dyslipidemia    [place] - Cardiology Amador Duran MD    2 months ago Routine physical examination    [facility] Family [facility] Joni Smith MD    4 months ago Toe ulcer, left, with fat layer exposed    [place] - Podiatry Chung Peterson, MUSTAPHA    5 months ago Low serum testosterone level    [facility] Urology Des Larson MD    5 months ago Status post nail surgery    [place] - Podiatrmarcia Peterson, DPM          Future Appointments              [facility]    Today Nathanael Ervin MD [facility] - Pulmonary, [facility]    In 1 week [facility] Duran [facility]    In 1 week [facility]OLOG[facility]    In 3 months LAB, [place] [facility]    In 3 months Joni Smith MD [facility]    In 7 months Amador Duran [facility]                Passed - Cr is 1.4 or below and within 360 days     Creatinine   Date Value Ref Range Status   12/13/2019 0.65 0.50 - 1.40 mg/dL Final   11/01/2019 1.1 0.5 - 1.4 mg/dL Final   07/22/2019  1.0 0.5 - 1.4 mg/dL Final     POC Creatinine   Date Value Ref Range Status   01/29/2020 1.0 0.5 - 1.4 mg/dL Final              Passed - HBA1C is 7.9 or below and within 180 days     Hemoglobin A1C   Date Value Ref Range Status   11/01/2019 6.4 (H) 4.0 - 5.6 % Final     Comment:     ADA Screening Guidelines:  5.7-6.4%  Consistent with prediabetes  >or=6.5%  Consistent with diabetes  High levels of fetal hemoglobin interfere with the HbA1C  assay. Heterozygous hemoglobin variants (HbS, HgC, etc)do  not significantly interfere with this assay.   However, presence of multiple variants may affect accuracy.     07/22/2019 6.7 (H) 4.0 - 5.6 % Final     Comment:     ADA Screening Guidelines:  5.7-6.4%  Consistent with prediabetes  >or=6.5%  Consistent with diabetes  High levels of fetal hemoglobin interfere with the HbA1C  assay. Heterozygous hemoglobin variants (HbS, HgC, etc)do  not significantly interfere with this assay.   However, presence of multiple variants may affect accuracy.     05/04/2019 6.5 (H) 4.0 - 5.6 % Final     Comment:     ADA Screening Guidelines:  5.7-6.4%  Consistent with prediabetes  >or=6.5%  Consistent with diabetes  High levels of fetal hemoglobin interfere with the HbA1C  assay. Heterozygous hemoglobin variants (HbS, HgC, etc)do  not significantly interfere with this assay.   However, presence of multiple variants may affect accuracy.                Passed - eGFR is 30 or above and within 360 days     eGFR if non    Date Value Ref Range Status   12/13/2019 >60 >60 mL/min/1.73 m^2 Final     Comment:     Calculation used to obtain the estimated glomerular filtration  rate (eGFR) is the CKD-EPI equation.      11/01/2019 >60.0 >60 mL/min/1.73 m^2 Final     Comment:     Calculation used to obtain the estimated glomerular filtration  rate (eGFR) is the CKD-EPI equation.      07/22/2019 >60.0 >60 mL/min/1.73 m^2 Final     Comment:     Calculation used to obtain the estimated glomerular  filtration  rate (eGFR) is the CKD-EPI equation.        eGFR if    Date Value Ref Range Status   12/13/2019 >60 >60 mL/min/1.73 m^2 Final   11/01/2019 >60.0 >60 mL/min/1.73 m^2 Final   07/22/2019 >60.0 >60 mL/min/1.73 m^2 Final              Appointments  past 12m or future 3m with PCP    Date Provider   Last Visit   11/11/2019 Joni Smith MD   Next Visit   5/11/2020 Joni Smith MD     ED visits in past 90 days: 0     Note composed: 01/30/2020

## 2020-02-03 ENCOUNTER — TELEPHONE (OUTPATIENT)
Dept: GASTROENTEROLOGY | Facility: CLINIC | Age: 55
End: 2020-02-03

## 2020-02-03 NOTE — TELEPHONE ENCOUNTER
Spoke with pt. While screening pt for which prep he needed pt stated he had stints placed in December & was started on Plavix. Pt has to be on Plavix for 6 months prior to holding for c-scope. Order canceled, recall letter placed. Pt informed once he gets letter to call clinic back to schedule for 2nd half of the year. Pt verbalized understanding to all.

## 2020-02-03 NOTE — TELEPHONE ENCOUNTER
----- Message from Shaka Seymour sent at 2/3/2020 10:27 AM CST -----  Contact: pt  Type: Needs Medical Advice    Who Called:  pt    Best Call Back Number: [phone]  Additional Information: Needs to r/s colonoscopy. Please call to advise.

## 2020-02-04 ENCOUNTER — PATIENT OUTREACH (OUTPATIENT)
Dept: ADMINISTRATIVE | Facility: OTHER | Age: 55
End: 2020-02-04

## 2020-02-06 ENCOUNTER — OFFICE VISIT (OUTPATIENT)
Dept: CARDIOLOGY | Facility: CLINIC | Age: 55
End: 2020-02-06
Payer: COMMERCIAL

## 2020-02-06 VITALS
HEIGHT: 73 IN | HEART RATE: 69 BPM | SYSTOLIC BLOOD PRESSURE: 128 MMHG | DIASTOLIC BLOOD PRESSURE: 84 MMHG | WEIGHT: 235.44 LBS | BODY MASS INDEX: 31.2 KG/M2

## 2020-02-06 DIAGNOSIS — E78.5 DYSLIPIDEMIA: ICD-10-CM

## 2020-02-06 DIAGNOSIS — Z95.5 STENTED CORONARY ARTERY: ICD-10-CM

## 2020-02-06 DIAGNOSIS — I10 ESSENTIAL HYPERTENSION: Primary | ICD-10-CM

## 2020-02-06 DIAGNOSIS — I25.10 CORONARY ARTERY DISEASE INVOLVING NATIVE CORONARY ARTERY OF NATIVE HEART WITHOUT ANGINA PECTORIS: ICD-10-CM

## 2020-02-06 PROCEDURE — 99214 OFFICE O/P EST MOD 30 MIN: CPT | Mod: S$GLB,,, | Performed by: INTERNAL MEDICINE

## 2020-02-06 PROCEDURE — 99999 PR PBB SHADOW E&M-EST. PATIENT-LVL III: CPT | Mod: PBBFAC,,, | Performed by: INTERNAL MEDICINE

## 2020-02-06 PROCEDURE — 3008F BODY MASS INDEX DOCD: CPT | Mod: CPTII,S$GLB,, | Performed by: INTERNAL MEDICINE

## 2020-02-06 PROCEDURE — 3074F SYST BP LT 130 MM HG: CPT | Mod: CPTII,S$GLB,, | Performed by: INTERNAL MEDICINE

## 2020-02-06 PROCEDURE — 3079F DIAST BP 80-89 MM HG: CPT | Mod: CPTII,S$GLB,, | Performed by: INTERNAL MEDICINE

## 2020-02-06 PROCEDURE — 99999 PR PBB SHADOW E&M-EST. PATIENT-LVL III: ICD-10-PCS | Mod: PBBFAC,,, | Performed by: INTERNAL MEDICINE

## 2020-02-06 PROCEDURE — 3008F PR BODY MASS INDEX (BMI) DOCUMENTED: ICD-10-PCS | Mod: CPTII,S$GLB,, | Performed by: INTERNAL MEDICINE

## 2020-02-06 PROCEDURE — 3079F PR MOST RECENT DIASTOLIC BLOOD PRESSURE 80-89 MM HG: ICD-10-PCS | Mod: CPTII,S$GLB,, | Performed by: INTERNAL MEDICINE

## 2020-02-06 PROCEDURE — 99214 PR OFFICE/OUTPT VISIT, EST, LEVL IV, 30-39 MIN: ICD-10-PCS | Mod: S$GLB,,, | Performed by: INTERNAL MEDICINE

## 2020-02-06 PROCEDURE — 3074F PR MOST RECENT SYSTOLIC BLOOD PRESSURE < 130 MM HG: ICD-10-PCS | Mod: CPTII,S$GLB,, | Performed by: INTERNAL MEDICINE

## 2020-02-06 NOTE — PROGRESS NOTES
Subjective:    Patient ID:  Cade Johnston is a 54 y.o. male who presents for follow-up of Palpitations f/u      HPI  Here for follow up of CAD-PCI (12/19 ALINA ). Exertional angina resolved. Still SOB due to sarcoid.       Review of Systems   Constitution: Negative for malaise/fatigue.   Eyes: Negative for blurred vision.   Cardiovascular: Negative for chest pain, claudication, cyanosis, dyspnea on exertion, irregular heartbeat, leg swelling, near-syncope, orthopnea, palpitations, paroxysmal nocturnal dyspnea and syncope.   Respiratory: Negative for cough and shortness of breath.    Hematologic/Lymphatic: Does not bruise/bleed easily.   Musculoskeletal: Negative for back pain, falls, joint pain, muscle cramps, muscle weakness and myalgias.   Gastrointestinal: Negative for abdominal pain, change in bowel habit, nausea and vomiting.   Genitourinary: Negative for urgency.   Neurological: Negative for dizziness, focal weakness and light-headedness.       Past Medical History:   Diagnosis Date    DJD (degenerative joint disease) of lumbar spine     DM type 2 (diabetes mellitus, type 2)     with diet alone    Dyslipidemia 8/13/2019    Essential hypertension 12/29/2015    Fatty liver     GERD (gastroesophageal reflux disease)     Hepatosplenomegaly     HTN (hypertension)     Hypogonadism male 2/17/2015    Mixed hyperlipidemia     resolved with diet    Nephrolithiasis     last stone 1/11    Palpitations 3/9/2016    Pulmonary sarcoidosis     Sarcoidosis of lung     Type II or unspecified type diabetes mellitus without mention of complication, not stated as uncontrolled 4/27/2014    Ventricular hypokinesis     angiogram with mild LAD stenosis     Patient Active Problem List   Diagnosis    DM type 2 (diabetes mellitus, type 2)    Sarcoidosis of lung    Ventral hernia    Bronchitis    Allergic conjunctivitis    Neuropathy in diabetes    Tinea pedis    Stasis dermatitis    Fatigue    ED (erectile  dysfunction)    Type II or unspecified type diabetes mellitus without mention of complication, not stated as uncontrolled    RLQ abdominal pain    Hx of colonic polyps    Hypogonadism male    Essential hypertension    Palpitations    Erythrocytosis    Encounter for monitoring testosterone replacement therapy    Dyslipidemia    Stented coronary artery    CAD (coronary artery disease)        Objective:     Vitals:    02/06/20 0927   BP: 128/84   Pulse: 69        Physical Exam   Constitutional: He is oriented to person, place, and time. He appears well-developed and well-nourished. He is cooperative.   HENT:   Head: Normocephalic and atraumatic.   Eyes: Conjunctivae are normal. Right eye exhibits no exudate. Left eye exhibits no exudate.   Neck: Normal range of motion. Neck supple. Normal carotid pulses and no JVD present. Carotid bruit is not present. No thyromegaly present.   Cardiovascular: Normal rate, regular rhythm, normal heart sounds and intact distal pulses.   Pulses:       Carotid pulses are 2+ on the right side, and 2+ on the left side.       Radial pulses are 2+ on the right side, and 2+ on the left side.        Dorsalis pedis pulses are 2+ on the right side, and 2+ on the left side.        Posterior tibial pulses are 2+ on the right side, and 2+ on the left side.   Pulmonary/Chest: Effort normal and breath sounds normal.   Abdominal: Soft. Bowel sounds are normal.   Musculoskeletal: Normal range of motion. He exhibits no edema.   Neurological: He is alert and oriented to person, place, and time. Gait normal.   Skin: Skin is warm, dry and intact. No cyanosis. Nails show no clubbing.   Psychiatric: He has a normal mood and affect. His speech is normal and behavior is normal. Judgment and thought content normal.   Nursing note and vitals reviewed.            ..    Chemistry        Component Value Date/Time     12/13/2019 0700    K 3.9 12/13/2019 0700     12/13/2019 0700    CO2 24  12/13/2019 0700    BUN 19 12/13/2019 0700    CREATININE 0.65 12/13/2019 0700     (H) 12/13/2019 0700        Component Value Date/Time    CALCIUM 8.6 12/13/2019 0700    ALKPHOS 76 11/01/2019 0831    AST 34 11/01/2019 0831    ALT 36 11/01/2019 0831    BILITOT 1.5 (H) 11/01/2019 0831    ESTGFRAFRICA >60 12/13/2019 0700    EGFRNONAA >60 12/13/2019 0700            ..  Lab Results   Component Value Date    CHOL 140 11/01/2019    CHOL 139 09/10/2019    CHOL 137 05/04/2019     Lab Results   Component Value Date    HDL 30 (L) 11/01/2019    HDL 31 (L) 09/10/2019    HDL 30 (L) 05/04/2019     Lab Results   Component Value Date    LDLCALC 73.2 11/01/2019    LDLCALC 80.0 09/10/2019    LDLCALC 38.2 (L) 05/04/2019     Lab Results   Component Value Date    TRIG 184 (H) 11/01/2019    TRIG 140 09/10/2019    TRIG 344 (H) 05/04/2019     Lab Results   Component Value Date    CHOLHDL 21.4 11/01/2019    CHOLHDL 22.3 09/10/2019    CHOLHDL 21.9 05/04/2019     ..  Lab Results   Component Value Date    WBC 6.05 12/13/2019    HGB 18.2 (H) 12/13/2019    HCT 53.1 12/13/2019    MCV 84 12/13/2019     12/13/2019       Test(s) Reviewed  I have reviewed the following in detail:  [] Stress test   [] Angiography   [x] Echocardiogram   [x] Labs   [] Other:       Assessment:         ICD-10-CM ICD-9-CM   1. Essential hypertension I10 401.9   2. Stented coronary artery Z95.5 V45.82   3. Coronary artery disease involving native coronary artery of native heart without angina pectoris I25.10 414.01   4. Dyslipidemia E78.5 272.4     Problem List Items Addressed This Visit     Stented coronary artery    Overview     12/19 LAD ALINA synergy 3x24         Essential hypertension - Primary    Dyslipidemia    CAD (coronary artery disease)    Overview     12/19  Left main coronary artery-normal size vessel normal appearance.  Left anterior descending-normal size vessel seen wrap around apex gives rise to 2 very small diagonals.  There is a 50-55% mid LAD  stenosis remainder appears normal for patient's age.  Circumflex-normal size vessel gives rise to very small 1st and 2nd obtuse marginal regular 3rd obtuse marginal.  Circumflex and its branches are normal appearance patient's age.  Right coronary-normal size dominant vessel minimal disease less than 10%.   Left ventricular end-diastolic pressure is 20 mmHg.  Fractional flow reserve of the LAD was 0.76.                   Plan:           Return to clinic 9 months   Low level/low impact aerobic exercise 5x's/wk. Heart healthy diet and risk factor modification.    See labs and med orders.  Orders Placed This Encounter   Procedures    Lipid panel    Comprehensive metabolic panel    CBC auto differential    Echo Color Flow Doppler? Yes       Portions of this note may have been created with voice recognition software.  Grammatical, syntax and spelling errors may be inevitable.

## 2020-02-09 DIAGNOSIS — E11.42 TYPE 2 DIABETES MELLITUS WITH DIABETIC POLYNEUROPATHY, WITHOUT LONG-TERM CURRENT USE OF INSULIN: ICD-10-CM

## 2020-02-10 ENCOUNTER — CLINICAL SUPPORT (OUTPATIENT)
Dept: UROLOGY | Facility: CLINIC | Age: 55
End: 2020-02-10
Payer: COMMERCIAL

## 2020-02-10 DIAGNOSIS — E29.1 MALE HYPOGONADISM: Primary | ICD-10-CM

## 2020-02-10 PROCEDURE — 99999 PR PBB SHADOW E&M-EST. PATIENT-LVL I: ICD-10-PCS | Mod: PBBFAC,,,

## 2020-02-10 PROCEDURE — 96372 PR INJECTION,THERAP/PROPH/DIAG2ST, IM OR SUBCUT: ICD-10-PCS | Mod: S$GLB,,, | Performed by: UROLOGY

## 2020-02-10 PROCEDURE — 96372 THER/PROPH/DIAG INJ SC/IM: CPT | Mod: S$GLB,,, | Performed by: UROLOGY

## 2020-02-10 PROCEDURE — 99999 PR PBB SHADOW E&M-EST. PATIENT-LVL I: CPT | Mod: PBBFAC,,,

## 2020-02-10 RX ORDER — GABAPENTIN 100 MG/1
CAPSULE ORAL
Qty: 90 CAPSULE | Refills: 1 | Status: SHIPPED | OUTPATIENT
Start: 2020-02-10 | End: 2020-03-06

## 2020-02-10 RX ADMIN — TESTOSTERONE CYPIONATE 400 MG: 200 INJECTION, SOLUTION INTRAMUSCULAR at 08:02

## 2020-02-10 NOTE — PROGRESS NOTES
Refill Routing Note     Medication(s) are not appropriate for processing by [facility]:    Medication Outside of Protocol    Appointments  past 12m or future 3m with PCP    Date Provider   Last Visit   11/11/2019 Joni Smith MD   Next Visit   5/11/2020 Joni Smith MD           Automatic Epic Protocol Generated Data:    Requested Prescriptions   Pending Prescriptions Disp Refills    gabapentin (NEURONTIN) 100 MG capsule [Pharmacy Med Name: GABAPENTIN 100 MG CAPSULE] 90 capsule 1     Sig: TAKE 1 CAPSULE BY MOUTH THREE TIMES A DAY AS NEEDED       Anticonvulsants Protocol Passed - 2/9/2020 10:32 AM        Passed - Visit with Authorizing provider in past 9 months or upcoming 90 days           Note composed:8:15 AM 02/10/2020

## 2020-03-06 DIAGNOSIS — E11.42 TYPE 2 DIABETES MELLITUS WITH DIABETIC POLYNEUROPATHY, WITHOUT LONG-TERM CURRENT USE OF INSULIN: ICD-10-CM

## 2020-03-06 RX ORDER — GABAPENTIN 100 MG/1
CAPSULE ORAL
Qty: 90 CAPSULE | Refills: 1 | Status: SHIPPED | OUTPATIENT
Start: 2020-03-06 | End: 2020-03-30

## 2020-03-06 NOTE — PROGRESS NOTES
Refill Routing Note     Medication(s) are appropriate for refill:    Medication Outside of Protocol    Appointments  past 15m or future 3m with PCP    Date Provider   Last Visit   11/11/2019 Joni Smith MD   Next Visit   5/11/2020 Joni Smith MD       Automatic Epic Protocol Generated Data:    Requested Prescriptions   Pending Prescriptions Disp Refills    gabapentin (NEURONTIN) 100 MG capsule [Pharmacy Med Name: GABAPENTIN 100 MG CAPSULE] 90 capsule 1     Sig: TAKE 1 CAPSULE BY MOUTH THREE TIMES A DAY AS NEEDED       Anticonvulsants Protocol Passed - 3/6/2020  9:43 AM        Passed - Visit with Authorizing provider in past 9 months or upcoming 90 days           Note created:10:52 AM 03/06/2020

## 2020-03-09 ENCOUNTER — CLINICAL SUPPORT (OUTPATIENT)
Dept: UROLOGY | Facility: CLINIC | Age: 55
End: 2020-03-09
Payer: COMMERCIAL

## 2020-03-09 DIAGNOSIS — E29.1 MALE HYPOGONADISM: Primary | ICD-10-CM

## 2020-03-09 PROCEDURE — 99999 PR PBB SHADOW E&M-EST. PATIENT-LVL I: ICD-10-PCS | Mod: PBBFAC,,,

## 2020-03-09 PROCEDURE — 99999 PR PBB SHADOW E&M-EST. PATIENT-LVL I: CPT | Mod: PBBFAC,,,

## 2020-03-09 PROCEDURE — 96372 PR INJECTION,THERAP/PROPH/DIAG2ST, IM OR SUBCUT: ICD-10-PCS | Mod: S$GLB,,, | Performed by: UROLOGY

## 2020-03-09 PROCEDURE — 96372 THER/PROPH/DIAG INJ SC/IM: CPT | Mod: S$GLB,,, | Performed by: UROLOGY

## 2020-03-09 RX ORDER — TESTOSTERONE CYPIONATE 200 MG/ML
400 INJECTION, SOLUTION INTRAMUSCULAR
Status: SHIPPED | OUTPATIENT
Start: 2020-04-06 | End: 2020-09-21

## 2020-03-09 RX ADMIN — TESTOSTERONE CYPIONATE 400 MG: 200 INJECTION, SOLUTION INTRAMUSCULAR at 09:03

## 2020-03-23 ENCOUNTER — TELEPHONE (OUTPATIENT)
Dept: UROLOGY | Facility: CLINIC | Age: 55
End: 2020-03-23

## 2020-03-23 ENCOUNTER — PATIENT MESSAGE (OUTPATIENT)
Dept: UROLOGY | Facility: CLINIC | Age: 55
End: 2020-03-23

## 2020-03-30 DIAGNOSIS — E11.42 TYPE 2 DIABETES MELLITUS WITH DIABETIC POLYNEUROPATHY, WITHOUT LONG-TERM CURRENT USE OF INSULIN: ICD-10-CM

## 2020-03-30 RX ORDER — GABAPENTIN 100 MG/1
CAPSULE ORAL
Qty: 90 CAPSULE | Refills: 0 | Status: SHIPPED | OUTPATIENT
Start: 2020-03-30 | End: 2020-04-24

## 2020-03-30 NOTE — PROGRESS NOTES
Refill Routing Note     Medication(s) are appropriate for refill:    Medication Outside of Protocol    Appointments  past 15m or future 3m with PCP    Date Provider   Last Visit   11/11/2019 Joni Smith MD   Next Visit   5/11/2020 Joni Smith MD       Automatic Epic Protocol Generated Data:    Requested Prescriptions   Pending Prescriptions Disp Refills    gabapentin (NEURONTIN) 100 MG capsule [Pharmacy Med Name: GABAPENTIN 100 MG CAPSULE] 90 capsule 1     Sig: TAKE 1 CAPSULE BY MOUTH THREE TIMES A DAY AS NEEDED       Anticonvulsants Protocol Passed - 3/30/2020  8:35 AM        Passed - Visit with Authorizing provider in past 9 months or upcoming 90 days           Note created:9:30 AM 03/30/2020

## 2020-03-30 NOTE — TELEPHONE ENCOUNTER
Dr. Smith out.  Please advise.  LOV 11/11/19  NOV 05/11/2020    Pt instructed to return: 6 months  Recent related labs: NA  30 DAYSsupply and 30 DAYS refills pended.

## 2020-04-23 DIAGNOSIS — E11.42 TYPE 2 DIABETES MELLITUS WITH DIABETIC POLYNEUROPATHY, WITHOUT LONG-TERM CURRENT USE OF INSULIN: ICD-10-CM

## 2020-04-23 NOTE — PROGRESS NOTES
Refill Routing Note     Medication(s) are not appropriate for processing by [facility]:    Medication Outside of Protocol    Appointments  past 12m or future 3m with PCP    Date Provider   Last Visit   Visit date not found CARLOS EDUARDO Arrieta MD   Next Visit   Visit date not found CARLOS EDUARDO Arrieta MD           Automatic Epic Protocol Generated Data:    Requested Prescriptions   Pending Prescriptions Disp Refills    gabapentin (NEURONTIN) 100 MG capsule [Pharmacy Med Name: GABAPENTIN 100 MG CAPSULE] 90 capsule 0     Sig: TAKE 1 CAPSULE BY MOUTH THREE TIMES A DAY AS NEEDED       Anticonvulsants Protocol Passed - 4/23/2020  8:35 AM        Passed - Visit with Authorizing provider in past 9 months or upcoming 90 days           Note composed:10:59 AM 04/23/2020

## 2020-04-24 RX ORDER — GABAPENTIN 100 MG/1
CAPSULE ORAL
Qty: 90 CAPSULE | Refills: 0 | Status: SHIPPED | OUTPATIENT
Start: 2020-04-24 | End: 2020-05-26

## 2020-04-24 NOTE — TELEPHONE ENCOUNTER
Please see message and advise his last set of labs was in feb 2020 for cbc, cmp and lipid. Hem a1c was 11/2019

## 2020-04-24 NOTE — TELEPHONE ENCOUNTER
----- Message from Beau Julien MA sent at 4/24/2020 11:16 AM CDT -----  Contact: Patient   Type: Needs Medical Advice    Who Called:  Patient   Patient would like to know if his labs are necessary at this time. He would like to push his labs back and his appointment has been changed to a virtual visit.  Best Call Back Number: [phone]

## 2020-04-28 ENCOUNTER — TELEPHONE (OUTPATIENT)
Dept: FAMILY MEDICINE | Facility: CLINIC | Age: 55
End: 2020-04-28

## 2020-04-28 NOTE — TELEPHONE ENCOUNTER
Patient called to cancel lab work as he does not wish to come into the public just yet, d/t COVID19

## 2020-04-28 NOTE — TELEPHONE ENCOUNTER
----- Message from Terrie Gomes sent at 4/28/2020  4:09 PM CDT -----  Contact: Patient  Type: Needs Medical Advice  Who Called: patient  Best Call Back Number: [phone] Please call this number for patient  Additional Information: The patient wants a call back about changing his appt he cant come in with his condition at this time call to advise

## 2020-05-04 NOTE — PROGRESS NOTES
Refill Routing Note    Medication(s) are not appropriate for processing by [facility]:       Non-participating provider           Medication reconciliation completed: No      Appointments [id number] or future 3m with PCP    Date Provider   Last Visit   11/11/2019 Joni Smith MD   Next Visit   5/11/2020 Joni Smith MD     Automatic Epic Protocol Generated Data:    Requested Prescriptions   Pending Prescriptions Disp Refills    losartan-hydrochlorothiazide 100-25 mg (HYZAAR) 100-25 mg per tablet 90 tablet 2     Sig: Take 1 tablet by mouth once daily.       Cardiovascular: ARB + Diuretic Combos Passed - 5/4/2020 10:27 AM        Passed - Patient is at least 18 years old        Passed - Last BP in normal range within 360 days.     BP Readings from Last 3 Encounters:   02/06/20 128/84   01/30/20 130/80   12/13/19 121/83              Passed - Office visit in past 12 months or future 90 days.     Recent Outpatient Visits            1 month ago Sarcoidosis of lung    [facility] Nathanael Ervin MD    2 months ago Essential hypertension    [facility] Amador Duran MD    3 months ago Sarcoidosis of lung    [facility] Nathanael Ervin MD    5 months ago Dyslipidemia    [place] [facility] Amador Duran MD    5 months ago Routine physical examination    [facility] Joni Smith MD          Future Appointments              In 1 week Nathanael Ervin MD [facility], [facility]    In 1 week Joni Smith MD [facility]    In 8 months [facility]                Passed - K in normal range and within 180 days     Potassium   Date Value Ref Range Status   12/13/2019 3.9 3.5 - 5.1 mmol/L Final   11/01/2019 4.9 3.5 - 5.1 mmol/L Final   07/22/2019 4.6 3.5 - 5.1 mmol/L Final               Passed - Na is between 130 and 148 and within 180 days     Sodium   Date Value Ref Range Status   12/13/2019 139 136 - 145 mmol/L Final   11/01/2019 138 136 - 145 mmol/L Final   07/22/2019 137 136 - 145 mmol/L Final              Passed - Cr is 1.3 or below and within 180 days     Creatinine   Date Value Ref Range Status   12/13/2019 0.65 0.50 - 1.40 mg/dL Final   11/01/2019 1.1 0.5 - 1.4 mg/dL Final   07/22/2019 1.0 0.5 - 1.4 mg/dL Final     POC Creatinine   Date Value Ref Range Status   01/29/2020 1.0 0.5 - 1.4 mg/dL Final              Passed - Ca in normal range and within 360 days     Calcium   Date Value Ref Range Status   12/13/2019 8.6 8.4 - 10.2 mg/dL Final   11/01/2019 9.5 8.7 - 10.5 mg/dL Final   07/22/2019 9.5 8.7 - 10.5 mg/dL Final              Passed - eGFR within 180 days     eGFR if non    Date Value Ref Range Status   12/13/2019 >60 >60 mL/min/1.73 m^2 Final     Comment:     Calculation used to obtain the estimated glomerular filtration  rate (eGFR) is the CKD-EPI equation.      11/01/2019 >60.0 >60 mL/min/1.73 m^2 Final     Comment:     Calculation used to obtain the estimated glomerular filtration  rate (eGFR) is the CKD-EPI equation.      07/22/2019 >60.0 >60 mL/min/1.73 m^2 Final     Comment:     Calculation used to obtain the estimated glomerular filtration  rate (eGFR) is the CKD-EPI equation.        eGFR if    Date Value Ref Range Status   12/13/2019 >60 >60 mL/min/1.73 m^2 Final   11/01/2019 >60.0 >60 mL/min/1.73 m^2 Final   07/22/2019 >60.0 >60 mL/min/1.73 m^2 Final                 Note composed:4:14 PM 05/04/2020

## 2020-05-05 ENCOUNTER — PATIENT MESSAGE (OUTPATIENT)
Dept: ADMINISTRATIVE | Facility: HOSPITAL | Age: 55
End: 2020-05-05

## 2020-05-05 RX ORDER — LOSARTAN POTASSIUM AND HYDROCHLOROTHIAZIDE 25; 100 MG/1; MG/1
1 TABLET ORAL DAILY
Qty: 90 TABLET | Refills: 2 | Status: SHIPPED | OUTPATIENT
Start: 2020-05-05 | End: 2021-01-19 | Stop reason: SDUPTHER

## 2020-05-12 ENCOUNTER — OFFICE VISIT (OUTPATIENT)
Dept: FAMILY MEDICINE | Facility: CLINIC | Age: 55
End: 2020-05-12
Payer: COMMERCIAL

## 2020-05-12 DIAGNOSIS — Z12.5 ENCOUNTER FOR SCREENING FOR MALIGNANT NEOPLASM OF PROSTATE: ICD-10-CM

## 2020-05-12 DIAGNOSIS — E78.5 DYSLIPIDEMIA: ICD-10-CM

## 2020-05-12 DIAGNOSIS — E11.9 CONTROLLED TYPE 2 DIABETES MELLITUS WITHOUT COMPLICATION, WITHOUT LONG-TERM CURRENT USE OF INSULIN: Primary | ICD-10-CM

## 2020-05-12 DIAGNOSIS — D86.0 SARCOIDOSIS OF LUNG: ICD-10-CM

## 2020-05-12 DIAGNOSIS — I10 ESSENTIAL HYPERTENSION: ICD-10-CM

## 2020-05-12 DIAGNOSIS — I25.10 CORONARY ARTERY DISEASE INVOLVING NATIVE CORONARY ARTERY OF NATIVE HEART WITHOUT ANGINA PECTORIS: ICD-10-CM

## 2020-05-12 PROCEDURE — 99214 PR OFFICE/OUTPT VISIT, EST, LEVL IV, 30-39 MIN: ICD-10-PCS | Mod: 95,,, | Performed by: NURSE PRACTITIONER

## 2020-05-12 PROCEDURE — 99214 OFFICE O/P EST MOD 30 MIN: CPT | Mod: 95,,, | Performed by: NURSE PRACTITIONER

## 2020-05-12 RX ORDER — METFORMIN HYDROCHLORIDE 500 MG/1
1000 TABLET, EXTENDED RELEASE ORAL 2 TIMES DAILY WITH MEALS
Qty: 360 TABLET | Refills: 1 | Status: SHIPPED | OUTPATIENT
Start: 2020-05-12 | End: 2021-01-19 | Stop reason: SDUPTHER

## 2020-05-12 NOTE — PROGRESS NOTES
Subjective:       Patient ID: Cade Johnston is a 54 y.o. male.    The patient location is: [place]  The chief complaint leading to consultation is: discuss medication refills   Visit type: audio only  Total time spent with patient: 10min  Each patient to whom he or she provides medical services by telemedicine is:  (1) informed of the relationship between the physician and patient and the respective role of any other health care provider with respect to management of the patient; and (2) notified that he or she may decline to receive medical services by telemedicine and may withdraw from such care at any time.    HPI   Mr. Johnston is a known patient to me. Audio visit to discuss medication refills/6 month follow up. He is without complaints today.  HTN: controlled; home average 120/78  CAD: PCI 12/19 ALINA Dr. Duran. Doing well.   HLD -due for labs; goal ldl < 70; refusing statin in past.  High triglycerides  Sarcoidosis: stable, feeling better since he retired. Followed by Dr. Ervin   T2DM: controlled     Patient would prefer to wait a month or so to complete labs as he is concerned about Covid 19 given he is high risk for complications due to chronic conditions   There were no vitals filed for this visit.  Review of Systems   Constitutional: Negative for fever.   Respiratory: Negative for wheezing.         Chronic KOROMA   Cardiovascular: Negative for chest pain.   Psychiatric/Behavioral: Negative for confusion.       Past Medical History:   Diagnosis Date    DJD (degenerative joint disease) of lumbar spine     DM type 2 (diabetes mellitus, type 2)     with diet alone    Dyslipidemia 8/13/2019    Essential hypertension 12/29/2015    Fatty liver     GERD (gastroesophageal reflux disease)     Hepatosplenomegaly     HTN (hypertension)     Hypogonadism male 2/17/2015    Mixed hyperlipidemia     resolved with diet    Nephrolithiasis     last stone 1/11    Palpitations 3/9/2016    Pulmonary sarcoidosis      Sarcoidosis of lung     Type II or unspecified type diabetes mellitus without mention of complication, not stated as uncontrolled 4/27/2014    Ventricular hypokinesis     angiogram with mild LAD stenosis     Objective:      Physical Exam   Constitutional: He is oriented to person, place, and time.   HENT:   Right Ear: Hearing normal.   Left Ear: Hearing normal.   Neurological: He is alert and oriented to person, place, and time.   Psychiatric: He has a normal mood and affect. His speech is normal. Cognition and memory are normal.       Assessment:       1. Controlled type 2 diabetes mellitus without complication, without long-term current use of insulin    2. Sarcoidosis of lung    3. Essential hypertension    4. Dyslipidemia    5. Encounter for screening for malignant neoplasm of prostate    6. Coronary artery disease involving native coronary artery of native heart without angina pectoris        Plan:       Controlled type 2 diabetes mellitus without complication, without long-term current use of insulin  -     Hemoglobin A1C; Future; Expected date: 05/12/2020  -     MICROALBUMIN / CREATININE RATIO URINE; Future; Expected date: 05/12/2020  -     metFORMIN (GLUCOPHAGE-XR) 500 MG XR 24hr tablet; Take 2 tablets (1,000 mg total) by mouth 2 (two) times daily with meals.  Dispense: 360 tablet; Refill: 1    Sarcoidosis of lung    Essential hypertension  -     CBC auto differential; Future; Expected date: 05/12/2020  -     Comprehensive metabolic panel; Future; Expected date: 05/12/2020    Dyslipidemia  -     Comprehensive metabolic panel; Future; Expected date: 05/12/2020  -     Lipid Panel; Future; Expected date: 05/12/2020    Encounter for screening for malignant neoplasm of prostate  -     PSA, Screening; Future; Expected date: 05/12/2020    Coronary artery disease involving native coronary artery of native heart without angina pectoris    complete labs within the next 3 months--follow up with PCP depending on  results--likely 6 months post labs and as needed      Medication List with Changes/Refills   Current Medications    ACETAMINOPHEN (TYLENOL) 325 MG TABLET    Take 1 tablet (325 mg total) by mouth every 6 (six) hours as needed for Pain.    ALBUTEROL (PROVENTIL/VENTOLIN HFA) 90 MCG/ACTUATION INHALER    Inhale 2 puffs into the lungs every 6 (six) hours as needed for Wheezing. Rescue    ASPIRIN (ENTERIC COATED ASPIRIN) 81 MG EC TABLET    Take 81 mg by mouth once daily. No Sig Provided    BLOOD SUGAR DIAGNOSTIC STRP    Test q day    CLOPIDOGREL (PLAVIX) 75 MG TABLET    Take 1 tablet (75 mg total) by mouth once daily.    FLUTICASONE FUROATE-VILANTEROL (BREO ELLIPTA) 200-25 MCG/DOSE DSDV DISKUS INHALER    Inhale 1 puff into the lungs once daily. Controller    GABAPENTIN (NEURONTIN) 100 MG CAPSULE    TAKE 1 CAPSULE BY MOUTH THREE TIMES A DAY AS NEEDED    IPRATROPIUM-ALBUTEROL (COMBIVENT)  MCG/ACTUATION INHALER    Inhale 1 puff into the lungs every 4 (four) hours as needed for Shortness of Breath. Rescue    LOSARTAN-HYDROCHLOROTHIAZIDE 100-25 MG (HYZAAR) 100-25 MG PER TABLET    Take 1 tablet by mouth once daily.    MONTELUKAST (SINGULAIR) 10 MG TABLET    TAKE 1 TABLET BY MOUTH EVERY DAY IN THE EVENING    SILDENAFIL (REVATIO) 20 MG TAB    Take 1 tablet (20 mg total) by mouth daily as needed. 5 tablets 1 hour prior to sexual intercourse.    VERAPAMIL (VERELAN) 120 MG C24P    TAKE 1 CAPSULE BY MOUTH ONCE DAILY   Changed and/or Refilled Medications    Modified Medication Previous Medication    METFORMIN (GLUCOPHAGE-XR) 500 MG XR 24HR TABLET metFORMIN (GLUCOPHAGE-XR) 500 MG 24 hr tablet       Take 2 tablets (1,000 mg total) by mouth 2 (two) times daily with meals.    TAKE 2 TABLETS BY MOUTH TWICE A DAY WITH MEALS   Discontinued Medications    FLUTICASONE (FLONASE) 50 MCG/ACTUATION NASAL SPRAY    2 sprays by Each Nare route once daily.    PREDNISONE (DELTASONE) 20 MG TABLET    Take 40mg x2 days, 30 mg x2 days, 20mg x2 days,  10mg x2 days

## 2020-05-23 DIAGNOSIS — E11.42 TYPE 2 DIABETES MELLITUS WITH DIABETIC POLYNEUROPATHY, WITHOUT LONG-TERM CURRENT USE OF INSULIN: ICD-10-CM

## 2020-05-25 NOTE — PROGRESS NOTES
Refill Routing Note    Medication(s) are not appropriate for processing by [facility]:       Outside of protocol           Medication reconciliation completed: No      Automatic Epic Protocol Generated Data:    Requested Prescriptions   Pending Prescriptions Disp Refills    gabapentin (NEURONTIN) 100 MG capsule [Pharmacy Med Name: GABAPENTIN 100 MG CAPSULE] 90 capsule 0     Sig: TAKE 1 CAPSULE BY MOUTH THREE TIMES A DAY AS NEEDED       Anticonvulsants Protocol Passed - 5/23/2020  8:32 AM        Passed - Visit with Authorizing provider in past 9 months or upcoming 90 days           Appointments  past 12m or future 3m with PCP    Date Provider   Last Visit   11/11/2019 Joni Smith MD   Next Visit   Visit date not found Joni Smith MD   ED visits in past 90 days: 0     Note composed:6:25 AM 05/25/2020

## 2020-05-26 RX ORDER — GABAPENTIN 100 MG/1
CAPSULE ORAL
Qty: 90 CAPSULE | Refills: 1 | Status: SHIPPED | OUTPATIENT
Start: 2020-05-26 | End: 2020-06-23

## 2020-06-24 ENCOUNTER — LAB VISIT (OUTPATIENT)
Dept: LAB | Facility: HOSPITAL | Age: 55
End: 2020-06-24
Attending: NURSE PRACTITIONER
Payer: COMMERCIAL

## 2020-06-24 DIAGNOSIS — E11.9 CONTROLLED TYPE 2 DIABETES MELLITUS WITHOUT COMPLICATION, WITHOUT LONG-TERM CURRENT USE OF INSULIN: ICD-10-CM

## 2020-06-24 DIAGNOSIS — I25.10 CORONARY ARTERY DISEASE INVOLVING NATIVE CORONARY ARTERY OF NATIVE HEART WITHOUT ANGINA PECTORIS: ICD-10-CM

## 2020-06-24 DIAGNOSIS — Z12.5 ENCOUNTER FOR SCREENING FOR MALIGNANT NEOPLASM OF PROSTATE: ICD-10-CM

## 2020-06-24 DIAGNOSIS — E78.5 DYSLIPIDEMIA: ICD-10-CM

## 2020-06-24 DIAGNOSIS — I10 ESSENTIAL HYPERTENSION: ICD-10-CM

## 2020-06-24 LAB
ALBUMIN SERPL BCP-MCNC: 3.9 G/DL (ref 3.5–5.2)
ALBUMIN/CREAT UR: NORMAL UG/MG (ref 0–30)
ALP SERPL-CCNC: 88 U/L (ref 55–135)
ALT SERPL W/O P-5'-P-CCNC: 32 U/L (ref 10–44)
ANION GAP SERPL CALC-SCNC: 9 MMOL/L (ref 8–16)
AST SERPL-CCNC: 21 U/L (ref 10–40)
BASOPHILS # BLD AUTO: 0.1 K/UL (ref 0–0.2)
BASOPHILS NFR BLD: 1.1 % (ref 0–1.9)
BILIRUB SERPL-MCNC: 1.3 MG/DL (ref 0.1–1)
BUN SERPL-MCNC: 18 MG/DL (ref 6–20)
CALCIUM SERPL-MCNC: 9.4 MG/DL (ref 8.7–10.5)
CHLORIDE SERPL-SCNC: 101 MMOL/L (ref 95–110)
CHOLEST SERPL-MCNC: 161 MG/DL (ref 120–199)
CHOLEST/HDLC SERPL: 5.8 {RATIO} (ref 2–5)
CO2 SERPL-SCNC: 27 MMOL/L (ref 23–29)
COMPLEXED PSA SERPL-MCNC: 1.5 NG/ML (ref 0–4)
CREAT SERPL-MCNC: 1.1 MG/DL (ref 0.5–1.4)
CREAT UR-MCNC: 43 MG/DL (ref 23–375)
DIFFERENTIAL METHOD: ABNORMAL
EOSINOPHIL # BLD AUTO: 0.2 K/UL (ref 0–0.5)
EOSINOPHIL NFR BLD: 2.2 % (ref 0–8)
ERYTHROCYTE [DISTWIDTH] IN BLOOD BY AUTOMATED COUNT: 14.1 % (ref 11.5–14.5)
EST. GFR  (AFRICAN AMERICAN): >60 ML/MIN/1.73 M^2
EST. GFR  (NON AFRICAN AMERICAN): >60 ML/MIN/1.73 M^2
GLUCOSE SERPL-MCNC: 157 MG/DL (ref 70–110)
HCT VFR BLD AUTO: 56.5 % (ref 40–54)
HDLC SERPL-MCNC: 28 MG/DL (ref 40–75)
HDLC SERPL: 17.4 % (ref 20–50)
HGB BLD-MCNC: 18.3 G/DL (ref 14–18)
IMM GRANULOCYTES # BLD AUTO: 0.05 K/UL (ref 0–0.04)
IMM GRANULOCYTES NFR BLD AUTO: 0.5 % (ref 0–0.5)
LDLC SERPL CALC-MCNC: 72.6 MG/DL (ref 63–159)
LYMPHOCYTES # BLD AUTO: 1.3 K/UL (ref 1–4.8)
LYMPHOCYTES NFR BLD: 14.1 % (ref 18–48)
MCH RBC QN AUTO: 28.5 PG (ref 27–31)
MCHC RBC AUTO-ENTMCNC: 32.4 G/DL (ref 32–36)
MCV RBC AUTO: 88 FL (ref 82–98)
MICROALBUMIN UR DL<=1MG/L-MCNC: <2.5 UG/ML
MONOCYTES # BLD AUTO: 0.9 K/UL (ref 0.3–1)
MONOCYTES NFR BLD: 9.7 % (ref 4–15)
NEUTROPHILS # BLD AUTO: 6.7 K/UL (ref 1.8–7.7)
NEUTROPHILS NFR BLD: 72.4 % (ref 38–73)
NONHDLC SERPL-MCNC: 133 MG/DL
NRBC BLD-RTO: 0 /100 WBC
PLATELET # BLD AUTO: 214 K/UL (ref 150–350)
PMV BLD AUTO: 10.4 FL (ref 9.2–12.9)
POTASSIUM SERPL-SCNC: 3.9 MMOL/L (ref 3.5–5.1)
PROT SERPL-MCNC: 7.5 G/DL (ref 6–8.4)
RBC # BLD AUTO: 6.41 M/UL (ref 4.6–6.2)
SODIUM SERPL-SCNC: 137 MMOL/L (ref 136–145)
TRIGL SERPL-MCNC: 302 MG/DL (ref 30–150)
WBC # BLD AUTO: 9.22 K/UL (ref 3.9–12.7)

## 2020-06-24 PROCEDURE — 80053 COMPREHEN METABOLIC PANEL: CPT

## 2020-06-24 PROCEDURE — 80061 LIPID PANEL: CPT

## 2020-06-24 PROCEDURE — 85025 COMPLETE CBC W/AUTO DIFF WBC: CPT

## 2020-06-24 PROCEDURE — 83036 HEMOGLOBIN GLYCOSYLATED A1C: CPT

## 2020-06-24 PROCEDURE — 82043 UR ALBUMIN QUANTITATIVE: CPT

## 2020-06-24 PROCEDURE — 36415 COLL VENOUS BLD VENIPUNCTURE: CPT | Mod: PO

## 2020-06-24 PROCEDURE — 84153 ASSAY OF PSA TOTAL: CPT

## 2020-06-25 LAB
ESTIMATED AVG GLUCOSE: 137 MG/DL (ref 68–131)
HBA1C MFR BLD HPLC: 6.4 % (ref 4–5.6)

## 2020-07-02 ENCOUNTER — LAB VISIT (OUTPATIENT)
Dept: LAB | Facility: HOSPITAL | Age: 55
End: 2020-07-02
Attending: INTERNAL MEDICINE
Payer: COMMERCIAL

## 2020-07-02 DIAGNOSIS — Z00.00 PREVENTATIVE HEALTH CARE: ICD-10-CM

## 2020-07-02 LAB
ALBUMIN SERPL BCP-MCNC: 3.9 G/DL (ref 3.5–5.2)
ALP SERPL-CCNC: 80 U/L (ref 55–135)
ALT SERPL W/O P-5'-P-CCNC: 40 U/L (ref 10–44)
ANION GAP SERPL CALC-SCNC: 9 MMOL/L (ref 8–16)
AST SERPL-CCNC: 27 U/L (ref 10–40)
BASOPHILS # BLD AUTO: 0.12 K/UL (ref 0–0.2)
BASOPHILS NFR BLD: 1.5 % (ref 0–1.9)
BILIRUB SERPL-MCNC: 1.3 MG/DL (ref 0.1–1)
BUN SERPL-MCNC: 18 MG/DL (ref 6–20)
CALCIUM SERPL-MCNC: 9.4 MG/DL (ref 8.7–10.5)
CHLORIDE SERPL-SCNC: 104 MMOL/L (ref 95–110)
CHOLEST SERPL-MCNC: 159 MG/DL (ref 120–199)
CHOLEST/HDLC SERPL: 5.1 {RATIO} (ref 2–5)
CO2 SERPL-SCNC: 26 MMOL/L (ref 23–29)
CREAT SERPL-MCNC: 1.2 MG/DL (ref 0.5–1.4)
DIFFERENTIAL METHOD: ABNORMAL
EOSINOPHIL # BLD AUTO: 0.2 K/UL (ref 0–0.5)
EOSINOPHIL NFR BLD: 2.6 % (ref 0–8)
ERYTHROCYTE [DISTWIDTH] IN BLOOD BY AUTOMATED COUNT: 13.6 % (ref 11.5–14.5)
EST. GFR  (AFRICAN AMERICAN): >60 ML/MIN/1.73 M^2
EST. GFR  (NON AFRICAN AMERICAN): >60 ML/MIN/1.73 M^2
GLUCOSE SERPL-MCNC: 115 MG/DL (ref 70–110)
HCT VFR BLD AUTO: 59 % (ref 40–54)
HDLC SERPL-MCNC: 31 MG/DL (ref 40–75)
HDLC SERPL: 19.5 % (ref 20–50)
HGB BLD-MCNC: 18.6 G/DL (ref 14–18)
IMM GRANULOCYTES # BLD AUTO: 0.05 K/UL (ref 0–0.04)
IMM GRANULOCYTES NFR BLD AUTO: 0.6 % (ref 0–0.5)
LDLC SERPL CALC-MCNC: 100.6 MG/DL (ref 63–159)
LYMPHOCYTES # BLD AUTO: 1.3 K/UL (ref 1–4.8)
LYMPHOCYTES NFR BLD: 17.1 % (ref 18–48)
MCH RBC QN AUTO: 28.3 PG (ref 27–31)
MCHC RBC AUTO-ENTMCNC: 31.5 G/DL (ref 32–36)
MCV RBC AUTO: 90 FL (ref 82–98)
MONOCYTES # BLD AUTO: 0.8 K/UL (ref 0.3–1)
MONOCYTES NFR BLD: 9.7 % (ref 4–15)
NEUTROPHILS # BLD AUTO: 5.3 K/UL (ref 1.8–7.7)
NEUTROPHILS NFR BLD: 68.5 % (ref 38–73)
NONHDLC SERPL-MCNC: 128 MG/DL
NRBC BLD-RTO: 0 /100 WBC
PLATELET # BLD AUTO: 188 K/UL (ref 150–350)
PMV BLD AUTO: 10.6 FL (ref 9.2–12.9)
POTASSIUM SERPL-SCNC: 4.4 MMOL/L (ref 3.5–5.1)
PROT SERPL-MCNC: 7.5 G/DL (ref 6–8.4)
RBC # BLD AUTO: 6.58 M/UL (ref 4.6–6.2)
SODIUM SERPL-SCNC: 139 MMOL/L (ref 136–145)
TRIGL SERPL-MCNC: 137 MG/DL (ref 30–150)
TSH SERPL DL<=0.005 MIU/L-ACNC: 1.91 UIU/ML (ref 0.4–4)
WBC # BLD AUTO: 7.76 K/UL (ref 3.9–12.7)

## 2020-07-02 PROCEDURE — 80053 COMPREHEN METABOLIC PANEL: CPT

## 2020-07-02 PROCEDURE — 83036 HEMOGLOBIN GLYCOSYLATED A1C: CPT

## 2020-07-02 PROCEDURE — 36415 COLL VENOUS BLD VENIPUNCTURE: CPT | Mod: PO

## 2020-07-02 PROCEDURE — 85025 COMPLETE CBC W/AUTO DIFF WBC: CPT

## 2020-07-02 PROCEDURE — 84443 ASSAY THYROID STIM HORMONE: CPT

## 2020-07-02 PROCEDURE — 80061 LIPID PANEL: CPT

## 2020-07-03 LAB
ESTIMATED AVG GLUCOSE: 137 MG/DL (ref 68–131)
HBA1C MFR BLD HPLC: 6.4 % (ref 4–5.6)

## 2020-07-04 NOTE — TELEPHONE ENCOUNTER
Activity Intolerance    • # Functional status is maintained or returned to baseline Outcome Met, Complete Goal    • # Tolerates activity for d/c setting with no clinical problems Outcome Met, Complete Goal        At Risk for Falls    • # Patient does not fall Outcome Met, Complete Goal    • # Takes action to control fall-related risks Outcome Met, Complete Goal        At Risk for Injury Due to Fall    • # Patient does not fall Outcome Met, Complete Goal    • # Takes action to control condition specific risks Outcome Met, Complete Goal        Impaired Physical Mobility    • # Bed mobility, ambulation, and ADLs are maintained or returned to baseline during hospitalization Outcome Met, Complete Goal        Pressure Injury, Risk for    • # Skin remains intact Outcome Met, Complete Goal    • No new pressure injury (PI) development Outcome Met, Complete Goal        VTE, Risk for    • # No s/s of VTE Outcome Met, Complete Goal            Patient discharging home with no further needs at this time. AVS printed and reviewed with patient. All questions and concerns answered. Patient's son-in-law is picking patient up and transporting her home. Discharge medications have been sent to [facility] on [address].    Patient given results and information about the prescription as below.  He verbalized understanding.    Chung Peterson, MUSTAPHA [facility] Staff             Cultures finalized today and I had to add an antibiotic to cover the bacteria that grew, I have sent ciprofloxacin to your pharmacy to begin taking for 7 days

## 2020-07-05 DIAGNOSIS — D86.0 SARCOIDOSIS OF LUNG: ICD-10-CM

## 2020-07-05 DIAGNOSIS — I10 ESSENTIAL HYPERTENSION: ICD-10-CM

## 2020-07-05 DIAGNOSIS — E11.9 CONTROLLED TYPE 2 DIABETES MELLITUS WITHOUT COMPLICATION, WITHOUT LONG-TERM CURRENT USE OF INSULIN: Primary | ICD-10-CM

## 2020-07-05 DIAGNOSIS — E78.5 DYSLIPIDEMIA: ICD-10-CM

## 2020-07-05 DIAGNOSIS — E11.42 TYPE 2 DIABETES MELLITUS WITH DIABETIC POLYNEUROPATHY, WITHOUT LONG-TERM CURRENT USE OF INSULIN: ICD-10-CM

## 2020-07-06 ENCOUNTER — TELEPHONE (OUTPATIENT)
Dept: UROLOGY | Facility: CLINIC | Age: 55
End: 2020-07-06

## 2020-07-06 NOTE — TELEPHONE ENCOUNTER
Called in refill of testosterone to [facility], advised patient would need follow up in the next 6 months. Pt expressed understanding

## 2020-07-06 NOTE — TELEPHONE ENCOUNTER
----- Message from Nataliia Liz sent at 7/6/2020  9:49 AM CDT -----  Contact: Patient  Type: Needs Medical Advice  Who Called:  Patient  Best Call Back Number: [phone]  Additional Information: Calling to speak with the nurse to request a refill on testosterone cypionate injection 400 mg. Patient says he does not know the name of the pharmacy that his wife uses.

## 2020-07-27 ENCOUNTER — PATIENT OUTREACH (OUTPATIENT)
Dept: ADMINISTRATIVE | Facility: OTHER | Age: 55
End: 2020-07-27

## 2020-07-27 NOTE — PROGRESS NOTES
Requested updates within Care Everywhere.  Patient's chart was reviewed for overdue HAL topics.  Immunizations reconciled.

## 2020-07-28 ENCOUNTER — OFFICE VISIT (OUTPATIENT)
Dept: PODIATRY | Facility: CLINIC | Age: 55
End: 2020-07-28
Payer: COMMERCIAL

## 2020-07-28 VITALS — BODY MASS INDEX: 30.76 KG/M2 | HEIGHT: 73 IN | WEIGHT: 232.06 LBS

## 2020-07-28 DIAGNOSIS — G57.63 NEUROMA OF SECOND INTERSPACE OF BOTH FEET: Primary | ICD-10-CM

## 2020-07-28 PROCEDURE — 64455 NJX AA&/STRD PLTR COM DG NRV: CPT | Mod: 50,S$GLB,, | Performed by: PODIATRIST

## 2020-07-28 PROCEDURE — 99999 PR PBB SHADOW E&M-EST. PATIENT-LVL IV: CPT | Mod: PBBFAC,,, | Performed by: PODIATRIST

## 2020-07-28 PROCEDURE — 64455 PR INJECT ANES/STEROID PLANTAR COMMON DIGITAL NERVE: ICD-10-PCS | Mod: 50,S$GLB,, | Performed by: PODIATRIST

## 2020-07-28 PROCEDURE — 99499 UNLISTED E&M SERVICE: CPT | Mod: S$GLB,,, | Performed by: PODIATRIST

## 2020-07-28 PROCEDURE — 99499 NO LOS: ICD-10-PCS | Mod: S$GLB,,, | Performed by: PODIATRIST

## 2020-07-28 PROCEDURE — 99999 PR PBB SHADOW E&M-EST. PATIENT-LVL IV: ICD-10-PCS | Mod: PBBFAC,,, | Performed by: PODIATRIST

## 2020-07-28 RX ORDER — METHYLPREDNISOLONE ACETATE 40 MG/ML
40 INJECTION, SUSPENSION INTRA-ARTICULAR; INTRALESIONAL; INTRAMUSCULAR; SOFT TISSUE
Status: COMPLETED | OUTPATIENT
Start: 2020-07-28 | End: 2020-07-28

## 2020-07-28 RX ADMIN — METHYLPREDNISOLONE ACETATE 40 MG: 40 INJECTION, SUSPENSION INTRA-ARTICULAR; INTRALESIONAL; INTRAMUSCULAR; SOFT TISSUE at 08:07

## 2020-07-28 NOTE — PROGRESS NOTES
Subjective:      Patient ID: Cade Johnston is a 54 y.o. male.    Chief Complaint: Foot Pain (brady) and Diabetes Mellitus (PCP:  STEW Duenas NP 5/12/20; HgbA1c:  7/2/20  6.4)    Cade is a 54 y.o. male who presents to the clinic upon referral from Dr. Shahnaz umana. provider found  for evaluation and treatment of diabetic feet. Cade has a past medical history of DJD (degenerative joint disease) of lumbar spine, DM type 2 (diabetes mellitus, type 2), Dyslipidemia (8/13/2019), Essential hypertension (12/29/2015), Fatty liver, GERD (gastroesophageal reflux disease), Hepatosplenomegaly, HTN (hypertension), Hypogonadism male (2/17/2015), Mixed hyperlipidemia, Nephrolithiasis, Palpitations (3/9/2016), Pulmonary sarcoidosis, Sarcoidosis of lung, Type II or unspecified type diabetes mellitus without mention of complication, not stated as uncontrolled (4/27/2014), and Ventricular hypokinesis. Patient relates pain to the ball of the foot bilateral with a feeling like he is walking on a balled up sock all the time but nothing is there. He relates numbness and tingling to the entire foot as well bilateral the is intermittent and not related to weight bearing.    7/28/20: patient returns for continued pain to the bilateral forefoot, changing shoes and using inserts and metatarsal pads has not helped seeking more treatment options, pain similar to before.      PCP: Joni Smith MD    Date Last Seen by PCP: 7/22/19    Current shoe gear: Tennis shoes    Hemoglobin A1C   Date Value Ref Range Status   07/02/2020 6.4 (H) 4.0 - 5.6 % Final     Comment:     ADA Screening Guidelines:  5.7-6.4%  Consistent with prediabetes  >or=6.5%  Consistent with diabetes  High levels of fetal hemoglobin interfere with the HbA1C  assay. Heterozygous hemoglobin variants (HbS, HgC, etc)do  not significantly interfere with this assay.   However, presence of multiple variants may affect accuracy.     06/24/2020 6.4 (H) 4.0 - 5.6 % Final     Comment:     ADA  Screening Guidelines:  5.7-6.4%  Consistent with prediabetes  >or=6.5%  Consistent with diabetes  High levels of fetal hemoglobin interfere with the HbA1C  assay. Heterozygous hemoglobin variants (HbS, HgC, etc)do  not significantly interfere with this assay.   However, presence of multiple variants may affect accuracy.     11/01/2019 6.4 (H) 4.0 - 5.6 % Final     Comment:     ADA Screening Guidelines:  5.7-6.4%  Consistent with prediabetes  >or=6.5%  Consistent with diabetes  High levels of fetal hemoglobin interfere with the HbA1C  assay. Heterozygous hemoglobin variants (HbS, HgC, etc)do  not significantly interfere with this assay.   However, presence of multiple variants may affect accuracy.             Review of Systems   Constitution: Negative for chills and fever.   Cardiovascular: Positive for leg swelling. Negative for claudication.   Respiratory: Negative for shortness of breath.    Skin: Positive for nail changes. Negative for itching and rash.   Musculoskeletal: Positive for arthritis. Negative for muscle cramps, muscle weakness and myalgias.   Gastrointestinal: Negative for nausea and vomiting.   Neurological: Positive for numbness and paresthesias. Negative for focal weakness and loss of balance.           Objective:      Physical Exam  Constitutional:       General: He is not in acute distress.     Appearance: He is well-developed. He is not diaphoretic.   Cardiovascular:      Pulses:           Dorsalis pedis pulses are 2+ on the right side and 2+ on the left side.        Posterior tibial pulses are 2+ on the right side and 2+ on the left side.      Comments: < 3 sec capillary refill time to toes 1-5 bilateral. Feet are warm to touch proximally with distal cooling b/l. There is no hair growth on the feet and toes b/l. There is mild edema b/l with varicosities present b/l.     Musculoskeletal:      Comments: Equinus noted b/l ankles with < 5 deg DF noted. MMT 5/5 in DF/PF/Inv/Ev resistance with no  reproduction of pain in any direction. Passive range of motion of ankle and pedal joints is painless b/l.     There is pain on palpation of the second  intermetatarsal space with a positive Raj's click. Minimal tenderness to palpation of the adjacent metatarsal heads.       Feet:      Right foot:      Protective Sensation: 10 sites tested. 6 sites sensed.      Left foot:      Protective Sensation: 10 sites tested. 5 sites sensed.   Skin:     General: Skin is warm and dry.      Coloration: Skin is not pale.      Findings: No abrasion, bruising, burn, ecchymosis, laceration, lesion, petechiae or rash.      Nails: There is no clubbing.        Comments: Skin is thin and atrophic bilateral    Left 1-2 and right hallux nails removed with nail bed hard eschar covering minimal erythema and no drainage.        Neurological:      Mental Status: He is alert and oriented to person, place, and time.      Sensory: Sensory deficit present.      Motor: No tremor, atrophy or abnormal muscle tone.      Comments: Negative tinel sign bilateral. Diminished vibratory and protective sensation bilateral   Psychiatric:         Behavior: Behavior normal.               Assessment:       Encounter Diagnosis   Name Primary?   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    Obtained verbal consent from the patient to perform an injection to his feet. Each injection site was prepped with alcohol and skin anesthesia achieved with Ethyl Chloride. A mixture containing a total of 0.5 mL of Depomedrol and 1 mL 1% plain lidocaine was injected within the affected intermetatarsal areas described above  First left then the same right. The  patient tolerated the procedure well and expressed improvement in her symptoms.    REturn in 6 weeks follow up    Chung Peterson DPM

## 2020-07-31 ENCOUNTER — TELEPHONE (OUTPATIENT)
Dept: UROLOGY | Facility: CLINIC | Age: 55
End: 2020-07-31

## 2020-07-31 NOTE — TELEPHONE ENCOUNTER
----- Message from Syl Garcia sent at 7/31/2020 11:41 AM CDT -----  Contact: self  Needs new orders sent over to the [facility] [address] Phone #[phone] to have blood drawn every three months.  Plus he wants to talk to Isidro about his upcoming appt on 9/8.  Call back at [phone] (home).  Thanks

## 2020-09-04 ENCOUNTER — PATIENT OUTREACH (OUTPATIENT)
Dept: ADMINISTRATIVE | Facility: OTHER | Age: 55
End: 2020-09-04

## 2020-09-04 NOTE — PROGRESS NOTES
Health Maintenance Due   Topic Date Due    Shingles Vaccine (1 of 2) 11/08/2013    TETANUS VACCINE  09/25/2019    Influenza Vaccine (1) 08/01/2020     Updates were requested from care everywhere.  Chart was reviewed for overdue Proactive [facility] Encounters ([facility]) topics (CRS, Breast Cancer Screening, Eye exam)  Health Maintenance has been updated.  LINKS immunization registry triggered.  Immunizations were reconciled.

## 2020-09-08 ENCOUNTER — OFFICE VISIT (OUTPATIENT)
Dept: PODIATRY | Facility: CLINIC | Age: 55
End: 2020-09-08
Payer: COMMERCIAL

## 2020-09-08 ENCOUNTER — OFFICE VISIT (OUTPATIENT)
Dept: UROLOGY | Facility: CLINIC | Age: 55
End: 2020-09-08
Payer: COMMERCIAL

## 2020-09-08 VITALS
SYSTOLIC BLOOD PRESSURE: 130 MMHG | WEIGHT: 229.5 LBS | HEART RATE: 66 BPM | BODY MASS INDEX: 30.42 KG/M2 | HEIGHT: 73 IN | DIASTOLIC BLOOD PRESSURE: 80 MMHG

## 2020-09-08 VITALS — HEIGHT: 73 IN | BODY MASS INDEX: 30.4 KG/M2 | WEIGHT: 229.38 LBS

## 2020-09-08 DIAGNOSIS — G57.62 MORTON'S NEUROMA OF THIRD INTERSPACE OF LEFT FOOT: Primary | ICD-10-CM

## 2020-09-08 DIAGNOSIS — R39.12 BENIGN PROSTATIC HYPERPLASIA WITH WEAK URINARY STREAM: ICD-10-CM

## 2020-09-08 DIAGNOSIS — E11.49 TYPE II DIABETES MELLITUS WITH NEUROLOGICAL MANIFESTATIONS: ICD-10-CM

## 2020-09-08 DIAGNOSIS — N20.0 NEPHROLITHIASIS: ICD-10-CM

## 2020-09-08 DIAGNOSIS — N40.1 BENIGN PROSTATIC HYPERPLASIA WITH WEAK URINARY STREAM: ICD-10-CM

## 2020-09-08 DIAGNOSIS — E29.1 MALE HYPOGONADISM: Primary | ICD-10-CM

## 2020-09-08 PROCEDURE — 3008F BODY MASS INDEX DOCD: CPT | Mod: CPTII,S$GLB,, | Performed by: UROLOGY

## 2020-09-08 PROCEDURE — 99999 PR PBB SHADOW E&M-EST. PATIENT-LVL III: ICD-10-PCS | Mod: PBBFAC,,, | Performed by: PODIATRIST

## 2020-09-08 PROCEDURE — 3079F PR MOST RECENT DIASTOLIC BLOOD PRESSURE 80-89 MM HG: ICD-10-PCS | Mod: CPTII,S$GLB,, | Performed by: UROLOGY

## 2020-09-08 PROCEDURE — 99215 PR OFFICE/OUTPT VISIT, EST, LEVL V, 40-54 MIN: ICD-10-PCS | Mod: S$GLB,,, | Performed by: UROLOGY

## 2020-09-08 PROCEDURE — 64455 PR INJECT ANES/STEROID PLANTAR COMMON DIGITAL NERVE: ICD-10-PCS | Mod: LT,S$GLB,, | Performed by: PODIATRIST

## 2020-09-08 PROCEDURE — 3008F PR BODY MASS INDEX (BMI) DOCUMENTED: ICD-10-PCS | Mod: CPTII,S$GLB,, | Performed by: UROLOGY

## 2020-09-08 PROCEDURE — 99999 PR PBB SHADOW E&M-EST. PATIENT-LVL III: CPT | Mod: PBBFAC,,, | Performed by: PODIATRIST

## 2020-09-08 PROCEDURE — 3075F PR MOST RECENT SYSTOLIC BLOOD PRESS GE 130-139MM HG: ICD-10-PCS | Mod: CPTII,S$GLB,, | Performed by: UROLOGY

## 2020-09-08 PROCEDURE — 99999 PR PBB SHADOW E&M-EST. PATIENT-LVL IV: ICD-10-PCS | Mod: PBBFAC,,, | Performed by: UROLOGY

## 2020-09-08 PROCEDURE — 64455 NJX AA&/STRD PLTR COM DG NRV: CPT | Mod: LT,S$GLB,, | Performed by: PODIATRIST

## 2020-09-08 PROCEDURE — 3075F SYST BP GE 130 - 139MM HG: CPT | Mod: CPTII,S$GLB,, | Performed by: UROLOGY

## 2020-09-08 PROCEDURE — 99215 OFFICE O/P EST HI 40 MIN: CPT | Mod: S$GLB,,, | Performed by: UROLOGY

## 2020-09-08 PROCEDURE — 99999 PR PBB SHADOW E&M-EST. PATIENT-LVL IV: CPT | Mod: PBBFAC,,, | Performed by: UROLOGY

## 2020-09-08 PROCEDURE — 99499 UNLISTED E&M SERVICE: CPT | Mod: S$GLB,,, | Performed by: PODIATRIST

## 2020-09-08 PROCEDURE — 3079F DIAST BP 80-89 MM HG: CPT | Mod: CPTII,S$GLB,, | Performed by: UROLOGY

## 2020-09-08 PROCEDURE — 99499 NO LOS: ICD-10-PCS | Mod: S$GLB,,, | Performed by: PODIATRIST

## 2020-09-08 RX ORDER — IBUPROFEN 200 MG
200 TABLET ORAL EVERY 6 HOURS PRN
COMMUNITY

## 2020-09-08 RX ORDER — METHYLPREDNISOLONE ACETATE 40 MG/ML
20 INJECTION, SUSPENSION INTRA-ARTICULAR; INTRALESIONAL; INTRAMUSCULAR; SOFT TISSUE
Status: COMPLETED | OUTPATIENT
Start: 2020-09-08 | End: 2020-09-08

## 2020-09-08 RX ADMIN — METHYLPREDNISOLONE ACETATE 20 MG: 40 INJECTION, SUSPENSION INTRA-ARTICULAR; INTRALESIONAL; INTRAMUSCULAR; SOFT TISSUE at 07:09

## 2020-09-08 NOTE — PROGRESS NOTES
UROLOGY [place]  9 8 20    Cc low testosterone    Age 54, just retired secondary to chronic lung problems. Voiding well, nocturia x 1-2. No pains or burning. Daytime frequency is normal, no urgency or incontinence.     Has been in testosterone replacement, using 400 mg q 28 d; his son gives him the injection at home.     has been on treatment for 5 years, feels it has helped his general energy and sexual interest. our nurses gave him the injection monthly, until covid situation led to pt start to get the injection at home.     Also has hx of kidney stones, with a large kidney stone treated in 1994 with eswl, with some fragments stuck in the ureter that then required open ureterolithotomy (juana/jorge). Pt says the open surgery was complex and took 10 hours. Pt did fine after that and has had no more problems. About 10 years later he passed another small stone and also he had one small one removed from the ureter by dr jag jensen.     Lately has had some issues with sarcoidosis, and is using steroids. This has thrown his diabetes off balance and made it harder to keep in control.         [facility]     Surgical:  has a past surgical history that includes Ureteral exploration (1996); elbow spur removal; Extracorporeal shock wave lithotripsy (1996); removal of skin cancer; and Circumcision.     Medical:  has a past medical history of DJD (degenerative joint disease) of lumbar spine; DM type 2 (diabetes mellitus, type 2); Fatty liver; GERD (gastroesophageal reflux disease); Hepatosplenomegaly; HTN (hypertension); Mixed hyperlipidemia; Nephrolithiasis; Pulmonary sarcoidosis; Sarcoidosis of lung; and Ventricular hypokinesis.     Familial: father with CAD, mother with hypertension     Social:  for [employer]                 Current Outpatient Prescriptions on File Prior to Visit   Medication Sig Dispense Refill    aspirin (ENTERIC COATED ASPIRIN) 81 MG EC tablet Take 81 mg by mouth once daily        blood  sugar diagnostic Strp Test q day 100 strip 3    diclofenac sodium 1 % Gel Apply 2 g topically 100 g 2    ipratropium-albuterol (COMBIVENT) 20-10 Inhale 1 puff into the lungs every 4 4 g 11    losartan-hydrochlorothiazide 100-25 mg (HYZAAR) 100-25 mg per tablet TAKE 1 TABLET BY MOUTH ONCE DAILY. 30 tablet 5    metformin (GLUCOPH TAKE 1  tablet 1    sildenafil (REVATIO) 20 mg Tab Take 20 mg by mouth.                            Current Facility-Administered Medications on File Prior to Visit   Medication Dose Route Frequency Provider Last Rate Last Dose    testosterone cypionate injection 300 mg  300 mg Intramuscular Q21 Days Des Larson MD   300 mg at 07/11/17 1559         REVIEW OF SYSTEMS  GENERAL: has complaints of fatigue less often now that he gets the testosterone treatment. No headaches or dizzy spells.   HEENT: vision preserved. Sinuses: has frequent congestion   CARDIOPULMONARY: No swelling of the legs; no chest pain. No shortness of breath, no wheezing.   GASTROINTESTINAL: No heartburn. Denies diarrhea; has some constipation, no blood or mucus in stools.   GENITOURINARY: Denies dysuria, bleeding or incontinence.   MUSCULOSKELETAL: some arthritic complaints in neck and feet   PSYCHIATRIC: No history of depression or anxiety.   ENDOCRINOLOGIC: No reports of sweating, cold or heat intolerance. No polyuria or polydipsia.   NEUROLOGICAL: No headache, dizziness, syncope, paralysis, ataxia, numbness or tingling in the extremities.  LYMPHATICS: No enlarged nodes. No history of splenectomy.  ==     Pt alert, oriented, no distress  HEENT:  wnl.  Neck: supple, no JVD, no lymphadenopathy  Chest: CV NSR  Lungs: normal chest expansion  Abdomen flat, nontender, no organomegaly, no masses.  cva's neg  No hernias. Has large RLQ surgical scar from his R ureterolithotomy, well healed. Also has a small scar from the stab incision for penrose drain  Penis circumcised, meatus nl  Testes nl, epi nl, scrotum  nl  BRIDGER: anus nl, sphincter nl tone, mucosa without lesions, prostate 30 gm, symmetric, no nodules or indurations  Extremities: no edema, peripheral pulses nl  Neuro: preserved          IMPRESSION:     Hypogonadism, on testosterone replacement.   Can continue with same replacement treatment.   Needs to get testosterone levels  Also needs to be attentive to the hematocrit level and make sure he gets a therapeutic phlebotomy when it nears 50      bph on observation     Nephrolithiasis, having passed stones and having had surgical procedures for them     Erectile dysfunction on pde5 inhibitors     RTC yearly

## 2020-09-08 NOTE — PROGRESS NOTES
Subjective:      Patient ID: Cade Johnston is a 54 y.o. male.    Chief Complaint: Foot Pain (Improved, but still having problems with pain left foot; right foot is much better)    Cade is a 54 y.o. male who presents to the clinic upon referral from Dr. Shahnaz umana. provider found  for evaluation and treatment of diabetic feet. Cade has a past medical history of DJD (degenerative joint disease) of lumbar spine, DM type 2 (diabetes mellitus, type 2), Dyslipidemia (8/13/2019), Essential hypertension (12/29/2015), Fatty liver, GERD (gastroesophageal reflux disease), Hepatosplenomegaly, HTN (hypertension), Hypogonadism male (2/17/2015), Mixed hyperlipidemia, Nephrolithiasis, Palpitations (3/9/2016), Pulmonary sarcoidosis, Sarcoidosis of lung, Type II or unspecified type diabetes mellitus without mention of complication, not stated as uncontrolled (4/27/2014), and Ventricular hypokinesis. Patient relates pain to the ball of the foot bilateral with a feeling like he is walking on a balled up sock all the time but nothing is there. He relates numbness and tingling to the entire foot as well bilateral the is intermittent and not related to weight bearing.    7/28/20: patient returns for continued pain to the bilateral forefoot, changing shoes and using inserts and metatarsal pads has not helped seeking more treatment options, pain similar to before.    9/8/20: patient returns for follow up bilateral neuromas. He relates that the pain has become significantly better to the areas of injection, now he has some pain more lateral on the left foot, although similar to before. No other complaints, not wearing the inserts in his boots because they bunch up at the ends      PCP: Joni Smith MD    Date Last Seen by PCP: 7/22/19    Current shoe gear: Tennis shoes    Hemoglobin A1C   Date Value Ref Range Status   07/02/2020 6.4 (H) 4.0 - 5.6 % Final     Comment:     ADA Screening Guidelines:  5.7-6.4%  Consistent with  prediabetes  >or=6.5%  Consistent with diabetes  High levels of fetal hemoglobin interfere with the HbA1C  assay. Heterozygous hemoglobin variants (HbS, HgC, etc)do  not significantly interfere with this assay.   However, presence of multiple variants may affect accuracy.     06/24/2020 6.4 (H) 4.0 - 5.6 % Final     Comment:     ADA Screening Guidelines:  5.7-6.4%  Consistent with prediabetes  >or=6.5%  Consistent with diabetes  High levels of fetal hemoglobin interfere with the HbA1C  assay. Heterozygous hemoglobin variants (HbS, HgC, etc)do  not significantly interfere with this assay.   However, presence of multiple variants may affect accuracy.     11/01/2019 6.4 (H) 4.0 - 5.6 % Final     Comment:     ADA Screening Guidelines:  5.7-6.4%  Consistent with prediabetes  >or=6.5%  Consistent with diabetes  High levels of fetal hemoglobin interfere with the HbA1C  assay. Heterozygous hemoglobin variants (HbS, HgC, etc)do  not significantly interfere with this assay.   However, presence of multiple variants may affect accuracy.             Review of Systems   Constitution: Negative for chills and fever.   Cardiovascular: Positive for leg swelling. Negative for claudication.   Respiratory: Negative for shortness of breath.    Skin: Positive for nail changes. Negative for itching and rash.   Musculoskeletal: Positive for arthritis. Negative for muscle cramps, muscle weakness and myalgias.   Gastrointestinal: Negative for nausea and vomiting.   Neurological: Positive for numbness and paresthesias. Negative for focal weakness and loss of balance.           Objective:      Physical Exam  Constitutional:       General: He is not in acute distress.     Appearance: He is well-developed. He is not diaphoretic.   Cardiovascular:      Pulses:           Dorsalis pedis pulses are 2+ on the right side and 2+ on the left side.        Posterior tibial pulses are 2+ on the right side and 2+ on the left side.      Comments: < 3 sec  capillary refill time to toes 1-5 bilateral. Feet are warm to touch proximally with distal cooling b/l. There is no hair growth on the feet and toes b/l. There is mild edema b/l with varicosities present b/l.     Musculoskeletal:      Comments: Equinus noted b/l ankles with < 5 deg DF noted. MMT 5/5 in DF/PF/Inv/Ev resistance with no reproduction of pain in any direction. Passive range of motion of ankle and pedal joints is painless b/l.     There is pain on palpation of the third intermetatarsal space with a positive Raj's click left foot. Minimal tenderness to palpation of the adjacent metatarsal heads.       Feet:      Right foot:      Protective Sensation: 10 sites tested. 6 sites sensed.      Left foot:      Protective Sensation: 10 sites tested. 5 sites sensed.   Skin:     General: Skin is warm and dry.      Coloration: Skin is not pale.      Findings: No abrasion, bruising, burn, ecchymosis, laceration, lesion, petechiae or rash.      Nails: There is no clubbing.        Comments: Skin is thin and atrophic bilateral    Left 1-2 and right hallux nails removed with nail bed hard eschar covering minimal erythema and no drainage.        Neurological:      Mental Status: He is alert and oriented to person, place, and time.      Sensory: Sensory deficit present.      Motor: No tremor, atrophy or abnormal muscle tone.      Comments: Negative tinel sign bilateral. Diminished vibratory and protective sensation bilateral   Psychiatric:         Behavior: Behavior normal.               Assessment:       Encounter Diagnoses   Name Primary?   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    Obtained verbal consent from the patient to perform an injection to his left foot. The injection site was prepped with alcohol and skin anesthesia achieved with Ethyl Chloride. A mixture containing a total of 0.5 mL of Depomedrol and 1 mL 1% plain lidocaine was injected within the affected intermetatarsal area described above . The patient tolerated the procedure well and expressed improvement in her symptoms.    Return in 6 weeks follow up    Chung Peterson DPM

## 2020-09-16 ENCOUNTER — LAB VISIT (OUTPATIENT)
Dept: LAB | Facility: HOSPITAL | Age: 55
End: 2020-09-16
Attending: UROLOGY
Payer: COMMERCIAL

## 2020-09-16 DIAGNOSIS — E29.1 MALE HYPOGONADISM: ICD-10-CM

## 2020-09-16 LAB
ERYTHROCYTE [DISTWIDTH] IN BLOOD BY AUTOMATED COUNT: 13.5 % (ref 11.5–14.5)
HCT VFR BLD AUTO: 51.2 % (ref 40–54)
HGB BLD-MCNC: 16.6 G/DL (ref 14–18)
MCH RBC QN AUTO: 29.1 PG (ref 27–31)
MCHC RBC AUTO-ENTMCNC: 32.4 G/DL (ref 32–36)
MCV RBC AUTO: 90 FL (ref 82–98)
PLATELET # BLD AUTO: 198 K/UL (ref 150–350)
PMV BLD AUTO: 10.5 FL (ref 9.2–12.9)
RBC # BLD AUTO: 5.7 M/UL (ref 4.6–6.2)
TESTOST SERPL-MCNC: 972 NG/DL (ref 304–1227)
WBC # BLD AUTO: 9 K/UL (ref 3.9–12.7)

## 2020-09-16 PROCEDURE — 85027 COMPLETE CBC AUTOMATED: CPT

## 2020-09-16 PROCEDURE — 84403 ASSAY OF TOTAL TESTOSTERONE: CPT

## 2020-09-16 PROCEDURE — 36415 COLL VENOUS BLD VENIPUNCTURE: CPT | Mod: PO

## 2020-09-19 DIAGNOSIS — E11.42 TYPE 2 DIABETES MELLITUS WITH DIABETIC POLYNEUROPATHY, WITHOUT LONG-TERM CURRENT USE OF INSULIN: ICD-10-CM

## 2020-09-21 RX ORDER — GABAPENTIN 100 MG/1
CAPSULE ORAL
Qty: 90 CAPSULE | Refills: 1 | Status: SHIPPED | OUTPATIENT
Start: 2020-09-21 | End: 2021-01-19

## 2020-09-21 NOTE — PROGRESS NOTES
Refill Routing Note   Medication(s) are not appropriate for processing by [facility]:       - Outside of protocol           Medication reconciliation completed: No      Automatic Epic Generated Protocol Data:        Requested Prescriptions   Pending Prescriptions Disp Refills    gabapentin (NEURONTIN) 100 MG capsule [Pharmacy Med Name: GABAPENTIN 100 MG CAPSULE] 90 capsule 1     Sig: TAKE 1 CAPSULE BY MOUTH THREE TIMES A DAY AS NEEDED       Anticonvulsants Protocol Passed - 9/19/2020 12:29 AM        Passed - Visit with Authorizing provider in past 9 months or upcoming 90 days              Appointments  past 12m or future 3m with PCP    Date Provider   Last Visit   11/11/2019 Joni Smith MD   Next Visit   1/18/2021 Joni Smith MD   ED visits in past 90 days: 0     Note composed:8:21 AM 09/21/2020

## 2020-10-16 NOTE — PROGRESS NOTES
Pt received 400 mg testosterone injection IM in LUQ of buttocks. Pt tolerated procedure well. Pt refused to stay for observation. Pt verbalized understand of the risk with leaving the clinic.    
No

## 2020-11-30 NOTE — TELEPHONE ENCOUNTER
----- Message from Pierce Huerta sent at 11/30/2020  1:08 PM CST -----  Regarding: refill  Contact: patient  Type:  RX Refill Request    Who Called:  patient  Refill or New Rx:  new    verapamil (VERELAN) 120 MG C24P 90 capsule 4 11/18/2019  No  Sig: TAKE 1 CAPSULE BY MOUTH ONCE DAILY    Preferred Pharmacy with phone number:    [facility]/pharmacy #[id number] - [place] - [facility]  [place] [address]  Phone: [phone] Fax: [phone]    Ordering Provider:  Renee Huynh Call Back Number:  [phone]  Additional Information:  Patient's appointment is not until 1/7/21 & is out of meds

## 2020-12-01 RX ORDER — VERAPAMIL HYDROCHLORIDE 120 MG/1
120 CAPSULE, EXTENDED RELEASE ORAL DAILY
Qty: 90 CAPSULE | Refills: 4 | Status: SHIPPED | OUTPATIENT
Start: 2020-12-01 | End: 2021-01-07 | Stop reason: SDUPTHER

## 2021-01-04 ENCOUNTER — PATIENT MESSAGE (OUTPATIENT)
Dept: UROLOGY | Facility: CLINIC | Age: 56
End: 2021-01-04

## 2021-01-05 ENCOUNTER — PATIENT MESSAGE (OUTPATIENT)
Dept: UROLOGY | Facility: CLINIC | Age: 56
End: 2021-01-05

## 2021-01-05 ENCOUNTER — PATIENT OUTREACH (OUTPATIENT)
Dept: ADMINISTRATIVE | Facility: OTHER | Age: 56
End: 2021-01-05

## 2021-01-05 DIAGNOSIS — E11.9 DIABETES MELLITUS WITHOUT COMPLICATION: Primary | ICD-10-CM

## 2021-01-06 ENCOUNTER — PATIENT OUTREACH (OUTPATIENT)
Dept: ADMINISTRATIVE | Facility: HOSPITAL | Age: 56
End: 2021-01-06

## 2021-01-07 ENCOUNTER — LAB VISIT (OUTPATIENT)
Dept: LAB | Facility: HOSPITAL | Age: 56
End: 2021-01-07
Attending: INTERNAL MEDICINE
Payer: COMMERCIAL

## 2021-01-07 ENCOUNTER — OFFICE VISIT (OUTPATIENT)
Dept: CARDIOLOGY | Facility: CLINIC | Age: 56
End: 2021-01-07
Payer: COMMERCIAL

## 2021-01-07 VITALS
HEIGHT: 73 IN | BODY MASS INDEX: 31.12 KG/M2 | DIASTOLIC BLOOD PRESSURE: 92 MMHG | HEART RATE: 78 BPM | WEIGHT: 234.81 LBS | SYSTOLIC BLOOD PRESSURE: 151 MMHG

## 2021-01-07 DIAGNOSIS — D86.0 SARCOIDOSIS OF LUNG: ICD-10-CM

## 2021-01-07 DIAGNOSIS — I10 ESSENTIAL HYPERTENSION: ICD-10-CM

## 2021-01-07 DIAGNOSIS — E11.42 TYPE 2 DIABETES MELLITUS WITH DIABETIC POLYNEUROPATHY, WITHOUT LONG-TERM CURRENT USE OF INSULIN: ICD-10-CM

## 2021-01-07 DIAGNOSIS — E78.5 DYSLIPIDEMIA: ICD-10-CM

## 2021-01-07 DIAGNOSIS — I25.10 CORONARY ARTERY DISEASE INVOLVING NATIVE CORONARY ARTERY OF NATIVE HEART WITHOUT ANGINA PECTORIS: Primary | ICD-10-CM

## 2021-01-07 DIAGNOSIS — Z95.5 STENTED CORONARY ARTERY: ICD-10-CM

## 2021-01-07 LAB
ALBUMIN SERPL BCP-MCNC: 3.9 G/DL (ref 3.5–5.2)
ALP SERPL-CCNC: 71 U/L (ref 55–135)
ALT SERPL W/O P-5'-P-CCNC: 42 U/L (ref 10–44)
ANION GAP SERPL CALC-SCNC: 11 MMOL/L (ref 8–16)
AST SERPL-CCNC: 24 U/L (ref 10–40)
BASOPHILS # BLD AUTO: 0.09 K/UL (ref 0–0.2)
BASOPHILS NFR BLD: 0.9 % (ref 0–1.9)
BILIRUB SERPL-MCNC: 1.6 MG/DL (ref 0.1–1)
BUN SERPL-MCNC: 16 MG/DL (ref 6–20)
CALCIUM SERPL-MCNC: 9.5 MG/DL (ref 8.7–10.5)
CHLORIDE SERPL-SCNC: 102 MMOL/L (ref 95–110)
CHOLEST SERPL-MCNC: 166 MG/DL (ref 120–199)
CHOLEST/HDLC SERPL: 5.4 {RATIO} (ref 2–5)
CO2 SERPL-SCNC: 29 MMOL/L (ref 23–29)
CREAT SERPL-MCNC: 1.3 MG/DL (ref 0.5–1.4)
DIFFERENTIAL METHOD: ABNORMAL
EOSINOPHIL # BLD AUTO: 0.2 K/UL (ref 0–0.5)
EOSINOPHIL NFR BLD: 1.5 % (ref 0–8)
ERYTHROCYTE [DISTWIDTH] IN BLOOD BY AUTOMATED COUNT: 13.9 % (ref 11.5–14.5)
EST. GFR  (AFRICAN AMERICAN): >60 ML/MIN/1.73 M^2
EST. GFR  (NON AFRICAN AMERICAN): >60 ML/MIN/1.73 M^2
ESTIMATED AVG GLUCOSE: 148 MG/DL (ref 68–131)
GLUCOSE SERPL-MCNC: 116 MG/DL (ref 70–110)
HBA1C MFR BLD HPLC: 6.8 % (ref 4–5.6)
HCT VFR BLD AUTO: 56.4 % (ref 40–54)
HDLC SERPL-MCNC: 31 MG/DL (ref 40–75)
HDLC SERPL: 18.7 % (ref 20–50)
HGB BLD-MCNC: 18.1 G/DL (ref 14–18)
IMM GRANULOCYTES # BLD AUTO: 0.06 K/UL (ref 0–0.04)
IMM GRANULOCYTES NFR BLD AUTO: 0.6 % (ref 0–0.5)
LDLC SERPL CALC-MCNC: 109.2 MG/DL (ref 63–159)
LYMPHOCYTES # BLD AUTO: 1.2 K/UL (ref 1–4.8)
LYMPHOCYTES NFR BLD: 12.1 % (ref 18–48)
MCH RBC QN AUTO: 28.5 PG (ref 27–31)
MCHC RBC AUTO-ENTMCNC: 32.1 G/DL (ref 32–36)
MCV RBC AUTO: 89 FL (ref 82–98)
MONOCYTES # BLD AUTO: 0.9 K/UL (ref 0.3–1)
MONOCYTES NFR BLD: 9 % (ref 4–15)
NEUTROPHILS # BLD AUTO: 7.4 K/UL (ref 1.8–7.7)
NEUTROPHILS NFR BLD: 75.9 % (ref 38–73)
NONHDLC SERPL-MCNC: 135 MG/DL
NRBC BLD-RTO: 0 /100 WBC
PLATELET # BLD AUTO: 199 K/UL (ref 150–350)
PMV BLD AUTO: 10.3 FL (ref 9.2–12.9)
POTASSIUM SERPL-SCNC: 4.2 MMOL/L (ref 3.5–5.1)
PROT SERPL-MCNC: 7.3 G/DL (ref 6–8.4)
RBC # BLD AUTO: 6.35 M/UL (ref 4.6–6.2)
SODIUM SERPL-SCNC: 142 MMOL/L (ref 136–145)
TRIGL SERPL-MCNC: 129 MG/DL (ref 30–150)
WBC # BLD AUTO: 9.72 K/UL (ref 3.9–12.7)

## 2021-01-07 PROCEDURE — 83036 HEMOGLOBIN GLYCOSYLATED A1C: CPT

## 2021-01-07 PROCEDURE — 3080F PR MOST RECENT DIASTOLIC BLOOD PRESSURE >= 90 MM HG: ICD-10-PCS | Mod: CPTII,S$GLB,, | Performed by: INTERNAL MEDICINE

## 2021-01-07 PROCEDURE — 3008F BODY MASS INDEX DOCD: CPT | Mod: CPTII,S$GLB,, | Performed by: INTERNAL MEDICINE

## 2021-01-07 PROCEDURE — 1126F AMNT PAIN NOTED NONE PRSNT: CPT | Mod: S$GLB,,, | Performed by: INTERNAL MEDICINE

## 2021-01-07 PROCEDURE — 3077F PR MOST RECENT SYSTOLIC BLOOD PRESSURE >= 140 MM HG: ICD-10-PCS | Mod: CPTII,S$GLB,, | Performed by: INTERNAL MEDICINE

## 2021-01-07 PROCEDURE — 80053 COMPREHEN METABOLIC PANEL: CPT

## 2021-01-07 PROCEDURE — 85025 COMPLETE CBC W/AUTO DIFF WBC: CPT

## 2021-01-07 PROCEDURE — 3008F PR BODY MASS INDEX (BMI) DOCUMENTED: ICD-10-PCS | Mod: CPTII,S$GLB,, | Performed by: INTERNAL MEDICINE

## 2021-01-07 PROCEDURE — 99999 PR PBB SHADOW E&M-EST. PATIENT-LVL IV: ICD-10-PCS | Mod: PBBFAC,,, | Performed by: INTERNAL MEDICINE

## 2021-01-07 PROCEDURE — 3080F DIAST BP >= 90 MM HG: CPT | Mod: CPTII,S$GLB,, | Performed by: INTERNAL MEDICINE

## 2021-01-07 PROCEDURE — 1126F PR PAIN SEVERITY QUANTIFIED, NO PAIN PRESENT: ICD-10-PCS | Mod: S$GLB,,, | Performed by: INTERNAL MEDICINE

## 2021-01-07 PROCEDURE — 99999 PR PBB SHADOW E&M-EST. PATIENT-LVL IV: CPT | Mod: PBBFAC,,, | Performed by: INTERNAL MEDICINE

## 2021-01-07 PROCEDURE — 99214 PR OFFICE/OUTPT VISIT, EST, LEVL IV, 30-39 MIN: ICD-10-PCS | Mod: S$GLB,,, | Performed by: INTERNAL MEDICINE

## 2021-01-07 PROCEDURE — 80061 LIPID PANEL: CPT

## 2021-01-07 PROCEDURE — 99214 OFFICE O/P EST MOD 30 MIN: CPT | Mod: S$GLB,,, | Performed by: INTERNAL MEDICINE

## 2021-01-07 PROCEDURE — 36415 COLL VENOUS BLD VENIPUNCTURE: CPT | Mod: PO

## 2021-01-07 PROCEDURE — 3077F SYST BP >= 140 MM HG: CPT | Mod: CPTII,S$GLB,, | Performed by: INTERNAL MEDICINE

## 2021-01-07 RX ORDER — VERAPAMIL HYDROCHLORIDE 120 MG/1
120 CAPSULE, EXTENDED RELEASE ORAL DAILY
Qty: 90 CAPSULE | Refills: 4 | Status: SHIPPED | OUTPATIENT
Start: 2021-01-07 | End: 2021-10-07 | Stop reason: SDUPTHER

## 2021-01-07 RX ORDER — CLOPIDOGREL BISULFATE 75 MG/1
75 TABLET ORAL DAILY
Qty: 90 TABLET | Refills: 4 | Status: SHIPPED | OUTPATIENT
Start: 2021-01-07 | End: 2021-10-07 | Stop reason: SDUPTHER

## 2021-01-19 ENCOUNTER — OFFICE VISIT (OUTPATIENT)
Dept: FAMILY MEDICINE | Facility: CLINIC | Age: 56
End: 2021-01-19
Payer: COMMERCIAL

## 2021-01-19 ENCOUNTER — PATIENT MESSAGE (OUTPATIENT)
Dept: FAMILY MEDICINE | Facility: CLINIC | Age: 56
End: 2021-01-19

## 2021-01-19 ENCOUNTER — HOSPITAL ENCOUNTER (OUTPATIENT)
Dept: RADIOLOGY | Facility: HOSPITAL | Age: 56
Discharge: HOME | End: 2021-01-19
Attending: NURSE PRACTITIONER
Payer: COMMERCIAL

## 2021-01-19 VITALS
HEART RATE: 76 BPM | SYSTOLIC BLOOD PRESSURE: 138 MMHG | HEIGHT: 73 IN | WEIGHT: 234.38 LBS | OXYGEN SATURATION: 96 % | BODY MASS INDEX: 31.06 KG/M2 | TEMPERATURE: 99 F | DIASTOLIC BLOOD PRESSURE: 88 MMHG

## 2021-01-19 DIAGNOSIS — G89.29 CHRONIC RIGHT-SIDED LOW BACK PAIN WITH RIGHT-SIDED SCIATICA: ICD-10-CM

## 2021-01-19 DIAGNOSIS — M54.41 CHRONIC RIGHT-SIDED LOW BACK PAIN WITH RIGHT-SIDED SCIATICA: ICD-10-CM

## 2021-01-19 DIAGNOSIS — E78.5 DYSLIPIDEMIA: ICD-10-CM

## 2021-01-19 DIAGNOSIS — G89.29 CHRONIC RIGHT-SIDED LOW BACK PAIN WITH RIGHT-SIDED SCIATICA: Primary | ICD-10-CM

## 2021-01-19 DIAGNOSIS — E11.9 CONTROLLED TYPE 2 DIABETES MELLITUS WITHOUT COMPLICATION, WITHOUT LONG-TERM CURRENT USE OF INSULIN: ICD-10-CM

## 2021-01-19 DIAGNOSIS — Z23 NEED FOR TDAP VACCINATION: ICD-10-CM

## 2021-01-19 DIAGNOSIS — Z12.5 ENCOUNTER FOR SCREENING FOR MALIGNANT NEOPLASM OF PROSTATE: ICD-10-CM

## 2021-01-19 DIAGNOSIS — M54.41 CHRONIC RIGHT-SIDED LOW BACK PAIN WITH RIGHT-SIDED SCIATICA: Primary | ICD-10-CM

## 2021-01-19 DIAGNOSIS — I10 ESSENTIAL HYPERTENSION: ICD-10-CM

## 2021-01-19 PROCEDURE — 72110 XR LUMBAR SPINE COMPLETE 5 VIEW: ICD-10-PCS | Mod: 26,,, | Performed by: RADIOLOGY

## 2021-01-19 PROCEDURE — 99999 PR PBB SHADOW E&M-EST. PATIENT-LVL IV: ICD-10-PCS | Mod: PBBFAC,,, | Performed by: NURSE PRACTITIONER

## 2021-01-19 PROCEDURE — 99214 OFFICE O/P EST MOD 30 MIN: CPT | Mod: 25,S$GLB,, | Performed by: NURSE PRACTITIONER

## 2021-01-19 PROCEDURE — 1125F PR PAIN SEVERITY QUANTIFIED, PAIN PRESENT: ICD-10-PCS | Mod: S$GLB,,, | Performed by: NURSE PRACTITIONER

## 2021-01-19 PROCEDURE — 3044F PR MOST RECENT HEMOGLOBIN A1C LEVEL <7.0%: ICD-10-PCS | Mod: CPTII,S$GLB,, | Performed by: NURSE PRACTITIONER

## 2021-01-19 PROCEDURE — 90715 TDAP VACCINE GREATER THAN OR EQUAL TO 7YO IM: ICD-10-PCS | Mod: S$GLB,,, | Performed by: NURSE PRACTITIONER

## 2021-01-19 PROCEDURE — 72110 X-RAY EXAM L-2 SPINE 4/>VWS: CPT | Mod: 26,,, | Performed by: RADIOLOGY

## 2021-01-19 PROCEDURE — 3079F DIAST BP 80-89 MM HG: CPT | Mod: CPTII,S$GLB,, | Performed by: NURSE PRACTITIONER

## 2021-01-19 PROCEDURE — 99214 PR OFFICE/OUTPT VISIT, EST, LEVL IV, 30-39 MIN: ICD-10-PCS | Mod: 25,S$GLB,, | Performed by: NURSE PRACTITIONER

## 2021-01-19 PROCEDURE — 90715 TDAP VACCINE 7 YRS/> IM: CPT | Mod: S$GLB,,, | Performed by: NURSE PRACTITIONER

## 2021-01-19 PROCEDURE — 3008F BODY MASS INDEX DOCD: CPT | Mod: CPTII,S$GLB,, | Performed by: NURSE PRACTITIONER

## 2021-01-19 PROCEDURE — 3075F PR MOST RECENT SYSTOLIC BLOOD PRESS GE 130-139MM HG: ICD-10-PCS | Mod: CPTII,S$GLB,, | Performed by: NURSE PRACTITIONER

## 2021-01-19 PROCEDURE — 3075F SYST BP GE 130 - 139MM HG: CPT | Mod: CPTII,S$GLB,, | Performed by: NURSE PRACTITIONER

## 2021-01-19 PROCEDURE — 99999 PR PBB SHADOW E&M-EST. PATIENT-LVL IV: CPT | Mod: PBBFAC,,, | Performed by: NURSE PRACTITIONER

## 2021-01-19 PROCEDURE — 3079F PR MOST RECENT DIASTOLIC BLOOD PRESSURE 80-89 MM HG: ICD-10-PCS | Mod: CPTII,S$GLB,, | Performed by: NURSE PRACTITIONER

## 2021-01-19 PROCEDURE — 1125F AMNT PAIN NOTED PAIN PRSNT: CPT | Mod: S$GLB,,, | Performed by: NURSE PRACTITIONER

## 2021-01-19 PROCEDURE — 3044F HG A1C LEVEL LT 7.0%: CPT | Mod: CPTII,S$GLB,, | Performed by: NURSE PRACTITIONER

## 2021-01-19 PROCEDURE — 3008F PR BODY MASS INDEX (BMI) DOCUMENTED: ICD-10-PCS | Mod: CPTII,S$GLB,, | Performed by: NURSE PRACTITIONER

## 2021-01-19 PROCEDURE — 72110 X-RAY EXAM L-2 SPINE 4/>VWS: CPT | Mod: TC,FY,PO

## 2021-01-19 PROCEDURE — 90471 TDAP VACCINE GREATER THAN OR EQUAL TO 7YO IM: ICD-10-PCS | Mod: S$GLB,,, | Performed by: NURSE PRACTITIONER

## 2021-01-19 PROCEDURE — 90471 IMMUNIZATION ADMIN: CPT | Mod: S$GLB,,, | Performed by: NURSE PRACTITIONER

## 2021-01-19 RX ORDER — METFORMIN HYDROCHLORIDE 500 MG/1
1000 TABLET, EXTENDED RELEASE ORAL 2 TIMES DAILY WITH MEALS
Qty: 360 TABLET | Refills: 3 | Status: SHIPPED | OUTPATIENT
Start: 2021-01-19 | End: 2022-01-18

## 2021-01-19 RX ORDER — LOSARTAN POTASSIUM AND HYDROCHLOROTHIAZIDE 25; 100 MG/1; MG/1
1 TABLET ORAL DAILY
Qty: 90 TABLET | Refills: 3 | Status: SHIPPED | OUTPATIENT
Start: 2021-01-19 | End: 2021-10-07 | Stop reason: SDUPTHER

## 2021-01-22 ENCOUNTER — OFFICE VISIT (OUTPATIENT)
Dept: PAIN MEDICINE | Facility: CLINIC | Age: 56
End: 2021-01-22
Payer: COMMERCIAL

## 2021-01-22 VITALS
HEIGHT: 73 IN | DIASTOLIC BLOOD PRESSURE: 73 MMHG | HEART RATE: 67 BPM | OXYGEN SATURATION: 96 % | WEIGHT: 234 LBS | BODY MASS INDEX: 31.01 KG/M2 | TEMPERATURE: 98 F | SYSTOLIC BLOOD PRESSURE: 147 MMHG

## 2021-01-22 DIAGNOSIS — M53.3 SACROILIAC JOINT PAIN: ICD-10-CM

## 2021-01-22 DIAGNOSIS — M54.9 DORSALGIA, UNSPECIFIED: Primary | ICD-10-CM

## 2021-01-22 DIAGNOSIS — M54.41 CHRONIC RIGHT-SIDED LOW BACK PAIN WITH RIGHT-SIDED SCIATICA: ICD-10-CM

## 2021-01-22 DIAGNOSIS — M47.816 LUMBAR SPONDYLOSIS: ICD-10-CM

## 2021-01-22 DIAGNOSIS — G89.29 CHRONIC RIGHT-SIDED LOW BACK PAIN WITH RIGHT-SIDED SCIATICA: ICD-10-CM

## 2021-01-22 PROCEDURE — 1125F PR PAIN SEVERITY QUANTIFIED, PAIN PRESENT: ICD-10-PCS | Mod: S$GLB,,, | Performed by: ANESTHESIOLOGY

## 2021-01-22 PROCEDURE — 3078F DIAST BP <80 MM HG: CPT | Mod: CPTII,S$GLB,, | Performed by: ANESTHESIOLOGY

## 2021-01-22 PROCEDURE — 99204 PR OFFICE/OUTPT VISIT, NEW, LEVL IV, 45-59 MIN: ICD-10-PCS | Mod: S$GLB,,, | Performed by: ANESTHESIOLOGY

## 2021-01-22 PROCEDURE — 3008F PR BODY MASS INDEX (BMI) DOCUMENTED: ICD-10-PCS | Mod: CPTII,S$GLB,, | Performed by: ANESTHESIOLOGY

## 2021-01-22 PROCEDURE — 3077F PR MOST RECENT SYSTOLIC BLOOD PRESSURE >= 140 MM HG: ICD-10-PCS | Mod: CPTII,S$GLB,, | Performed by: ANESTHESIOLOGY

## 2021-01-22 PROCEDURE — 99999 PR PBB SHADOW E&M-EST. PATIENT-LVL IV: CPT | Mod: PBBFAC,,, | Performed by: ANESTHESIOLOGY

## 2021-01-22 PROCEDURE — 99999 PR PBB SHADOW E&M-EST. PATIENT-LVL IV: ICD-10-PCS | Mod: PBBFAC,,, | Performed by: ANESTHESIOLOGY

## 2021-01-22 PROCEDURE — 99204 OFFICE O/P NEW MOD 45 MIN: CPT | Mod: S$GLB,,, | Performed by: ANESTHESIOLOGY

## 2021-01-22 PROCEDURE — 3078F PR MOST RECENT DIASTOLIC BLOOD PRESSURE < 80 MM HG: ICD-10-PCS | Mod: CPTII,S$GLB,, | Performed by: ANESTHESIOLOGY

## 2021-01-22 PROCEDURE — 3008F BODY MASS INDEX DOCD: CPT | Mod: CPTII,S$GLB,, | Performed by: ANESTHESIOLOGY

## 2021-01-22 PROCEDURE — 1125F AMNT PAIN NOTED PAIN PRSNT: CPT | Mod: S$GLB,,, | Performed by: ANESTHESIOLOGY

## 2021-01-22 PROCEDURE — 3077F SYST BP >= 140 MM HG: CPT | Mod: CPTII,S$GLB,, | Performed by: ANESTHESIOLOGY

## 2021-01-22 RX ORDER — METHOCARBAMOL 500 MG/1
500 TABLET, FILM COATED ORAL 2 TIMES DAILY PRN
Qty: 30 TABLET | Refills: 0 | Status: SHIPPED | OUTPATIENT
Start: 2021-01-22 | End: 2021-03-03

## 2021-02-02 ENCOUNTER — TELEPHONE (OUTPATIENT)
Dept: PAIN MEDICINE | Facility: HOSPITAL | Age: 56
End: 2021-02-02

## 2021-02-02 DIAGNOSIS — M54.16 LUMBAR RADICULOPATHY: ICD-10-CM

## 2021-02-02 DIAGNOSIS — Z13.9 SCREENING PROCEDURE: Primary | ICD-10-CM

## 2021-02-02 RX ORDER — SODIUM CHLORIDE, SODIUM LACTATE, POTASSIUM CHLORIDE, CALCIUM CHLORIDE 600; 310; 30; 20 MG/100ML; MG/100ML; MG/100ML; MG/100ML
INJECTION, SOLUTION INTRAVENOUS CONTINUOUS
Status: CANCELLED | OUTPATIENT
Start: 2021-02-10

## 2021-02-07 ENCOUNTER — LAB VISIT (OUTPATIENT)
Dept: FAMILY MEDICINE | Facility: CLINIC | Age: 56
End: 2021-02-07
Payer: COMMERCIAL

## 2021-02-07 DIAGNOSIS — Z13.9 SCREENING PROCEDURE: ICD-10-CM

## 2021-02-07 PROCEDURE — U0003 INFECTIOUS AGENT DETECTION BY NUCLEIC ACID (DNA OR RNA); SEVERE ACUTE RESPIRATORY SYNDROME CORONAVIRUS 2 (SARS-COV-2) (CORONAVIRUS DISEASE [COVID-19]), AMPLIFIED PROBE TECHNIQUE, MAKING USE OF HIGH THROUGHPUT TECHNOLOGIES AS DESCRIBED BY CMS-2020-01-R: HCPCS

## 2021-02-08 LAB — SARS-COV-2 RNA RESP QL NAA+PROBE: NOT DETECTED

## 2021-02-09 ENCOUNTER — PATIENT MESSAGE (OUTPATIENT)
Dept: SURGERY | Facility: HOSPITAL | Age: 56
End: 2021-02-09

## 2021-02-10 ENCOUNTER — HOSPITAL ENCOUNTER (OUTPATIENT)
Facility: HOSPITAL | Age: 56
Discharge: HOME | End: 2021-02-10
Attending: ANESTHESIOLOGY | Admitting: ANESTHESIOLOGY
Payer: COMMERCIAL

## 2021-02-10 ENCOUNTER — HOSPITAL ENCOUNTER (OUTPATIENT)
Dept: RADIOLOGY | Facility: HOSPITAL | Age: 56
Discharge: HOME | End: 2021-02-10
Attending: ANESTHESIOLOGY | Admitting: ANESTHESIOLOGY
Payer: COMMERCIAL

## 2021-02-10 ENCOUNTER — PATIENT MESSAGE (OUTPATIENT)
Dept: UROLOGY | Facility: CLINIC | Age: 56
End: 2021-02-10

## 2021-02-10 DIAGNOSIS — M54.9 DORSALGIA, UNSPECIFIED: ICD-10-CM

## 2021-02-10 DIAGNOSIS — M54.16 LUMBAR RADICULOPATHY: Primary | ICD-10-CM

## 2021-02-10 LAB — GLUCOSE SERPL-MCNC: 101 MG/DL (ref 70–110)

## 2021-02-10 PROCEDURE — 62323 NJX INTERLAMINAR LMBR/SAC: CPT | Mod: PO | Performed by: ANESTHESIOLOGY

## 2021-02-10 PROCEDURE — 99152 MOD SED SAME PHYS/QHP 5/>YRS: CPT | Mod: ,,, | Performed by: ANESTHESIOLOGY

## 2021-02-10 PROCEDURE — 62323 PR INJ LUMBAR/SACRAL, W/IMAGING GUIDANCE: ICD-10-PCS | Mod: ,,, | Performed by: ANESTHESIOLOGY

## 2021-02-10 PROCEDURE — 76000 FLUOROSCOPY <1 HR PHYS/QHP: CPT | Mod: TC,PO

## 2021-02-10 PROCEDURE — 99152 PR MOD CONSCIOUS SEDATION, SAME PHYS, 5+ YRS, FIRST 15 MIN: ICD-10-PCS | Mod: ,,, | Performed by: ANESTHESIOLOGY

## 2021-02-10 PROCEDURE — 63600175 PHARM REV CODE 636 W HCPCS: Mod: PO | Performed by: ANESTHESIOLOGY

## 2021-02-10 PROCEDURE — 25500020 PHARM REV CODE 255: Mod: PO | Performed by: ANESTHESIOLOGY

## 2021-02-10 PROCEDURE — 62323 NJX INTERLAMINAR LMBR/SAC: CPT | Mod: ,,, | Performed by: ANESTHESIOLOGY

## 2021-02-10 PROCEDURE — 25000003 PHARM REV CODE 250: Mod: PO | Performed by: ANESTHESIOLOGY

## 2021-02-10 RX ORDER — MIDAZOLAM HYDROCHLORIDE 2 MG/2ML
INJECTION, SOLUTION INTRAMUSCULAR; INTRAVENOUS
Status: DISCONTINUED | OUTPATIENT
Start: 2021-02-10 | End: 2021-02-10 | Stop reason: HOSPADM

## 2021-02-10 RX ORDER — SODIUM BICARBONATE 42 MG/ML
INJECTION, SOLUTION INTRAVENOUS
Status: DISCONTINUED | OUTPATIENT
Start: 2021-02-10 | End: 2021-02-10 | Stop reason: HOSPADM

## 2021-02-10 RX ORDER — SODIUM CHLORIDE, SODIUM LACTATE, POTASSIUM CHLORIDE, CALCIUM CHLORIDE 600; 310; 30; 20 MG/100ML; MG/100ML; MG/100ML; MG/100ML
INJECTION, SOLUTION INTRAVENOUS CONTINUOUS
Status: DISCONTINUED | OUTPATIENT
Start: 2021-02-10 | End: 2021-02-10 | Stop reason: HOSPADM

## 2021-02-10 RX ORDER — METHYLPREDNISOLONE ACETATE 80 MG/ML
INJECTION, SUSPENSION INTRA-ARTICULAR; INTRALESIONAL; INTRAMUSCULAR; SOFT TISSUE
Status: DISCONTINUED | OUTPATIENT
Start: 2021-02-10 | End: 2021-02-10 | Stop reason: HOSPADM

## 2021-02-10 RX ORDER — LIDOCAINE HYDROCHLORIDE 10 MG/ML
INJECTION, SOLUTION EPIDURAL; INFILTRATION; INTRACAUDAL; PERINEURAL
Status: DISCONTINUED | OUTPATIENT
Start: 2021-02-10 | End: 2021-02-10 | Stop reason: HOSPADM

## 2021-02-10 RX ADMIN — SODIUM CHLORIDE, SODIUM LACTATE, POTASSIUM CHLORIDE, AND CALCIUM CHLORIDE: .6; .31; .03; .02 INJECTION, SOLUTION INTRAVENOUS at 12:02

## 2021-02-11 VITALS
SYSTOLIC BLOOD PRESSURE: 128 MMHG | TEMPERATURE: 98 F | DIASTOLIC BLOOD PRESSURE: 74 MMHG | OXYGEN SATURATION: 98 % | HEART RATE: 78 BPM | RESPIRATION RATE: 16 BRPM

## 2021-03-02 ENCOUNTER — PATIENT OUTREACH (OUTPATIENT)
Dept: ADMINISTRATIVE | Facility: OTHER | Age: 56
End: 2021-03-02

## 2021-03-03 ENCOUNTER — OFFICE VISIT (OUTPATIENT)
Dept: PAIN MEDICINE | Facility: CLINIC | Age: 56
End: 2021-03-03
Payer: COMMERCIAL

## 2021-03-03 ENCOUNTER — TELEPHONE (OUTPATIENT)
Dept: PAIN MEDICINE | Facility: CLINIC | Age: 56
End: 2021-03-03

## 2021-03-03 VITALS
DIASTOLIC BLOOD PRESSURE: 70 MMHG | OXYGEN SATURATION: 96 % | SYSTOLIC BLOOD PRESSURE: 135 MMHG | BODY MASS INDEX: 31.34 KG/M2 | WEIGHT: 236.44 LBS | TEMPERATURE: 98 F | HEIGHT: 73 IN | HEART RATE: 76 BPM

## 2021-03-03 DIAGNOSIS — Z01.818 PRE-OP TESTING: ICD-10-CM

## 2021-03-03 DIAGNOSIS — M54.41 CHRONIC RIGHT-SIDED LOW BACK PAIN WITH RIGHT-SIDED SCIATICA: ICD-10-CM

## 2021-03-03 DIAGNOSIS — M54.16 LUMBAR RADICULOPATHY: Primary | ICD-10-CM

## 2021-03-03 DIAGNOSIS — G89.29 CHRONIC RIGHT-SIDED LOW BACK PAIN WITH RIGHT-SIDED SCIATICA: ICD-10-CM

## 2021-03-03 PROCEDURE — 99999 PR PBB SHADOW E&M-EST. PATIENT-LVL IV: ICD-10-PCS | Mod: PBBFAC,,, | Performed by: ANESTHESIOLOGY

## 2021-03-03 PROCEDURE — 99214 PR OFFICE/OUTPT VISIT, EST, LEVL IV, 30-39 MIN: ICD-10-PCS | Mod: S$GLB,,, | Performed by: ANESTHESIOLOGY

## 2021-03-03 PROCEDURE — 99999 PR PBB SHADOW E&M-EST. PATIENT-LVL IV: CPT | Mod: PBBFAC,,, | Performed by: ANESTHESIOLOGY

## 2021-03-03 PROCEDURE — 99214 OFFICE O/P EST MOD 30 MIN: CPT | Mod: S$GLB,,, | Performed by: ANESTHESIOLOGY

## 2021-03-03 RX ORDER — CYCLOBENZAPRINE HCL 5 MG
5 TABLET ORAL 2 TIMES DAILY PRN
Qty: 30 TABLET | Refills: 0 | Status: ON HOLD | OUTPATIENT
Start: 2021-03-03 | End: 2022-08-16

## 2021-03-03 RX ORDER — SODIUM CHLORIDE, SODIUM LACTATE, POTASSIUM CHLORIDE, CALCIUM CHLORIDE 600; 310; 30; 20 MG/100ML; MG/100ML; MG/100ML; MG/100ML
INJECTION, SOLUTION INTRAVENOUS CONTINUOUS
Status: CANCELLED | OUTPATIENT
Start: 2021-03-15

## 2021-03-12 ENCOUNTER — LAB VISIT (OUTPATIENT)
Dept: FAMILY MEDICINE | Facility: CLINIC | Age: 56
End: 2021-03-12
Payer: COMMERCIAL

## 2021-03-12 DIAGNOSIS — Z01.818 PRE-OP TESTING: ICD-10-CM

## 2021-03-12 PROCEDURE — U0005 INFEC AGEN DETEC AMPLI PROBE: HCPCS | Performed by: ANESTHESIOLOGY

## 2021-03-12 PROCEDURE — U0003 INFECTIOUS AGENT DETECTION BY NUCLEIC ACID (DNA OR RNA); SEVERE ACUTE RESPIRATORY SYNDROME CORONAVIRUS 2 (SARS-COV-2) (CORONAVIRUS DISEASE [COVID-19]), AMPLIFIED PROBE TECHNIQUE, MAKING USE OF HIGH THROUGHPUT TECHNOLOGIES AS DESCRIBED BY CMS-2020-01-R: HCPCS | Performed by: ANESTHESIOLOGY

## 2021-03-13 LAB — SARS-COV-2 RNA RESP QL NAA+PROBE: NOT DETECTED

## 2021-03-15 ENCOUNTER — HOSPITAL ENCOUNTER (OUTPATIENT)
Dept: RADIOLOGY | Facility: HOSPITAL | Age: 56
Discharge: HOME | End: 2021-03-15
Attending: ANESTHESIOLOGY
Payer: COMMERCIAL

## 2021-03-15 ENCOUNTER — HOSPITAL ENCOUNTER (OUTPATIENT)
Facility: HOSPITAL | Age: 56
Discharge: HOME | End: 2021-03-15
Attending: ANESTHESIOLOGY | Admitting: ANESTHESIOLOGY
Payer: COMMERCIAL

## 2021-03-15 DIAGNOSIS — M54.16 LUMBAR RADICULOPATHY: Primary | ICD-10-CM

## 2021-03-15 DIAGNOSIS — M54.16 LUMBAR RADICULOPATHY: ICD-10-CM

## 2021-03-15 LAB — GLUCOSE SERPL-MCNC: 111 MG/DL (ref 70–110)

## 2021-03-15 PROCEDURE — 63600175 PHARM REV CODE 636 W HCPCS: Mod: PO | Performed by: ANESTHESIOLOGY

## 2021-03-15 PROCEDURE — 99152 PR MOD CONSCIOUS SEDATION, SAME PHYS, 5+ YRS, FIRST 15 MIN: ICD-10-PCS | Mod: ,,, | Performed by: ANESTHESIOLOGY

## 2021-03-15 PROCEDURE — 62323 NJX INTERLAMINAR LMBR/SAC: CPT | Mod: PO | Performed by: ANESTHESIOLOGY

## 2021-03-15 PROCEDURE — 99152 MOD SED SAME PHYS/QHP 5/>YRS: CPT | Mod: ,,, | Performed by: ANESTHESIOLOGY

## 2021-03-15 PROCEDURE — 25000003 PHARM REV CODE 250: Mod: PO | Performed by: ANESTHESIOLOGY

## 2021-03-15 PROCEDURE — 62323 PR INJ LUMBAR/SACRAL, W/IMAGING GUIDANCE: ICD-10-PCS | Mod: ,,, | Performed by: ANESTHESIOLOGY

## 2021-03-15 PROCEDURE — 62323 NJX INTERLAMINAR LMBR/SAC: CPT | Mod: ,,, | Performed by: ANESTHESIOLOGY

## 2021-03-15 PROCEDURE — 76000 FLUOROSCOPY <1 HR PHYS/QHP: CPT | Mod: TC,PO

## 2021-03-15 PROCEDURE — 25500020 PHARM REV CODE 255: Mod: PO | Performed by: ANESTHESIOLOGY

## 2021-03-15 RX ORDER — LIDOCAINE HYDROCHLORIDE 10 MG/ML
INJECTION, SOLUTION EPIDURAL; INFILTRATION; INTRACAUDAL; PERINEURAL
Status: DISCONTINUED | OUTPATIENT
Start: 2021-03-15 | End: 2021-03-15 | Stop reason: HOSPADM

## 2021-03-15 RX ORDER — METHYLPREDNISOLONE ACETATE 80 MG/ML
INJECTION, SUSPENSION INTRA-ARTICULAR; INTRALESIONAL; INTRAMUSCULAR; SOFT TISSUE
Status: DISCONTINUED | OUTPATIENT
Start: 2021-03-15 | End: 2021-03-15 | Stop reason: HOSPADM

## 2021-03-15 RX ORDER — SODIUM CHLORIDE, SODIUM LACTATE, POTASSIUM CHLORIDE, CALCIUM CHLORIDE 600; 310; 30; 20 MG/100ML; MG/100ML; MG/100ML; MG/100ML
INJECTION, SOLUTION INTRAVENOUS CONTINUOUS
Status: DISCONTINUED | OUTPATIENT
Start: 2021-03-15 | End: 2021-03-15 | Stop reason: HOSPADM

## 2021-03-15 RX ORDER — LIDOCAINE HYDROCHLORIDE 10 MG/ML
1 INJECTION INFILTRATION; PERINEURAL ONCE
Status: COMPLETED | OUTPATIENT
Start: 2021-03-15 | End: 2021-03-15

## 2021-03-15 RX ORDER — LIDOCAINE HYDROCHLORIDE 10 MG/ML
1 INJECTION INFILTRATION; PERINEURAL ONCE
Status: DISCONTINUED | OUTPATIENT
Start: 2021-03-15 | End: 2021-03-15

## 2021-03-15 RX ORDER — SODIUM BICARBONATE 42 MG/ML
INJECTION, SOLUTION INTRAVENOUS
Status: DISCONTINUED | OUTPATIENT
Start: 2021-03-15 | End: 2021-03-15 | Stop reason: HOSPADM

## 2021-03-15 RX ADMIN — SODIUM CHLORIDE, SODIUM LACTATE, POTASSIUM CHLORIDE, AND CALCIUM CHLORIDE: .6; .31; .03; .02 INJECTION, SOLUTION INTRAVENOUS at 08:03

## 2021-03-15 RX ADMIN — LIDOCAINE HYDROCHLORIDE 1 ML: 10 INJECTION, SOLUTION EPIDURAL; INFILTRATION; INTRACAUDAL; PERINEURAL at 09:03

## 2021-03-16 VITALS
HEIGHT: 73 IN | DIASTOLIC BLOOD PRESSURE: 71 MMHG | TEMPERATURE: 97 F | BODY MASS INDEX: 31.28 KG/M2 | HEART RATE: 76 BPM | RESPIRATION RATE: 18 BRPM | WEIGHT: 236 LBS | SYSTOLIC BLOOD PRESSURE: 120 MMHG | OXYGEN SATURATION: 94 %

## 2021-03-29 ENCOUNTER — OFFICE VISIT (OUTPATIENT)
Dept: PAIN MEDICINE | Facility: CLINIC | Age: 56
End: 2021-03-29
Payer: COMMERCIAL

## 2021-03-29 VITALS
DIASTOLIC BLOOD PRESSURE: 72 MMHG | OXYGEN SATURATION: 97 % | SYSTOLIC BLOOD PRESSURE: 139 MMHG | TEMPERATURE: 98 F | WEIGHT: 234.69 LBS | HEART RATE: 69 BPM | BODY MASS INDEX: 30.96 KG/M2 | RESPIRATION RATE: 18 BRPM

## 2021-03-29 DIAGNOSIS — M54.41 CHRONIC RIGHT-SIDED LOW BACK PAIN WITH RIGHT-SIDED SCIATICA: Primary | ICD-10-CM

## 2021-03-29 DIAGNOSIS — G89.29 CHRONIC RIGHT-SIDED LOW BACK PAIN WITH RIGHT-SIDED SCIATICA: Primary | ICD-10-CM

## 2021-03-29 DIAGNOSIS — M54.9 DORSALGIA, UNSPECIFIED: ICD-10-CM

## 2021-03-29 PROCEDURE — 99214 PR OFFICE/OUTPT VISIT, EST, LEVL IV, 30-39 MIN: ICD-10-PCS | Mod: S$GLB,,, | Performed by: ANESTHESIOLOGY

## 2021-03-29 PROCEDURE — 99999 PR PBB SHADOW E&M-EST. PATIENT-LVL IV: CPT | Mod: PBBFAC,,, | Performed by: ANESTHESIOLOGY

## 2021-03-29 PROCEDURE — 99214 OFFICE O/P EST MOD 30 MIN: CPT | Mod: S$GLB,,, | Performed by: ANESTHESIOLOGY

## 2021-03-29 PROCEDURE — 99999 PR PBB SHADOW E&M-EST. PATIENT-LVL IV: ICD-10-PCS | Mod: PBBFAC,,, | Performed by: ANESTHESIOLOGY

## 2021-04-25 ENCOUNTER — PATIENT MESSAGE (OUTPATIENT)
Dept: FAMILY MEDICINE | Facility: CLINIC | Age: 56
End: 2021-04-25

## 2021-04-27 ENCOUNTER — OFFICE VISIT (OUTPATIENT)
Dept: FAMILY MEDICINE | Facility: CLINIC | Age: 56
End: 2021-04-27
Payer: COMMERCIAL

## 2021-04-27 ENCOUNTER — LAB VISIT (OUTPATIENT)
Dept: LAB | Facility: HOSPITAL | Age: 56
End: 2021-04-27
Attending: INTERNAL MEDICINE
Payer: COMMERCIAL

## 2021-04-27 VITALS
BODY MASS INDEX: 31.32 KG/M2 | SYSTOLIC BLOOD PRESSURE: 138 MMHG | HEART RATE: 84 BPM | DIASTOLIC BLOOD PRESSURE: 76 MMHG | OXYGEN SATURATION: 95 % | HEIGHT: 73 IN | WEIGHT: 236.31 LBS

## 2021-04-27 DIAGNOSIS — Z00.00 ROUTINE PHYSICAL EXAMINATION: Primary | ICD-10-CM

## 2021-04-27 DIAGNOSIS — E11.9 CONTROLLED TYPE 2 DIABETES MELLITUS WITHOUT COMPLICATION, WITHOUT LONG-TERM CURRENT USE OF INSULIN: ICD-10-CM

## 2021-04-27 PROCEDURE — 1126F PR PAIN SEVERITY QUANTIFIED, NO PAIN PRESENT: ICD-10-PCS | Mod: S$GLB,,, | Performed by: INTERNAL MEDICINE

## 2021-04-27 PROCEDURE — 99999 PR PBB SHADOW E&M-EST. PATIENT-LVL III: ICD-10-PCS | Mod: PBBFAC,,, | Performed by: INTERNAL MEDICINE

## 2021-04-27 PROCEDURE — 99396 PREV VISIT EST AGE 40-64: CPT | Mod: S$GLB,,, | Performed by: INTERNAL MEDICINE

## 2021-04-27 PROCEDURE — 36415 COLL VENOUS BLD VENIPUNCTURE: CPT | Mod: PO | Performed by: INTERNAL MEDICINE

## 2021-04-27 PROCEDURE — 3008F BODY MASS INDEX DOCD: CPT | Mod: CPTII,S$GLB,, | Performed by: INTERNAL MEDICINE

## 2021-04-27 PROCEDURE — 83036 HEMOGLOBIN GLYCOSYLATED A1C: CPT | Performed by: INTERNAL MEDICINE

## 2021-04-27 PROCEDURE — 1126F AMNT PAIN NOTED NONE PRSNT: CPT | Mod: S$GLB,,, | Performed by: INTERNAL MEDICINE

## 2021-04-27 PROCEDURE — 99999 PR PBB SHADOW E&M-EST. PATIENT-LVL III: CPT | Mod: PBBFAC,,, | Performed by: INTERNAL MEDICINE

## 2021-04-27 PROCEDURE — 99396 PR PREVENTIVE VISIT,EST,40-64: ICD-10-PCS | Mod: S$GLB,,, | Performed by: INTERNAL MEDICINE

## 2021-04-27 PROCEDURE — 3008F PR BODY MASS INDEX (BMI) DOCUMENTED: ICD-10-PCS | Mod: CPTII,S$GLB,, | Performed by: INTERNAL MEDICINE

## 2021-04-28 LAB
ESTIMATED AVG GLUCOSE: 148 MG/DL (ref 68–131)
HBA1C MFR BLD: 6.8 % (ref 4–5.6)

## 2021-06-11 ENCOUNTER — TELEPHONE (OUTPATIENT)
Dept: UROLOGY | Facility: CLINIC | Age: 56
End: 2021-06-11

## 2021-06-16 ENCOUNTER — TELEPHONE (OUTPATIENT)
Dept: UROLOGY | Facility: CLINIC | Age: 56
End: 2021-06-16

## 2021-07-07 ENCOUNTER — PATIENT MESSAGE (OUTPATIENT)
Dept: ADMINISTRATIVE | Facility: HOSPITAL | Age: 56
End: 2021-07-07

## 2021-07-27 ENCOUNTER — LAB VISIT (OUTPATIENT)
Dept: LAB | Facility: HOSPITAL | Age: 56
End: 2021-07-27
Payer: COMMERCIAL

## 2021-07-27 DIAGNOSIS — E78.5 DYSLIPIDEMIA: ICD-10-CM

## 2021-07-27 DIAGNOSIS — E11.9 CONTROLLED TYPE 2 DIABETES MELLITUS WITHOUT COMPLICATION, WITHOUT LONG-TERM CURRENT USE OF INSULIN: ICD-10-CM

## 2021-07-27 DIAGNOSIS — I10 ESSENTIAL HYPERTENSION: ICD-10-CM

## 2021-07-27 LAB
ALBUMIN SERPL BCP-MCNC: 3.9 G/DL (ref 3.5–5.2)
ALP SERPL-CCNC: 73 U/L (ref 55–135)
ALT SERPL W/O P-5'-P-CCNC: 40 U/L (ref 10–44)
ANION GAP SERPL CALC-SCNC: 11 MMOL/L (ref 8–16)
AST SERPL-CCNC: 28 U/L (ref 10–40)
BASOPHILS # BLD AUTO: 0.1 K/UL (ref 0–0.2)
BASOPHILS NFR BLD: 1.1 % (ref 0–1.9)
BILIRUB SERPL-MCNC: 1.7 MG/DL (ref 0.1–1)
BUN SERPL-MCNC: 20 MG/DL (ref 6–20)
CALCIUM SERPL-MCNC: 10 MG/DL (ref 8.7–10.5)
CHLORIDE SERPL-SCNC: 103 MMOL/L (ref 95–110)
CHOLEST SERPL-MCNC: 153 MG/DL (ref 120–199)
CHOLEST/HDLC SERPL: 4.5 {RATIO} (ref 2–5)
CO2 SERPL-SCNC: 26 MMOL/L (ref 23–29)
CREAT SERPL-MCNC: 1.2 MG/DL (ref 0.5–1.4)
DIFFERENTIAL METHOD: ABNORMAL
EOSINOPHIL # BLD AUTO: 0.2 K/UL (ref 0–0.5)
EOSINOPHIL NFR BLD: 2.3 % (ref 0–8)
ERYTHROCYTE [DISTWIDTH] IN BLOOD BY AUTOMATED COUNT: 13 % (ref 11.5–14.5)
EST. GFR  (AFRICAN AMERICAN): >60 ML/MIN/1.73 M^2
EST. GFR  (NON AFRICAN AMERICAN): >60 ML/MIN/1.73 M^2
ESTIMATED AVG GLUCOSE: 143 MG/DL (ref 68–131)
GLUCOSE SERPL-MCNC: 114 MG/DL (ref 70–110)
HBA1C MFR BLD: 6.6 % (ref 4–5.6)
HCT VFR BLD AUTO: 50.7 % (ref 40–54)
HDLC SERPL-MCNC: 34 MG/DL (ref 40–75)
HDLC SERPL: 22.2 % (ref 20–50)
HGB BLD-MCNC: 17.1 G/DL (ref 14–18)
IMM GRANULOCYTES # BLD AUTO: 0.07 K/UL (ref 0–0.04)
IMM GRANULOCYTES NFR BLD AUTO: 0.8 % (ref 0–0.5)
LDLC SERPL CALC-MCNC: 80.4 MG/DL (ref 63–159)
LYMPHOCYTES # BLD AUTO: 1.1 K/UL (ref 1–4.8)
LYMPHOCYTES NFR BLD: 12.6 % (ref 18–48)
MCH RBC QN AUTO: 28.8 PG (ref 27–31)
MCHC RBC AUTO-ENTMCNC: 33.7 G/DL (ref 32–36)
MCV RBC AUTO: 86 FL (ref 82–98)
MONOCYTES # BLD AUTO: 0.7 K/UL (ref 0.3–1)
MONOCYTES NFR BLD: 8 % (ref 4–15)
NEUTROPHILS # BLD AUTO: 6.8 K/UL (ref 1.8–7.7)
NEUTROPHILS NFR BLD: 75.2 % (ref 38–73)
NONHDLC SERPL-MCNC: 119 MG/DL
NRBC BLD-RTO: 0 /100 WBC
PLATELET # BLD AUTO: 216 K/UL (ref 150–450)
PMV BLD AUTO: 10.5 FL (ref 9.2–12.9)
POTASSIUM SERPL-SCNC: 4.2 MMOL/L (ref 3.5–5.1)
PROT SERPL-MCNC: 7.6 G/DL (ref 6–8.4)
RBC # BLD AUTO: 5.93 M/UL (ref 4.6–6.2)
SODIUM SERPL-SCNC: 140 MMOL/L (ref 136–145)
TRIGL SERPL-MCNC: 193 MG/DL (ref 30–150)
TSH SERPL DL<=0.005 MIU/L-ACNC: 1.36 UIU/ML (ref 0.4–4)
WBC # BLD AUTO: 9.02 K/UL (ref 3.9–12.7)

## 2021-07-27 PROCEDURE — 80061 LIPID PANEL: CPT | Performed by: NURSE PRACTITIONER

## 2021-07-27 PROCEDURE — 83036 HEMOGLOBIN GLYCOSYLATED A1C: CPT | Performed by: NURSE PRACTITIONER

## 2021-07-27 PROCEDURE — 80053 COMPREHEN METABOLIC PANEL: CPT | Performed by: NURSE PRACTITIONER

## 2021-07-27 PROCEDURE — 85025 COMPLETE CBC W/AUTO DIFF WBC: CPT | Performed by: NURSE PRACTITIONER

## 2021-07-27 PROCEDURE — 84443 ASSAY THYROID STIM HORMONE: CPT | Performed by: NURSE PRACTITIONER

## 2021-07-27 PROCEDURE — 36415 COLL VENOUS BLD VENIPUNCTURE: CPT | Mod: PO | Performed by: NURSE PRACTITIONER

## 2021-09-30 ENCOUNTER — LAB VISIT (OUTPATIENT)
Dept: LAB | Facility: HOSPITAL | Age: 56
End: 2021-09-30
Attending: INTERNAL MEDICINE
Payer: COMMERCIAL

## 2021-09-30 ENCOUNTER — CLINICAL SUPPORT (OUTPATIENT)
Dept: CARDIOLOGY | Facility: HOSPITAL | Age: 56
End: 2021-09-30
Attending: INTERNAL MEDICINE
Payer: COMMERCIAL

## 2021-09-30 VITALS — WEIGHT: 237 LBS | HEIGHT: 73 IN | BODY MASS INDEX: 31.41 KG/M2

## 2021-09-30 DIAGNOSIS — D86.0 SARCOIDOSIS OF LUNG: ICD-10-CM

## 2021-09-30 DIAGNOSIS — Z95.5 STENTED CORONARY ARTERY: ICD-10-CM

## 2021-09-30 DIAGNOSIS — I25.10 CORONARY ARTERY DISEASE INVOLVING NATIVE CORONARY ARTERY OF NATIVE HEART WITHOUT ANGINA PECTORIS: ICD-10-CM

## 2021-09-30 DIAGNOSIS — E11.9 CONTROLLED TYPE 2 DIABETES MELLITUS WITHOUT COMPLICATION, WITHOUT LONG-TERM CURRENT USE OF INSULIN: ICD-10-CM

## 2021-09-30 DIAGNOSIS — E78.5 DYSLIPIDEMIA: ICD-10-CM

## 2021-09-30 DIAGNOSIS — I10 ESSENTIAL HYPERTENSION: ICD-10-CM

## 2021-09-30 LAB
ALBUMIN SERPL BCP-MCNC: 3.9 G/DL (ref 3.5–5.2)
ALP SERPL-CCNC: 72 U/L (ref 55–135)
ALT SERPL W/O P-5'-P-CCNC: 37 U/L (ref 10–44)
ANION GAP SERPL CALC-SCNC: 10 MMOL/L (ref 8–16)
ASCENDING AORTA: 2.77 CM
AST SERPL-CCNC: 27 U/L (ref 10–40)
AV INDEX (PROSTH): 0.66
AV MEAN GRADIENT: 3 MMHG
AV PEAK GRADIENT: 6 MMHG
AV VALVE AREA: 2.73 CM2
AV VELOCITY RATIO: 0.74
BASOPHILS # BLD AUTO: 0.11 K/UL (ref 0–0.2)
BASOPHILS NFR BLD: 1.1 % (ref 0–1.9)
BILIRUB SERPL-MCNC: 1.4 MG/DL (ref 0.1–1)
BSA FOR ECHO PROCEDURE: 2.35 M2
BUN SERPL-MCNC: 16 MG/DL (ref 6–20)
CALCIUM SERPL-MCNC: 9.8 MG/DL (ref 8.7–10.5)
CHLORIDE SERPL-SCNC: 100 MMOL/L (ref 95–110)
CHOLEST SERPL-MCNC: 126 MG/DL (ref 120–199)
CHOLEST/HDLC SERPL: 4.7 {RATIO} (ref 2–5)
CO2 SERPL-SCNC: 26 MMOL/L (ref 23–29)
CREAT SERPL-MCNC: 1.1 MG/DL (ref 0.5–1.4)
CV ECHO LV RWT: 0.66 CM
DIFFERENTIAL METHOD: ABNORMAL
DOP CALC AO PEAK VEL: 1.22 M/S
DOP CALC AO VTI: 24.92 CM
DOP CALC LVOT AREA: 4.1 CM2
DOP CALC LVOT DIAMETER: 2.29 CM
DOP CALC LVOT PEAK VEL: 0.9 M/S
DOP CALC LVOT STROKE VOLUME: 68.01 CM3
DOP CALCLVOT PEAK VEL VTI: 16.52 CM
E WAVE DECELERATION TIME: 160.32 MSEC
E/A RATIO: 1
E/E' RATIO: 8 M/S
ECHO LV POSTERIOR WALL: 1.27 CM (ref 0.6–1.1)
EJECTION FRACTION: 55 %
EOSINOPHIL # BLD AUTO: 0.3 K/UL (ref 0–0.5)
EOSINOPHIL NFR BLD: 2.5 % (ref 0–8)
ERYTHROCYTE [DISTWIDTH] IN BLOOD BY AUTOMATED COUNT: 13.4 % (ref 11.5–14.5)
EST. GFR  (AFRICAN AMERICAN): >60 ML/MIN/1.73 M^2
EST. GFR  (NON AFRICAN AMERICAN): >60 ML/MIN/1.73 M^2
ESTIMATED AVG GLUCOSE: 134 MG/DL (ref 68–131)
FRACTIONAL SHORTENING: 28 % (ref 28–44)
GLUCOSE SERPL-MCNC: 137 MG/DL (ref 70–110)
HBA1C MFR BLD: 6.3 % (ref 4–5.6)
HCT VFR BLD AUTO: 49.3 % (ref 40–54)
HDLC SERPL-MCNC: 27 MG/DL (ref 40–75)
HDLC SERPL: 21.4 % (ref 20–50)
HGB BLD-MCNC: 16.8 G/DL (ref 14–18)
IMM GRANULOCYTES # BLD AUTO: 0.14 K/UL (ref 0–0.04)
IMM GRANULOCYTES NFR BLD AUTO: 1.4 % (ref 0–0.5)
INTERVENTRICULAR SEPTUM: 1.17 CM (ref 0.6–1.1)
IVRT: 139.87 MSEC
LA MAJOR: 4.59 CM
LA MINOR: 4.8 CM
LA WIDTH: 3.69 CM
LDLC SERPL CALC-MCNC: 62.8 MG/DL (ref 63–159)
LEFT ATRIUM SIZE: 3.37 CM
LEFT ATRIUM VOLUME INDEX: 21.5 ML/M2
LEFT ATRIUM VOLUME: 49.6 CM3
LEFT INTERNAL DIMENSION IN SYSTOLE: 2.79 CM (ref 2.1–4)
LEFT VENTRICLE DIASTOLIC VOLUME INDEX: 28.01 ML/M2
LEFT VENTRICLE DIASTOLIC VOLUME: 64.7 ML
LEFT VENTRICLE MASS INDEX: 70 G/M2
LEFT VENTRICLE SYSTOLIC VOLUME INDEX: 12.7 ML/M2
LEFT VENTRICLE SYSTOLIC VOLUME: 29.23 ML
LEFT VENTRICULAR INTERNAL DIMENSION IN DIASTOLE: 3.87 CM (ref 3.5–6)
LEFT VENTRICULAR MASS: 161.41 G
LV LATERAL E/E' RATIO: 6.67 M/S
LV SEPTAL E/E' RATIO: 10 M/S
LYMPHOCYTES # BLD AUTO: 1.3 K/UL (ref 1–4.8)
LYMPHOCYTES NFR BLD: 12.9 % (ref 18–48)
MCH RBC QN AUTO: 29.3 PG (ref 27–31)
MCHC RBC AUTO-ENTMCNC: 34.1 G/DL (ref 32–36)
MCV RBC AUTO: 86 FL (ref 82–98)
MONOCYTES # BLD AUTO: 0.8 K/UL (ref 0.3–1)
MONOCYTES NFR BLD: 8.2 % (ref 4–15)
MV A" WAVE DURATION": 10.28 MSEC
MV PEAK A VEL: 0.8 M/S
MV PEAK E VEL: 0.8 M/S
NEUTROPHILS # BLD AUTO: 7.3 K/UL (ref 1.8–7.7)
NEUTROPHILS NFR BLD: 73.9 % (ref 38–73)
NONHDLC SERPL-MCNC: 99 MG/DL
NRBC BLD-RTO: 0 /100 WBC
PISA TR MAX VEL: 3 M/S
PLATELET # BLD AUTO: 232 K/UL (ref 150–450)
PMV BLD AUTO: 10.6 FL (ref 9.2–12.9)
POTASSIUM SERPL-SCNC: 4.1 MMOL/L (ref 3.5–5.1)
PROT SERPL-MCNC: 7.4 G/DL (ref 6–8.4)
PULM VEIN S/D RATIO: 1.05
PV PEAK D VEL: 0.62 M/S
PV PEAK S VEL: 0.65 M/S
RA MAJOR: 4.28 CM
RA PRESSURE: 3 MMHG
RA WIDTH: 3.35 CM
RBC # BLD AUTO: 5.73 M/UL (ref 4.6–6.2)
RIGHT VENTRICULAR END-DIASTOLIC DIMENSION: 3.9 CM
RV TISSUE DOPPLER FREE WALL SYSTOLIC VELOCITY 1 (APICAL 4 CHAMBER VIEW): 15.37 CM/S
SINUS: 3.02 CM
SODIUM SERPL-SCNC: 136 MMOL/L (ref 136–145)
STJ: 2.57 CM
TDI LATERAL: 0.12 M/S
TDI SEPTAL: 0.08 M/S
TDI: 0.1 M/S
TR MAX PG: 36 MMHG
TRICUSPID ANNULAR PLANE SYSTOLIC EXCURSION: 2.89 CM
TRIGL SERPL-MCNC: 181 MG/DL (ref 30–150)
TV REST PULMONARY ARTERY PRESSURE: 39 MMHG
WBC # BLD AUTO: 9.94 K/UL (ref 3.9–12.7)

## 2021-09-30 PROCEDURE — 83036 HEMOGLOBIN GLYCOSYLATED A1C: CPT | Performed by: INTERNAL MEDICINE

## 2021-09-30 PROCEDURE — 93306 ECHO (CUPID ONLY): ICD-10-PCS | Mod: 26,,, | Performed by: INTERNAL MEDICINE

## 2021-09-30 PROCEDURE — 36415 COLL VENOUS BLD VENIPUNCTURE: CPT | Mod: PO | Performed by: INTERNAL MEDICINE

## 2021-09-30 PROCEDURE — 80061 LIPID PANEL: CPT | Performed by: INTERNAL MEDICINE

## 2021-09-30 PROCEDURE — 85025 COMPLETE CBC W/AUTO DIFF WBC: CPT | Performed by: INTERNAL MEDICINE

## 2021-09-30 PROCEDURE — 93306 TTE W/DOPPLER COMPLETE: CPT | Mod: 26,,, | Performed by: INTERNAL MEDICINE

## 2021-09-30 PROCEDURE — 93306 TTE W/DOPPLER COMPLETE: CPT | Mod: PO

## 2021-09-30 PROCEDURE — 80053 COMPREHEN METABOLIC PANEL: CPT | Performed by: INTERNAL MEDICINE

## 2021-10-07 ENCOUNTER — OFFICE VISIT (OUTPATIENT)
Dept: CARDIOLOGY | Facility: CLINIC | Age: 56
End: 2021-10-07
Payer: COMMERCIAL

## 2021-10-07 VITALS
HEIGHT: 73 IN | BODY MASS INDEX: 31.18 KG/M2 | HEART RATE: 84 BPM | WEIGHT: 235.25 LBS | DIASTOLIC BLOOD PRESSURE: 82 MMHG | SYSTOLIC BLOOD PRESSURE: 143 MMHG

## 2021-10-07 DIAGNOSIS — R00.2 PALPITATIONS: ICD-10-CM

## 2021-10-07 DIAGNOSIS — D86.0 SARCOIDOSIS OF LUNG: ICD-10-CM

## 2021-10-07 DIAGNOSIS — Z95.5 STENTED CORONARY ARTERY: Primary | ICD-10-CM

## 2021-10-07 DIAGNOSIS — I10 ESSENTIAL HYPERTENSION: ICD-10-CM

## 2021-10-07 DIAGNOSIS — E78.5 DYSLIPIDEMIA: ICD-10-CM

## 2021-10-07 DIAGNOSIS — I25.10 CORONARY ARTERY DISEASE INVOLVING NATIVE CORONARY ARTERY OF NATIVE HEART WITHOUT ANGINA PECTORIS: ICD-10-CM

## 2021-10-07 PROCEDURE — 3061F PR NEG MICROALBUMINURIA RESULT DOCUMENTED/REVIEW: ICD-10-PCS | Mod: CPTII,S$GLB,, | Performed by: INTERNAL MEDICINE

## 2021-10-07 PROCEDURE — 99214 PR OFFICE/OUTPT VISIT, EST, LEVL IV, 30-39 MIN: ICD-10-PCS | Mod: S$GLB,,, | Performed by: INTERNAL MEDICINE

## 2021-10-07 PROCEDURE — 99999 PR PBB SHADOW E&M-EST. PATIENT-LVL III: ICD-10-PCS | Mod: PBBFAC,,, | Performed by: INTERNAL MEDICINE

## 2021-10-07 PROCEDURE — 3079F PR MOST RECENT DIASTOLIC BLOOD PRESSURE 80-89 MM HG: ICD-10-PCS | Mod: CPTII,S$GLB,, | Performed by: INTERNAL MEDICINE

## 2021-10-07 PROCEDURE — 3044F HG A1C LEVEL LT 7.0%: CPT | Mod: CPTII,S$GLB,, | Performed by: INTERNAL MEDICINE

## 2021-10-07 PROCEDURE — 1160F RVW MEDS BY RX/DR IN RCRD: CPT | Mod: CPTII,S$GLB,, | Performed by: INTERNAL MEDICINE

## 2021-10-07 PROCEDURE — 1159F PR MEDICATION LIST DOCUMENTED IN MEDICAL RECORD: ICD-10-PCS | Mod: CPTII,S$GLB,, | Performed by: INTERNAL MEDICINE

## 2021-10-07 PROCEDURE — 1159F MED LIST DOCD IN RCRD: CPT | Mod: CPTII,S$GLB,, | Performed by: INTERNAL MEDICINE

## 2021-10-07 PROCEDURE — 99999 PR PBB SHADOW E&M-EST. PATIENT-LVL III: CPT | Mod: PBBFAC,,, | Performed by: INTERNAL MEDICINE

## 2021-10-07 PROCEDURE — 3079F DIAST BP 80-89 MM HG: CPT | Mod: CPTII,S$GLB,, | Performed by: INTERNAL MEDICINE

## 2021-10-07 PROCEDURE — 3077F SYST BP >= 140 MM HG: CPT | Mod: CPTII,S$GLB,, | Performed by: INTERNAL MEDICINE

## 2021-10-07 PROCEDURE — 3008F BODY MASS INDEX DOCD: CPT | Mod: CPTII,S$GLB,, | Performed by: INTERNAL MEDICINE

## 2021-10-07 PROCEDURE — 3077F PR MOST RECENT SYSTOLIC BLOOD PRESSURE >= 140 MM HG: ICD-10-PCS | Mod: CPTII,S$GLB,, | Performed by: INTERNAL MEDICINE

## 2021-10-07 PROCEDURE — 1160F PR REVIEW ALL MEDS BY PRESCRIBER/CLIN PHARMACIST DOCUMENTED: ICD-10-PCS | Mod: CPTII,S$GLB,, | Performed by: INTERNAL MEDICINE

## 2021-10-07 PROCEDURE — 3061F NEG MICROALBUMINURIA REV: CPT | Mod: CPTII,S$GLB,, | Performed by: INTERNAL MEDICINE

## 2021-10-07 PROCEDURE — 3066F PR DOCUMENTATION OF TREATMENT FOR NEPHROPATHY: ICD-10-PCS | Mod: CPTII,S$GLB,, | Performed by: INTERNAL MEDICINE

## 2021-10-07 PROCEDURE — 99214 OFFICE O/P EST MOD 30 MIN: CPT | Mod: S$GLB,,, | Performed by: INTERNAL MEDICINE

## 2021-10-07 PROCEDURE — 3066F NEPHROPATHY DOC TX: CPT | Mod: CPTII,S$GLB,, | Performed by: INTERNAL MEDICINE

## 2021-10-07 PROCEDURE — 3008F PR BODY MASS INDEX (BMI) DOCUMENTED: ICD-10-PCS | Mod: CPTII,S$GLB,, | Performed by: INTERNAL MEDICINE

## 2021-10-07 PROCEDURE — 3044F PR MOST RECENT HEMOGLOBIN A1C LEVEL <7.0%: ICD-10-PCS | Mod: CPTII,S$GLB,, | Performed by: INTERNAL MEDICINE

## 2021-10-07 RX ORDER — LOSARTAN POTASSIUM AND HYDROCHLOROTHIAZIDE 25; 100 MG/1; MG/1
1 TABLET ORAL DAILY
Qty: 90 TABLET | Refills: 3 | Status: SHIPPED | OUTPATIENT
Start: 2021-10-07 | End: 2022-04-28

## 2021-10-07 RX ORDER — VERAPAMIL HYDROCHLORIDE 120 MG/1
120 CAPSULE, EXTENDED RELEASE ORAL DAILY
Qty: 90 CAPSULE | Refills: 4 | Status: SHIPPED | OUTPATIENT
Start: 2021-10-07 | End: 2023-02-02

## 2021-10-07 RX ORDER — CLOPIDOGREL BISULFATE 75 MG/1
75 TABLET ORAL DAILY
Qty: 90 TABLET | Refills: 4 | Status: SHIPPED | OUTPATIENT
Start: 2021-10-07 | End: 2023-01-04

## 2021-10-27 ENCOUNTER — OFFICE VISIT (OUTPATIENT)
Dept: FAMILY MEDICINE | Facility: CLINIC | Age: 56
End: 2021-10-27
Payer: COMMERCIAL

## 2021-10-27 VITALS
HEART RATE: 80 BPM | DIASTOLIC BLOOD PRESSURE: 78 MMHG | BODY MASS INDEX: 30.6 KG/M2 | WEIGHT: 231.94 LBS | OXYGEN SATURATION: 97 % | SYSTOLIC BLOOD PRESSURE: 136 MMHG

## 2021-10-27 DIAGNOSIS — Z00.00 ROUTINE PHYSICAL EXAMINATION: ICD-10-CM

## 2021-10-27 DIAGNOSIS — E11.9 CONTROLLED TYPE 2 DIABETES MELLITUS WITHOUT COMPLICATION, WITHOUT LONG-TERM CURRENT USE OF INSULIN: Primary | ICD-10-CM

## 2021-10-27 DIAGNOSIS — D86.9 SARCOIDOSIS: ICD-10-CM

## 2021-10-27 DIAGNOSIS — I10 ESSENTIAL HYPERTENSION: ICD-10-CM

## 2021-10-27 PROCEDURE — 3075F PR MOST RECENT SYSTOLIC BLOOD PRESS GE 130-139MM HG: ICD-10-PCS | Mod: CPTII,S$GLB,, | Performed by: INTERNAL MEDICINE

## 2021-10-27 PROCEDURE — 3066F PR DOCUMENTATION OF TREATMENT FOR NEPHROPATHY: ICD-10-PCS | Mod: CPTII,S$GLB,, | Performed by: INTERNAL MEDICINE

## 2021-10-27 PROCEDURE — 3061F NEG MICROALBUMINURIA REV: CPT | Mod: CPTII,S$GLB,, | Performed by: INTERNAL MEDICINE

## 2021-10-27 PROCEDURE — 3044F PR MOST RECENT HEMOGLOBIN A1C LEVEL <7.0%: ICD-10-PCS | Mod: CPTII,S$GLB,, | Performed by: INTERNAL MEDICINE

## 2021-10-27 PROCEDURE — 90686 FLU VACCINE (QUAD) GREATER THAN OR EQUAL TO 3YO PRESERVATIVE FREE IM: ICD-10-PCS | Mod: S$GLB,,, | Performed by: INTERNAL MEDICINE

## 2021-10-27 PROCEDURE — 3078F PR MOST RECENT DIASTOLIC BLOOD PRESSURE < 80 MM HG: ICD-10-PCS | Mod: CPTII,S$GLB,, | Performed by: INTERNAL MEDICINE

## 2021-10-27 PROCEDURE — 3066F NEPHROPATHY DOC TX: CPT | Mod: CPTII,S$GLB,, | Performed by: INTERNAL MEDICINE

## 2021-10-27 PROCEDURE — 3078F DIAST BP <80 MM HG: CPT | Mod: CPTII,S$GLB,, | Performed by: INTERNAL MEDICINE

## 2021-10-27 PROCEDURE — 3008F BODY MASS INDEX DOCD: CPT | Mod: CPTII,S$GLB,, | Performed by: INTERNAL MEDICINE

## 2021-10-27 PROCEDURE — 3061F PR NEG MICROALBUMINURIA RESULT DOCUMENTED/REVIEW: ICD-10-PCS | Mod: CPTII,S$GLB,, | Performed by: INTERNAL MEDICINE

## 2021-10-27 PROCEDURE — 90471 FLU VACCINE (QUAD) GREATER THAN OR EQUAL TO 3YO PRESERVATIVE FREE IM: ICD-10-PCS | Mod: S$GLB,,, | Performed by: INTERNAL MEDICINE

## 2021-10-27 PROCEDURE — 1159F MED LIST DOCD IN RCRD: CPT | Mod: CPTII,S$GLB,, | Performed by: INTERNAL MEDICINE

## 2021-10-27 PROCEDURE — 99999 PR PBB SHADOW E&M-EST. PATIENT-LVL III: CPT | Mod: PBBFAC,,, | Performed by: INTERNAL MEDICINE

## 2021-10-27 PROCEDURE — 90471 IMMUNIZATION ADMIN: CPT | Mod: S$GLB,,, | Performed by: INTERNAL MEDICINE

## 2021-10-27 PROCEDURE — 1160F RVW MEDS BY RX/DR IN RCRD: CPT | Mod: CPTII,S$GLB,, | Performed by: INTERNAL MEDICINE

## 2021-10-27 PROCEDURE — 1160F PR REVIEW ALL MEDS BY PRESCRIBER/CLIN PHARMACIST DOCUMENTED: ICD-10-PCS | Mod: CPTII,S$GLB,, | Performed by: INTERNAL MEDICINE

## 2021-10-27 PROCEDURE — 3075F SYST BP GE 130 - 139MM HG: CPT | Mod: CPTII,S$GLB,, | Performed by: INTERNAL MEDICINE

## 2021-10-27 PROCEDURE — 90686 IIV4 VACC NO PRSV 0.5 ML IM: CPT | Mod: S$GLB,,, | Performed by: INTERNAL MEDICINE

## 2021-10-27 PROCEDURE — 99214 PR OFFICE/OUTPT VISIT, EST, LEVL IV, 30-39 MIN: ICD-10-PCS | Mod: 25,S$GLB,, | Performed by: INTERNAL MEDICINE

## 2021-10-27 PROCEDURE — 1159F PR MEDICATION LIST DOCUMENTED IN MEDICAL RECORD: ICD-10-PCS | Mod: CPTII,S$GLB,, | Performed by: INTERNAL MEDICINE

## 2021-10-27 PROCEDURE — 3008F PR BODY MASS INDEX (BMI) DOCUMENTED: ICD-10-PCS | Mod: CPTII,S$GLB,, | Performed by: INTERNAL MEDICINE

## 2021-10-27 PROCEDURE — 99999 PR PBB SHADOW E&M-EST. PATIENT-LVL III: ICD-10-PCS | Mod: PBBFAC,,, | Performed by: INTERNAL MEDICINE

## 2021-10-27 PROCEDURE — 99214 OFFICE O/P EST MOD 30 MIN: CPT | Mod: 25,S$GLB,, | Performed by: INTERNAL MEDICINE

## 2021-10-27 PROCEDURE — 3044F HG A1C LEVEL LT 7.0%: CPT | Mod: CPTII,S$GLB,, | Performed by: INTERNAL MEDICINE

## 2021-11-05 ENCOUNTER — TELEPHONE (OUTPATIENT)
Dept: UROLOGY | Facility: CLINIC | Age: 56
End: 2021-11-05
Payer: COMMERCIAL

## 2021-11-05 DIAGNOSIS — E29.1 MALE HYPOGONADISM: Primary | ICD-10-CM

## 2021-11-06 ENCOUNTER — LAB VISIT (OUTPATIENT)
Dept: LAB | Facility: HOSPITAL | Age: 56
End: 2021-11-06
Attending: UROLOGY
Payer: COMMERCIAL

## 2021-11-06 DIAGNOSIS — E29.1 MALE HYPOGONADISM: ICD-10-CM

## 2021-11-06 LAB
ERYTHROCYTE [DISTWIDTH] IN BLOOD BY AUTOMATED COUNT: 12.8 % (ref 11.5–14.5)
HCT VFR BLD AUTO: 50.4 % (ref 40–54)
HGB BLD-MCNC: 16.8 G/DL (ref 14–18)
MCH RBC QN AUTO: 29.1 PG (ref 27–31)
MCHC RBC AUTO-ENTMCNC: 33.3 G/DL (ref 32–36)
MCV RBC AUTO: 87 FL (ref 82–98)
PLATELET # BLD AUTO: 205 K/UL (ref 150–450)
PMV BLD AUTO: 10.3 FL (ref 9.2–12.9)
RBC # BLD AUTO: 5.77 M/UL (ref 4.6–6.2)
TESTOST SERPL-MCNC: 792 NG/DL (ref 304–1227)
WBC # BLD AUTO: 8.17 K/UL (ref 3.9–12.7)

## 2021-11-06 PROCEDURE — 85027 COMPLETE CBC AUTOMATED: CPT | Performed by: UROLOGY

## 2021-11-06 PROCEDURE — 36415 COLL VENOUS BLD VENIPUNCTURE: CPT | Mod: PO | Performed by: UROLOGY

## 2021-11-06 PROCEDURE — 84403 ASSAY OF TOTAL TESTOSTERONE: CPT | Performed by: UROLOGY

## 2021-11-22 ENCOUNTER — TELEPHONE (OUTPATIENT)
Dept: UROLOGY | Facility: CLINIC | Age: 56
End: 2021-11-22
Payer: COMMERCIAL

## 2021-12-07 ENCOUNTER — HOSPITAL ENCOUNTER (OUTPATIENT)
Dept: RADIOLOGY | Facility: HOSPITAL | Age: 56
Discharge: HOME | End: 2021-12-07
Attending: INTERNAL MEDICINE
Payer: COMMERCIAL

## 2021-12-07 DIAGNOSIS — D86.0 SARCOIDOSIS OF LUNG: ICD-10-CM

## 2021-12-07 LAB
CREAT SERPL-MCNC: 1.2 MG/DL (ref 0.5–1.4)
SAMPLE: NORMAL

## 2021-12-07 PROCEDURE — 75561 CARDIAC MRI FOR MORPH W/DYE: CPT | Mod: TC

## 2021-12-07 PROCEDURE — 75561 MRI CARDIAC MORPHOLOGY FUNCTION W WO: ICD-10-PCS | Mod: 26,,, | Performed by: RADIOLOGY

## 2021-12-07 PROCEDURE — 75561 CARDIAC MRI FOR MORPH W/DYE: CPT | Mod: 26,,, | Performed by: RADIOLOGY

## 2021-12-07 PROCEDURE — 75561 CV CARDIAC MRI MORPHOLOGY (CUPID ONLY): ICD-10-PCS | Mod: 26,,, | Performed by: INTERNAL MEDICINE

## 2021-12-07 PROCEDURE — A9577 INJ MULTIHANCE: HCPCS | Performed by: INTERNAL MEDICINE

## 2021-12-07 PROCEDURE — 75561 CARDIAC MRI FOR MORPH W/DYE: CPT | Mod: 26,,, | Performed by: INTERNAL MEDICINE

## 2021-12-07 PROCEDURE — 25500020 PHARM REV CODE 255: Performed by: INTERNAL MEDICINE

## 2021-12-07 RX ADMIN — GADOBENATE DIMEGLUMINE 20 ML: 529 INJECTION, SOLUTION INTRAVENOUS at 11:12

## 2021-12-09 PROBLEM — I27.20 PULMONARY HYPERTENSION: Status: ACTIVE | Noted: 2021-12-09

## 2022-01-20 ENCOUNTER — PATIENT OUTREACH (OUTPATIENT)
Dept: ADMINISTRATIVE | Facility: OTHER | Age: 57
End: 2022-01-20
Payer: COMMERCIAL

## 2022-01-20 NOTE — PROGRESS NOTES
Health Maintenance Due   Topic Date Due    HIV Screening  Never done    High Dose Statin  Never done    Shingles Vaccine (1 of 2) Never done    Eye Exam  11/11/2020    COVID-19 Vaccine (2 - Booster for [place] series) 05/21/2021    Foot Exam  09/08/2021     Updates were requested from care everywhere.  Chart was reviewed for overdue Proactive [facility] Encounters (HAL) topics (CRS, Breast Cancer Screening, Eye exam)  Health Maintenance has been updated.  LINKS immunization registry triggered.  Immunizations were reconciled.

## 2022-01-25 ENCOUNTER — OFFICE VISIT (OUTPATIENT)
Dept: UROLOGY | Facility: CLINIC | Age: 57
End: 2022-01-25
Payer: COMMERCIAL

## 2022-01-25 VITALS — BODY MASS INDEX: 30.91 KG/M2 | HEIGHT: 73 IN | WEIGHT: 233.25 LBS

## 2022-01-25 DIAGNOSIS — E29.1 HYPOGONADISM MALE: ICD-10-CM

## 2022-01-25 DIAGNOSIS — R39.12 BENIGN PROSTATIC HYPERPLASIA WITH WEAK URINARY STREAM: Primary | ICD-10-CM

## 2022-01-25 DIAGNOSIS — R35.1 NOCTURIA: ICD-10-CM

## 2022-01-25 DIAGNOSIS — N40.1 BENIGN PROSTATIC HYPERPLASIA WITH WEAK URINARY STREAM: Primary | ICD-10-CM

## 2022-01-25 DIAGNOSIS — N20.0 NEPHROLITHIASIS: ICD-10-CM

## 2022-01-25 PROCEDURE — 1160F RVW MEDS BY RX/DR IN RCRD: CPT | Mod: CPTII,S$GLB,, | Performed by: UROLOGY

## 2022-01-25 PROCEDURE — 3008F BODY MASS INDEX DOCD: CPT | Mod: CPTII,S$GLB,, | Performed by: UROLOGY

## 2022-01-25 PROCEDURE — 99999 PR PBB SHADOW E&M-EST. PATIENT-LVL IV: CPT | Mod: PBBFAC,,, | Performed by: UROLOGY

## 2022-01-25 PROCEDURE — 99215 OFFICE O/P EST HI 40 MIN: CPT | Mod: S$GLB,,, | Performed by: UROLOGY

## 2022-01-25 PROCEDURE — 1159F MED LIST DOCD IN RCRD: CPT | Mod: CPTII,S$GLB,, | Performed by: UROLOGY

## 2022-01-25 PROCEDURE — 1159F PR MEDICATION LIST DOCUMENTED IN MEDICAL RECORD: ICD-10-PCS | Mod: CPTII,S$GLB,, | Performed by: UROLOGY

## 2022-01-25 PROCEDURE — 3008F PR BODY MASS INDEX (BMI) DOCUMENTED: ICD-10-PCS | Mod: CPTII,S$GLB,, | Performed by: UROLOGY

## 2022-01-25 PROCEDURE — 1160F PR REVIEW ALL MEDS BY PRESCRIBER/CLIN PHARMACIST DOCUMENTED: ICD-10-PCS | Mod: CPTII,S$GLB,, | Performed by: UROLOGY

## 2022-01-25 PROCEDURE — 99999 PR PBB SHADOW E&M-EST. PATIENT-LVL IV: ICD-10-PCS | Mod: PBBFAC,,, | Performed by: UROLOGY

## 2022-01-25 PROCEDURE — 99215 PR OFFICE/OUTPT VISIT, EST, LEVL V, 40-54 MIN: ICD-10-PCS | Mod: S$GLB,,, | Performed by: UROLOGY

## 2022-01-25 NOTE — PROGRESS NOTES
UROLOGY [place]  1 25 22     Cc low testosterone     Age 56, says his whole family had covid around end of dec 2021, and all did basically well. Pt will have lung studies by a pulmonologist; pt has hx of sarcoidosis.     Voiding well, nocturia x 1-2. No pains or burning. Daytime frequency is normal, no urgency or incontinence.      Has been in testosterone replacement, using 400 mg q 28 d; his son gives him the injection at home. Last level was in nov 2021 at 792. psa 1.5 jan 2021. Hct 50.4     has been on treatment for 6 years, feels it has helped his general energy and sexual interest. Our nurses gave him the injection monthly initially until the covid situation led pt to get the injection at home.     Also has hx of kidney stones, with a large kidney stone treated in 1994 with eswl, with some fragments stuck in the ureter that then required open ureterolithotomy (juana/jorge). Pt says the open surgery was complex and took 10 hours. Pt did fine after that and has had no more problems. About 10 years later he passed another small stone and also he had one small one removed from the ureter by dr jag jensen.        [facility]     Surgical:  has a past surgical history that includes Ureteral exploration (1996); elbow spur removal; Extracorporeal shock wave lithotripsy (1996); removal of skin cancer; and Circumcision.     Medical:  has a past medical history of DJD (degenerative joint disease) of lumbar spine; DM type 2 (diabetes mellitus, type 2); Fatty liver; GERD (gastroesophageal reflux disease); Hepatosplenomegaly; HTN (hypertension); Mixed hyperlipidemia; Nephrolithiasis; Pulmonary sarcoidosis; Sarcoidosis of lung; and Ventricular hypokinesis.     Familial: father with CAD, mother with hypertension     Social:  for [employer] company, retired 2019.                 Current Outpatient Prescriptions on File Prior to Visit   Medication Sig Dispense Refill    aspirin (ENTERIC COATED ASPIRIN) 81 MG EC  tablet Take 81 mg by mouth once daily        blood sugar diagnostic Strp Test q day 100 strip 3    diclofenac sodium 1 % Gel Apply 2 g topically 100 g 2    ipratropium-albuterol (COMBIVENT) 20-10 Inhale 1 puff into the lungs every 4 4 g 11    losartan-hydrochlorothiazide 100-25 mg (HYZAAR) 100-25 mg per tablet TAKE 1 TABLET BY MOUTH ONCE DAILY. 30 tablet 5    metformin (GLUCOPH TAKE 1  tablet 1    sildenafil (REVATIO) 20 mg Tab Take 20 mg by m                            Current Facility-Administered Medications on File Prior to Visit   Medication Dose Route Frequency Provider Last Rate Last Dose    testosterone cypionate injection 300   300 mg Intramuscular Q21 Days Des Larson,    300 mg at 0         REVIEW OF SYSTEMS  GENERAL: has complaints of fatigue less often now that he gets the testosterone treatment. No headaches or dizzy spells.   HEENT: vision preserved. Sinuses: has frequent congestion   CARDIOPULMONARY: No swelling of the legs; no chest pain. Has shortness of breath if walks a lot.    GASTROINTESTINAL: No heartburn. Denies diarrhea; has some constipation, no blood or mucus in stools.   GENITOURINARY: receives testosterone. Denies dysuria, bleeding or incontinence.   MUSCULOSKELETAL: some arthritic complaints in neck and feet   PSYCHIATRIC: No history of depression or anxiety.   ENDOCRINOLOGIC: No reports of sweating, cold or heat intolerance. No polyuria or polydipsia.   NEUROLOGICAL: No headache, dizziness, syncope, paralysis, ataxia, numbness or tingling in the extremities.  LYMPHATICS: No enlarged nodes. No history of splenectomy.  ==     Pt alert, oriented, no distress  HEENT:  wnl.  Neck: supple, no JVD, no lymphadenopathy  Chest: CV NSR  Lungs: normal chest expansion  Abdomen flat, nontender, no organomegaly, no masses.  cva's neg  No hernias. Has large RLQ surgical scar from his R ureterolithotomy, well healed. Also has a small scar from the stab incision for penrose  drain  Penis circumcised, meatus nl  Testes nl, epi nl, scrotum nl  BRIDGER: anus nl, sphincter nl tone, mucosa without lesions, prostate 30 gm, symmetric, no nodules or indurations  Extremities: no edema, peripheral pulses nl  Neuro: preserved          IMPRESSION:     Hypogonadism, on testosterone replacement.   Can continue with same replacement treatment.   Elevated hematocrit, needs to donate blood or get a therapeutic phlebotomy     bph on observation     Nephrolithiasis, having passed stones and having had surgical procedures for them     Erectile dysfunction on pde5 inhibitors     RTC yearly

## 2022-01-31 DIAGNOSIS — N52.9 ERECTILE DYSFUNCTION, UNSPECIFIED ERECTILE DYSFUNCTION TYPE: Primary | ICD-10-CM

## 2022-01-31 NOTE — TELEPHONE ENCOUNTER
----- Message from Eda Beatty sent at 1/31/2022  3:45 PM CST -----  Type: Needs Medical Advice  Who Called:  PT  Symptoms (please be specific):  Pt is asking for update on sildenafil (REVATIO) 20 mg Tab and testosterone Injection    Best Call Back Number: [phone]  Additional Information: Please call and advise

## 2022-02-01 RX ORDER — SILDENAFIL CITRATE 20 MG/1
20 TABLET ORAL DAILY PRN
Qty: 30 TABLET | Refills: 11 | Status: SHIPPED | OUTPATIENT
Start: 2022-02-01 | End: 2022-02-07

## 2022-02-18 ENCOUNTER — PATIENT MESSAGE (OUTPATIENT)
Dept: ADMINISTRATIVE | Facility: HOSPITAL | Age: 57
End: 2022-02-18
Payer: COMMERCIAL

## 2022-02-18 ENCOUNTER — PATIENT OUTREACH (OUTPATIENT)
Dept: ADMINISTRATIVE | Facility: HOSPITAL | Age: 57
End: 2022-02-18
Payer: COMMERCIAL

## 2022-02-22 ENCOUNTER — TELEPHONE (OUTPATIENT)
Dept: UROLOGY | Facility: CLINIC | Age: 57
End: 2022-02-22
Payer: COMMERCIAL

## 2022-02-22 NOTE — TELEPHONE ENCOUNTER
Spoke with patient, we are sending over an order for Therapeutic blood draw to [facility]. Patient reports he is there to have therapeutic blood draw. Patient expressed understanding.

## 2022-02-22 NOTE — TELEPHONE ENCOUNTER
----- Message from Taryn Carl sent at 2/22/2022  1:10 PM CST -----  Contact: @[phone]  Pt calling and is at the lab now needs orders faxed for blood lab please call to discuss further  Fax number: [phone] Name of facility:  [facility] phone:[phone]-Graph Story

## 2022-02-22 NOTE — TELEPHONE ENCOUNTER
----- Message from Taryn Carl sent at 2/22/2022  1:10 PM CST -----  Contact: @[phone]  Pt calling and is at the lab now needs orders faxed for blood lab please call to discuss further  Fax number: [phone] Name of facility:  [facility] phone:[phone]-HelloTel

## 2022-03-14 ENCOUNTER — PATIENT OUTREACH (OUTPATIENT)
Dept: ADMINISTRATIVE | Facility: OTHER | Age: 57
End: 2022-03-14
Payer: COMMERCIAL

## 2022-03-14 DIAGNOSIS — Z13.5 ENCOUNTER FOR SCREENING FOR DIABETIC RETINOPATHY: Primary | ICD-10-CM

## 2022-03-16 ENCOUNTER — OFFICE VISIT (OUTPATIENT)
Dept: PODIATRY | Facility: CLINIC | Age: 57
End: 2022-03-16
Payer: COMMERCIAL

## 2022-03-16 DIAGNOSIS — E11.49 TYPE II DIABETES MELLITUS WITH NEUROLOGICAL MANIFESTATIONS: Primary | ICD-10-CM

## 2022-03-16 DIAGNOSIS — G57.63 NEUROMA OF SECOND INTERSPACE OF BOTH FEET: ICD-10-CM

## 2022-03-16 DIAGNOSIS — G57.63 MORTON'S NEUROMA OF THIRD INTERSPACES OF BOTH FEET: ICD-10-CM

## 2022-03-16 PROCEDURE — 99999 PR PBB SHADOW E&M-EST. PATIENT-LVL III: ICD-10-PCS | Mod: PBBFAC,,, | Performed by: PODIATRIST

## 2022-03-16 PROCEDURE — 64455 NJX AA&/STRD PLTR COM DG NRV: CPT | Mod: 50,S$GLB,, | Performed by: PODIATRIST

## 2022-03-16 PROCEDURE — 1160F PR REVIEW ALL MEDS BY PRESCRIBER/CLIN PHARMACIST DOCUMENTED: ICD-10-PCS | Mod: CPTII,S$GLB,, | Performed by: PODIATRIST

## 2022-03-16 PROCEDURE — 64455 PR INJECT ANES/STEROID PLANTAR COMMON DIGITAL NERVE: ICD-10-PCS | Mod: 50,S$GLB,, | Performed by: PODIATRIST

## 2022-03-16 PROCEDURE — 99213 OFFICE O/P EST LOW 20 MIN: CPT | Mod: 25,S$GLB,, | Performed by: PODIATRIST

## 2022-03-16 PROCEDURE — 99213 PR OFFICE/OUTPT VISIT, EST, LEVL III, 20-29 MIN: ICD-10-PCS | Mod: 25,S$GLB,, | Performed by: PODIATRIST

## 2022-03-16 PROCEDURE — 1159F MED LIST DOCD IN RCRD: CPT | Mod: CPTII,S$GLB,, | Performed by: PODIATRIST

## 2022-03-16 PROCEDURE — 1160F RVW MEDS BY RX/DR IN RCRD: CPT | Mod: CPTII,S$GLB,, | Performed by: PODIATRIST

## 2022-03-16 PROCEDURE — 99999 PR PBB SHADOW E&M-EST. PATIENT-LVL III: CPT | Mod: PBBFAC,,, | Performed by: PODIATRIST

## 2022-03-16 PROCEDURE — 1159F PR MEDICATION LIST DOCUMENTED IN MEDICAL RECORD: ICD-10-PCS | Mod: CPTII,S$GLB,, | Performed by: PODIATRIST

## 2022-03-16 RX ORDER — DEXAMETHASONE SODIUM PHOSPHATE 4 MG/ML
4 INJECTION, SOLUTION INTRA-ARTICULAR; INTRALESIONAL; INTRAMUSCULAR; INTRAVENOUS; SOFT TISSUE
Status: COMPLETED | OUTPATIENT
Start: 2022-03-16 | End: 2022-03-16

## 2022-03-16 RX ORDER — METHYLPREDNISOLONE ACETATE 40 MG/ML
40 INJECTION, SUSPENSION INTRA-ARTICULAR; INTRALESIONAL; INTRAMUSCULAR; SOFT TISSUE
Status: COMPLETED | OUTPATIENT
Start: 2022-03-16 | End: 2022-03-16

## 2022-03-16 RX ADMIN — METHYLPREDNISOLONE ACETATE 40 MG: 40 INJECTION, SUSPENSION INTRA-ARTICULAR; INTRALESIONAL; INTRAMUSCULAR; SOFT TISSUE at 08:03

## 2022-03-16 RX ADMIN — DEXAMETHASONE SODIUM PHOSPHATE 4 MG: 4 INJECTION, SOLUTION INTRA-ARTICULAR; INTRALESIONAL; INTRAMUSCULAR; INTRAVENOUS; SOFT TISSUE at 08:03

## 2022-03-16 NOTE — PROGRESS NOTES
Subjective:      Patient ID: Cade Johnston is a 56 y.o. male.    Chief Complaint: Nail Care (FOOT EXAM DUE, Joni Luis 10/27/21, A1C 6.3 09/30/21)    Cade is a 56 y.o. male who presents to the clinic upon referral from Dr. Shahnaz umana. provider found  for evaluation and treatment of diabetic feet. Cade has a past medical history of DJD (degenerative joint disease) of lumbar spine, DM type 2 (diabetes mellitus, type 2), Dyslipidemia (8/13/2019), Essential hypertension (12/29/2015), Fatty liver, GERD (gastroesophageal reflux disease), Hepatosplenomegaly, HTN (hypertension), Hypogonadism male (2/17/2015), Mixed hyperlipidemia, Nephrolithiasis, Palpitations (3/9/2016), Pulmonary sarcoidosis, Sarcoidosis of lung (1990), Type II or unspecified type diabetes mellitus without mention of complication, not stated as uncontrolled (4/27/2014), and Ventricular hypokinesis. Patient relates pain to the ball of the foot bilateral with a feeling like he is walking on a balled up sock all the time but nothing is there. The injections he got 1.5 years ago helped a lot and the pain has only recently started to return. He is also here for his diabetic foot check up      PCP: Joni Smith MD    Date Last Seen by PCP: 10/27/221    Current shoe gear: Tennis shoes    Hemoglobin A1C   Date Value Ref Range Status   09/30/2021 6.3 (H) 4.0 - 5.6 % Final     Comment:     ADA Screening Guidelines:  5.7-6.4%  Consistent with prediabetes  >or=6.5%  Consistent with diabetes    High levels of fetal hemoglobin interfere with the HbA1C  assay. Heterozygous hemoglobin variants (HbS, HgC, etc)do  not significantly interfere with this assay.   However, presence of multiple variants may affect accuracy.     07/27/2021 6.6 (H) 4.0 - 5.6 % Final     Comment:     ADA Screening Guidelines:  5.7-6.4%  Consistent with prediabetes  >or=6.5%  Consistent with diabetes    High levels of fetal hemoglobin interfere with the HbA1C  assay. Heterozygous hemoglobin  variants (HbS, HgC, etc)do  not significantly interfere with this assay.   However, presence of multiple variants may affect accuracy.     04/27/2021 6.8 (H) 4.0 - 5.6 % Final     Comment:     ADA Screening Guidelines:  5.7-6.4%  Consistent with prediabetes  >or=6.5%  Consistent with diabetes    High levels of fetal hemoglobin interfere with the HbA1C  assay. Heterozygous hemoglobin variants (HbS, HgC, etc)do  not significantly interfere with this assay.   However, presence of multiple variants may affect accuracy.             Review of Systems   Constitutional: Negative for chills and fever.   Cardiovascular: Positive for leg swelling. Negative for claudication.   Respiratory: Negative for shortness of breath.    Skin: Positive for nail changes. Negative for itching and rash.   Musculoskeletal: Positive for arthritis. Negative for muscle cramps, muscle weakness and myalgias.   Gastrointestinal: Negative for nausea and vomiting.   Neurological: Positive for numbness and paresthesias. Negative for focal weakness and loss of balance.           Objective:      Physical Exam  Constitutional:       General: He is not in acute distress.     Appearance: He is well-developed. He is not diaphoretic.   Cardiovascular:      Pulses:           Dorsalis pedis pulses are 2+ on the right side and 2+ on the left side.        Posterior tibial pulses are 2+ on the right side and 2+ on the left side.      Comments: < 3 sec capillary refill time to toes 1-5 bilateral. Feet are warm to touch proximally with distal cooling b/l. There is no hair growth on the feet and toes b/l. There is mild edema b/l with varicosities present b/l.     Musculoskeletal:      Comments: Equinus noted b/l ankles with < 5 deg DF noted. MMT 5/5 in DF/PF/Inv/Ev resistance with no reproduction of pain in any direction. Passive range of motion of ankle and pedal joints is painless b/l.     There is pain on palpation of the second and third intermetatarsal space  with a positive Raj's click bilateral foot. Minimal tenderness to palpation of the adjacent metatarsal heads.       Feet:      Right foot:      Protective Sensation: 10 sites tested. 10 sites sensed.      Left foot:      Protective Sensation: 10 sites tested. 10 sites sensed.   Skin:     General: Skin is warm and dry.      Coloration: Skin is not pale.      Findings: No abrasion, bruising, burn, ecchymosis, laceration, lesion, petechiae or rash.      Nails: There is no clubbing.      Comments: Skin is thin and atrophic bilateral    Left 1-2 and right hallux nails removed with nail bed hard eschar covering minimal erythema and no drainage.        Neurological:      Mental Status: He is alert and oriented to person, place, and time.      Sensory: Sensory deficit present.      Motor: No tremor, atrophy or abnormal muscle tone.      Comments: Negative tinel sign bilateral. Diminished vibratory and protective sensation bilateral   Psychiatric:         Behavior: Behavior normal.               Assessment:       Encounter Diagnoses   Name Primary?   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    Shoe inspection. Diabetic Foot Education. Patient reminded of the importance of good nutrition and blood sugar control to help prevent podiatric complications of diabetes. Patient instructed on proper foot hygeine. We discussed wearing proper shoe gear, daily foot inspections, never walking without protective shoe gear, never putting sharp instruments to  feet    Obtained verbal consent from the patient to perform an injection to his left foot. The injection site was prepped with alcohol and skin anesthesia achieved with Ethyl Chloride. A mixture containing a total of 0.5 mL of Depomedrol and 0.5 mL dexamethasone and 1 mL 1% plain lidocaine was injected within the affected intermetatarsal area described above split between left 2-3 interspaces and the same between right 2-3 interspaces. The patient tolerated the procedure well and expressed improvement in her symptoms.    Return in 6 weeks follow up    Chung Peterson DPM

## 2022-04-11 ENCOUNTER — TELEPHONE (OUTPATIENT)
Dept: UROLOGY | Facility: CLINIC | Age: 57
End: 2022-04-11
Payer: COMMERCIAL

## 2022-04-11 NOTE — TELEPHONE ENCOUNTER
----- Message from Syl Garcia sent at 4/11/2022  3:29 PM CDT -----  Contact: self  Type:  RX Refill Request    Who Called:  patient  Refill or New Rx:  refill  RX Name and Strength:  testosterone inj  How is the patient currently taking it? (ex. 1XDay):  as directed  Is this a 30 day or 90 day RX:  30  Preferred Pharmacy with phone number:    [facility] - [place], [place] - [address]  [address] [address]  Phone: [phone] Fax: [phone]  Local or Mail Order: local  Ordering Provider:  Dr Larson  [facility] Call Back Number:  [phone] (home)   Additional Information:  Needs PA for the new quarter and thanks

## 2022-04-21 ENCOUNTER — LAB VISIT (OUTPATIENT)
Dept: LAB | Facility: HOSPITAL | Age: 57
End: 2022-04-21
Attending: INTERNAL MEDICINE
Payer: COMMERCIAL

## 2022-04-21 DIAGNOSIS — I10 ESSENTIAL HYPERTENSION: ICD-10-CM

## 2022-04-21 DIAGNOSIS — D86.9 SARCOIDOSIS: ICD-10-CM

## 2022-04-21 DIAGNOSIS — Z00.00 ROUTINE PHYSICAL EXAMINATION: ICD-10-CM

## 2022-04-21 DIAGNOSIS — E11.9 CONTROLLED TYPE 2 DIABETES MELLITUS WITHOUT COMPLICATION, WITHOUT LONG-TERM CURRENT USE OF INSULIN: ICD-10-CM

## 2022-04-21 LAB
ALBUMIN SERPL BCP-MCNC: 3.8 G/DL (ref 3.5–5.2)
ALP SERPL-CCNC: 80 U/L (ref 55–135)
ALT SERPL W/O P-5'-P-CCNC: 27 U/L (ref 10–44)
ANION GAP SERPL CALC-SCNC: 10 MMOL/L (ref 8–16)
AST SERPL-CCNC: 20 U/L (ref 10–40)
BASOPHILS # BLD AUTO: 0.08 K/UL (ref 0–0.2)
BASOPHILS NFR BLD: 1.1 % (ref 0–1.9)
BILIRUB SERPL-MCNC: 1.6 MG/DL (ref 0.1–1)
BUN SERPL-MCNC: 17 MG/DL (ref 6–20)
CALCIUM SERPL-MCNC: 9.5 MG/DL (ref 8.7–10.5)
CHLORIDE SERPL-SCNC: 98 MMOL/L (ref 95–110)
CO2 SERPL-SCNC: 28 MMOL/L (ref 23–29)
COMPLEXED PSA SERPL-MCNC: 2.1 NG/ML (ref 0–4)
CREAT SERPL-MCNC: 1.1 MG/DL (ref 0.5–1.4)
DIFFERENTIAL METHOD: ABNORMAL
EOSINOPHIL # BLD AUTO: 0.1 K/UL (ref 0–0.5)
EOSINOPHIL NFR BLD: 1.5 % (ref 0–8)
ERYTHROCYTE [DISTWIDTH] IN BLOOD BY AUTOMATED COUNT: 13.5 % (ref 11.5–14.5)
EST. GFR  (AFRICAN AMERICAN): >60 ML/MIN/1.73 M^2
EST. GFR  (NON AFRICAN AMERICAN): >60 ML/MIN/1.73 M^2
ESTIMATED AVG GLUCOSE: 154 MG/DL (ref 68–131)
GLUCOSE SERPL-MCNC: 140 MG/DL (ref 70–110)
HBA1C MFR BLD: 7 % (ref 4–5.6)
HCT VFR BLD AUTO: 51.7 % (ref 40–54)
HGB BLD-MCNC: 17.3 G/DL (ref 14–18)
IMM GRANULOCYTES # BLD AUTO: 0.06 K/UL (ref 0–0.04)
IMM GRANULOCYTES NFR BLD AUTO: 0.8 % (ref 0–0.5)
LYMPHOCYTES # BLD AUTO: 1 K/UL (ref 1–4.8)
LYMPHOCYTES NFR BLD: 13.8 % (ref 18–48)
MCH RBC QN AUTO: 28.1 PG (ref 27–31)
MCHC RBC AUTO-ENTMCNC: 33.5 G/DL (ref 32–36)
MCV RBC AUTO: 84 FL (ref 82–98)
MONOCYTES # BLD AUTO: 0.7 K/UL (ref 0.3–1)
MONOCYTES NFR BLD: 8.9 % (ref 4–15)
NEUTROPHILS # BLD AUTO: 5.6 K/UL (ref 1.8–7.7)
NEUTROPHILS NFR BLD: 73.9 % (ref 38–73)
NRBC BLD-RTO: 0 /100 WBC
PLATELET # BLD AUTO: 204 K/UL (ref 150–450)
PMV BLD AUTO: 9.5 FL (ref 9.2–12.9)
POTASSIUM SERPL-SCNC: 4.4 MMOL/L (ref 3.5–5.1)
PROT SERPL-MCNC: 7.7 G/DL (ref 6–8.4)
RBC # BLD AUTO: 6.16 M/UL (ref 4.6–6.2)
SODIUM SERPL-SCNC: 136 MMOL/L (ref 136–145)
WBC # BLD AUTO: 7.53 K/UL (ref 3.9–12.7)

## 2022-04-21 PROCEDURE — 80053 COMPREHEN METABOLIC PANEL: CPT | Performed by: INTERNAL MEDICINE

## 2022-04-21 PROCEDURE — 83036 HEMOGLOBIN GLYCOSYLATED A1C: CPT | Performed by: INTERNAL MEDICINE

## 2022-04-21 PROCEDURE — 36415 COLL VENOUS BLD VENIPUNCTURE: CPT | Mod: PO | Performed by: INTERNAL MEDICINE

## 2022-04-21 PROCEDURE — 84153 ASSAY OF PSA TOTAL: CPT | Performed by: INTERNAL MEDICINE

## 2022-04-21 PROCEDURE — 85025 COMPLETE CBC W/AUTO DIFF WBC: CPT | Performed by: INTERNAL MEDICINE

## 2022-04-28 ENCOUNTER — OFFICE VISIT (OUTPATIENT)
Dept: FAMILY MEDICINE | Facility: CLINIC | Age: 57
End: 2022-04-28
Payer: COMMERCIAL

## 2022-04-28 VITALS
BODY MASS INDEX: 30.65 KG/M2 | SYSTOLIC BLOOD PRESSURE: 136 MMHG | DIASTOLIC BLOOD PRESSURE: 82 MMHG | HEIGHT: 73 IN | TEMPERATURE: 98 F | OXYGEN SATURATION: 97 % | WEIGHT: 231.25 LBS | HEART RATE: 79 BPM

## 2022-04-28 DIAGNOSIS — D86.0 SARCOIDOSIS OF LUNG: ICD-10-CM

## 2022-04-28 DIAGNOSIS — E78.5 DYSLIPIDEMIA: ICD-10-CM

## 2022-04-28 DIAGNOSIS — I10 ESSENTIAL HYPERTENSION: ICD-10-CM

## 2022-04-28 DIAGNOSIS — D75.1 POLYCYTHEMIA: ICD-10-CM

## 2022-04-28 DIAGNOSIS — R10.33 PERIUMBILICAL ABDOMINAL PAIN: ICD-10-CM

## 2022-04-28 DIAGNOSIS — Z00.00 ROUTINE PHYSICAL EXAMINATION: Primary | ICD-10-CM

## 2022-04-28 DIAGNOSIS — E11.9 CONTROLLED TYPE 2 DIABETES MELLITUS WITHOUT COMPLICATION, WITHOUT LONG-TERM CURRENT USE OF INSULIN: ICD-10-CM

## 2022-04-28 PROCEDURE — 99999 PR PBB SHADOW E&M-EST. PATIENT-LVL IV: ICD-10-PCS | Mod: PBBFAC,,, | Performed by: INTERNAL MEDICINE

## 2022-04-28 PROCEDURE — 1159F PR MEDICATION LIST DOCUMENTED IN MEDICAL RECORD: ICD-10-PCS | Mod: CPTII,S$GLB,, | Performed by: INTERNAL MEDICINE

## 2022-04-28 PROCEDURE — 99396 PREV VISIT EST AGE 40-64: CPT | Mod: S$GLB,,, | Performed by: INTERNAL MEDICINE

## 2022-04-28 PROCEDURE — 1159F MED LIST DOCD IN RCRD: CPT | Mod: CPTII,S$GLB,, | Performed by: INTERNAL MEDICINE

## 2022-04-28 PROCEDURE — 3008F PR BODY MASS INDEX (BMI) DOCUMENTED: ICD-10-PCS | Mod: CPTII,S$GLB,, | Performed by: INTERNAL MEDICINE

## 2022-04-28 PROCEDURE — 3079F DIAST BP 80-89 MM HG: CPT | Mod: CPTII,S$GLB,, | Performed by: INTERNAL MEDICINE

## 2022-04-28 PROCEDURE — 3075F SYST BP GE 130 - 139MM HG: CPT | Mod: CPTII,S$GLB,, | Performed by: INTERNAL MEDICINE

## 2022-04-28 PROCEDURE — 3051F PR MOST RECENT HEMOGLOBIN A1C LEVEL 7.0 - < 8.0%: ICD-10-PCS | Mod: CPTII,S$GLB,, | Performed by: INTERNAL MEDICINE

## 2022-04-28 PROCEDURE — 1160F PR REVIEW ALL MEDS BY PRESCRIBER/CLIN PHARMACIST DOCUMENTED: ICD-10-PCS | Mod: CPTII,S$GLB,, | Performed by: INTERNAL MEDICINE

## 2022-04-28 PROCEDURE — 99396 PR PREVENTIVE VISIT,EST,40-64: ICD-10-PCS | Mod: S$GLB,,, | Performed by: INTERNAL MEDICINE

## 2022-04-28 PROCEDURE — 3008F BODY MASS INDEX DOCD: CPT | Mod: CPTII,S$GLB,, | Performed by: INTERNAL MEDICINE

## 2022-04-28 PROCEDURE — 3079F PR MOST RECENT DIASTOLIC BLOOD PRESSURE 80-89 MM HG: ICD-10-PCS | Mod: CPTII,S$GLB,, | Performed by: INTERNAL MEDICINE

## 2022-04-28 PROCEDURE — 3051F HG A1C>EQUAL 7.0%<8.0%: CPT | Mod: CPTII,S$GLB,, | Performed by: INTERNAL MEDICINE

## 2022-04-28 PROCEDURE — 99999 PR PBB SHADOW E&M-EST. PATIENT-LVL IV: CPT | Mod: PBBFAC,,, | Performed by: INTERNAL MEDICINE

## 2022-04-28 PROCEDURE — 1160F RVW MEDS BY RX/DR IN RCRD: CPT | Mod: CPTII,S$GLB,, | Performed by: INTERNAL MEDICINE

## 2022-04-28 PROCEDURE — 3075F PR MOST RECENT SYSTOLIC BLOOD PRESS GE 130-139MM HG: ICD-10-PCS | Mod: CPTII,S$GLB,, | Performed by: INTERNAL MEDICINE

## 2022-04-28 RX ORDER — LOSARTAN POTASSIUM AND HYDROCHLOROTHIAZIDE 12.5; 1 MG/1; MG/1
1 TABLET ORAL DAILY
Qty: 30 TABLET | Refills: 5 | Status: SHIPPED | OUTPATIENT
Start: 2022-04-28 | End: 2022-05-26

## 2022-04-28 NOTE — PROGRESS NOTES
Subjective:       Patient ID: Cade Johnston is a 56 y.o. male.    Chief Complaint: Annual Exam    Here for routine health maintenance.      Sarcoidosis:  Had to retire.  Had several episodes at work where he had trouble breathing and was alone not to be found.  45% lung fnx, sees pulmonary - increased sob - now with talking.  States lungs slightly worse.  On new inhaler Breo from pulm.  pulm recommended retiring.    low testosterone, Dr Larson;  Secondary erythrocytosis - gets frequent phlebotomies.  2nd to testosterone and sarcoidosis      CAD s/p stent.    Sees Dr Duran cardiology.     VÍCTOR - + fatigue; could not tolerate cpap 10 yr ago.     DM - controlled, but worse; poor diet and feels can get it back down   HTN - controlled  HLD - controlled ldl < 70 goal; refusing statin in past.  High triglycerides  BPH/ ED - sees Dr Larson     Mood good.    Complains of pain just above naval.  Started last week.  Some nausea.  Improving.  Dx with ventral hernia versus diastasis by general surgeon in past.  otc PPI helped.     Review of Systems   Constitutional: Negative for appetite change and fever.   HENT: Negative for nosebleeds and trouble swallowing.    Eyes: Negative for discharge and visual disturbance.   Respiratory: Positive for shortness of breath. Negative for choking.    Cardiovascular: Negative for chest pain and palpitations.   Gastrointestinal: Positive for abdominal pain and nausea. Negative for vomiting.   Musculoskeletal: Negative for arthralgias and joint swelling.   Skin: Negative for rash and wound.   Neurological: Negative for dizziness and syncope.   Psychiatric/Behavioral: Negative for confusion and dysphoric mood.       Objective:      Vitals:    04/28/22 0744   BP: 136/82   Pulse: 79   Temp: 98.2 °F (36.8 °C)     Physical Exam  Vitals reviewed.   Eyes:      Conjunctiva/sclera: Conjunctivae normal.   Neck:      Thyroid: No thyromegaly.      Trachea: Trachea normal.   Cardiovascular:      Heart  sounds: Normal heart sounds.      Comments: Edema negative  Pulmonary:      Effort: Pulmonary effort is normal.      Breath sounds: Normal breath sounds.   Abdominal:      Palpations: Abdomen is soft. There is no hepatomegaly.      Comments: Ventral bulge central above umbilicus    Musculoskeletal:      Cervical back: Normal range of motion.      Comments: ROM normal bilateral  Strength normal bilateral   Skin:     General: Skin is warm and dry.   Neurological:      Cranial Nerves: No cranial nerve deficit.      Deep Tendon Reflexes: Reflexes are normal and symmetric.   Psychiatric:      Comments: Alert and Oriented            Assessment:       1. Routine physical examination    2. Controlled type 2 diabetes mellitus without complication, without long-term current use of insulin    3. Essential hypertension    4. Dyslipidemia    5. Periumbilical abdominal pain    6. Sarcoidosis of lung    7. Polycythemia        Plan:       Routine physical examination  -     Hemoglobin A1C; Future; Expected date: 10/25/2022  -     Comprehensive Metabolic Panel; Future; Expected date: 10/25/2022  -     Lipid Panel; Future; Expected date: 10/25/2022  -     PSA, Screening; Future; Expected date: 10/25/2022  -     CBC Auto Differential; Future; Expected date: 10/25/2022    Controlled type 2 diabetes mellitus without complication, without long-term current use of insulin  -     Microalbumin/Creatinine Ratio, Urine; Future; Expected date: 10/25/2022    Essential hypertension  -     losartan-hydrochlorothiazide 100-12.5 mg (HYZAAR) 100-12.5 mg Tab; Take 1 tablet by mouth once daily.  Dispense: 30 tablet; Refill: 5    Dyslipidemia    Periumbilical abdominal pain    Sarcoidosis of lung    Polycythemia            Medication List with Changes/Refills   New Medications    LOSARTAN-HYDROCHLOROTHIAZIDE 100-12.5 MG (HYZAAR) 100-12.5 MG TAB    Take 1 tablet by mouth once daily.   Current Medications    ALBUTEROL (PROVENTIL/VENTOLIN HFA) 90  MCG/ACTUATION INHALER    Inhale 2 puffs into the lungs every 6 (six) hours as needed for Wheezing. Rescue    ASPIRIN (ECOTRIN) 81 MG EC TABLET    Take 81 mg by mouth once daily. No Sig Provided    BLOOD SUGAR DIAGNOSTIC STRP    Test q day    CLOPIDOGREL (PLAVIX) 75 MG TABLET    Take 1 tablet (75 mg total) by mouth once daily.    CYCLOBENZAPRINE (FLEXERIL) 5 MG TABLET    Take 1 tablet (5 mg total) by mouth 2 (two) times daily as needed for Muscle spasms.    FLUTICASONE FUROATE-VILANTEROL (BREO ELLIPTA) 200-25 MCG/DOSE DSDV DISKUS INHALER    Inhale 1 puff into the lungs once daily. Controller    IBUPROFEN (ADVIL,MOTRIN) 200 MG TABLET    Take 200 mg by mouth every 6 (six) hours as needed for Pain.    IPRATROPIUM-ALBUTEROL (COMBIVENT)  MCG/ACTUATION INHALER    Inhale 1 puff into the lungs every 4 (four) hours as needed for Shortness of Breath. Rescue    METFORMIN (GLUCOPHAGE-XR) 500 MG ER 24HR TABLET    TAKE 2 TABLETS BY MOUTH TWICE A DAY WITH MEALS    SILDENAFIL (REVATIO) 20 MG TAB    TAKE 1 TABLET (20 MG TOTAL) BY MOUTH DAILY AS NEEDED. 5 TABLETS 1 HOUR PRIOR TO SEXUAL INTERCOURSE.    VERAPAMIL (VERELAN) 120 MG C24P    Take 1 capsule (120 mg total) by mouth once daily.   Discontinued Medications    LOSARTAN-HYDROCHLOROTHIAZIDE 100-25 MG (HYZAAR) 100-25 MG PER TABLET    Take 1 tablet by mouth once daily.       Continue current management and monitor.    Counseled on regular exercise, maintenance of a healthy weight, balanced diet rich in fruits/vegetables and lean protein, and avoidance of unhealthy habits like smoking and excessive alcohol intake.   Also, counseled on importance of being compliant with medication, health appointments, diet and exercise.     Follow up in about 6 months (around 10/28/2022).

## 2022-04-29 ENCOUNTER — PATIENT MESSAGE (OUTPATIENT)
Dept: PODIATRY | Facility: CLINIC | Age: 57
End: 2022-04-29
Payer: COMMERCIAL

## 2022-05-09 ENCOUNTER — PATIENT MESSAGE (OUTPATIENT)
Dept: SMOKING CESSATION | Facility: CLINIC | Age: 57
End: 2022-05-09
Payer: COMMERCIAL

## 2022-05-11 ENCOUNTER — OFFICE VISIT (OUTPATIENT)
Dept: FAMILY MEDICINE | Facility: CLINIC | Age: 57
End: 2022-05-11
Payer: COMMERCIAL

## 2022-05-11 VITALS
OXYGEN SATURATION: 96 % | SYSTOLIC BLOOD PRESSURE: 128 MMHG | HEIGHT: 73 IN | BODY MASS INDEX: 31.12 KG/M2 | TEMPERATURE: 98 F | WEIGHT: 234.81 LBS | HEART RATE: 69 BPM | DIASTOLIC BLOOD PRESSURE: 70 MMHG

## 2022-05-11 DIAGNOSIS — M46.1 SACROILIITIS, NOT ELSEWHERE CLASSIFIED: ICD-10-CM

## 2022-05-11 DIAGNOSIS — I10 ESSENTIAL HYPERTENSION: ICD-10-CM

## 2022-05-11 DIAGNOSIS — D86.0 SARCOIDOSIS OF LUNG: ICD-10-CM

## 2022-05-11 DIAGNOSIS — I82.811 SUPERFICIAL THROMBOSIS OF RIGHT LOWER EXTREMITY: Primary | ICD-10-CM

## 2022-05-11 DIAGNOSIS — I27.20 PULMONARY HYPERTENSION: ICD-10-CM

## 2022-05-11 PROCEDURE — 3051F HG A1C>EQUAL 7.0%<8.0%: CPT | Mod: CPTII,S$GLB,, | Performed by: INTERNAL MEDICINE

## 2022-05-11 PROCEDURE — 3008F PR BODY MASS INDEX (BMI) DOCUMENTED: ICD-10-PCS | Mod: CPTII,S$GLB,, | Performed by: INTERNAL MEDICINE

## 2022-05-11 PROCEDURE — 99214 OFFICE O/P EST MOD 30 MIN: CPT | Mod: S$GLB,,, | Performed by: INTERNAL MEDICINE

## 2022-05-11 PROCEDURE — 99214 PR OFFICE/OUTPT VISIT, EST, LEVL IV, 30-39 MIN: ICD-10-PCS | Mod: S$GLB,,, | Performed by: INTERNAL MEDICINE

## 2022-05-11 PROCEDURE — 3074F SYST BP LT 130 MM HG: CPT | Mod: CPTII,S$GLB,, | Performed by: INTERNAL MEDICINE

## 2022-05-11 PROCEDURE — 3078F DIAST BP <80 MM HG: CPT | Mod: CPTII,S$GLB,, | Performed by: INTERNAL MEDICINE

## 2022-05-11 PROCEDURE — 99999 PR PBB SHADOW E&M-EST. PATIENT-LVL IV: CPT | Mod: PBBFAC,,, | Performed by: INTERNAL MEDICINE

## 2022-05-11 PROCEDURE — 1159F MED LIST DOCD IN RCRD: CPT | Mod: CPTII,S$GLB,, | Performed by: INTERNAL MEDICINE

## 2022-05-11 PROCEDURE — 1160F PR REVIEW ALL MEDS BY PRESCRIBER/CLIN PHARMACIST DOCUMENTED: ICD-10-PCS | Mod: CPTII,S$GLB,, | Performed by: INTERNAL MEDICINE

## 2022-05-11 PROCEDURE — 3078F PR MOST RECENT DIASTOLIC BLOOD PRESSURE < 80 MM HG: ICD-10-PCS | Mod: CPTII,S$GLB,, | Performed by: INTERNAL MEDICINE

## 2022-05-11 PROCEDURE — 3051F PR MOST RECENT HEMOGLOBIN A1C LEVEL 7.0 - < 8.0%: ICD-10-PCS | Mod: CPTII,S$GLB,, | Performed by: INTERNAL MEDICINE

## 2022-05-11 PROCEDURE — 99999 PR PBB SHADOW E&M-EST. PATIENT-LVL IV: ICD-10-PCS | Mod: PBBFAC,,, | Performed by: INTERNAL MEDICINE

## 2022-05-11 PROCEDURE — 1160F RVW MEDS BY RX/DR IN RCRD: CPT | Mod: CPTII,S$GLB,, | Performed by: INTERNAL MEDICINE

## 2022-05-11 PROCEDURE — 1159F PR MEDICATION LIST DOCUMENTED IN MEDICAL RECORD: ICD-10-PCS | Mod: CPTII,S$GLB,, | Performed by: INTERNAL MEDICINE

## 2022-05-11 PROCEDURE — 3074F PR MOST RECENT SYSTOLIC BLOOD PRESSURE < 130 MM HG: ICD-10-PCS | Mod: CPTII,S$GLB,, | Performed by: INTERNAL MEDICINE

## 2022-05-11 PROCEDURE — 3008F BODY MASS INDEX DOCD: CPT | Mod: CPTII,S$GLB,, | Performed by: INTERNAL MEDICINE

## 2022-05-11 NOTE — PROGRESS NOTES
Subjective:       Patient ID: Cade Johnston is a 56 y.o. male.    Chief Complaint: thigh pain    Complains of nodule that became painful 1.5 weeks ago.  Derm previously evaluated and found to be a blood vessel.  Was soft.     Sarcoidosis:  Had to retire.  Had several episodes at work where he had trouble breathing and was alone not to be found.  45% lung fnx, sees pulmonary - increased sob - now with talking.  States lungs slightly worse.  On new inhaler Breo from pulm.  pulm recommended retiring.    low testosterone, Dr Larson;  Secondary erythrocytosis - gets frequent phlebotomies.  2nd to testosterone and sarcoidosis      CAD s/p stent.    Sees Dr Duran cardiology.     VÍCTOR - + fatigue; could not tolerate cpap 10 yr ago.     DM - controlled, but worse; poor diet and feels can get it back down   HTN - controlled on home log   HLD - controlled ldl < 70 goal; refusing statin in past.  High triglycerides  BPH/ ED - sees Dr Larson     Mood good.    Complains of pain just above naval.  Started last week.  Some nausea.  Improving.  Dx with ventral hernia versus diastasis by general surgeon in past.  otc PPI helped.     Review of Systems   Skin: Positive for rash.       Objective:      Vitals:    05/11/22 0850   BP: 128/70   Pulse:    Temp:      Physical Exam  Skin:            Comments: Right proximal anterior tigh + 1 cm firm nodule. + purple discoloration under dermis            Assessment:       1. Superficial thrombosis of right lower extremity    2. Essential hypertension    3. Sacroiliitis, not elsewhere classified    4. Pulmonary hypertension    5. Sarcoidosis of lung        Plan:       Superficial thrombosis of right lower extremity    Essential hypertension    Sacroiliitis, not elsewhere classified    Pulmonary hypertension    Sarcoidosis of lung            Medication List with Changes/Refills   Current Medications    ALBUTEROL (PROVENTIL/VENTOLIN HFA) 90 MCG/ACTUATION INHALER    Inhale 2 puffs into the lungs  every 6 (six) hours as needed for Wheezing. Rescue    ASPIRIN (ECOTRIN) 81 MG EC TABLET    Take 81 mg by mouth once daily. No Sig Provided    BLOOD SUGAR DIAGNOSTIC STRP    Test q day    CLOPIDOGREL (PLAVIX) 75 MG TABLET    Take 1 tablet (75 mg total) by mouth once daily.    CYCLOBENZAPRINE (FLEXERIL) 5 MG TABLET    Take 1 tablet (5 mg total) by mouth 2 (two) times daily as needed for Muscle spasms.    FLUTICASONE FUROATE-VILANTEROL (BREO ELLIPTA) 200-25 MCG/DOSE DSDV DISKUS INHALER    Inhale 1 puff into the lungs once daily. Controller    IBUPROFEN (ADVIL,MOTRIN) 200 MG TABLET    Take 200 mg by mouth every 6 (six) hours as needed for Pain.    IPRATROPIUM-ALBUTEROL (COMBIVENT)  MCG/ACTUATION INHALER    Inhale 1 puff into the lungs every 4 (four) hours as needed for Shortness of Breath. Rescue    LOSARTAN-HYDROCHLOROTHIAZIDE 100-12.5 MG (HYZAAR) 100-12.5 MG TAB    Take 1 tablet by mouth once daily.    METFORMIN (GLUCOPHAGE-XR) 500 MG ER 24HR TABLET    TAKE 2 TABLETS BY MOUTH TWICE A DAY WITH MEALS    SILDENAFIL (REVATIO) 20 MG TAB    TAKE 1 TABLET (20 MG TOTAL) BY MOUTH DAILY AS NEEDED. 5 TABLETS 1 HOUR PRIOR TO SEXUAL INTERCOURSE.    VERAPAMIL (VERELAN) 120 MG C24P    Take 1 capsule (120 mg total) by mouth once daily.     Warm compresses  See his derm - Dr Del Valle   Continue current management and monitor.    Counseled on regular exercise, maintenance of a healthy weight, balanced diet rich in fruits/vegetables and lean protein, and avoidance of unhealthy habits like smoking and excessive alcohol intake.   Also, counseled on importance of being compliant with medication, health appointments, diet and exercise.     Keep future

## 2022-05-27 ENCOUNTER — TELEPHONE (OUTPATIENT)
Dept: CARDIOLOGY | Facility: CLINIC | Age: 57
End: 2022-05-27

## 2022-05-27 NOTE — TELEPHONE ENCOUNTER
REC FAX FROM [facility] / DR. ADITYA SARAVIA REQUESTING CLEARANCE FOR REMOVAL OF SKIN LESION / SUBCUTANEOUS TISSUE UNDER LOCAL ANESTHESIA AND TO HOLD  PLAVIX AND ASA X 5 DAYS PRIOR    THX

## 2022-05-31 ENCOUNTER — PATIENT MESSAGE (OUTPATIENT)
Dept: ADMINISTRATIVE | Facility: HOSPITAL | Age: 57
End: 2022-05-31
Payer: COMMERCIAL

## 2022-06-30 NOTE — LETTER
August 19, 2019    Cade Johnston  [address] [address] [address]         [facility]  [address] [place] [address]  Phone: [phone] August 19, 2019     Patient: Cade Johnston   YOB: 1965   Date of Visit: 8/19/2019       To Whom It May Concern:    It is my medical opinion that Cade Johnston should remain out of work until 8/26/19.    If you have any questions or concerns, please don't hesitate to call.    Sincerely,        Chung Peterson DPM      bed rails

## 2022-07-07 ENCOUNTER — OFFICE VISIT (OUTPATIENT)
Dept: CARDIOLOGY | Facility: CLINIC | Age: 57
End: 2022-07-07
Payer: COMMERCIAL

## 2022-07-07 VITALS
WEIGHT: 233.69 LBS | HEART RATE: 69 BPM | DIASTOLIC BLOOD PRESSURE: 91 MMHG | SYSTOLIC BLOOD PRESSURE: 169 MMHG | BODY MASS INDEX: 30.97 KG/M2 | HEIGHT: 73 IN

## 2022-07-07 DIAGNOSIS — I25.10 CORONARY ARTERY DISEASE INVOLVING NATIVE CORONARY ARTERY OF NATIVE HEART WITHOUT ANGINA PECTORIS: Primary | ICD-10-CM

## 2022-07-07 DIAGNOSIS — E11.00 TYPE 2 DIABETES MELLITUS WITH HYPEROSMOLARITY WITHOUT COMA, WITHOUT LONG-TERM CURRENT USE OF INSULIN: ICD-10-CM

## 2022-07-07 DIAGNOSIS — I10 ESSENTIAL HYPERTENSION: ICD-10-CM

## 2022-07-07 DIAGNOSIS — E78.5 DYSLIPIDEMIA: ICD-10-CM

## 2022-07-07 PROCEDURE — 1160F PR REVIEW ALL MEDS BY PRESCRIBER/CLIN PHARMACIST DOCUMENTED: ICD-10-PCS | Mod: CPTII,S$GLB,, | Performed by: INTERNAL MEDICINE

## 2022-07-07 PROCEDURE — 3077F SYST BP >= 140 MM HG: CPT | Mod: CPTII,S$GLB,, | Performed by: INTERNAL MEDICINE

## 2022-07-07 PROCEDURE — 1160F RVW MEDS BY RX/DR IN RCRD: CPT | Mod: CPTII,S$GLB,, | Performed by: INTERNAL MEDICINE

## 2022-07-07 PROCEDURE — 99214 PR OFFICE/OUTPT VISIT, EST, LEVL IV, 30-39 MIN: ICD-10-PCS | Mod: S$GLB,,, | Performed by: INTERNAL MEDICINE

## 2022-07-07 PROCEDURE — 3008F PR BODY MASS INDEX (BMI) DOCUMENTED: ICD-10-PCS | Mod: CPTII,S$GLB,, | Performed by: INTERNAL MEDICINE

## 2022-07-07 PROCEDURE — 3008F BODY MASS INDEX DOCD: CPT | Mod: CPTII,S$GLB,, | Performed by: INTERNAL MEDICINE

## 2022-07-07 PROCEDURE — 3080F PR MOST RECENT DIASTOLIC BLOOD PRESSURE >= 90 MM HG: ICD-10-PCS | Mod: CPTII,S$GLB,, | Performed by: INTERNAL MEDICINE

## 2022-07-07 PROCEDURE — 3051F HG A1C>EQUAL 7.0%<8.0%: CPT | Mod: CPTII,S$GLB,, | Performed by: INTERNAL MEDICINE

## 2022-07-07 PROCEDURE — 1159F PR MEDICATION LIST DOCUMENTED IN MEDICAL RECORD: ICD-10-PCS | Mod: CPTII,S$GLB,, | Performed by: INTERNAL MEDICINE

## 2022-07-07 PROCEDURE — 1159F MED LIST DOCD IN RCRD: CPT | Mod: CPTII,S$GLB,, | Performed by: INTERNAL MEDICINE

## 2022-07-07 PROCEDURE — 3080F DIAST BP >= 90 MM HG: CPT | Mod: CPTII,S$GLB,, | Performed by: INTERNAL MEDICINE

## 2022-07-07 PROCEDURE — 99214 OFFICE O/P EST MOD 30 MIN: CPT | Mod: S$GLB,,, | Performed by: INTERNAL MEDICINE

## 2022-07-07 PROCEDURE — 3077F PR MOST RECENT SYSTOLIC BLOOD PRESSURE >= 140 MM HG: ICD-10-PCS | Mod: CPTII,S$GLB,, | Performed by: INTERNAL MEDICINE

## 2022-07-07 PROCEDURE — 99999 PR PBB SHADOW E&M-EST. PATIENT-LVL IV: ICD-10-PCS | Mod: PBBFAC,,, | Performed by: INTERNAL MEDICINE

## 2022-07-07 PROCEDURE — 3051F PR MOST RECENT HEMOGLOBIN A1C LEVEL 7.0 - < 8.0%: ICD-10-PCS | Mod: CPTII,S$GLB,, | Performed by: INTERNAL MEDICINE

## 2022-07-07 PROCEDURE — 99999 PR PBB SHADOW E&M-EST. PATIENT-LVL IV: CPT | Mod: PBBFAC,,, | Performed by: INTERNAL MEDICINE

## 2022-07-07 NOTE — PROGRESS NOTES
Subjective:    Patient ID:  Cade Johnston is a 56 y.o. male who presents for follow-up of cad    HPI  He comes for follow up with occasional episodes of shortness of breath when doing some strenuous activity.  No real chest pain.  No syncope.  He did have a cardiac MRI last year which showed no evidence of sarcoid    Review of Systems   Constitutional: Negative for decreased appetite, malaise/fatigue, weight gain and weight loss.   Cardiovascular: Negative for chest pain, dyspnea on exertion, leg swelling, palpitations and syncope.   Respiratory: Negative for cough and shortness of breath.    Gastrointestinal: Negative.    Neurological: Negative for weakness.   All other systems reviewed and are negative.       Objective:      Physical Exam  Vitals and nursing note reviewed.   Constitutional:       Appearance: Normal appearance. He is well-developed.   HENT:      Head: Normocephalic.   Eyes:      Pupils: Pupils are equal, round, and reactive to light.   Neck:      Thyroid: No thyromegaly.      Vascular: No carotid bruit or JVD.   Cardiovascular:      Rate and Rhythm: Normal rate and regular rhythm.      Chest Wall: PMI is not displaced.      Pulses: Normal pulses and intact distal pulses.      Heart sounds: Normal heart sounds. No murmur heard.    No gallop.   Pulmonary:      Effort: Pulmonary effort is normal.      Breath sounds: Normal breath sounds.   Abdominal:      Palpations: Abdomen is soft. There is no mass.      Tenderness: There is no abdominal tenderness.   Musculoskeletal:         General: Normal range of motion.      Cervical back: Normal range of motion and neck supple.   Skin:     General: Skin is warm.   Neurological:      Mental Status: He is alert and oriented to person, place, and time.      Sensory: No sensory deficit.      Deep Tendon Reflexes: Reflexes are normal and symmetric.           Assessment:       1. Coronary artery disease involving native coronary artery of native heart without  angina pectoris    2. Dyslipidemia    3. Essential hypertension    4. Type 2 diabetes mellitus with hyperosmolarity without coma, without long-term current use of insulin         Plan:   Nuclear stress test.  Call with results  Continue all cardiac medications  Regular exercise program  Weight loss  Six-month follow-up with Blank Mendoza if nuclear stress test negative

## 2022-07-21 ENCOUNTER — PATIENT MESSAGE (OUTPATIENT)
Dept: CARDIOLOGY | Facility: HOSPITAL | Age: 57
End: 2022-07-21
Payer: COMMERCIAL

## 2022-07-22 ENCOUNTER — HOSPITAL ENCOUNTER (OUTPATIENT)
Dept: RADIOLOGY | Facility: HOSPITAL | Age: 57
Discharge: HOME | End: 2022-07-22
Attending: INTERNAL MEDICINE
Payer: COMMERCIAL

## 2022-07-22 ENCOUNTER — PATIENT OUTREACH (OUTPATIENT)
Dept: ADMINISTRATIVE | Facility: HOSPITAL | Age: 57
End: 2022-07-22
Payer: COMMERCIAL

## 2022-07-22 ENCOUNTER — CLINICAL SUPPORT (OUTPATIENT)
Dept: CARDIOLOGY | Facility: HOSPITAL | Age: 57
End: 2022-07-22
Attending: INTERNAL MEDICINE
Payer: COMMERCIAL

## 2022-07-22 VITALS — BODY MASS INDEX: 30.88 KG/M2 | HEIGHT: 73 IN | WEIGHT: 233 LBS

## 2022-07-22 DIAGNOSIS — E11.00 TYPE 2 DIABETES MELLITUS WITH HYPEROSMOLARITY WITHOUT COMA, WITHOUT LONG-TERM CURRENT USE OF INSULIN: ICD-10-CM

## 2022-07-22 DIAGNOSIS — I25.10 CORONARY ARTERY DISEASE INVOLVING NATIVE CORONARY ARTERY OF NATIVE HEART WITHOUT ANGINA PECTORIS: ICD-10-CM

## 2022-07-22 DIAGNOSIS — E78.5 DYSLIPIDEMIA: ICD-10-CM

## 2022-07-22 DIAGNOSIS — I10 ESSENTIAL HYPERTENSION: ICD-10-CM

## 2022-07-22 LAB
CV PHARM DOSE: 0.4 MG
CV STRESS BASE HR: 62 BPM
DIASTOLIC BLOOD PRESSURE: 77 MMHG
NUC REST EJECTION FRACTION: 69
OHS CV CPX 1 MINUTE RECOVERY HEART RATE: 81 BPM
OHS CV CPX 85 PERCENT MAX PREDICTED HEART RATE MALE: 138
OHS CV CPX MAX PREDICTED HEART RATE: 162
OHS CV CPX PATIENT IS FEMALE: 0
OHS CV CPX PATIENT IS MALE: 1
OHS CV CPX PEAK DIASTOLIC BLOOD PRESSURE: 77 MMHG
OHS CV CPX PEAK HEAR RATE: 82 BPM
OHS CV CPX PEAK RATE PRESSURE PRODUCT: NORMAL
OHS CV CPX PEAK SYSTOLIC BLOOD PRESSURE: 146 MMHG
OHS CV CPX PERCENT MAX PREDICTED HEART RATE ACHIEVED: 51
OHS CV CPX RATE PRESSURE PRODUCT PRESENTING: 9052
OHS CV PHARM TIME: 900 MIN
SYSTOLIC BLOOD PRESSURE: 146 MMHG

## 2022-07-22 PROCEDURE — 78452 HT MUSCLE IMAGE SPECT MULT: CPT | Mod: 26,,, | Performed by: INTERNAL MEDICINE

## 2022-07-22 PROCEDURE — 93018 CV STRESS TEST I&R ONLY: CPT | Mod: ,,, | Performed by: INTERNAL MEDICINE

## 2022-07-22 PROCEDURE — 93018 PR CARDIAC STRESS TST,INTERP/REPT ONLY: ICD-10-PCS | Mod: ,,, | Performed by: INTERNAL MEDICINE

## 2022-07-22 PROCEDURE — A9502 TC99M TETROFOSMIN: HCPCS | Mod: PO

## 2022-07-22 PROCEDURE — 78452 STRESS TEST WITH MYOCARDIAL PERFUSION (CUPID ONLY): ICD-10-PCS | Mod: 26,,, | Performed by: INTERNAL MEDICINE

## 2022-07-22 PROCEDURE — 93016 STRESS TEST WITH MYOCARDIAL PERFUSION (CUPID ONLY): ICD-10-PCS | Mod: ,,, | Performed by: INTERNAL MEDICINE

## 2022-07-22 PROCEDURE — 93017 CV STRESS TEST TRACING ONLY: CPT | Mod: PO

## 2022-07-22 PROCEDURE — 93016 CV STRESS TEST SUPVJ ONLY: CPT | Mod: ,,, | Performed by: INTERNAL MEDICINE

## 2022-07-22 PROCEDURE — 63600175 PHARM REV CODE 636 W HCPCS: Mod: PO | Performed by: INTERNAL MEDICINE

## 2022-07-22 RX ORDER — REGADENOSON 0.08 MG/ML
0.4 INJECTION, SOLUTION INTRAVENOUS ONCE
Status: COMPLETED | OUTPATIENT
Start: 2022-07-22 | End: 2022-07-22

## 2022-07-22 RX ADMIN — REGADENOSON 0.4 MG: 0.08 INJECTION, SOLUTION INTRAVENOUS at 09:07

## 2022-07-22 NOTE — PROGRESS NOTES
Non-compliant report chart audits. Chart review completed for HM test overdue (Mammogram, Colon Cancer Screening, Pap smear, DM labs, and/or Eye Exam)      Care Everywhere and media, updates requested and reviewed.        Contacted about eye exm, pt said he will get his wife to schedule his eye appt within the next couple of days.

## 2022-07-25 NOTE — PROGRESS NOTES
Nuclear stress test is abnormal, suggestive of a new blockage in 1 of the arteries going to the heart.  Angiogram recommended

## 2022-07-26 ENCOUNTER — PATIENT MESSAGE (OUTPATIENT)
Dept: CARDIOLOGY | Facility: CLINIC | Age: 57
End: 2022-07-26
Payer: COMMERCIAL

## 2022-07-26 DIAGNOSIS — I25.10 CORONARY ARTERY DISEASE INVOLVING NATIVE CORONARY ARTERY OF NATIVE HEART WITHOUT ANGINA PECTORIS: ICD-10-CM

## 2022-07-26 DIAGNOSIS — R94.39 ABNORMAL NUCLEAR STRESS TEST: Primary | ICD-10-CM

## 2022-07-26 RX ORDER — SODIUM CHLORIDE 9 MG/ML
INJECTION, SOLUTION INTRAVENOUS ONCE
Status: CANCELLED | OUTPATIENT
Start: 2022-08-16

## 2022-07-26 RX ORDER — SODIUM CHLORIDE 0.9 % (FLUSH) 0.9 %
10 SYRINGE (ML) INJECTION
Status: SHIPPED | OUTPATIENT
Start: 2022-07-26

## 2022-08-16 PROBLEM — R94.39 ABNORMAL NUCLEAR STRESS TEST: Status: ACTIVE | Noted: 2022-08-16

## 2022-08-24 ENCOUNTER — PATIENT MESSAGE (OUTPATIENT)
Dept: ADMINISTRATIVE | Facility: HOSPITAL | Age: 57
End: 2022-08-24
Payer: COMMERCIAL

## 2022-09-27 ENCOUNTER — OFFICE VISIT (OUTPATIENT)
Dept: UROLOGY | Facility: CLINIC | Age: 57
End: 2022-09-27
Payer: MEDICARE

## 2022-09-27 VITALS — BODY MASS INDEX: 30.47 KG/M2 | WEIGHT: 229.94 LBS | HEIGHT: 73 IN

## 2022-09-27 DIAGNOSIS — D75.1 POLYCYTHEMIA: ICD-10-CM

## 2022-09-27 DIAGNOSIS — E34.9 TESTOSTERONE DEFICIENCY: ICD-10-CM

## 2022-09-27 DIAGNOSIS — N52.9 ERECTILE DYSFUNCTION, UNSPECIFIED ERECTILE DYSFUNCTION TYPE: Primary | ICD-10-CM

## 2022-09-27 PROCEDURE — 99214 PR OFFICE/OUTPT VISIT, EST, LEVL IV, 30-39 MIN: ICD-10-PCS | Mod: S$GLB,,, | Performed by: STUDENT IN AN ORGANIZED HEALTH CARE EDUCATION/TRAINING PROGRAM

## 2022-09-27 PROCEDURE — 3051F PR MOST RECENT HEMOGLOBIN A1C LEVEL 7.0 - < 8.0%: ICD-10-PCS | Mod: CPTII,S$GLB,, | Performed by: STUDENT IN AN ORGANIZED HEALTH CARE EDUCATION/TRAINING PROGRAM

## 2022-09-27 PROCEDURE — 1159F PR MEDICATION LIST DOCUMENTED IN MEDICAL RECORD: ICD-10-PCS | Mod: CPTII,S$GLB,, | Performed by: STUDENT IN AN ORGANIZED HEALTH CARE EDUCATION/TRAINING PROGRAM

## 2022-09-27 PROCEDURE — 1160F RVW MEDS BY RX/DR IN RCRD: CPT | Mod: CPTII,S$GLB,, | Performed by: STUDENT IN AN ORGANIZED HEALTH CARE EDUCATION/TRAINING PROGRAM

## 2022-09-27 PROCEDURE — 1159F MED LIST DOCD IN RCRD: CPT | Mod: CPTII,S$GLB,, | Performed by: STUDENT IN AN ORGANIZED HEALTH CARE EDUCATION/TRAINING PROGRAM

## 2022-09-27 PROCEDURE — 99214 OFFICE O/P EST MOD 30 MIN: CPT | Mod: S$GLB,,, | Performed by: STUDENT IN AN ORGANIZED HEALTH CARE EDUCATION/TRAINING PROGRAM

## 2022-09-27 PROCEDURE — 1160F PR REVIEW ALL MEDS BY PRESCRIBER/CLIN PHARMACIST DOCUMENTED: ICD-10-PCS | Mod: CPTII,S$GLB,, | Performed by: STUDENT IN AN ORGANIZED HEALTH CARE EDUCATION/TRAINING PROGRAM

## 2022-09-27 PROCEDURE — 3051F HG A1C>EQUAL 7.0%<8.0%: CPT | Mod: CPTII,S$GLB,, | Performed by: STUDENT IN AN ORGANIZED HEALTH CARE EDUCATION/TRAINING PROGRAM

## 2022-09-27 PROCEDURE — 99999 PR PBB SHADOW E&M-EST. PATIENT-LVL III: ICD-10-PCS | Mod: PBBFAC,,, | Performed by: STUDENT IN AN ORGANIZED HEALTH CARE EDUCATION/TRAINING PROGRAM

## 2022-09-27 PROCEDURE — 3008F BODY MASS INDEX DOCD: CPT | Mod: CPTII,S$GLB,, | Performed by: STUDENT IN AN ORGANIZED HEALTH CARE EDUCATION/TRAINING PROGRAM

## 2022-09-27 PROCEDURE — 3008F PR BODY MASS INDEX (BMI) DOCUMENTED: ICD-10-PCS | Mod: CPTII,S$GLB,, | Performed by: STUDENT IN AN ORGANIZED HEALTH CARE EDUCATION/TRAINING PROGRAM

## 2022-09-27 PROCEDURE — 99999 PR PBB SHADOW E&M-EST. PATIENT-LVL III: CPT | Mod: PBBFAC,,, | Performed by: STUDENT IN AN ORGANIZED HEALTH CARE EDUCATION/TRAINING PROGRAM

## 2022-09-27 RX ORDER — SILDENAFIL CITRATE 20 MG/1
20 TABLET ORAL 3 TIMES DAILY
Qty: 30 TABLET | Refills: 5 | Status: SHIPPED | OUTPATIENT
Start: 2022-09-27 | End: 2024-02-02 | Stop reason: SDUPTHER

## 2022-09-27 NOTE — PROGRESS NOTES
"Page - Urology   Clinic Note    SUBJECTIVE:     Chief Complaint: Other (Establishing care)      History of Present Illness:  Cade Johnston is a 56 y.o. male who presents to clinic for low testosterone. He is established to our clinic and was last seen Dr. Larson.      Reports not having as much benefit from T replacement as he did in the past.  He reports that the 1st day he does have increased energy, but overall he feels much better towards the end of his cycle.    IPSS Questionnaire (AUA-SS):  1)  Incomplete Emptying 0 - Not at all   2)  Frequency 1 - Less than 1 time in 5   3)  Intermittency 0 - Not at all   4) Urgency 0 - Not at all   5) Weak Stream  0 - Not at all   6) Straining 0 - Not at all   7) Nocturia 1 - 1 time   Total score:  0-7 mild, 8-19 mod, 20-35 severe 2   Quality of Life:  1 - Pleased     He reports ED that responds well to Viagra.       No family history of prostate cancer.     Past medical, family, surgical and social history reviewed as documented in chart with pertinent positive medical, family, surgical and social history detailed in HPI.    A review systems was conducted with pertinent positive and negative findings documented in HPI.      OBJECTIVE:     Estimated body mass index is 30.34 kg/m² as calculated from the following:    Height as of this encounter: 6' 1" (1.854 m).    Weight as of this encounter: 104.3 kg (229 lb 15 oz).    Vital Signs (Most Recent)       Physical Exam  Vitals and nursing note reviewed.   Constitutional:       General: He is not in acute distress.     Appearance: Normal appearance. He is well-developed. He is not ill-appearing or toxic-appearing.   Pulmonary:      Effort: Pulmonary effort is normal. No accessory muscle usage or respiratory distress.   Genitourinary:     Comments: Prostate 30 g no nodules  Neurological:      General: No focal deficit present.      Mental Status: He is alert and oriented to person, place, and time. Mental status is at " baseline.   Psychiatric:         Mood and Affect: Mood normal.         Behavior: Behavior is cooperative.         Thought Content: Thought content normal.         Judgment: Judgment normal.       Lab Results   Component Value Date    BUN 20 08/16/2022    CREATININE 1.07 08/16/2022    WBC 7.69 08/16/2022    HGB 17.5 08/16/2022    HCT 50.2 08/16/2022     08/16/2022    AST 39 08/16/2022    ALT 43 08/16/2022    ALKPHOS 75 08/16/2022    ALBUMIN 4.2 08/16/2022    HGBA1C 7.0 (H) 04/21/2022        Lab Results   Component Value Date    PSA 2.1 04/21/2022    PSA 1.5 01/19/2021    PSA 1.5 06/24/2020    PSA 1.2 05/04/2019    PSA 1.3 05/04/2017    PSADIAG 1.4 04/13/2015    PSAFREE 0.15 01/05/2017    PSAFREEPCT 10.71 01/05/2017        ASSESSMENT     1. Erectile dysfunction, unspecified erectile dysfunction type    2. Testosterone deficiency    3. Polycythemia      PLAN:   1. Erectile dysfunction, unspecified erectile dysfunction type  -     sildenafil (REVATIO) 20 mg Tab    2. Testosterone deficiency    3. Polycythemia     A refill was sent to his pharmacy for the sildenafil.  Discussed the risks and benefits of testosterone replacement.  If he is not receiving benefit from it, especially in the setting of polycythemia and other side effects of T therapy, I recommended that he stop for about 6 weeks and check in.  He will reach out if he is interested in continuing.    Continue PSA screening with PCP.    Earl Martinez MD

## 2022-10-03 ENCOUNTER — OFFICE VISIT (OUTPATIENT)
Dept: CARDIOLOGY | Facility: CLINIC | Age: 57
End: 2022-10-03
Payer: MEDICARE

## 2022-10-03 VITALS
DIASTOLIC BLOOD PRESSURE: 73 MMHG | HEIGHT: 73 IN | BODY MASS INDEX: 30.24 KG/M2 | WEIGHT: 228.19 LBS | HEART RATE: 77 BPM | SYSTOLIC BLOOD PRESSURE: 130 MMHG

## 2022-10-03 DIAGNOSIS — E11.00 TYPE 2 DIABETES MELLITUS WITH HYPEROSMOLARITY WITHOUT COMA, WITHOUT LONG-TERM CURRENT USE OF INSULIN: ICD-10-CM

## 2022-10-03 DIAGNOSIS — E78.5 DYSLIPIDEMIA: ICD-10-CM

## 2022-10-03 DIAGNOSIS — I10 ESSENTIAL HYPERTENSION: ICD-10-CM

## 2022-10-03 DIAGNOSIS — D75.1 POLYCYTHEMIA: ICD-10-CM

## 2022-10-03 DIAGNOSIS — I25.10 CORONARY ARTERY DISEASE INVOLVING NATIVE CORONARY ARTERY OF NATIVE HEART WITHOUT ANGINA PECTORIS: Primary | ICD-10-CM

## 2022-10-03 DIAGNOSIS — D86.0 SARCOIDOSIS OF LUNG: ICD-10-CM

## 2022-10-03 PROCEDURE — 99214 OFFICE O/P EST MOD 30 MIN: CPT | Mod: S$GLB,,, | Performed by: PHYSICIAN ASSISTANT

## 2022-10-03 PROCEDURE — 3078F PR MOST RECENT DIASTOLIC BLOOD PRESSURE < 80 MM HG: ICD-10-PCS | Mod: CPTII,S$GLB,, | Performed by: PHYSICIAN ASSISTANT

## 2022-10-03 PROCEDURE — 3008F PR BODY MASS INDEX (BMI) DOCUMENTED: ICD-10-PCS | Mod: CPTII,S$GLB,, | Performed by: PHYSICIAN ASSISTANT

## 2022-10-03 PROCEDURE — 3075F PR MOST RECENT SYSTOLIC BLOOD PRESS GE 130-139MM HG: ICD-10-PCS | Mod: CPTII,S$GLB,, | Performed by: PHYSICIAN ASSISTANT

## 2022-10-03 PROCEDURE — 3078F DIAST BP <80 MM HG: CPT | Mod: CPTII,S$GLB,, | Performed by: PHYSICIAN ASSISTANT

## 2022-10-03 PROCEDURE — 99214 PR OFFICE/OUTPT VISIT, EST, LEVL IV, 30-39 MIN: ICD-10-PCS | Mod: S$GLB,,, | Performed by: PHYSICIAN ASSISTANT

## 2022-10-03 PROCEDURE — 3051F HG A1C>EQUAL 7.0%<8.0%: CPT | Mod: CPTII,S$GLB,, | Performed by: PHYSICIAN ASSISTANT

## 2022-10-03 PROCEDURE — 99999 PR PBB SHADOW E&M-EST. PATIENT-LVL III: ICD-10-PCS | Mod: PBBFAC,,, | Performed by: PHYSICIAN ASSISTANT

## 2022-10-03 PROCEDURE — 1159F MED LIST DOCD IN RCRD: CPT | Mod: CPTII,S$GLB,, | Performed by: PHYSICIAN ASSISTANT

## 2022-10-03 PROCEDURE — 1159F PR MEDICATION LIST DOCUMENTED IN MEDICAL RECORD: ICD-10-PCS | Mod: CPTII,S$GLB,, | Performed by: PHYSICIAN ASSISTANT

## 2022-10-03 PROCEDURE — 3051F PR MOST RECENT HEMOGLOBIN A1C LEVEL 7.0 - < 8.0%: ICD-10-PCS | Mod: CPTII,S$GLB,, | Performed by: PHYSICIAN ASSISTANT

## 2022-10-03 PROCEDURE — 99999 PR PBB SHADOW E&M-EST. PATIENT-LVL III: CPT | Mod: PBBFAC,,, | Performed by: PHYSICIAN ASSISTANT

## 2022-10-03 PROCEDURE — 3008F BODY MASS INDEX DOCD: CPT | Mod: CPTII,S$GLB,, | Performed by: PHYSICIAN ASSISTANT

## 2022-10-03 PROCEDURE — 3075F SYST BP GE 130 - 139MM HG: CPT | Mod: CPTII,S$GLB,, | Performed by: PHYSICIAN ASSISTANT

## 2022-10-03 NOTE — PROGRESS NOTES
Subjective:    Patient ID:  Cade Johnston is a 56 y.o. male who presents for follow-up of CAD.      HPI  Mr. Johnston is a delightful gentleman who follows with Dr. Mcdonough. He presents today for follow up after a recent coronary angiogram. He was having some atypical CP and has a hx of CAD s/p LAD PCI. He had a nuclear stress test that revealed a moderate intensity, moderate sized, reversible perfusion abnormality that is consistent with ischemia in the inferior wall. His angiogram revealed non-obstructive CAD with a widely patent stent in the LAD. He states his CP  has since resolved (he thinks it may have been GERD). No PND, orthopnea, or LE edema. His chronic KOROMA related to his sarcoid is stable.     Review of Systems   Constitutional: Negative for chills, diaphoresis, fever, weight gain and weight loss.   HENT:  Negative for sore throat.    Eyes:  Negative for blurred vision, vision loss in left eye, vision loss in right eye and visual disturbance.   Cardiovascular:  Negative for chest pain, claudication, dyspnea on exertion, leg swelling, near-syncope, orthopnea, palpitations, paroxysmal nocturnal dyspnea and syncope.   Respiratory:  Negative for cough, hemoptysis, shortness of breath, sputum production and wheezing.    Endocrine: Negative for cold intolerance and heat intolerance.   Hematologic/Lymphatic: Negative for adenopathy. Does not bruise/bleed easily.   Skin:  Negative for rash.   Musculoskeletal:  Negative for falls, muscle weakness and myalgias.   Gastrointestinal:  Negative for abdominal pain, change in bowel habit, constipation, diarrhea, melena and nausea.   Genitourinary:  Negative for bladder incontinence.   Neurological:  Negative for dizziness, focal weakness, headaches, light-headedness, numbness and weakness.   Psychiatric/Behavioral:  Negative for altered mental status.       Vitals:    10/03/22 1242   BP: 130/73   BP Location: Left arm   Patient Position: Sitting   BP Method: Medium  "(Automatic)   Pulse: 77   Weight: 103.5 kg (228 lb 2.8 oz)   Height: 6' 1" (1.854 m)   Body mass index is 30.1 kg/m².    Objective:    Physical Exam  Constitutional:       Appearance: He is well-developed.   HENT:      Head: Normocephalic and atraumatic.   Eyes:      Extraocular Movements: Extraocular movements intact.      Pupils: Pupils are equal, round, and reactive to light.   Neck:      Thyroid: No thyromegaly.      Vascular: No JVD.      Trachea: No tracheal deviation.   Cardiovascular:      Rate and Rhythm: Normal rate and regular rhythm.      Chest Wall: PMI is not displaced.      Pulses: Normal pulses and intact distal pulses.      Heart sounds: S1 normal and S2 normal. No murmur heard.    No friction rub. No gallop.   Pulmonary:      Effort: Pulmonary effort is normal. No respiratory distress.      Breath sounds: Normal breath sounds. No wheezing or rales.   Chest:      Chest wall: No tenderness.   Abdominal:      General: Bowel sounds are normal. There is no distension.      Palpations: Abdomen is soft. There is no mass.      Tenderness: There is no abdominal tenderness.   Musculoskeletal:         General: No tenderness. Normal range of motion.      Cervical back: Neck supple.   Skin:     General: Skin is warm and dry.      Findings: No rash.   Neurological:      General: No focal deficit present.      Mental Status: He is alert and oriented to person, place, and time.   Psychiatric:         Mood and Affect: Mood normal.         Behavior: Behavior normal.         Assessment:       Problem List Items Addressed This Visit          Cardiology Problems    CAD (coronary artery disease) - Primary    Essential hypertension       Other    DM type 2 (diabetes mellitus, type 2)    Dyslipidemia    Polycythemia    Sarcoidosis of lung          Plan:       Doing well.  Continue current cardiac medications.   LDL and BP at goal. Discussed increasing activity to raise HDL and diet-modifications/glucose control for " Triglycerides which are not at goal.   F/U with me as scheduled.

## 2022-10-10 ENCOUNTER — PATIENT MESSAGE (OUTPATIENT)
Dept: ADMINISTRATIVE | Facility: HOSPITAL | Age: 57
End: 2022-10-10
Payer: MEDICARE

## 2022-10-18 ENCOUNTER — LAB VISIT (OUTPATIENT)
Dept: LAB | Facility: HOSPITAL | Age: 57
End: 2022-10-18
Attending: INTERNAL MEDICINE
Payer: MEDICARE

## 2022-10-18 DIAGNOSIS — Z00.00 ROUTINE PHYSICAL EXAMINATION: ICD-10-CM

## 2022-10-18 LAB
ALBUMIN SERPL BCP-MCNC: 3.9 G/DL (ref 3.5–5.2)
ALP SERPL-CCNC: 97 U/L (ref 55–135)
ALT SERPL W/O P-5'-P-CCNC: 33 U/L (ref 10–44)
ANION GAP SERPL CALC-SCNC: 8 MMOL/L (ref 8–16)
AST SERPL-CCNC: 23 U/L (ref 10–40)
BASOPHILS # BLD AUTO: 0.1 K/UL (ref 0–0.2)
BASOPHILS NFR BLD: 0.8 % (ref 0–1.9)
BILIRUB SERPL-MCNC: 1.7 MG/DL (ref 0.1–1)
BUN SERPL-MCNC: 18 MG/DL (ref 6–20)
CALCIUM SERPL-MCNC: 9.3 MG/DL (ref 8.7–10.5)
CHLORIDE SERPL-SCNC: 101 MMOL/L (ref 95–110)
CHOLEST SERPL-MCNC: 142 MG/DL (ref 120–199)
CHOLEST/HDLC SERPL: 3.9 {RATIO} (ref 2–5)
CO2 SERPL-SCNC: 29 MMOL/L (ref 23–29)
COMPLEXED PSA SERPL-MCNC: 2.1 NG/ML (ref 0–4)
CREAT SERPL-MCNC: 1 MG/DL (ref 0.5–1.4)
DIFFERENTIAL METHOD: ABNORMAL
EOSINOPHIL # BLD AUTO: 0.2 K/UL (ref 0–0.5)
EOSINOPHIL NFR BLD: 1.3 % (ref 0–8)
ERYTHROCYTE [DISTWIDTH] IN BLOOD BY AUTOMATED COUNT: 12.8 % (ref 11.5–14.5)
EST. GFR  (NO RACE VARIABLE): >60 ML/MIN/1.73 M^2
ESTIMATED AVG GLUCOSE: 148 MG/DL (ref 68–131)
GLUCOSE SERPL-MCNC: 149 MG/DL (ref 70–110)
HBA1C MFR BLD: 6.8 % (ref 4–5.6)
HCT VFR BLD AUTO: 55 % (ref 40–54)
HDLC SERPL-MCNC: 36 MG/DL (ref 40–75)
HDLC SERPL: 25.4 % (ref 20–50)
HGB BLD-MCNC: 17.9 G/DL (ref 14–18)
IMM GRANULOCYTES # BLD AUTO: 0.07 K/UL (ref 0–0.04)
IMM GRANULOCYTES NFR BLD AUTO: 0.5 % (ref 0–0.5)
LDLC SERPL CALC-MCNC: 76.4 MG/DL (ref 63–159)
LYMPHOCYTES # BLD AUTO: 1.1 K/UL (ref 1–4.8)
LYMPHOCYTES NFR BLD: 8.5 % (ref 18–48)
MCH RBC QN AUTO: 28.3 PG (ref 27–31)
MCHC RBC AUTO-ENTMCNC: 32.5 G/DL (ref 32–36)
MCV RBC AUTO: 87 FL (ref 82–98)
MONOCYTES # BLD AUTO: 0.9 K/UL (ref 0.3–1)
MONOCYTES NFR BLD: 6.8 % (ref 4–15)
NEUTROPHILS # BLD AUTO: 10.9 K/UL (ref 1.8–7.7)
NEUTROPHILS NFR BLD: 82.1 % (ref 38–73)
NONHDLC SERPL-MCNC: 106 MG/DL
NRBC BLD-RTO: 0 /100 WBC
PLATELET # BLD AUTO: 225 K/UL (ref 150–450)
PMV BLD AUTO: 10.9 FL (ref 9.2–12.9)
POTASSIUM SERPL-SCNC: 4.6 MMOL/L (ref 3.5–5.1)
PROT SERPL-MCNC: 7.6 G/DL (ref 6–8.4)
RBC # BLD AUTO: 6.33 M/UL (ref 4.6–6.2)
SODIUM SERPL-SCNC: 138 MMOL/L (ref 136–145)
TRIGL SERPL-MCNC: 148 MG/DL (ref 30–150)
WBC # BLD AUTO: 13.27 K/UL (ref 3.9–12.7)

## 2022-10-18 PROCEDURE — 83036 HEMOGLOBIN GLYCOSYLATED A1C: CPT | Performed by: INTERNAL MEDICINE

## 2022-10-18 PROCEDURE — 85025 COMPLETE CBC W/AUTO DIFF WBC: CPT | Performed by: INTERNAL MEDICINE

## 2022-10-18 PROCEDURE — 80053 COMPREHEN METABOLIC PANEL: CPT | Performed by: INTERNAL MEDICINE

## 2022-10-18 PROCEDURE — 84153 ASSAY OF PSA TOTAL: CPT | Performed by: INTERNAL MEDICINE

## 2022-10-18 PROCEDURE — 80061 LIPID PANEL: CPT | Performed by: INTERNAL MEDICINE

## 2022-10-18 PROCEDURE — 36415 COLL VENOUS BLD VENIPUNCTURE: CPT | Mod: PO | Performed by: INTERNAL MEDICINE

## 2022-10-19 DIAGNOSIS — E11.9 TYPE 2 DIABETES MELLITUS WITHOUT COMPLICATION: ICD-10-CM

## 2022-10-27 DIAGNOSIS — I10 ESSENTIAL (PRIMARY) HYPERTENSION: ICD-10-CM

## 2022-10-27 RX ORDER — LOSARTAN POTASSIUM AND HYDROCHLOROTHIAZIDE 12.5; 1 MG/1; MG/1
TABLET ORAL
Qty: 90 TABLET | Refills: 1 | Status: SHIPPED | OUTPATIENT
Start: 2022-10-27 | End: 2023-04-24

## 2022-10-27 NOTE — TELEPHONE ENCOUNTER
No new care gaps identified.  [facility] Embedded Care Gaps. Reference number: [id number]. 10/27/2022   12:18:09 AM CDT

## 2022-10-27 NOTE — TELEPHONE ENCOUNTER
Refill Decision Note   Cade Preston  is requesting a refill authorization.  Brief Assessment and Rationale for Refill:  Approve     Medication Therapy Plan:       Medication Reconciliation Completed: No   Comments:     No Care Gaps recommended.     Note composed:6:11 AM 10/27/2022

## 2022-11-02 ENCOUNTER — OFFICE VISIT (OUTPATIENT)
Dept: FAMILY MEDICINE | Facility: CLINIC | Age: 57
End: 2022-11-02
Payer: MEDICARE

## 2022-11-02 VITALS
OXYGEN SATURATION: 96 % | HEART RATE: 76 BPM | TEMPERATURE: 98 F | SYSTOLIC BLOOD PRESSURE: 132 MMHG | BODY MASS INDEX: 29.27 KG/M2 | DIASTOLIC BLOOD PRESSURE: 80 MMHG | HEIGHT: 73 IN | WEIGHT: 220.88 LBS

## 2022-11-02 DIAGNOSIS — E78.5 DYSLIPIDEMIA: ICD-10-CM

## 2022-11-02 DIAGNOSIS — I10 ESSENTIAL HYPERTENSION: Primary | ICD-10-CM

## 2022-11-02 DIAGNOSIS — Z79.899 ENCOUNTER FOR LONG-TERM (CURRENT) USE OF MEDICATIONS: ICD-10-CM

## 2022-11-02 DIAGNOSIS — E11.9 CONTROLLED TYPE 2 DIABETES MELLITUS WITHOUT COMPLICATION, WITHOUT LONG-TERM CURRENT USE OF INSULIN: ICD-10-CM

## 2022-11-02 LAB
LEFT EYE DM RETINOPATHY: NEGATIVE
RIGHT EYE DM RETINOPATHY: NEGATIVE

## 2022-11-02 PROCEDURE — 3008F PR BODY MASS INDEX (BMI) DOCUMENTED: ICD-10-PCS | Mod: CPTII,S$GLB,, | Performed by: INTERNAL MEDICINE

## 2022-11-02 PROCEDURE — G0008 FLU VACCINE (QUAD) GREATER THAN OR EQUAL TO 3YO PRESERVATIVE FREE IM: ICD-10-PCS | Mod: S$GLB,,, | Performed by: INTERNAL MEDICINE

## 2022-11-02 PROCEDURE — 99214 PR OFFICE/OUTPT VISIT, EST, LEVL IV, 30-39 MIN: ICD-10-PCS | Mod: 25,S$GLB,, | Performed by: INTERNAL MEDICINE

## 2022-11-02 PROCEDURE — 3044F PR MOST RECENT HEMOGLOBIN A1C LEVEL <7.0%: ICD-10-PCS | Mod: CPTII,S$GLB,, | Performed by: INTERNAL MEDICINE

## 2022-11-02 PROCEDURE — 3066F NEPHROPATHY DOC TX: CPT | Mod: CPTII,S$GLB,, | Performed by: INTERNAL MEDICINE

## 2022-11-02 PROCEDURE — 3066F PR DOCUMENTATION OF TREATMENT FOR NEPHROPATHY: ICD-10-PCS | Mod: CPTII,S$GLB,, | Performed by: INTERNAL MEDICINE

## 2022-11-02 PROCEDURE — 3008F BODY MASS INDEX DOCD: CPT | Mod: CPTII,S$GLB,, | Performed by: INTERNAL MEDICINE

## 2022-11-02 PROCEDURE — 1159F MED LIST DOCD IN RCRD: CPT | Mod: CPTII,S$GLB,, | Performed by: INTERNAL MEDICINE

## 2022-11-02 PROCEDURE — 1159F PR MEDICATION LIST DOCUMENTED IN MEDICAL RECORD: ICD-10-PCS | Mod: CPTII,S$GLB,, | Performed by: INTERNAL MEDICINE

## 2022-11-02 PROCEDURE — 99999 PR PBB SHADOW E&M-EST. PATIENT-LVL IV: CPT | Mod: PBBFAC,,, | Performed by: INTERNAL MEDICINE

## 2022-11-02 PROCEDURE — 3075F SYST BP GE 130 - 139MM HG: CPT | Mod: CPTII,S$GLB,, | Performed by: INTERNAL MEDICINE

## 2022-11-02 PROCEDURE — 3079F DIAST BP 80-89 MM HG: CPT | Mod: CPTII,S$GLB,, | Performed by: INTERNAL MEDICINE

## 2022-11-02 PROCEDURE — 99214 OFFICE O/P EST MOD 30 MIN: CPT | Mod: 25,S$GLB,, | Performed by: INTERNAL MEDICINE

## 2022-11-02 PROCEDURE — G0008 ADMIN INFLUENZA VIRUS VAC: HCPCS | Mod: S$GLB,,, | Performed by: INTERNAL MEDICINE

## 2022-11-02 PROCEDURE — 3044F HG A1C LEVEL LT 7.0%: CPT | Mod: CPTII,S$GLB,, | Performed by: INTERNAL MEDICINE

## 2022-11-02 PROCEDURE — 90686 IIV4 VACC NO PRSV 0.5 ML IM: CPT | Mod: S$GLB,,, | Performed by: INTERNAL MEDICINE

## 2022-11-02 PROCEDURE — 1160F PR REVIEW ALL MEDS BY PRESCRIBER/CLIN PHARMACIST DOCUMENTED: ICD-10-PCS | Mod: CPTII,S$GLB,, | Performed by: INTERNAL MEDICINE

## 2022-11-02 PROCEDURE — 3079F PR MOST RECENT DIASTOLIC BLOOD PRESSURE 80-89 MM HG: ICD-10-PCS | Mod: CPTII,S$GLB,, | Performed by: INTERNAL MEDICINE

## 2022-11-02 PROCEDURE — 3075F PR MOST RECENT SYSTOLIC BLOOD PRESS GE 130-139MM HG: ICD-10-PCS | Mod: CPTII,S$GLB,, | Performed by: INTERNAL MEDICINE

## 2022-11-02 PROCEDURE — 90686 FLU VACCINE (QUAD) GREATER THAN OR EQUAL TO 3YO PRESERVATIVE FREE IM: ICD-10-PCS | Mod: S$GLB,,, | Performed by: INTERNAL MEDICINE

## 2022-11-02 PROCEDURE — 3061F NEG MICROALBUMINURIA REV: CPT | Mod: CPTII,S$GLB,, | Performed by: INTERNAL MEDICINE

## 2022-11-02 PROCEDURE — 1160F RVW MEDS BY RX/DR IN RCRD: CPT | Mod: CPTII,S$GLB,, | Performed by: INTERNAL MEDICINE

## 2022-11-02 PROCEDURE — 3061F PR NEG MICROALBUMINURIA RESULT DOCUMENTED/REVIEW: ICD-10-PCS | Mod: CPTII,S$GLB,, | Performed by: INTERNAL MEDICINE

## 2022-11-02 PROCEDURE — 99999 PR PBB SHADOW E&M-EST. PATIENT-LVL IV: ICD-10-PCS | Mod: PBBFAC,,, | Performed by: INTERNAL MEDICINE

## 2022-11-02 RX ORDER — TOBRAMYCIN AND DEXAMETHASONE 3; 1 MG/ML; MG/ML
SUSPENSION/ DROPS OPHTHALMIC
COMMUNITY
Start: 2022-10-21

## 2022-11-02 NOTE — PROGRESS NOTES
Subjective:       Patient ID: Cade Johnston is a 56 y.o. male.    Chief Complaint: Diabetes      Sarcoidosis:  Had to retire.  Had several episodes at work where he had trouble breathing and was alone not to be found.  45% lung fnx, sees pulmonary - increased sob - now with talking.  States lungs slightly worse.  On new inhaler Breo from pulm.  pulm recommended retiring.    low testosterone, Dr Larson;  Secondary erythrocytosis - gets frequent phlebotomies.  2nd to testosterone and sarcoidosis       CAD s/p stent.  Angio 2022 clear.  Sees Dr Duran cardiology.      VÍCTOR - + fatigue; could not tolerate cpap 10 yr ago.     DM - controlled, but worse; poor diet and feels can get it back down   HTN - controlled on home log   HLD - controlled ldl < 70 goal; refusing statin in past.  High triglycerides  BPH/ ED - sees Dr Larson     Mood good.       Review of Systems   Constitutional:  Negative for appetite change and fever.   HENT:  Negative for nosebleeds and trouble swallowing.    Eyes:  Negative for discharge and visual disturbance.   Respiratory:  Negative for choking and shortness of breath.    Cardiovascular:  Negative for chest pain and palpitations.   Gastrointestinal:  Negative for abdominal pain, nausea and vomiting.   Musculoskeletal:  Negative for arthralgias and joint swelling.   Skin:  Negative for rash and wound.   Neurological:  Negative for dizziness and syncope.   Psychiatric/Behavioral:  Negative for confusion and dysphoric mood.      Objective:      Vitals:    11/02/22 0744   BP: 132/80   Pulse: 76   Temp: 98.1 °F (36.7 °C)     Physical Exam  Vitals reviewed.   Constitutional:       Appearance: Normal appearance.   Eyes:      Conjunctiva/sclera: Conjunctivae normal.   Cardiovascular:      Rate and Rhythm: Normal rate and regular rhythm.   Pulmonary:      Effort: Pulmonary effort is normal.      Breath sounds: Normal breath sounds.   Musculoskeletal:      Cervical back: Normal range of motion.       Comments: Normal ROM bilateral    Skin:     General: Skin is warm and dry.   Neurological:      Mental Status: He is alert.      Cranial Nerves: No cranial nerve deficit (grossly intact).   Psychiatric:      Comments: Alert and orientated         Assessment:       1. Essential hypertension    2. Controlled type 2 diabetes mellitus without complication, without long-term current use of insulin    3. Dyslipidemia    4. Encounter for long-term (current) use of medications          Plan:       Essential hypertension  -     Comprehensive Metabolic Panel; Future; Expected date: 05/01/2023    Controlled type 2 diabetes mellitus without complication, without long-term current use of insulin  -     Hemoglobin A1C; Future; Expected date: 05/01/2023    Dyslipidemia  -     Lipid Panel; Future; Expected date: 05/01/2023    Encounter for long-term (current) use of medications  -     CBC Auto Differential; Future; Expected date: 05/01/2023            Medication List with Changes/Refills   Current Medications    ALBUTEROL (PROVENTIL/VENTOLIN HFA) 90 MCG/ACTUATION INHALER    Inhale 2 puffs into the lungs every 6 (six) hours as needed for Wheezing. Rescue    ASPIRIN (ECOTRIN) 81 MG EC TABLET    Take 81 mg by mouth once daily. No Sig Provided    BLOOD SUGAR DIAGNOSTIC STRP    Test q day    CLOPIDOGREL (PLAVIX) 75 MG TABLET    Take 1 tablet (75 mg total) by mouth once daily.    FLUTICASONE FUROATE-VILANTEROL (BREO ELLIPTA) 200-25 MCG/DOSE DSDV DISKUS INHALER    Inhale 1 puff into the lungs once daily. Controller    IBUPROFEN (ADVIL,MOTRIN) 200 MG TABLET    Take 200 mg by mouth every 6 (six) hours as needed for Pain.    IPRATROPIUM-ALBUTEROL (COMBIVENT)  MCG/ACTUATION INHALER    Inhale 1 puff into the lungs every 4 (four) hours as needed for Shortness of Breath. Rescue    LOSARTAN-HYDROCHLOROTHIAZIDE 100-12.5 MG (HYZAAR) 100-12.5 MG TAB    TAKE 1 TABLET BY MOUTH EVERY DAY    METFORMIN (GLUCOPHAGE-XR) 500 MG ER 24HR TABLET    TAKE  2 TABLETS BY MOUTH TWICE A DAY WITH MEALS    SILDENAFIL (REVATIO) 20 MG TAB    Take 1 tablet (20 mg total) by mouth 3 (three) times daily. for 180 doses    TOBRAMYCIN-DEXAMETHASONE 0.3-0.1% (TOBRADEX) 0.3-0.1 % DRPS    SMARTSI-2 Drop(s) In Eye(s) Every 4-6 Hours    VERAPAMIL (VERELAN) 120 MG C24P    Take 1 capsule (120 mg total) by mouth once daily.       Continue current management and monitor.    Counseled on regular exercise, maintenance of a healthy weight, balanced diet rich in fruits/vegetables and lean protein, and avoidance of unhealthy habits like smoking and excessive alcohol intake.   Also, counseled on importance of being compliant with medication, health appointments, diet and exercise.     Follow up in about 6 months (around 2023).

## 2023-01-09 ENCOUNTER — OFFICE VISIT (OUTPATIENT)
Dept: CARDIOLOGY | Facility: CLINIC | Age: 58
End: 2023-01-09
Payer: MEDICARE

## 2023-01-09 VITALS
DIASTOLIC BLOOD PRESSURE: 79 MMHG | WEIGHT: 224.63 LBS | HEIGHT: 73 IN | HEART RATE: 72 BPM | BODY MASS INDEX: 29.77 KG/M2 | SYSTOLIC BLOOD PRESSURE: 133 MMHG

## 2023-01-09 DIAGNOSIS — I25.10 CORONARY ARTERY DISEASE INVOLVING NATIVE CORONARY ARTERY OF NATIVE HEART WITHOUT ANGINA PECTORIS: Primary | ICD-10-CM

## 2023-01-09 DIAGNOSIS — E11.00 TYPE 2 DIABETES MELLITUS WITH HYPEROSMOLARITY WITHOUT COMA, WITHOUT LONG-TERM CURRENT USE OF INSULIN: ICD-10-CM

## 2023-01-09 DIAGNOSIS — E78.5 DYSLIPIDEMIA: ICD-10-CM

## 2023-01-09 DIAGNOSIS — Z95.5 STENTED CORONARY ARTERY: ICD-10-CM

## 2023-01-09 DIAGNOSIS — D75.1 POLYCYTHEMIA: ICD-10-CM

## 2023-01-09 DIAGNOSIS — I27.20 PULMONARY HYPERTENSION: ICD-10-CM

## 2023-01-09 DIAGNOSIS — I10 ESSENTIAL HYPERTENSION: ICD-10-CM

## 2023-01-09 PROCEDURE — 99999 PR PBB SHADOW E&M-EST. PATIENT-LVL III: ICD-10-PCS | Mod: PBBFAC,,, | Performed by: PHYSICIAN ASSISTANT

## 2023-01-09 PROCEDURE — 3008F BODY MASS INDEX DOCD: CPT | Mod: CPTII,S$GLB,, | Performed by: PHYSICIAN ASSISTANT

## 2023-01-09 PROCEDURE — 1160F RVW MEDS BY RX/DR IN RCRD: CPT | Mod: CPTII,S$GLB,, | Performed by: PHYSICIAN ASSISTANT

## 2023-01-09 PROCEDURE — 3075F SYST BP GE 130 - 139MM HG: CPT | Mod: CPTII,S$GLB,, | Performed by: PHYSICIAN ASSISTANT

## 2023-01-09 PROCEDURE — 1159F PR MEDICATION LIST DOCUMENTED IN MEDICAL RECORD: ICD-10-PCS | Mod: CPTII,S$GLB,, | Performed by: PHYSICIAN ASSISTANT

## 2023-01-09 PROCEDURE — 1159F MED LIST DOCD IN RCRD: CPT | Mod: CPTII,S$GLB,, | Performed by: PHYSICIAN ASSISTANT

## 2023-01-09 PROCEDURE — 1160F PR REVIEW ALL MEDS BY PRESCRIBER/CLIN PHARMACIST DOCUMENTED: ICD-10-PCS | Mod: CPTII,S$GLB,, | Performed by: PHYSICIAN ASSISTANT

## 2023-01-09 PROCEDURE — 3008F PR BODY MASS INDEX (BMI) DOCUMENTED: ICD-10-PCS | Mod: CPTII,S$GLB,, | Performed by: PHYSICIAN ASSISTANT

## 2023-01-09 PROCEDURE — 3078F PR MOST RECENT DIASTOLIC BLOOD PRESSURE < 80 MM HG: ICD-10-PCS | Mod: CPTII,S$GLB,, | Performed by: PHYSICIAN ASSISTANT

## 2023-01-09 PROCEDURE — 3078F DIAST BP <80 MM HG: CPT | Mod: CPTII,S$GLB,, | Performed by: PHYSICIAN ASSISTANT

## 2023-01-09 PROCEDURE — 99999 PR PBB SHADOW E&M-EST. PATIENT-LVL III: CPT | Mod: PBBFAC,,, | Performed by: PHYSICIAN ASSISTANT

## 2023-01-09 PROCEDURE — 99214 OFFICE O/P EST MOD 30 MIN: CPT | Mod: S$GLB,,, | Performed by: PHYSICIAN ASSISTANT

## 2023-01-09 PROCEDURE — 99214 PR OFFICE/OUTPT VISIT, EST, LEVL IV, 30-39 MIN: ICD-10-PCS | Mod: S$GLB,,, | Performed by: PHYSICIAN ASSISTANT

## 2023-01-09 PROCEDURE — 3075F PR MOST RECENT SYSTOLIC BLOOD PRESS GE 130-139MM HG: ICD-10-PCS | Mod: CPTII,S$GLB,, | Performed by: PHYSICIAN ASSISTANT

## 2023-01-09 NOTE — PROGRESS NOTES
"Subjective:    Patient ID:  Cade Johnston is a 57 y.o. male who presents for follow-up of CAD.      HPI  Mr. Johnston is a delightful gentleman who follows with Dr. Mcdonough. He presents today for routine follow up. He is without cardiovascular complaints. No CP or change in his exercise tolerance. He has chronic KOROMA related to his sarcoid that is stable.       Review of Systems   Constitutional: Negative for weight gain and weight loss.   Eyes:  Negative for blurred vision, vision loss in left eye, vision loss in right eye and visual disturbance.   Cardiovascular:  Positive for dyspnea on exertion. Negative for claudication, leg swelling, near-syncope, orthopnea, palpitations, paroxysmal nocturnal dyspnea and syncope.   Respiratory:  Negative for hemoptysis, shortness of breath, sputum production and wheezing.    Endocrine: Negative for cold intolerance and heat intolerance.   Hematologic/Lymphatic: Negative for adenopathy. Does not bruise/bleed easily.   Musculoskeletal:  Negative for falls and muscle weakness.   Gastrointestinal:  Negative for constipation, diarrhea and melena.   Genitourinary:  Negative for bladder incontinence.   Neurological:  Negative for dizziness, focal weakness and light-headedness.   Psychiatric/Behavioral:  Negative for altered mental status.       Vitals:    01/09/23 1206   BP: 133/79   BP Location: Right arm   Patient Position: Sitting   BP Method: Medium (Automatic)   Pulse: 72   Weight: 101.9 kg (224 lb 10.4 oz)   Height: 6' 1" (1.854 m)   Body mass index is 29.64 kg/m².    Objective:    Physical Exam  Constitutional:       Appearance: He is well-developed.   HENT:      Head: Normocephalic and atraumatic.   Eyes:      Extraocular Movements: Extraocular movements intact.      Pupils: Pupils are equal, round, and reactive to light.   Neck:      Thyroid: No thyromegaly.      Vascular: No JVD.      Trachea: No tracheal deviation.   Cardiovascular:      Rate and Rhythm: Normal rate and " regular rhythm.      Chest Wall: PMI is not displaced.      Pulses: Normal pulses and intact distal pulses.      Heart sounds: S1 normal and S2 normal. No murmur heard.    No friction rub. No gallop.   Pulmonary:      Effort: Pulmonary effort is normal. No respiratory distress.      Breath sounds: Normal breath sounds. No wheezing or rales.   Chest:      Chest wall: No tenderness.   Abdominal:      General: Bowel sounds are normal. There is no distension.      Palpations: Abdomen is soft. There is no mass.      Tenderness: There is no abdominal tenderness.   Musculoskeletal:         General: No tenderness. Normal range of motion.      Cervical back: Neck supple.   Skin:     General: Skin is warm and dry.      Findings: No rash.   Neurological:      General: No focal deficit present.      Mental Status: He is alert and oriented to person, place, and time.   Psychiatric:         Mood and Affect: Mood normal.         Behavior: Behavior normal.         Assessment:       Problem List Items Addressed This Visit          Cardiology Problems    CAD (coronary artery disease) - Primary    Essential hypertension    Pulmonary hypertension       Other    DM type 2 (diabetes mellitus, type 2)    Dyslipidemia    Polycythemia    Stented coronary artery        Plan:       Doing well.   Continue current cardiac medications.   Risk factor modification including diet and exercise.   F/U with Dr. Mcdonough in 6 months.

## 2023-01-18 ENCOUNTER — PATIENT MESSAGE (OUTPATIENT)
Dept: ADMINISTRATIVE | Facility: HOSPITAL | Age: 58
End: 2023-01-18
Payer: MEDICARE

## 2023-04-11 ENCOUNTER — PATIENT MESSAGE (OUTPATIENT)
Dept: ADMINISTRATIVE | Facility: HOSPITAL | Age: 58
End: 2023-04-11
Payer: MEDICARE

## 2023-04-25 ENCOUNTER — LAB VISIT (OUTPATIENT)
Dept: LAB | Facility: HOSPITAL | Age: 58
End: 2023-04-25
Attending: INTERNAL MEDICINE
Payer: MEDICARE

## 2023-04-25 DIAGNOSIS — Z79.899 ENCOUNTER FOR LONG-TERM (CURRENT) USE OF MEDICATIONS: ICD-10-CM

## 2023-04-25 DIAGNOSIS — E78.5 DYSLIPIDEMIA: ICD-10-CM

## 2023-04-25 DIAGNOSIS — I10 ESSENTIAL HYPERTENSION: ICD-10-CM

## 2023-04-25 DIAGNOSIS — E11.9 CONTROLLED TYPE 2 DIABETES MELLITUS WITHOUT COMPLICATION, WITHOUT LONG-TERM CURRENT USE OF INSULIN: ICD-10-CM

## 2023-04-25 LAB
ALBUMIN SERPL BCP-MCNC: 3.6 G/DL (ref 3.5–5.2)
ALP SERPL-CCNC: 83 U/L (ref 55–135)
ALT SERPL W/O P-5'-P-CCNC: 38 U/L (ref 10–44)
ANION GAP SERPL CALC-SCNC: 9 MMOL/L (ref 8–16)
AST SERPL-CCNC: 31 U/L (ref 10–40)
BASOPHILS # BLD AUTO: 0.08 K/UL (ref 0–0.2)
BASOPHILS NFR BLD: 1.3 % (ref 0–1.9)
BILIRUB SERPL-MCNC: 1.1 MG/DL (ref 0.1–1)
BUN SERPL-MCNC: 17 MG/DL (ref 6–20)
CALCIUM SERPL-MCNC: 9.1 MG/DL (ref 8.7–10.5)
CHLORIDE SERPL-SCNC: 106 MMOL/L (ref 95–110)
CHOLEST SERPL-MCNC: 138 MG/DL (ref 120–199)
CHOLEST/HDLC SERPL: 4.2 {RATIO} (ref 2–5)
CO2 SERPL-SCNC: 25 MMOL/L (ref 23–29)
CREAT SERPL-MCNC: 0.9 MG/DL (ref 0.5–1.4)
DIFFERENTIAL METHOD: ABNORMAL
EOSINOPHIL # BLD AUTO: 0.2 K/UL (ref 0–0.5)
EOSINOPHIL NFR BLD: 3.2 % (ref 0–8)
ERYTHROCYTE [DISTWIDTH] IN BLOOD BY AUTOMATED COUNT: 13.2 % (ref 11.5–14.5)
EST. GFR  (NO RACE VARIABLE): >60 ML/MIN/1.73 M^2
ESTIMATED AVG GLUCOSE: 137 MG/DL (ref 68–131)
GLUCOSE SERPL-MCNC: 151 MG/DL (ref 70–110)
HBA1C MFR BLD: 6.4 % (ref 4–5.6)
HCT VFR BLD AUTO: 48.6 % (ref 40–54)
HDLC SERPL-MCNC: 33 MG/DL (ref 40–75)
HDLC SERPL: 23.9 % (ref 20–50)
HGB BLD-MCNC: 15.9 G/DL (ref 14–18)
IMM GRANULOCYTES # BLD AUTO: 0.06 K/UL (ref 0–0.04)
IMM GRANULOCYTES NFR BLD AUTO: 1 % (ref 0–0.5)
LDLC SERPL CALC-MCNC: 67.8 MG/DL (ref 63–159)
LYMPHOCYTES # BLD AUTO: 1.2 K/UL (ref 1–4.8)
LYMPHOCYTES NFR BLD: 19.8 % (ref 18–48)
MCH RBC QN AUTO: 28.4 PG (ref 27–31)
MCHC RBC AUTO-ENTMCNC: 32.7 G/DL (ref 32–36)
MCV RBC AUTO: 87 FL (ref 82–98)
MONOCYTES # BLD AUTO: 0.5 K/UL (ref 0.3–1)
MONOCYTES NFR BLD: 7.8 % (ref 4–15)
NEUTROPHILS # BLD AUTO: 4.1 K/UL (ref 1.8–7.7)
NEUTROPHILS NFR BLD: 66.9 % (ref 38–73)
NONHDLC SERPL-MCNC: 105 MG/DL
NRBC BLD-RTO: 0 /100 WBC
PLATELET # BLD AUTO: 182 K/UL (ref 150–450)
PMV BLD AUTO: 10.4 FL (ref 9.2–12.9)
POTASSIUM SERPL-SCNC: 4.3 MMOL/L (ref 3.5–5.1)
PROT SERPL-MCNC: 6.5 G/DL (ref 6–8.4)
RBC # BLD AUTO: 5.59 M/UL (ref 4.6–6.2)
SODIUM SERPL-SCNC: 140 MMOL/L (ref 136–145)
TRIGL SERPL-MCNC: 186 MG/DL (ref 30–150)
WBC # BLD AUTO: 6.17 K/UL (ref 3.9–12.7)

## 2023-04-25 PROCEDURE — 83036 HEMOGLOBIN GLYCOSYLATED A1C: CPT | Performed by: INTERNAL MEDICINE

## 2023-04-25 PROCEDURE — 36415 COLL VENOUS BLD VENIPUNCTURE: CPT | Mod: PO | Performed by: INTERNAL MEDICINE

## 2023-04-25 PROCEDURE — 80061 LIPID PANEL: CPT | Performed by: INTERNAL MEDICINE

## 2023-04-25 PROCEDURE — 85025 COMPLETE CBC W/AUTO DIFF WBC: CPT | Performed by: INTERNAL MEDICINE

## 2023-04-25 PROCEDURE — 80053 COMPREHEN METABOLIC PANEL: CPT | Performed by: INTERNAL MEDICINE

## 2023-05-02 ENCOUNTER — OFFICE VISIT (OUTPATIENT)
Dept: FAMILY MEDICINE | Facility: CLINIC | Age: 58
End: 2023-05-02
Payer: MEDICARE

## 2023-05-02 VITALS
OXYGEN SATURATION: 97 % | BODY MASS INDEX: 30.13 KG/M2 | DIASTOLIC BLOOD PRESSURE: 78 MMHG | WEIGHT: 227.31 LBS | HEART RATE: 71 BPM | HEIGHT: 73 IN | TEMPERATURE: 98 F | SYSTOLIC BLOOD PRESSURE: 132 MMHG

## 2023-05-02 DIAGNOSIS — R09.82 PND (POST-NASAL DRIP): ICD-10-CM

## 2023-05-02 DIAGNOSIS — I10 ESSENTIAL HYPERTENSION: ICD-10-CM

## 2023-05-02 DIAGNOSIS — Z12.5 ENCOUNTER FOR SCREENING FOR MALIGNANT NEOPLASM OF PROSTATE: ICD-10-CM

## 2023-05-02 DIAGNOSIS — Z00.00 ROUTINE PHYSICAL EXAMINATION: Primary | ICD-10-CM

## 2023-05-02 DIAGNOSIS — M46.1 SACROILIITIS, NOT ELSEWHERE CLASSIFIED: ICD-10-CM

## 2023-05-02 DIAGNOSIS — E78.5 DYSLIPIDEMIA: ICD-10-CM

## 2023-05-02 DIAGNOSIS — D86.0 SARCOIDOSIS OF LUNG: ICD-10-CM

## 2023-05-02 DIAGNOSIS — E11.9 CONTROLLED TYPE 2 DIABETES MELLITUS WITHOUT COMPLICATION, WITHOUT LONG-TERM CURRENT USE OF INSULIN: ICD-10-CM

## 2023-05-02 DIAGNOSIS — Z79.899 ENCOUNTER FOR LONG-TERM (CURRENT) USE OF MEDICATIONS: ICD-10-CM

## 2023-05-02 PROCEDURE — 99214 OFFICE O/P EST MOD 30 MIN: CPT | Mod: 25,S$GLB,, | Performed by: INTERNAL MEDICINE

## 2023-05-02 PROCEDURE — 3078F PR MOST RECENT DIASTOLIC BLOOD PRESSURE < 80 MM HG: ICD-10-PCS | Mod: CPTII,S$GLB,, | Performed by: INTERNAL MEDICINE

## 2023-05-02 PROCEDURE — 3075F SYST BP GE 130 - 139MM HG: CPT | Mod: CPTII,S$GLB,, | Performed by: INTERNAL MEDICINE

## 2023-05-02 PROCEDURE — 1159F MED LIST DOCD IN RCRD: CPT | Mod: CPTII,S$GLB,, | Performed by: INTERNAL MEDICINE

## 2023-05-02 PROCEDURE — 99999 PR PBB SHADOW E&M-EST. PATIENT-LVL IV: CPT | Mod: PBBFAC,,, | Performed by: INTERNAL MEDICINE

## 2023-05-02 PROCEDURE — 3044F PR MOST RECENT HEMOGLOBIN A1C LEVEL <7.0%: ICD-10-PCS | Mod: CPTII,S$GLB,, | Performed by: INTERNAL MEDICINE

## 2023-05-02 PROCEDURE — 3078F DIAST BP <80 MM HG: CPT | Mod: CPTII,S$GLB,, | Performed by: INTERNAL MEDICINE

## 2023-05-02 PROCEDURE — 3008F PR BODY MASS INDEX (BMI) DOCUMENTED: ICD-10-PCS | Mod: CPTII,S$GLB,, | Performed by: INTERNAL MEDICINE

## 2023-05-02 PROCEDURE — G0439 PR MEDICARE ANNUAL WELLNESS SUBSEQUENT VISIT: ICD-10-PCS | Mod: S$GLB,,, | Performed by: INTERNAL MEDICINE

## 2023-05-02 PROCEDURE — 1159F PR MEDICATION LIST DOCUMENTED IN MEDICAL RECORD: ICD-10-PCS | Mod: CPTII,S$GLB,, | Performed by: INTERNAL MEDICINE

## 2023-05-02 PROCEDURE — 1160F PR REVIEW ALL MEDS BY PRESCRIBER/CLIN PHARMACIST DOCUMENTED: ICD-10-PCS | Mod: CPTII,S$GLB,, | Performed by: INTERNAL MEDICINE

## 2023-05-02 PROCEDURE — 3075F PR MOST RECENT SYSTOLIC BLOOD PRESS GE 130-139MM HG: ICD-10-PCS | Mod: CPTII,S$GLB,, | Performed by: INTERNAL MEDICINE

## 2023-05-02 PROCEDURE — 99999 PR PBB SHADOW E&M-EST. PATIENT-LVL IV: ICD-10-PCS | Mod: PBBFAC,,, | Performed by: INTERNAL MEDICINE

## 2023-05-02 PROCEDURE — G0439 PPPS, SUBSEQ VISIT: HCPCS | Mod: S$GLB,,, | Performed by: INTERNAL MEDICINE

## 2023-05-02 PROCEDURE — 3044F HG A1C LEVEL LT 7.0%: CPT | Mod: CPTII,S$GLB,, | Performed by: INTERNAL MEDICINE

## 2023-05-02 PROCEDURE — 3008F BODY MASS INDEX DOCD: CPT | Mod: CPTII,S$GLB,, | Performed by: INTERNAL MEDICINE

## 2023-05-02 PROCEDURE — 99214 PR OFFICE/OUTPT VISIT, EST, LEVL IV, 30-39 MIN: ICD-10-PCS | Mod: 25,S$GLB,, | Performed by: INTERNAL MEDICINE

## 2023-05-02 PROCEDURE — 1160F RVW MEDS BY RX/DR IN RCRD: CPT | Mod: CPTII,S$GLB,, | Performed by: INTERNAL MEDICINE

## 2023-05-02 NOTE — PROGRESS NOTES
Subjective:       Patient ID: Cade Johnston is a 57 y.o. male.  Chief Complaint: Annual Exam     HPI    HRA: patient feels overall is healthy.  Psychosocial and behavioral risks discussed.  BMI - 29   Weight loss discussed.   Diet - well balanced.   ADL: self sufficient in all  Instrumental ADL: patient is able to manage things like their medications and finances.    Memory or cognitive function - Patient has no issues with either   Ambulates normal. No recent falls.  Exercise - walks and resistance   Depression screening is negative.  Hearing--no deficits.  Vision - no deficits.   Incontinence - none    Preventative health needs discussed and patient was given a printed list of what they have received and what they will need with in the next 5-10 years.  Screening schedule reviewed with patient  Colonoscopy  - complete   COVID - 1 shot; refuses further   Pneumovax - complete   Prevnar -complete   Shingrix - complete   Flu vaccine - v      Advanced Care directive: Patient will address advanced care directives on their own at a later time.   I have reviewed and updated the patient's current list of providers.       The patient is not currently on opioids                In addition to the patient's preventative review and discussion today, the patient also has other issues to discuss today with a separate summary of plan below:     PND/ seasonal allergies.  Uncontrolled.  Tried Singulair in past, but makes eyes to dry.  Zyrtec and Xyzal = tired.     Sarcoidosis:  some SOB, but stable.  Had to retire.  Had several episodes at work where he had trouble breathing and was alone not to be found.  45% lung fnx, sees pulmonary - increased sob - now with talking.  States lungs slightly worse.  On new inhaler Breo from pulm.  pulm recommended retiring.    low testosterone, Dr Larson;  Secondary erythrocytosis - gets frequent phlebotomies.  2nd to testosterone and sarcoidosis       CAD s/p stent.  Angio 2022 clear.  Sees   [facility].      VÍCTOR - + fatigue; could not tolerate cpap 10 yr ago.     DM - controlled, but worse; poor diet and feels can get it back down; outside eye   HTN - controlled on home log   HLD - controlled ldl < 70 goal; refusing statin in past.  High triglycerides  BPH/ ED - sees Dr Marsha Luke good.    Refuses further COVID vaccines             Assessment:       1. Routine physical examination    2. Controlled type 2 diabetes mellitus without complication, without long-term current use of insulin    3. Essential hypertension    4. Dyslipidemia    5. Sacroiliitis, not elsewhere classified    6. Sarcoidosis of lung        Plan:       Routine physical examination  -     Lipid Panel; Future; Expected date: 10/29/2023  -     Comprehensive Metabolic Panel; Future; Expected date: 10/29/2023  -     Hemoglobin A1C; Future; Expected date: 10/29/2023  -     PSA, Screening; Future; Expected date: 10/29/2023  -     CBC Auto Differential; Future; Expected date: 10/29/2023    Controlled type 2 diabetes mellitus without complication, without long-term current use of insulin  -     Hemoglobin A1C; Future; Expected date: 10/29/2023  -     Microalbumin/Creatinine Ratio, Urine; Future; Expected date: 10/29/2023    Essential hypertension  -     Comprehensive Metabolic Panel; Future; Expected date: 10/29/2023    Dyslipidemia  -     Lipid Panel; Future; Expected date: 10/29/2023    Sacroiliitis, not elsewhere classified    Sarcoidosis of lung    Encounter for long-term (current) use of medications  -     CBC Auto Differential; Future; Expected date: 10/29/2023    Encounter for screening for malignant neoplasm of prostate  -     PSA, Screening; Future; Expected date: 10/29/2023    PND (post-nasal drip)          Wellness reviewed        Discussed Allergra and Flonase as tolerated (dec urination); if worsens, send to allergist.  Continue current management and monitor.  Other diagnoses were reviewed and found stable and will  continue to monitor.  Counseled on regular exercise, maintenance of a healthy weight, balanced diet rich in fruits/vegetables and lean protein, and avoidance of unhealthy habits like smoking and excessive alcohol intake.   Also, counseled on importance of being compliant with medication, health appointments, diet and exercise.     Follow up in about 6 months (around 2023).      Medication List with Changes/Refills   Current Medications    ALBUTEROL (PROVENTIL/VENTOLIN HFA) 90 MCG/ACTUATION INHALER    Inhale 2 puffs into the lungs every 6 (six) hours as needed for Wheezing. Rescue    ASPIRIN (ECOTRIN) 81 MG EC TABLET    Take 81 mg by mouth once daily. No Sig Provided    BLOOD SUGAR DIAGNOSTIC STRP    Test q day    CLOPIDOGREL (PLAVIX) 75 MG TABLET    TAKE 1 TABLET BY MOUTH EVERY DAY    FLUTICASONE FUROATE-VILANTEROL (BREO ELLIPTA) 100-25 MCG/DOSE DISKUS INHALER    Inhale 1 puff into the lungs once daily. Controller    IBUPROFEN (ADVIL,MOTRIN) 200 MG TABLET    Take 200 mg by mouth every 6 (six) hours as needed for Pain.    IPRATROPIUM-ALBUTEROL (COMBIVENT)  MCG/ACTUATION INHALER    Inhale 1 puff into the lungs every 4 (four) hours as needed for Shortness of Breath. Rescue    LOSARTAN-HYDROCHLOROTHIAZIDE 100-12.5 MG (HYZAAR) 100-12.5 MG TAB    TAKE 1 TABLET BY MOUTH EVERY DAY    METFORMIN (GLUCOPHAGE-XR) 500 MG ER 24HR TABLET    TAKE 2 TABLETS BY MOUTH TWICE A DAY WITH MEALS    SILDENAFIL (REVATIO) 20 MG TAB    Take 1 tablet (20 mg total) by mouth 3 (three) times daily. for 180 doses    TOBRAMYCIN-DEXAMETHASONE 0.3-0.1% (TOBRADEX) 0.3-0.1 % DRPS    SMARTSI-2 Drop(s) In Eye(s) Every 4-6 Hours    VERAPAMIL (VERELAN) 120 MG C24P    TAKE 1 CAPSULE BY MOUTH ONCE DAILY       BP Readings from Last 3 Encounters:   23 132/78   23 133/79   22 132/65     Hemoglobin A1C   Date Value Ref Range Status   2023 6.4 (H) 4.0 - 5.6 % Final     Comment:     ADA Screening Guidelines:  5.7-6.4%   Consistent with prediabetes  >or=6.5%  Consistent with diabetes    High levels of fetal hemoglobin interfere with the HbA1C  assay. Heterozygous hemoglobin variants (HbS, HgC, etc)do  not significantly interfere with this assay.   However, presence of multiple variants may affect accuracy.     10/18/2022 6.8 (H) 4.0 - 5.6 % Final     Comment:     ADA Screening Guidelines:  5.7-6.4%  Consistent with prediabetes  >or=6.5%  Consistent with diabetes    High levels of fetal hemoglobin interfere with the HbA1C  assay. Heterozygous hemoglobin variants (HbS, HgC, etc)do  not significantly interfere with this assay.   However, presence of multiple variants may affect accuracy.     04/21/2022 7.0 (H) 4.0 - 5.6 % Final     Comment:     ADA Screening Guidelines:  5.7-6.4%  Consistent with prediabetes  >or=6.5%  Consistent with diabetes    High levels of fetal hemoglobin interfere with the HbA1C  assay. Heterozygous hemoglobin variants (HbS, HgC, etc)do  not significantly interfere with this assay.   However, presence of multiple variants may affect accuracy.       Lab Results   Component Value Date    TSH 1.358 07/27/2021     Lab Results   Component Value Date    LDLCALC 67.8 04/25/2023    LDLCALC 76.4 10/18/2022    LDLCALC 62.8 (L) 09/30/2021     Lab Results   Component Value Date    TRIG 186 (H) 04/25/2023    TRIG 148 10/18/2022    TRIG 181 (H) 09/30/2021     Wt Readings from Last 3 Encounters:   05/02/23 103.1 kg (227 lb 4.7 oz)   01/09/23 101.9 kg (224 lb 10.4 oz)   11/03/22 101.2 kg (223 lb)     Lab Results   Component Value Date    HGB 15.9 04/25/2023    HCT 48.6 04/25/2023    WBC 6.17 04/25/2023    ALT 38 04/25/2023    AST 31 04/25/2023     04/25/2023    K 4.3 04/25/2023    CREATININE 0.9 04/25/2023    PSA 2.1 10/18/2022           Review of Systems   Constitutional:  Negative for diaphoresis and fever.   HENT:  Negative for drooling and nosebleeds.    Eyes:  Negative for discharge and redness.   Respiratory:   Positive for shortness of breath. Negative for apnea and choking.    Cardiovascular:  Negative for chest pain and palpitations.   Gastrointestinal:  Negative for abdominal pain and nausea.   Skin:  Negative for color change.   Neurological:  Negative for seizures and syncope.   Psychiatric/Behavioral:  Negative for behavioral problems.          Objective:      Vitals:    05/02/23 0747   BP: 132/78   Pulse: 71   Temp: 97.9 °F (36.6 °C)     Physical Exam  Vitals reviewed.   Eyes:      Conjunctiva/sclera: Conjunctivae normal.   Neck:      Thyroid: No thyromegaly.      Trachea: Trachea normal.   Cardiovascular:      Rate and Rhythm: Normal rate and regular rhythm.      Comments: Edema negative  Pulmonary:      Effort: Pulmonary effort is normal.      Breath sounds: Normal breath sounds.   Abdominal:      General: Bowel sounds are normal.      Palpations: Abdomen is soft. There is no hepatomegaly.   Musculoskeletal:      Cervical back: Normal range of motion.      Comments: ROM normal bilateral  Strength normal bilateral   Skin:     General: Skin is warm and dry.   Neurological:      Deep Tendon Reflexes: Reflexes are normal and symmetric.   Psychiatric:      Comments: Alert and Oriented

## 2023-05-02 NOTE — Clinical Note
Eye exam few months ago on [address] female Dr; same place as 2019.  Can call him if need for exact name.

## 2023-05-05 ENCOUNTER — PATIENT OUTREACH (OUTPATIENT)
Dept: ADMINISTRATIVE | Facility: HOSPITAL | Age: 58
End: 2023-05-05
Payer: MEDICARE

## 2023-05-09 ENCOUNTER — TELEPHONE (OUTPATIENT)
Dept: FAMILY MEDICINE | Facility: CLINIC | Age: 58
End: 2023-05-09
Payer: MEDICARE

## 2023-05-09 NOTE — TELEPHONE ENCOUNTER
----- Message from Sana Brenner sent at 5/9/2023  9:05 AM CDT -----  Type: Needs Medical Advice  Who Called:  pt     Best Call Back Number: [phone]    Additional Information: pt was told by dr suh to give nurse his eye exam records . Please call back and advise

## 2023-07-18 ENCOUNTER — OFFICE VISIT (OUTPATIENT)
Dept: CARDIOLOGY | Facility: CLINIC | Age: 58
End: 2023-07-18
Payer: MEDICARE

## 2023-07-18 VITALS
WEIGHT: 230.38 LBS | SYSTOLIC BLOOD PRESSURE: 150 MMHG | HEART RATE: 67 BPM | HEIGHT: 72 IN | BODY MASS INDEX: 31.2 KG/M2 | DIASTOLIC BLOOD PRESSURE: 85 MMHG

## 2023-07-18 DIAGNOSIS — Z95.5 STENTED CORONARY ARTERY: Primary | ICD-10-CM

## 2023-07-18 DIAGNOSIS — E78.5 DYSLIPIDEMIA: ICD-10-CM

## 2023-07-18 DIAGNOSIS — I10 ESSENTIAL HYPERTENSION: ICD-10-CM

## 2023-07-18 PROCEDURE — 3044F HG A1C LEVEL LT 7.0%: CPT | Mod: CPTII,S$GLB,, | Performed by: INTERNAL MEDICINE

## 2023-07-18 PROCEDURE — 3008F PR BODY MASS INDEX (BMI) DOCUMENTED: ICD-10-PCS | Mod: CPTII,S$GLB,, | Performed by: INTERNAL MEDICINE

## 2023-07-18 PROCEDURE — 1159F MED LIST DOCD IN RCRD: CPT | Mod: CPTII,S$GLB,, | Performed by: INTERNAL MEDICINE

## 2023-07-18 PROCEDURE — 3079F DIAST BP 80-89 MM HG: CPT | Mod: CPTII,S$GLB,, | Performed by: INTERNAL MEDICINE

## 2023-07-18 PROCEDURE — 3077F PR MOST RECENT SYSTOLIC BLOOD PRESSURE >= 140 MM HG: ICD-10-PCS | Mod: CPTII,S$GLB,, | Performed by: INTERNAL MEDICINE

## 2023-07-18 PROCEDURE — 99214 OFFICE O/P EST MOD 30 MIN: CPT | Mod: S$GLB,,, | Performed by: INTERNAL MEDICINE

## 2023-07-18 PROCEDURE — 99999 PR PBB SHADOW E&M-EST. PATIENT-LVL III: CPT | Mod: PBBFAC,,, | Performed by: INTERNAL MEDICINE

## 2023-07-18 PROCEDURE — 3079F PR MOST RECENT DIASTOLIC BLOOD PRESSURE 80-89 MM HG: ICD-10-PCS | Mod: CPTII,S$GLB,, | Performed by: INTERNAL MEDICINE

## 2023-07-18 PROCEDURE — 1160F RVW MEDS BY RX/DR IN RCRD: CPT | Mod: CPTII,S$GLB,, | Performed by: INTERNAL MEDICINE

## 2023-07-18 PROCEDURE — 1159F PR MEDICATION LIST DOCUMENTED IN MEDICAL RECORD: ICD-10-PCS | Mod: CPTII,S$GLB,, | Performed by: INTERNAL MEDICINE

## 2023-07-18 PROCEDURE — 99214 PR OFFICE/OUTPT VISIT, EST, LEVL IV, 30-39 MIN: ICD-10-PCS | Mod: S$GLB,,, | Performed by: INTERNAL MEDICINE

## 2023-07-18 PROCEDURE — 3008F BODY MASS INDEX DOCD: CPT | Mod: CPTII,S$GLB,, | Performed by: INTERNAL MEDICINE

## 2023-07-18 PROCEDURE — 1160F PR REVIEW ALL MEDS BY PRESCRIBER/CLIN PHARMACIST DOCUMENTED: ICD-10-PCS | Mod: CPTII,S$GLB,, | Performed by: INTERNAL MEDICINE

## 2023-07-18 PROCEDURE — 3044F PR MOST RECENT HEMOGLOBIN A1C LEVEL <7.0%: ICD-10-PCS | Mod: CPTII,S$GLB,, | Performed by: INTERNAL MEDICINE

## 2023-07-18 PROCEDURE — 99999 PR PBB SHADOW E&M-EST. PATIENT-LVL III: ICD-10-PCS | Mod: PBBFAC,,, | Performed by: INTERNAL MEDICINE

## 2023-07-18 PROCEDURE — 3077F SYST BP >= 140 MM HG: CPT | Mod: CPTII,S$GLB,, | Performed by: INTERNAL MEDICINE

## 2023-07-18 NOTE — PROGRESS NOTES
Subjective:    Patient ID:  Cade Johnston is a 57 y.o. male who presents for follow-up of Coronary Artery Disease      HPI  He comes for follow up with no major problems, no chest pain, no shortness of breath.  Functional class 2.  Blood pressure normal at home    Review of Systems   Constitutional: Negative for decreased appetite, malaise/fatigue, weight gain and weight loss.   Cardiovascular:  Negative for chest pain, dyspnea on exertion, leg swelling, palpitations and syncope.   Respiratory:  Negative for cough and shortness of breath.    Gastrointestinal: Negative.    Neurological:  Negative for weakness.   All other systems reviewed and are negative.     Objective:      Physical Exam  Vitals and nursing note reviewed.   Constitutional:       Appearance: Normal appearance. He is well-developed.   HENT:      Head: Normocephalic.   Eyes:      Pupils: Pupils are equal, round, and reactive to light.   Neck:      Thyroid: No thyromegaly.      Vascular: No carotid bruit or JVD.   Cardiovascular:      Rate and Rhythm: Normal rate and regular rhythm.      Chest Wall: PMI is not displaced.      Pulses: Normal pulses and intact distal pulses.      Heart sounds: Normal heart sounds. No murmur heard.    No gallop.   Pulmonary:      Effort: Pulmonary effort is normal.      Breath sounds: Normal breath sounds.   Abdominal:      Palpations: Abdomen is soft. There is no mass.      Tenderness: There is no abdominal tenderness.   Musculoskeletal:         General: Normal range of motion.      Cervical back: Normal range of motion and neck supple.   Skin:     General: Skin is warm.   Neurological:      Mental Status: He is alert and oriented to person, place, and time.      Sensory: No sensory deficit.      Deep Tendon Reflexes: Reflexes are normal and symmetric.         Assessment:       1. Stented coronary artery    2. Essential hypertension    3. Dyslipidemia         Plan:     Continue all cardiac medications  Regular exercise  program  Weight loss  Follow-up

## 2023-09-25 ENCOUNTER — TELEPHONE (OUTPATIENT)
Dept: FAMILY MEDICINE | Facility: CLINIC | Age: 58
End: 2023-09-25
Payer: MEDICARE

## 2023-09-25 NOTE — TELEPHONE ENCOUNTER
----- Message from Roxanne Mendes sent at 9/25/2023  9:25 AM CDT -----  Caller is requesting a sooner/ same day appointment. Caller declined first available appointment listed below. Caller will not accept being placed on the waitlist and is requesting a message be sent to doctor.        Name of Caller:  pt       When is the first available appointment?  10/25/23      Symptoms:  bp 76/50 blood sugar 180+ pt under significant amount of stress       Best Call Back Number: [phone] (home)         Additional Information:

## 2023-09-26 ENCOUNTER — TELEPHONE (OUTPATIENT)
Dept: PAIN MEDICINE | Facility: CLINIC | Age: 58
End: 2023-09-26
Payer: MEDICARE

## 2023-09-26 NOTE — TELEPHONE ENCOUNTER
----- Message from Roxanne Mendes sent at 9/25/2023  9:28 AM CDT -----  Name of Who is Calling: pt        What is the request in detail: pt is requesting an appt for an injection. Please assist with this matter        Can the clinic reply by [facility]: yes        What Number to Call Back if not in [facility]: [phone] (home)

## 2023-09-26 NOTE — TELEPHONE ENCOUNTER
Spoke with patient, made appointment for office visit with Dr. Rangel because it has been over a year since his last appointment.

## 2023-09-27 ENCOUNTER — E-CONSULT (OUTPATIENT)
Dept: CARDIOLOGY | Facility: CLINIC | Age: 58
End: 2023-09-27
Payer: MEDICARE

## 2023-09-27 ENCOUNTER — HOSPITAL ENCOUNTER (OUTPATIENT)
Dept: RADIOLOGY | Facility: HOSPITAL | Age: 58
Discharge: HOME | End: 2023-09-27
Attending: ANESTHESIOLOGY
Payer: MEDICARE

## 2023-09-27 ENCOUNTER — OFFICE VISIT (OUTPATIENT)
Dept: PAIN MEDICINE | Facility: CLINIC | Age: 58
End: 2023-09-27
Payer: MEDICARE

## 2023-09-27 VITALS
WEIGHT: 224.31 LBS | HEART RATE: 69 BPM | HEIGHT: 72 IN | BODY MASS INDEX: 30.38 KG/M2 | DIASTOLIC BLOOD PRESSURE: 77 MMHG | SYSTOLIC BLOOD PRESSURE: 136 MMHG

## 2023-09-27 DIAGNOSIS — E78.5 DYSLIPIDEMIA: ICD-10-CM

## 2023-09-27 DIAGNOSIS — M54.16 LUMBAR RADICULOPATHY: ICD-10-CM

## 2023-09-27 DIAGNOSIS — I25.10 CORONARY ARTERY DISEASE INVOLVING NATIVE CORONARY ARTERY OF NATIVE HEART WITHOUT ANGINA PECTORIS: Primary | ICD-10-CM

## 2023-09-27 DIAGNOSIS — E11.00 TYPE 2 DIABETES MELLITUS WITH HYPEROSMOLARITY WITHOUT COMA, WITHOUT LONG-TERM CURRENT USE OF INSULIN: ICD-10-CM

## 2023-09-27 DIAGNOSIS — I10 ESSENTIAL HYPERTENSION: ICD-10-CM

## 2023-09-27 DIAGNOSIS — M54.2 CERVICALGIA: ICD-10-CM

## 2023-09-27 PROCEDURE — 99214 OFFICE O/P EST MOD 30 MIN: CPT | Mod: S$GLB,,, | Performed by: ANESTHESIOLOGY

## 2023-09-27 PROCEDURE — 3008F BODY MASS INDEX DOCD: CPT | Mod: CPTII,S$GLB,, | Performed by: ANESTHESIOLOGY

## 2023-09-27 PROCEDURE — 1159F MED LIST DOCD IN RCRD: CPT | Mod: CPTII,S$GLB,, | Performed by: ANESTHESIOLOGY

## 2023-09-27 PROCEDURE — 72052 X-RAY EXAM NECK SPINE 6/>VWS: CPT | Mod: 26,,, | Performed by: RADIOLOGY

## 2023-09-27 PROCEDURE — 99214 PR OFFICE/OUTPT VISIT, EST, LEVL IV, 30-39 MIN: ICD-10-PCS | Mod: S$GLB,,, | Performed by: ANESTHESIOLOGY

## 2023-09-27 PROCEDURE — 72052 X-RAY EXAM NECK SPINE 6/>VWS: CPT | Mod: TC,PO

## 2023-09-27 PROCEDURE — 3008F PR BODY MASS INDEX (BMI) DOCUMENTED: ICD-10-PCS | Mod: CPTII,S$GLB,, | Performed by: ANESTHESIOLOGY

## 2023-09-27 PROCEDURE — 99999 PR PBB SHADOW E&M-EST. PATIENT-LVL V: ICD-10-PCS | Mod: PBBFAC,,, | Performed by: ANESTHESIOLOGY

## 2023-09-27 PROCEDURE — 3078F DIAST BP <80 MM HG: CPT | Mod: CPTII,S$GLB,, | Performed by: ANESTHESIOLOGY

## 2023-09-27 PROCEDURE — 3075F SYST BP GE 130 - 139MM HG: CPT | Mod: CPTII,S$GLB,, | Performed by: ANESTHESIOLOGY

## 2023-09-27 PROCEDURE — 72052 XR CERVICAL SPINE 5 VIEW WITH FLEX AND EXT: ICD-10-PCS | Mod: 26,,, | Performed by: RADIOLOGY

## 2023-09-27 PROCEDURE — 3075F PR MOST RECENT SYSTOLIC BLOOD PRESS GE 130-139MM HG: ICD-10-PCS | Mod: CPTII,S$GLB,, | Performed by: ANESTHESIOLOGY

## 2023-09-27 PROCEDURE — 1159F PR MEDICATION LIST DOCUMENTED IN MEDICAL RECORD: ICD-10-PCS | Mod: CPTII,S$GLB,, | Performed by: ANESTHESIOLOGY

## 2023-09-27 PROCEDURE — 99447 PR INTERPROF, PHONE/INTERNET/EHR, CONSULT, 11-20 MINS: ICD-10-PCS | Mod: S$GLB,,, | Performed by: INTERNAL MEDICINE

## 2023-09-27 PROCEDURE — 3044F HG A1C LEVEL LT 7.0%: CPT | Mod: CPTII,S$GLB,, | Performed by: ANESTHESIOLOGY

## 2023-09-27 PROCEDURE — 3044F PR MOST RECENT HEMOGLOBIN A1C LEVEL <7.0%: ICD-10-PCS | Mod: CPTII,S$GLB,, | Performed by: ANESTHESIOLOGY

## 2023-09-27 PROCEDURE — 99447 NTRPROF PH1/NTRNET/EHR 11-20: CPT | Mod: S$GLB,,, | Performed by: INTERNAL MEDICINE

## 2023-09-27 PROCEDURE — 99999 PR PBB SHADOW E&M-EST. PATIENT-LVL V: CPT | Mod: PBBFAC,,, | Performed by: ANESTHESIOLOGY

## 2023-09-27 PROCEDURE — 3078F PR MOST RECENT DIASTOLIC BLOOD PRESSURE < 80 MM HG: ICD-10-PCS | Mod: CPTII,S$GLB,, | Performed by: ANESTHESIOLOGY

## 2023-09-27 NOTE — PROGRESS NOTES
[facility] Follow Up Evaluation    Referred by: Apurva Duenas NP  Reason for referral: back pain    CC:   Chief Complaint   Patient presents with    Hip Pain    Back Pain    Low-back Pain    Neck Pain     Stiffness progressively getting worse            9/27/2023     9:35 AM 3/29/2021    12:54 PM 3/3/2021    10:08 AM   Last 3 PDI Scores   Pain Disability Index (PDI) 43 8 32       Interval HPI 9/27/23: Mr. Johnston returns to the office for follow up.  Reports that over the past few months he has been having worsening lower back pain.  The pain radiating down into the bilateral buttocks.  Pain is worse with standing, bending, lifting and relieved with rest.  No new numbness or weakness.  He is also having some axial neck pain.  Denies any cervical radicular complaints.    Pain intervention history:  - s/p L4/5 WADE on 2/10/21 with 50% relief  - s/p L5/S1 WADE on 3/15/21 with 85% relief for over 12 months     HPI:   Cade Johnston is a 57 y.o. male who complains of back pain    Onset: many years  Progression: since onset, pain is gradually worsening over the past 4-5 months  Current Pain Score: 8/10  Timing: constant  Quality: sharp, burning, pressure  Radiation: yes, into the right buttock  Associated numbness or weakness: no numbness, no weakness   Exacerbated by: sitting, prolonged standing  Allievated by: standing  Is Pain Level Acceptable?: No    Previous Therapies:  PT/OT: yes, completed  HEP: yes  Interventions:   Surgery:  Medications:   - NSAIDS:   - MSK Relaxants:   - TCAs:   - SNRIs:   - Topicals:   - Anticonvulsants:  - Opioids:     History:    Current Outpatient Medications:     albuterol (PROVENTIL/VENTOLIN HFA) 90 mcg/actuation inhaler, Inhale 2 puffs into the lungs every 6 (six) hours as needed for Wheezing. Rescue, Disp: 1 Inhaler, Rfl: 2    aspirin (ECOTRIN) 81 MG EC tablet, Take 81 mg by mouth once daily. No Sig Provided, Disp: , Rfl:     blood sugar diagnostic Strp, Test q day, Disp:  100 strip, Rfl: 3    clopidogreL (PLAVIX) 75 mg tablet, TAKE 1 TABLET BY MOUTH EVERY DAY, Disp: 90 tablet, Rfl: 4    fluticasone furoate-vilanteroL (BREO ELLIPTA) 100-25 mcg/dose diskus inhaler, Inhale 1 puff into the lungs once daily. Controller, Disp: 1 each, Rfl: 11    ibuprofen (ADVIL,MOTRIN) 200 MG tablet, Take 200 mg by mouth every 6 (six) hours as needed for Pain., Disp: , Rfl:     ipratropium-albuteroL (COMBIVENT)  mcg/actuation inhaler, Inhale 1 puff into the lungs every 4 (four) hours as needed for Shortness of Breath. Rescue, Disp: 4 g, Rfl: 4    losartan-hydrochlorothiazide 100-12.5 mg (HYZAAR) 100-12.5 mg Tab, TAKE 1 TABLET BY MOUTH EVERY DAY, Disp: 90 tablet, Rfl: 1    metFORMIN (GLUCOPHAGE-XR) 500 MG ER 24hr tablet, TAKE 2 TABLETS BY MOUTH TWICE A DAY WITH MEALS, Disp: 360 tablet, Rfl: 3    tobramycin-dexAMETHasone 0.3-0.1% (TOBRADEX) 0.3-0.1 % DrpS, SMARTSI-2 Drop(s) In Eye(s) Every 4-6 Hours, Disp: , Rfl:     verapamiL (VERELAN) 120 MG C24P, TAKE 1 CAPSULE BY MOUTH ONCE DAILY, Disp: 90 capsule, Rfl: 4    sildenafil (REVATIO) 20 mg Tab, Take 1 tablet (20 mg total) by mouth 3 (three) times daily. for 180 doses, Disp: 30 tablet, Rfl: 5    Current Facility-Administered Medications:     sodium chloride 0.9% flush 10 mL, 10 mL, Intravenous, PRN, Refugio Mcdonough MD    Past Medical History:   Diagnosis Date    Anticoagulant long-term use     Cancer     skin cancer left ear    DJD (degenerative joint disease) of lumbar spine     DM type 2 (diabetes mellitus, type 2)     with diet alone    Dyslipidemia 2019    Essential hypertension 2015    Fatty liver     GERD (gastroesophageal reflux disease)     Hepatosplenomegaly     HTN (hypertension)     Hypogonadism male 2015    Mixed hyperlipidemia     resolved with diet    Nephrolithiasis     last stone     Palpitations 2016    Pulmonary sarcoidosis     Sarcoidosis of lung 1990    Sleep apnea     uses cpap    Type II or  unspecified type diabetes mellitus without mention of complication, not stated as uncontrolled 04/27/2014    Ventricular hypokinesis     angiogram with mild LAD stenosis       Past Surgical History:   Procedure Laterality Date    ANGIOGRAM, CORONARY, WITH LEFT HEART CATHETERIZATION N/A 8/16/2022    Procedure: Angiogram, Coronary, with Left Heart Cath;  Surgeon: Refugio Mcdonough MD;  Location: [facility] CATH;  Service: Cardiology;  Laterality: N/A;    CARDIAC SURGERY      CIRCUMCISION      CORONARY ANGIOGRAPHY N/A 12/13/2019    Procedure: ANGIOGRAM, CORONARY ARTERY;  Surgeon: Amador Duran MD;  Location: [facility] CATH;  Service: Cardiology;  Laterality: N/A;    CORONARY STENT PLACEMENT  12/2019    x1    CYST REMOVAL Right 05/2022    thigh, Dr. Parkinson    elbow spur removal      right    EPIDURAL STEROID INJECTION INTO LUMBAR SPINE N/A 02/10/2021    Procedure: Injection-steroid-epidural-lumbar L4/5;  Surgeon: Richi Rangel MD;  Location: [facility] OR;  Service: Pain Management;  Laterality: N/A;    EPIDURAL STEROID INJECTION INTO LUMBAR SPINE N/A 03/15/2021    Procedure: Injection-steroid-epidural-lumbar L5/S1;  Surgeon: Richi Rangel MD;  Location: [facility] OR;  Service: Pain Management;  Laterality: N/A;    EXTRACORPOREAL SHOCK WAVE LITHOTRIPSY  1996    [facility]    LEFT HEART CATHETERIZATION Left 12/13/2019    Procedure: Left heart cath;  Surgeon: Amador Duran MD;  Location: [facility] CATH;  Service: Cardiology;  Laterality: Left;    removal of skin cancer      L side of face    URETERAL EXPLORATION  1996    exploration of the R ureter for ureteral injury [facility] - did well       Family History   Problem Relation Age of Onset    Hypertension Mother     Fibromyalgia Mother     Coronary artery disease Father     Diabetes Father     Liver disease Brother         hepatitis C from a blood transufusion       Social History     Socioeconomic History    Marital status:     Number of children: 2    Occupational History    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     Employer: [employer]   Tobacco Use    Smoking status: Former     Current packs/day: 0.00     Types: Cigarettes     Quit date: 1991     Years since quittin.8     Passive exposure: Never    Smokeless tobacco: Never   Substance and Sexual Activity    Alcohol use: No    Drug use: No   Social History Narrative    ** Merged History Encounter **            Review of patient's allergies indicates:   Allergen Reactions    Codeine Hives    Latex Rash     blisters       Review of Systems:  General ROS: negative for - fever  Psychological ROS: negative for - hostility  Hematological and Lymphatic ROS: negative for - bleeding problems  Endocrine ROS: negative for - unexpected weight changes  Respiratory ROS: no cough, shortness of breath, or wheezing  Cardiovascular ROS: no chest pain or dyspnea on exertion  Gastrointestinal ROS: no abdominal pain, change in bowel habits, or black or bloody stools  Musculoskeletal ROS: negative for - muscular weakness  Neurological ROS: negative for - bowel and bladder control changes or numbness/tingling  Dermatological ROS: negative for rash    Physical Exam:  Vitals:    23 0934   BP: 136/77   Pulse: 69   Weight: 101.8 kg (224 lb 5.1 oz)   Height: 6' (1.829 m)   PainSc:   6   PainLoc: Back       Body mass index is 30.42 kg/m².     Gen: NAD  Gait: gait intact  Psych:  Mood appropriate for given condition  HEENT: eyes anicteric   GI: Abd soft  CV: RRR  Lungs: breathing unlabored   Sensation: intact to light touch in all dermatomes tested from L2-S1 bilaterally  Reflexes: 2+ b/l patella and achilles  Palpation: Diffusely tender over lumbar paraspinals  +TTP over the rightt SI joint  Tone: normal in the b/l knees and hips   Skin: intact  Extremities: No edema in b/l ankles or hands  Provacative tests:        Right Left   L2/3 Iliacus Hip flexion  5-  5   L3/4 Qudratus Femoris Knee Extension   5  5   L4/5 Tib Anterior Ankle Dorsiflexion   5  5   L5/S1 Extensor Hallicus Longus Great toe extension  5  5                 S1/S2 Gastroc/Soleus Plantar Flexion  5  5       Imaging:  Xray lumbar spine 1/19/21  FINDINGS:  Bone density is normal.  The lumbar vertebral bodies maintain normal height and alignment.  There is moderate to marked disc space narrowing at the L4-5 level with mild disc space narrowing at the L3-4 level.  There is anterior osteophyte formation at multiple levels, most apparent at the L4-5 level as well as at the thoracolumbar junction.  There is facet joint arthropathy particularly at the lower 2 lumbar levels.    MRI lumbar spine 2/1/21  FINDINGS:  The vertebral body heights are preserved.  There is no significant spondylolisthesis.  There is no non degenerative marrow edema or infiltrative marrow process identified.  There is a small hemangioma within the L3 vertebral body.  There is disc height loss at L4-L5 and to a lesser extent L3-L4 and L5-S1.  The conus medullaris terminates at L1 level.  There is a small Tarlov cyst slightly left of midline S2 level measures 10 x 6 x 12 mm.  There is no significant paraspinal soft tissue edema. The partially visualized kidneys demonstrate renal cysts bilaterally including parapelvic cysts on the left similar to a prior CT scan.    T12-L1: No significant disc bulge or protrusion is noted.  There is mild facet arthropathy.  There is no significant spinal canal or neural foraminal narrowing noted.  L1-L2: There is no significant disc bulge or protrusion and no significant spinal canal or neural foraminal narrowing.  There is mild facet arthropathy.  L2-L3: There is no significant disc bulge or protrusion.  There is mild right and moderate left facet arthropathy.  No significant spinal canal narrowing is identified.  There is mild neural foraminal narrowing on the left.  L3-L4: There is a mild circumferential disc bulge.  There is moderate facet  arthropathy.  There is no significant spinal canal narrowing identified.  There is mild inferior neural foraminal narrowing bilaterally.  L4-L5: There is a right paracentral left paracentral disc extrusion measures approximately 12 mm transverse and approximately 14 mm craniocaudal.  The extrusion measures approximately 5 mm AP.  There is annular fissuring.  There is moderate bilateral facet arthropathy.  There is mild bilateral lateral recess narrowing.  There is no significant central spinal canal narrowing.  There is moderate left and mild right neural foraminal narrowing.  L5-S1: At L5-S1 there is a right paracentral to left paracentral disc protrusion with annular fissure.  There is moderate left greater than right facet arthropathy.  There is mild bilateral lateral recess narrowing.  No significant central spinal canal narrowing is noted.  There is moderate bilateral foraminal narrowing.    Labs:  BMP  Lab Results   Component Value Date     04/25/2023    K 4.3 04/25/2023     04/25/2023    CO2 25 04/25/2023    BUN 17 04/25/2023    CREATININE 0.9 04/25/2023    CALCIUM 9.1 04/25/2023    ANIONGAP 9 04/25/2023    ESTGFRAFRICA >60.0 04/21/2022    EGFRNONAA >60.0 04/21/2022     Lab Results   Component Value Date    ALT 38 04/25/2023    AST 31 04/25/2023    ALKPHOS 83 04/25/2023    BILITOT 1.1 (H) 04/25/2023       Assessment:   Problem List Items Addressed This Visit          Neuro    Lumbar radiculopathy - Primary    Relevant Orders    Ambulatory referral/consult to Physical/Occupational Therapy    Case Request Operating Room: Injection-steroid-epidural-lumbar (Completed)     Other Visit Diagnoses       Cervicalgia        Relevant Orders    X-Ray Cervical Spine 5 View With Flex And Ext    Ambulatory referral/consult to Physical/Occupational Therapy              57 y.o. year old male with PMH HTN, DM II, CAD presents to the office with back pain.  He has chronic lower back pain for the past few year that  has been worsening over the past 4-5 months.  Today his pain is 8/10, constant, deep, sharp, burning, in the right back into the right buttock.  Denies any history of rash/zoster      9/27/23 - Mr. Johnston returns to the office for follow up.  Reports that over the past few months he has been having worsening lower back pain.  The pain radiating down into the bilateral buttocks.  Pain is worse with standing, bending, lifting and relieved with rest.  No new numbness or weakness.  He is also having some axial neck pain.  Denies any cervical radicular complaints.    - on exam he has full strength in his upper extremities and intact sensation to light touch bilateral C4-T1.  He has some weakness with right hip flexion.    - I independently reviewed his lumbar MRI with him today in the office and it is c/w L4-5 right paracentral paracentral disc extrusion with annular fissuring and mild bilateral lateral recess narrowing and L5-S1 L5-S1 right paracentral to left paracentral disc protrusion with annular fissure with mild bilateral lateral recess narrowing and moderate bilateral foraminal narrowing.    - over the past 6 weeks has been maintaining PT directed home exercises as well as taking NSAIDs but is continuing to have pain that is limiting his mobility and his quality of life.  He has been doing some lifting of his parents that he is moved into nursing home that has reaggravated his back    - he is status post L5-S1 on 03/15/2021 with over 85% relief of his pain lasting for over 12 months.  His back pain is similar to how it previously was prior to the last epidural injection we did    - we will schedule for repeat L5-S1 WADE.  The risks and benefits of this intervention, and alternative therapies were discussed with the patient.  The discussion of risks included infection, bleeding, need for additional procedures or surgery, nerve damage.  Questions regarding the procedure, risks, expected outcome, and possible side  effects were solicited and answered to the patient's satisfaction.  Rodrigo Johnston wishes to proceed with the injection or procedure.  Written consent was obtained.    - the patient currently takes Plavix.  He is not taking aspirin.  We will get clearance from his cardiologist to hold his Plavix for 5 days prior to WADE    - for his axial neck pain, referral given for physical therapy to improve function and strength, and to receive training towards establishing a safe and effective home exercise program (HEP).     - follow up 2-3 weeks post injection    : Reviewed    Richi Rangel M.D.  Interventional Pain Medicine / Anesthesiology

## 2023-09-27 NOTE — CONSULTS
[facility] - [facility]  Response for E-Consult     Patient Name: Cade Johnston  MRN: [id number]  Primary Care Provider: Joni Smith MD   Requesting Provider: Richi Rangel MD  Consults    History of coronary artery disease, status post coronary stent back in 2019.    Last angiogram in 2022 showed nonobstructive coronary artery disease.    Recommendation: No contraindication for surgery/anesthesia from cardiac standpoint  Okay to hold Plavix 5 days before procedure    Total time of Consultation: 15 minute    I did not speak to the requesting provider verbally about this.     *This eConsult is based on the clinical data available to me and is furnished without benefit of a physical examination. The eConsult will need to be interpreted in light of any clinical issues or changes in patient status not available to me at the time of filing this eConsults. Significant changes in patient condition or level of acuity should result in immediate formal consultation and reevaluation. Please alert me if you have further questions.    Thank you for this eConsult referral.     Refugio Mcdonough MD  [facility] - [facility]

## 2023-09-27 NOTE — H&P (VIEW-ONLY)
[facility] Follow Up Evaluation    Referred by: Apurva Duenas NP  Reason for referral: back pain    CC:   Chief Complaint   Patient presents with    Hip Pain    Back Pain    Low-back Pain    Neck Pain     Stiffness progressively getting worse            9/27/2023     9:35 AM 3/29/2021    12:54 PM 3/3/2021    10:08 AM   Last 3 PDI Scores   Pain Disability Index (PDI) 43 8 32       Interval HPI 9/27/23: Mr. Johnston returns to the office for follow up.  Reports that over the past few months he has been having worsening lower back pain.  The pain radiating down into the bilateral buttocks.  Pain is worse with standing, bending, lifting and relieved with rest.  No new numbness or weakness.  He is also having some axial neck pain.  Denies any cervical radicular complaints.    Pain intervention history:  - s/p L4/5 WADE on 2/10/21 with 50% relief  - s/p L5/S1 WADE on 3/15/21 with 85% relief for over 12 months     HPI:   Cade Johnston is a 57 y.o. male who complains of back pain    Onset: many years  Progression: since onset, pain is gradually worsening over the past 4-5 months  Current Pain Score: 8/10  Timing: constant  Quality: sharp, burning, pressure  Radiation: yes, into the right buttock  Associated numbness or weakness: no numbness, no weakness   Exacerbated by: sitting, prolonged standing  Allievated by: standing  Is Pain Level Acceptable?: No    Previous Therapies:  PT/OT: yes, completed  HEP: yes  Interventions:   Surgery:  Medications:   - NSAIDS:   - MSK Relaxants:   - TCAs:   - SNRIs:   - Topicals:   - Anticonvulsants:  - Opioids:     History:    Current Outpatient Medications:     albuterol (PROVENTIL/VENTOLIN HFA) 90 mcg/actuation inhaler, Inhale 2 puffs into the lungs every 6 (six) hours as needed for Wheezing. Rescue, Disp: 1 Inhaler, Rfl: 2    aspirin (ECOTRIN) 81 MG EC tablet, Take 81 mg by mouth once daily. No Sig Provided, Disp: , Rfl:     blood sugar diagnostic Strp, Test q day, Disp:  100 strip, Rfl: 3    clopidogreL (PLAVIX) 75 mg tablet, TAKE 1 TABLET BY MOUTH EVERY DAY, Disp: 90 tablet, Rfl: 4    fluticasone furoate-vilanteroL (BREO ELLIPTA) 100-25 mcg/dose diskus inhaler, Inhale 1 puff into the lungs once daily. Controller, Disp: 1 each, Rfl: 11    ibuprofen (ADVIL,MOTRIN) 200 MG tablet, Take 200 mg by mouth every 6 (six) hours as needed for Pain., Disp: , Rfl:     ipratropium-albuteroL (COMBIVENT)  mcg/actuation inhaler, Inhale 1 puff into the lungs every 4 (four) hours as needed for Shortness of Breath. Rescue, Disp: 4 g, Rfl: 4    losartan-hydrochlorothiazide 100-12.5 mg (HYZAAR) 100-12.5 mg Tab, TAKE 1 TABLET BY MOUTH EVERY DAY, Disp: 90 tablet, Rfl: 1    metFORMIN (GLUCOPHAGE-XR) 500 MG ER 24hr tablet, TAKE 2 TABLETS BY MOUTH TWICE A DAY WITH MEALS, Disp: 360 tablet, Rfl: 3    tobramycin-dexAMETHasone 0.3-0.1% (TOBRADEX) 0.3-0.1 % DrpS, SMARTSI-2 Drop(s) In Eye(s) Every 4-6 Hours, Disp: , Rfl:     verapamiL (VERELAN) 120 MG C24P, TAKE 1 CAPSULE BY MOUTH ONCE DAILY, Disp: 90 capsule, Rfl: 4    sildenafil (REVATIO) 20 mg Tab, Take 1 tablet (20 mg total) by mouth 3 (three) times daily. for 180 doses, Disp: 30 tablet, Rfl: 5    Current Facility-Administered Medications:     sodium chloride 0.9% flush 10 mL, 10 mL, Intravenous, PRN, Refugio Mcdonough MD    Past Medical History:   Diagnosis Date    Anticoagulant long-term use     Cancer     skin cancer left ear    DJD (degenerative joint disease) of lumbar spine     DM type 2 (diabetes mellitus, type 2)     with diet alone    Dyslipidemia 2019    Essential hypertension 2015    Fatty liver     GERD (gastroesophageal reflux disease)     Hepatosplenomegaly     HTN (hypertension)     Hypogonadism male 2015    Mixed hyperlipidemia     resolved with diet    Nephrolithiasis     last stone     Palpitations 2016    Pulmonary sarcoidosis     Sarcoidosis of lung 1990    Sleep apnea     uses cpap    Type II or  unspecified type diabetes mellitus without mention of complication, not stated as uncontrolled 04/27/2014    Ventricular hypokinesis     angiogram with mild LAD stenosis       Past Surgical History:   Procedure Laterality Date    ANGIOGRAM, CORONARY, WITH LEFT HEART CATHETERIZATION N/A 8/16/2022    Procedure: Angiogram, Coronary, with Left Heart Cath;  Surgeon: Refugio Mcdonough MD;  Location: [facility] CATH;  Service: Cardiology;  Laterality: N/A;    CARDIAC SURGERY      CIRCUMCISION      CORONARY ANGIOGRAPHY N/A 12/13/2019    Procedure: ANGIOGRAM, CORONARY ARTERY;  Surgeon: Amador Duran MD;  Location: [facility] CATH;  Service: Cardiology;  Laterality: N/A;    CORONARY STENT PLACEMENT  12/2019    x1    CYST REMOVAL Right 05/2022    thigh, Dr. Parkinson    elbow spur removal      right    EPIDURAL STEROID INJECTION INTO LUMBAR SPINE N/A 02/10/2021    Procedure: Injection-steroid-epidural-lumbar L4/5;  Surgeon: Richi Rangel MD;  Location: [facility] OR;  Service: Pain Management;  Laterality: N/A;    EPIDURAL STEROID INJECTION INTO LUMBAR SPINE N/A 03/15/2021    Procedure: Injection-steroid-epidural-lumbar L5/S1;  Surgeon: Richi Rangel MD;  Location: [facility] OR;  Service: Pain Management;  Laterality: N/A;    EXTRACORPOREAL SHOCK WAVE LITHOTRIPSY  1996    [facility]    LEFT HEART CATHETERIZATION Left 12/13/2019    Procedure: Left heart cath;  Surgeon: Amador Duran MD;  Location: [facility] CATH;  Service: Cardiology;  Laterality: Left;    removal of skin cancer      L side of face    URETERAL EXPLORATION  1996    exploration of the R ureter for ureteral injury [facility] - did well       Family History   Problem Relation Age of Onset    Hypertension Mother     Fibromyalgia Mother     Coronary artery disease Father     Diabetes Father     Liver disease Brother         hepatitis C from a blood transufusion       Social History     Socioeconomic History    Marital status:     Number of children: 2    Occupational History    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     Employer: [employer]   Tobacco Use    Smoking status: Former     Current packs/day: 0.00     Types: Cigarettes     Quit date: 1991     Years since quittin.8     Passive exposure: Never    Smokeless tobacco: Never   Substance and Sexual Activity    Alcohol use: No    Drug use: No   Social History Narrative    ** Merged History Encounter **            Review of patient's allergies indicates:   Allergen Reactions    Codeine Hives    Latex Rash     blisters       Review of Systems:  General ROS: negative for - fever  Psychological ROS: negative for - hostility  Hematological and Lymphatic ROS: negative for - bleeding problems  Endocrine ROS: negative for - unexpected weight changes  Respiratory ROS: no cough, shortness of breath, or wheezing  Cardiovascular ROS: no chest pain or dyspnea on exertion  Gastrointestinal ROS: no abdominal pain, change in bowel habits, or black or bloody stools  Musculoskeletal ROS: negative for - muscular weakness  Neurological ROS: negative for - bowel and bladder control changes or numbness/tingling  Dermatological ROS: negative for rash    Physical Exam:  Vitals:    23 0934   BP: 136/77   Pulse: 69   Weight: 101.8 kg (224 lb 5.1 oz)   Height: 6' (1.829 m)   PainSc:   6   PainLoc: Back       Body mass index is 30.42 kg/m².     Gen: NAD  Gait: gait intact  Psych:  Mood appropriate for given condition  HEENT: eyes anicteric   GI: Abd soft  CV: RRR  Lungs: breathing unlabored   Sensation: intact to light touch in all dermatomes tested from L2-S1 bilaterally  Reflexes: 2+ b/l patella and achilles  Palpation: Diffusely tender over lumbar paraspinals  +TTP over the rightt SI joint  Tone: normal in the b/l knees and hips   Skin: intact  Extremities: No edema in b/l ankles or hands  Provacative tests:        Right Left   L2/3 Iliacus Hip flexion  5-  5   L3/4 Qudratus Femoris Knee Extension   5  5   L4/5 Tib Anterior Ankle Dorsiflexion   5  5   L5/S1 Extensor Hallicus Longus Great toe extension  5  5                 S1/S2 Gastroc/Soleus Plantar Flexion  5  5       Imaging:  Xray lumbar spine 1/19/21  FINDINGS:  Bone density is normal.  The lumbar vertebral bodies maintain normal height and alignment.  There is moderate to marked disc space narrowing at the L4-5 level with mild disc space narrowing at the L3-4 level.  There is anterior osteophyte formation at multiple levels, most apparent at the L4-5 level as well as at the thoracolumbar junction.  There is facet joint arthropathy particularly at the lower 2 lumbar levels.    MRI lumbar spine 2/1/21  FINDINGS:  The vertebral body heights are preserved.  There is no significant spondylolisthesis.  There is no non degenerative marrow edema or infiltrative marrow process identified.  There is a small hemangioma within the L3 vertebral body.  There is disc height loss at L4-L5 and to a lesser extent L3-L4 and L5-S1.  The conus medullaris terminates at L1 level.  There is a small Tarlov cyst slightly left of midline S2 level measures 10 x 6 x 12 mm.  There is no significant paraspinal soft tissue edema. The partially visualized kidneys demonstrate renal cysts bilaterally including parapelvic cysts on the left similar to a prior CT scan.    T12-L1: No significant disc bulge or protrusion is noted.  There is mild facet arthropathy.  There is no significant spinal canal or neural foraminal narrowing noted.  L1-L2: There is no significant disc bulge or protrusion and no significant spinal canal or neural foraminal narrowing.  There is mild facet arthropathy.  L2-L3: There is no significant disc bulge or protrusion.  There is mild right and moderate left facet arthropathy.  No significant spinal canal narrowing is identified.  There is mild neural foraminal narrowing on the left.  L3-L4: There is a mild circumferential disc bulge.  There is moderate facet  arthropathy.  There is no significant spinal canal narrowing identified.  There is mild inferior neural foraminal narrowing bilaterally.  L4-L5: There is a right paracentral left paracentral disc extrusion measures approximately 12 mm transverse and approximately 14 mm craniocaudal.  The extrusion measures approximately 5 mm AP.  There is annular fissuring.  There is moderate bilateral facet arthropathy.  There is mild bilateral lateral recess narrowing.  There is no significant central spinal canal narrowing.  There is moderate left and mild right neural foraminal narrowing.  L5-S1: At L5-S1 there is a right paracentral to left paracentral disc protrusion with annular fissure.  There is moderate left greater than right facet arthropathy.  There is mild bilateral lateral recess narrowing.  No significant central spinal canal narrowing is noted.  There is moderate bilateral foraminal narrowing.    Labs:  BMP  Lab Results   Component Value Date     04/25/2023    K 4.3 04/25/2023     04/25/2023    CO2 25 04/25/2023    BUN 17 04/25/2023    CREATININE 0.9 04/25/2023    CALCIUM 9.1 04/25/2023    ANIONGAP 9 04/25/2023    ESTGFRAFRICA >60.0 04/21/2022    EGFRNONAA >60.0 04/21/2022     Lab Results   Component Value Date    ALT 38 04/25/2023    AST 31 04/25/2023    ALKPHOS 83 04/25/2023    BILITOT 1.1 (H) 04/25/2023       Assessment:   Problem List Items Addressed This Visit          Neuro    Lumbar radiculopathy - Primary    Relevant Orders    Ambulatory referral/consult to Physical/Occupational Therapy    Case Request Operating Room: Injection-steroid-epidural-lumbar (Completed)     Other Visit Diagnoses       Cervicalgia        Relevant Orders    X-Ray Cervical Spine 5 View With Flex And Ext    Ambulatory referral/consult to Physical/Occupational Therapy              57 y.o. year old male with PMH HTN, DM II, CAD presents to the office with back pain.  He has chronic lower back pain for the past few year that  has been worsening over the past 4-5 months.  Today his pain is 8/10, constant, deep, sharp, burning, in the right back into the right buttock.  Denies any history of rash/zoster      9/27/23 - Mr. Johnston returns to the office for follow up.  Reports that over the past few months he has been having worsening lower back pain.  The pain radiating down into the bilateral buttocks.  Pain is worse with standing, bending, lifting and relieved with rest.  No new numbness or weakness.  He is also having some axial neck pain.  Denies any cervical radicular complaints.    - on exam he has full strength in his upper extremities and intact sensation to light touch bilateral C4-T1.  He has some weakness with right hip flexion.    - I independently reviewed his lumbar MRI with him today in the office and it is c/w L4-5 right paracentral paracentral disc extrusion with annular fissuring and mild bilateral lateral recess narrowing and L5-S1 L5-S1 right paracentral to left paracentral disc protrusion with annular fissure with mild bilateral lateral recess narrowing and moderate bilateral foraminal narrowing.    - over the past 6 weeks has been maintaining PT directed home exercises as well as taking NSAIDs but is continuing to have pain that is limiting his mobility and his quality of life.  He has been doing some lifting of his parents that he is moved into nursing home that has reaggravated his back    - he is status post L5-S1 on 03/15/2021 with over 85% relief of his pain lasting for over 12 months.  His back pain is similar to how it previously was prior to the last epidural injection we did    - we will schedule for repeat L5-S1 WADE.  The risks and benefits of this intervention, and alternative therapies were discussed with the patient.  The discussion of risks included infection, bleeding, need for additional procedures or surgery, nerve damage.  Questions regarding the procedure, risks, expected outcome, and possible side  effects were solicited and answered to the patient's satisfaction.  Rodrigo Johnston wishes to proceed with the injection or procedure.  Written consent was obtained.    - the patient currently takes Plavix.  He is not taking aspirin.  We will get clearance from his cardiologist to hold his Plavix for 5 days prior to WADE    - for his axial neck pain, referral given for physical therapy to improve function and strength, and to receive training towards establishing a safe and effective home exercise program (HEP).     - follow up 2-3 weeks post injection    : Reviewed    Richi Rangel M.D.  Interventional Pain Medicine / Anesthesiology

## 2023-10-09 ENCOUNTER — OFFICE VISIT (OUTPATIENT)
Dept: FAMILY MEDICINE | Facility: CLINIC | Age: 58
End: 2023-10-09
Payer: MEDICARE

## 2023-10-09 VITALS
HEIGHT: 72 IN | BODY MASS INDEX: 30.67 KG/M2 | WEIGHT: 226.44 LBS | DIASTOLIC BLOOD PRESSURE: 82 MMHG | TEMPERATURE: 98 F | SYSTOLIC BLOOD PRESSURE: 138 MMHG

## 2023-10-09 DIAGNOSIS — F43.29 STRESS AND ADJUSTMENT REACTION: ICD-10-CM

## 2023-10-09 DIAGNOSIS — Z86.010 PERSONAL HISTORY OF COLONIC POLYPS: ICD-10-CM

## 2023-10-09 DIAGNOSIS — I10 ESSENTIAL HYPERTENSION: Primary | ICD-10-CM

## 2023-10-09 DIAGNOSIS — E11.9 CONTROLLED TYPE 2 DIABETES MELLITUS WITHOUT COMPLICATION, WITHOUT LONG-TERM CURRENT USE OF INSULIN: ICD-10-CM

## 2023-10-09 PROCEDURE — 90686 IIV4 VACC NO PRSV 0.5 ML IM: CPT | Mod: S$GLB,,, | Performed by: NURSE PRACTITIONER

## 2023-10-09 PROCEDURE — 1159F PR MEDICATION LIST DOCUMENTED IN MEDICAL RECORD: ICD-10-PCS | Mod: CPTII,S$GLB,, | Performed by: NURSE PRACTITIONER

## 2023-10-09 PROCEDURE — G0008 ADMIN INFLUENZA VIRUS VAC: HCPCS | Mod: S$GLB,,, | Performed by: NURSE PRACTITIONER

## 2023-10-09 PROCEDURE — 3008F PR BODY MASS INDEX (BMI) DOCUMENTED: ICD-10-PCS | Mod: CPTII,S$GLB,, | Performed by: NURSE PRACTITIONER

## 2023-10-09 PROCEDURE — 1159F MED LIST DOCD IN RCRD: CPT | Mod: CPTII,S$GLB,, | Performed by: NURSE PRACTITIONER

## 2023-10-09 PROCEDURE — 99214 OFFICE O/P EST MOD 30 MIN: CPT | Mod: S$GLB,,, | Performed by: NURSE PRACTITIONER

## 2023-10-09 PROCEDURE — 99999 PR PBB SHADOW E&M-EST. PATIENT-LVL IV: CPT | Mod: PBBFAC,,, | Performed by: NURSE PRACTITIONER

## 2023-10-09 PROCEDURE — 3044F PR MOST RECENT HEMOGLOBIN A1C LEVEL <7.0%: ICD-10-PCS | Mod: CPTII,S$GLB,, | Performed by: NURSE PRACTITIONER

## 2023-10-09 PROCEDURE — 3079F PR MOST RECENT DIASTOLIC BLOOD PRESSURE 80-89 MM HG: ICD-10-PCS | Mod: CPTII,S$GLB,, | Performed by: NURSE PRACTITIONER

## 2023-10-09 PROCEDURE — 3079F DIAST BP 80-89 MM HG: CPT | Mod: CPTII,S$GLB,, | Performed by: NURSE PRACTITIONER

## 2023-10-09 PROCEDURE — 3075F PR MOST RECENT SYSTOLIC BLOOD PRESS GE 130-139MM HG: ICD-10-PCS | Mod: CPTII,S$GLB,, | Performed by: NURSE PRACTITIONER

## 2023-10-09 PROCEDURE — 90686 FLU VACCINE (QUAD) GREATER THAN OR EQUAL TO 3YO PRESERVATIVE FREE IM: ICD-10-PCS | Mod: S$GLB,,, | Performed by: NURSE PRACTITIONER

## 2023-10-09 PROCEDURE — 3075F SYST BP GE 130 - 139MM HG: CPT | Mod: CPTII,S$GLB,, | Performed by: NURSE PRACTITIONER

## 2023-10-09 PROCEDURE — G0008 FLU VACCINE (QUAD) GREATER THAN OR EQUAL TO 3YO PRESERVATIVE FREE IM: ICD-10-PCS | Mod: S$GLB,,, | Performed by: NURSE PRACTITIONER

## 2023-10-09 PROCEDURE — 3008F BODY MASS INDEX DOCD: CPT | Mod: CPTII,S$GLB,, | Performed by: NURSE PRACTITIONER

## 2023-10-09 PROCEDURE — 99214 PR OFFICE/OUTPT VISIT, EST, LEVL IV, 30-39 MIN: ICD-10-PCS | Mod: S$GLB,,, | Performed by: NURSE PRACTITIONER

## 2023-10-09 PROCEDURE — 3044F HG A1C LEVEL LT 7.0%: CPT | Mod: CPTII,S$GLB,, | Performed by: NURSE PRACTITIONER

## 2023-10-09 PROCEDURE — 99999 PR PBB SHADOW E&M-EST. PATIENT-LVL IV: ICD-10-PCS | Mod: PBBFAC,,, | Performed by: NURSE PRACTITIONER

## 2023-10-09 NOTE — PROGRESS NOTES
Subjective:       Patient ID: Cade Johnston is a 57 y.o. male.    Chief Complaint: Diabetes    HPI  Patient presents for stress, BP concerns    Caring for elderly parents, mom on hospice, brother stealing money from parents. Extreme stress. Parents not in nursing home--less stress, feeling much better.     During the time they were home he was so stressed he was not eating and drinking as normal, stomach pains and diarrhea. Hospice nursing caring for his mother noted him to be pale one day--checked his BP and systolic in the 70s. He held his BP meds a few days, resumed proper diet/fluid intake--symptoms improved, resumed meds. BP good today    Mood is better. He is not interested in med help at this time    Due for screening colonoscopy. History of polyps    Has FU next month with PCP--routine labs prior     Scheduled for lumbar WADE this month (Dr Rangel)    Vitals:    10/09/23 1003   BP: 138/82   Temp: 97.9 °F (36.6 °C)     Review of Systems   Constitutional:  Negative for fever.   Cardiovascular:  Negative for chest pain.   Musculoskeletal:  Positive for back pain and neck pain.       Past Medical History:   Diagnosis Date    Anticoagulant long-term use     Cancer     skin cancer left ear    DJD (degenerative joint disease) of lumbar spine     DM type 2 (diabetes mellitus, type 2)     with diet alone    Dyslipidemia 08/13/2019    Essential hypertension 12/29/2015    Fatty liver     GERD (gastroesophageal reflux disease)     Hepatosplenomegaly     HTN (hypertension)     Hypogonadism male 02/17/2015    Mixed hyperlipidemia     resolved with diet    Nephrolithiasis     last stone 1/11    Palpitations 03/09/2016    Pulmonary sarcoidosis     Sarcoidosis of lung 1990    Sleep apnea     uses cpap    Type II or unspecified type diabetes mellitus without mention of complication, not stated as uncontrolled 04/27/2014    Ventricular hypokinesis     angiogram with mild LAD stenosis     Objective:      Physical  Exam  Constitutional:       General: He is not in acute distress.     Appearance: He is well-developed. He is not ill-appearing, toxic-appearing or diaphoretic.   HENT:      Right Ear: Hearing normal.      Left Ear: Hearing normal.   Cardiovascular:      Rate and Rhythm: Normal rate and regular rhythm.      Heart sounds: Normal heart sounds.   Pulmonary:      Effort: No tachypnea or respiratory distress.   Skin:     Coloration: Skin is not pale.   Neurological:      Mental Status: He is alert and oriented to person, place, and time.   Psychiatric:         Speech: Speech normal.         Behavior: Behavior normal.         Thought Content: Thought content normal.         Judgment: Judgment normal.         Assessment:       1. Essential hypertension    2. Stress and adjustment reaction    3. Personal history of colonic polyps    4. Controlled type 2 diabetes mellitus without complication, without long-term current use of insulin        Plan:       Essential hypertension    Stress and adjustment reaction    Personal history of colonic polyps  -     Case Request Endoscopy: COLONOSCOPY    Controlled type 2 diabetes mellitus without complication, without long-term current use of insulin          Vitals OK today--continue current medications  Mood improved   FU next month as scheduled with PCP labs prior        Medication List with Changes/Refills   Current Medications    ALBUTEROL (PROVENTIL/VENTOLIN HFA) 90 MCG/ACTUATION INHALER    Inhale 2 puffs into the lungs every 6 (six) hours as needed for Wheezing. Rescue    ASPIRIN (ECOTRIN) 81 MG EC TABLET    Take 81 mg by mouth once daily. No Sig Provided    BLOOD SUGAR DIAGNOSTIC STRP    Test q day    CLOPIDOGREL (PLAVIX) 75 MG TABLET    TAKE 1 TABLET BY MOUTH EVERY DAY    FLUTICASONE FUROATE-VILANTEROL (BREO ELLIPTA) 100-25 MCG/DOSE DISKUS INHALER    Inhale 1 puff into the lungs once daily. Controller    IBUPROFEN (ADVIL,MOTRIN) 200 MG TABLET    Take 200 mg by mouth every 6  (six) hours as needed for Pain.    IPRATROPIUM-ALBUTEROL (COMBIVENT)  MCG/ACTUATION INHALER    Inhale 1 puff into the lungs every 4 (four) hours as needed for Shortness of Breath. Rescue    LOSARTAN-HYDROCHLOROTHIAZIDE 100-12.5 MG (HYZAAR) 100-12.5 MG TAB    TAKE 1 TABLET BY MOUTH EVERY DAY    METFORMIN (GLUCOPHAGE-XR) 500 MG ER 24HR TABLET    TAKE 2 TABLETS BY MOUTH TWICE A DAY WITH MEALS    SILDENAFIL (REVATIO) 20 MG TAB    Take 1 tablet (20 mg total) by mouth 3 (three) times daily. for 180 doses    TOBRAMYCIN-DEXAMETHASONE 0.3-0.1% (TOBRADEX) 0.3-0.1 % DRPS    SMARTSI-2 Drop(s) In Eye(s) Every 4-6 Hours    VERAPAMIL (VERELAN) 120 MG C24P    TAKE 1 CAPSULE BY MOUTH ONCE DAILY

## 2023-10-16 ENCOUNTER — HOSPITAL ENCOUNTER (OUTPATIENT)
Facility: HOSPITAL | Age: 58
Discharge: HOME | End: 2023-10-16
Attending: ANESTHESIOLOGY | Admitting: ANESTHESIOLOGY
Payer: MEDICARE

## 2023-10-16 ENCOUNTER — HOSPITAL ENCOUNTER (OUTPATIENT)
Dept: RADIOLOGY | Facility: HOSPITAL | Age: 58
Discharge: HOME | End: 2023-10-16
Attending: ANESTHESIOLOGY | Admitting: ANESTHESIOLOGY
Payer: MEDICARE

## 2023-10-16 DIAGNOSIS — M54.16 LUMBAR RADICULOPATHY: Primary | ICD-10-CM

## 2023-10-16 DIAGNOSIS — M54.50 LOWER BACK PAIN: ICD-10-CM

## 2023-10-16 LAB — GLUCOSE SERPL-MCNC: 142 MG/DL (ref 70–110)

## 2023-10-16 PROCEDURE — 25500020 PHARM REV CODE 255: Mod: PO | Performed by: ANESTHESIOLOGY

## 2023-10-16 PROCEDURE — 76000 FLUOROSCOPY <1 HR PHYS/QHP: CPT | Mod: TC,PO

## 2023-10-16 PROCEDURE — 62323 NJX INTERLAMINAR LMBR/SAC: CPT | Mod: ,,, | Performed by: ANESTHESIOLOGY

## 2023-10-16 PROCEDURE — 62323 NJX INTERLAMINAR LMBR/SAC: CPT | Mod: PO | Performed by: ANESTHESIOLOGY

## 2023-10-16 PROCEDURE — 62323 PR INJ LUMBAR/SACRAL, W/IMAGING GUIDANCE: ICD-10-PCS | Mod: ,,, | Performed by: ANESTHESIOLOGY

## 2023-10-16 PROCEDURE — 25000003 PHARM REV CODE 250: Mod: PO | Performed by: ANESTHESIOLOGY

## 2023-10-16 PROCEDURE — 63600175 PHARM REV CODE 636 W HCPCS: Mod: PO | Performed by: ANESTHESIOLOGY

## 2023-10-16 RX ORDER — METHYLPREDNISOLONE ACETATE 80 MG/ML
INJECTION, SUSPENSION INTRA-ARTICULAR; INTRALESIONAL; INTRAMUSCULAR; SOFT TISSUE
Status: DISCONTINUED | OUTPATIENT
Start: 2023-10-16 | End: 2023-10-16 | Stop reason: HOSPADM

## 2023-10-16 RX ORDER — SODIUM CHLORIDE, SODIUM LACTATE, POTASSIUM CHLORIDE, CALCIUM CHLORIDE 600; 310; 30; 20 MG/100ML; MG/100ML; MG/100ML; MG/100ML
INJECTION, SOLUTION INTRAVENOUS CONTINUOUS
Status: DISCONTINUED | OUTPATIENT
Start: 2023-10-16 | End: 2023-10-16 | Stop reason: HOSPADM

## 2023-10-16 RX ORDER — LIDOCAINE HYDROCHLORIDE 10 MG/ML
1 INJECTION, SOLUTION EPIDURAL; INFILTRATION; INTRACAUDAL; PERINEURAL ONCE
Status: COMPLETED | OUTPATIENT
Start: 2023-10-16 | End: 2023-10-16

## 2023-10-16 RX ORDER — LIDOCAINE HYDROCHLORIDE 10 MG/ML
INJECTION, SOLUTION EPIDURAL; INFILTRATION; INTRACAUDAL; PERINEURAL
Status: DISCONTINUED | OUTPATIENT
Start: 2023-10-16 | End: 2023-10-16 | Stop reason: HOSPADM

## 2023-10-16 RX ORDER — MIDAZOLAM HYDROCHLORIDE 2 MG/2ML
INJECTION, SOLUTION INTRAMUSCULAR; INTRAVENOUS
Status: DISCONTINUED | OUTPATIENT
Start: 2023-10-16 | End: 2023-10-16 | Stop reason: HOSPADM

## 2023-10-16 RX ADMIN — LIDOCAINE HYDROCHLORIDE 10 MG: 10 INJECTION, SOLUTION EPIDURAL; INFILTRATION; INTRACAUDAL; PERINEURAL at 09:10

## 2023-10-16 RX ADMIN — SODIUM CHLORIDE, POTASSIUM CHLORIDE, SODIUM LACTATE AND CALCIUM CHLORIDE: 600; 310; 30; 20 INJECTION, SOLUTION INTRAVENOUS at 09:10

## 2023-10-16 NOTE — DISCHARGE SUMMARY
[facility]  Discharge Note  Short Stay    Admit Date: 10/16/2023    Discharge Date: 10/16/2023    Attending Physician: Richi Rangel     Discharge Provider: Richi Rangel    Diagnoses:  There are no hospital problems to display for this patient.      Discharged Condition: Good    Final Diagnoses: Lumbar radiculopathy [M54.16]    Disposition: Home or Self Care    Hospital Course: No complications, uneventful    Outcome of Hospitalization, Treatment, Procedure, or Surgery:  Patient was admitted for outpatient interventional pain management procedure. The patient tolerated the procedure well with no complications.    Follow up/Patient Instructions:  Follow up as scheduled in Pain Management office in 2-3 weeks.  Patient has received instructions and follow up date and time.    Medications:  Continue previous medications, except restart plavix in 12 hours    Discharge Procedure Orders   Notify your health care provider if you experience any of the following:  temperature >100.4     Notify your health care provider if you experience any of the following:  persistent nausea and vomiting or diarrhea     Notify your health care provider if you experience any of the following:  severe uncontrolled pain     Notify your health care provider if you experience any of the following:  redness, tenderness, or signs of infection (pain, swelling, redness, odor or green/yellow discharge around incision site)     Notify your health care provider if you experience any of the following:  difficulty breathing or increased cough     Notify your health care provider if you experience any of the following:  severe persistent headache     Notify your health care provider if you experience any of the following:  worsening rash     Notify your health care provider if you experience any of the following:  persistent dizziness, light-headedness, or visual disturbances     Notify your health care provider if you experience any of the  following:  increased confusion or weakness     Activity as tolerated

## 2023-10-16 NOTE — OP NOTE
"Procedure Note    Procedure Date: 10/16/2023    Procedure Performed:  L5/S1 lumbar interlaminar epidural steroid injection under fluoroscopy.    Indications:  Cade Johnston presents with lumbar radiculitis/radiculopathy secondary to disc herniation, osteophyte/osteophyte complexes, and/or severe degenerative disc disease producing foraminal or central spinal stenosis.  The pain has been present for at least 4 weeks and the patient has failed to respond to noninvasive conservative care.  Pain rated by NRS at baseline prior to intervention is 6/10.  Their radiculitis/radiculopathy and/or neurogenic claudication is severe enough to greatly impact their quality of life or function.     Pre-op diagnosis: Lumbar Radiculopathy    Post-op diagnosis: same    Physician: Richi Rangel MD    IV anxiolysis medications: versed 2mg    Medications injected: depomedrol 80mg, 1% Lidocaine 1ml, 2 mL sterile, preservative-free normal saline.    Local anesthetic used: 1% Lidocaine, 1 ml    Estimated Blood Loss: none    Complications:  none    Technique:  The patient was interviewed in the holding area and Risks/Benefits were discussed, including, but not limited to, the possibility of new or different pain, bleeding or infection.   All questions were answered.  The patient understood and accepted risks.  Consent was verfied.  A time-out was taken to identify patient and procedure prior to starting the procedure. The patient was placed in the prone position on the fluoroscopy table. The area of the lumbar spine was prepped with Chloraprep x2 and draped in a sterile manner. The L5/S1 interspace was identified and marked under AP fluoroscopy. The skin and subcutaneous tissues overlying the targeted interspace were anesthetized with 3-5 mL of 1% lidocaine using a 25G, 1.5" needle.  A 20G, 3.5" Tuohy epidural needle was directed toward the interspace under fluoroscopic guidance until the ligamentum flavum was engaged. From this point, a loss " of resistance technique with a glass syringe and saline was used to identify entrance of the needle into the epidural space. Once loss of resistance was observed 1 mL of contrast solution was injected. An appropriate epidurogram was noted.  A 4 mL mixture consisting of saline, 1 mL 1% Lidocaine and 80 mg of depomedrol was injected slowly and without resistance.  The needle was flushed with normal saline and removed. The contrast was seen to be displaced after injection. Patient was awake/responsive during all injections.  The patient tolerated the procedure well and was transferred to the [facility] in stable condition.  The patient was monitored after the procedure and was given post-procedure and discharge instructions to follow at home. The patient was discharged in a stable condition.

## 2023-10-17 ENCOUNTER — TELEPHONE (OUTPATIENT)
Dept: GASTROENTEROLOGY | Facility: CLINIC | Age: 58
End: 2023-10-17
Payer: MEDICARE

## 2023-10-17 VITALS
DIASTOLIC BLOOD PRESSURE: 73 MMHG | RESPIRATION RATE: 16 BRPM | OXYGEN SATURATION: 96 % | SYSTOLIC BLOOD PRESSURE: 125 MMHG | BODY MASS INDEX: 30.34 KG/M2 | WEIGHT: 224 LBS | HEIGHT: 72 IN | HEART RATE: 79 BPM | TEMPERATURE: 98 F

## 2023-10-17 NOTE — TELEPHONE ENCOUNTER
Colonoscopy is scheduled on 1/8/2024 with Dr. Holland Montes at [address] [facility] [address] in [place]. After our Pre-service team gets the green light from your insurance, the Preop Nurse will call a couple of days before your procedure date with the arrival time.    Prep instructions has been sent via [facility] and via mail. Address is confirmed. Patient is informed the preop Nurse will call him/her with the arrival time a day or two prior to procedure. Patient verbalizes understanding.

## 2023-10-17 NOTE — TELEPHONE ENCOUNTER
----- Message from Richelle Sauceda LPN sent at 10/17/2023  9:10 AM CDT -----  Contact: pt    ----- Message -----  From: Fidel Cardona  Sent: 10/17/2023   9:06 AM CDT  To: [facility] Clinical Staff    Type: Needs Medical Advice  Who Called: pt  Best Call Back Number: [phone]    Additional Information: Pt is calling the office needs to schedule a colonoscopy.Please call back and advise.

## 2023-10-20 ENCOUNTER — LAB VISIT (OUTPATIENT)
Dept: LAB | Facility: HOSPITAL | Age: 58
End: 2023-10-20
Attending: INTERNAL MEDICINE
Payer: MEDICARE

## 2023-10-20 DIAGNOSIS — E78.5 DYSLIPIDEMIA: ICD-10-CM

## 2023-10-20 DIAGNOSIS — Z12.5 ENCOUNTER FOR SCREENING FOR MALIGNANT NEOPLASM OF PROSTATE: ICD-10-CM

## 2023-10-20 DIAGNOSIS — Z00.00 ROUTINE PHYSICAL EXAMINATION: ICD-10-CM

## 2023-10-20 DIAGNOSIS — I10 ESSENTIAL HYPERTENSION: ICD-10-CM

## 2023-10-20 DIAGNOSIS — Z79.899 ENCOUNTER FOR LONG-TERM (CURRENT) USE OF MEDICATIONS: ICD-10-CM

## 2023-10-20 DIAGNOSIS — E11.9 CONTROLLED TYPE 2 DIABETES MELLITUS WITHOUT COMPLICATION, WITHOUT LONG-TERM CURRENT USE OF INSULIN: ICD-10-CM

## 2023-10-20 LAB
BASOPHILS # BLD AUTO: 0.11 K/UL (ref 0–0.2)
BASOPHILS NFR BLD: 0.8 % (ref 0–1.9)
DIFFERENTIAL METHOD: ABNORMAL
EOSINOPHIL # BLD AUTO: 0.1 K/UL (ref 0–0.5)
EOSINOPHIL NFR BLD: 0.8 % (ref 0–8)
ERYTHROCYTE [DISTWIDTH] IN BLOOD BY AUTOMATED COUNT: 13.2 % (ref 11.5–14.5)
ESTIMATED AVG GLUCOSE: 146 MG/DL (ref 68–131)
HBA1C MFR BLD: 6.7 % (ref 4–5.6)
HCT VFR BLD AUTO: 52.2 % (ref 40–54)
HGB BLD-MCNC: 17.6 G/DL (ref 14–18)
IMM GRANULOCYTES # BLD AUTO: 0.12 K/UL (ref 0–0.04)
IMM GRANULOCYTES NFR BLD AUTO: 0.9 % (ref 0–0.5)
LYMPHOCYTES # BLD AUTO: 1.3 K/UL (ref 1–4.8)
LYMPHOCYTES NFR BLD: 9.7 % (ref 18–48)
MCH RBC QN AUTO: 29.3 PG (ref 27–31)
MCHC RBC AUTO-ENTMCNC: 33.7 G/DL (ref 32–36)
MCV RBC AUTO: 87 FL (ref 82–98)
MONOCYTES # BLD AUTO: 1 K/UL (ref 0.3–1)
MONOCYTES NFR BLD: 7.4 % (ref 4–15)
NEUTROPHILS # BLD AUTO: 10.6 K/UL (ref 1.8–7.7)
NEUTROPHILS NFR BLD: 80.4 % (ref 38–73)
NRBC BLD-RTO: 0 /100 WBC
PLATELET # BLD AUTO: 203 K/UL (ref 150–450)
PMV BLD AUTO: 10.7 FL (ref 9.2–12.9)
RBC # BLD AUTO: 6 M/UL (ref 4.6–6.2)
WBC # BLD AUTO: 13.21 K/UL (ref 3.9–12.7)

## 2023-10-20 PROCEDURE — 83036 HEMOGLOBIN GLYCOSYLATED A1C: CPT | Performed by: INTERNAL MEDICINE

## 2023-10-20 PROCEDURE — 85025 COMPLETE CBC W/AUTO DIFF WBC: CPT | Performed by: INTERNAL MEDICINE

## 2023-10-20 PROCEDURE — 80061 LIPID PANEL: CPT | Performed by: INTERNAL MEDICINE

## 2023-10-20 PROCEDURE — 80053 COMPREHEN METABOLIC PANEL: CPT | Performed by: INTERNAL MEDICINE

## 2023-10-20 PROCEDURE — 36415 COLL VENOUS BLD VENIPUNCTURE: CPT | Mod: PO | Performed by: INTERNAL MEDICINE

## 2023-10-20 PROCEDURE — 84153 ASSAY OF PSA TOTAL: CPT | Performed by: INTERNAL MEDICINE

## 2023-10-21 LAB
ALBUMIN SERPL BCP-MCNC: 4.2 G/DL (ref 3.5–5.2)
ALP SERPL-CCNC: 94 U/L (ref 55–135)
ALT SERPL W/O P-5'-P-CCNC: 37 U/L (ref 10–44)
ANION GAP SERPL CALC-SCNC: 13 MMOL/L (ref 8–16)
AST SERPL-CCNC: 25 U/L (ref 10–40)
BILIRUB SERPL-MCNC: 1.5 MG/DL (ref 0.1–1)
BUN SERPL-MCNC: 17 MG/DL (ref 6–20)
CALCIUM SERPL-MCNC: 9.5 MG/DL (ref 8.7–10.5)
CHLORIDE SERPL-SCNC: 102 MMOL/L (ref 95–110)
CHOLEST SERPL-MCNC: 165 MG/DL (ref 120–199)
CHOLEST/HDLC SERPL: 3.6 {RATIO} (ref 2–5)
CO2 SERPL-SCNC: 23 MMOL/L (ref 23–29)
COMPLEXED PSA SERPL-MCNC: 1.3 NG/ML (ref 0–4)
CREAT SERPL-MCNC: 0.9 MG/DL (ref 0.5–1.4)
EST. GFR  (NO RACE VARIABLE): >60 ML/MIN/1.73 M^2
GLUCOSE SERPL-MCNC: 154 MG/DL (ref 70–110)
HDLC SERPL-MCNC: 46 MG/DL (ref 40–75)
HDLC SERPL: 27.9 % (ref 20–50)
LDLC SERPL CALC-MCNC: 92.6 MG/DL (ref 63–159)
NONHDLC SERPL-MCNC: 119 MG/DL
POTASSIUM SERPL-SCNC: 3.9 MMOL/L (ref 3.5–5.1)
PROT SERPL-MCNC: 7.7 G/DL (ref 6–8.4)
SODIUM SERPL-SCNC: 138 MMOL/L (ref 136–145)
TRIGL SERPL-MCNC: 132 MG/DL (ref 30–150)

## 2023-10-23 DIAGNOSIS — I10 ESSENTIAL (PRIMARY) HYPERTENSION: ICD-10-CM

## 2023-10-23 RX ORDER — LOSARTAN POTASSIUM AND HYDROCHLOROTHIAZIDE 12.5; 1 MG/1; MG/1
TABLET ORAL
Qty: 90 TABLET | Refills: 1 | Status: SHIPPED | OUTPATIENT
Start: 2023-10-23 | End: 2023-11-02

## 2023-10-23 NOTE — TELEPHONE ENCOUNTER
No care due was identified.  [facility] Embedded Care Due Messages. Reference number: [id number].   10/23/2023 1:22:30 AM CDT

## 2023-10-24 PROBLEM — E66.811 OBESITY (BMI 30.0-34.9): Status: ACTIVE | Noted: 2023-10-24

## 2023-10-24 PROBLEM — E66.9 OBESITY (BMI 30.0-34.9): Status: ACTIVE | Noted: 2023-10-24

## 2023-10-24 NOTE — TELEPHONE ENCOUNTER
Refill Decision Note   Cade Preston  is requesting a refill authorization.  Brief Assessment and Rationale for Refill:  Approve     Medication Therapy Plan:       Medication Reconciliation Completed: No   Comments:     No Care Gaps recommended.     Note composed:7:19 PM 10/23/2023

## 2023-10-30 ENCOUNTER — OFFICE VISIT (OUTPATIENT)
Dept: PAIN MEDICINE | Facility: CLINIC | Age: 58
End: 2023-10-30
Payer: MEDICARE

## 2023-10-30 VITALS
BODY MASS INDEX: 30.34 KG/M2 | HEART RATE: 75 BPM | HEIGHT: 72 IN | WEIGHT: 224 LBS | SYSTOLIC BLOOD PRESSURE: 161 MMHG | DIASTOLIC BLOOD PRESSURE: 75 MMHG

## 2023-10-30 DIAGNOSIS — M54.16 LUMBAR RADICULOPATHY, CHRONIC: ICD-10-CM

## 2023-10-30 DIAGNOSIS — M54.16 LUMBAR RADICULOPATHY: ICD-10-CM

## 2023-10-30 DIAGNOSIS — M54.9 DORSALGIA, UNSPECIFIED: Primary | ICD-10-CM

## 2023-10-30 DIAGNOSIS — M47.812 CERVICAL SPONDYLOSIS: ICD-10-CM

## 2023-10-30 PROCEDURE — 3078F DIAST BP <80 MM HG: CPT | Mod: CPTII,S$GLB,,

## 2023-10-30 PROCEDURE — 3077F PR MOST RECENT SYSTOLIC BLOOD PRESSURE >= 140 MM HG: ICD-10-PCS | Mod: CPTII,S$GLB,,

## 2023-10-30 PROCEDURE — 99999 PR PBB SHADOW E&M-EST. PATIENT-LVL III: ICD-10-PCS | Mod: PBBFAC,,,

## 2023-10-30 PROCEDURE — 1159F MED LIST DOCD IN RCRD: CPT | Mod: CPTII,S$GLB,,

## 2023-10-30 PROCEDURE — 3066F NEPHROPATHY DOC TX: CPT | Mod: CPTII,S$GLB,,

## 2023-10-30 PROCEDURE — 3044F PR MOST RECENT HEMOGLOBIN A1C LEVEL <7.0%: ICD-10-PCS | Mod: CPTII,S$GLB,,

## 2023-10-30 PROCEDURE — 99213 OFFICE O/P EST LOW 20 MIN: CPT | Mod: S$GLB,,,

## 2023-10-30 PROCEDURE — 99213 PR OFFICE/OUTPT VISIT, EST, LEVL III, 20-29 MIN: ICD-10-PCS | Mod: S$GLB,,,

## 2023-10-30 PROCEDURE — 3061F NEG MICROALBUMINURIA REV: CPT | Mod: CPTII,S$GLB,,

## 2023-10-30 PROCEDURE — 3008F BODY MASS INDEX DOCD: CPT | Mod: CPTII,S$GLB,,

## 2023-10-30 PROCEDURE — 3061F PR NEG MICROALBUMINURIA RESULT DOCUMENTED/REVIEW: ICD-10-PCS | Mod: CPTII,S$GLB,,

## 2023-10-30 PROCEDURE — 3044F HG A1C LEVEL LT 7.0%: CPT | Mod: CPTII,S$GLB,,

## 2023-10-30 PROCEDURE — 3078F PR MOST RECENT DIASTOLIC BLOOD PRESSURE < 80 MM HG: ICD-10-PCS | Mod: CPTII,S$GLB,,

## 2023-10-30 PROCEDURE — 99999 PR PBB SHADOW E&M-EST. PATIENT-LVL III: CPT | Mod: PBBFAC,,,

## 2023-10-30 PROCEDURE — 3077F SYST BP >= 140 MM HG: CPT | Mod: CPTII,S$GLB,,

## 2023-10-30 PROCEDURE — 1159F PR MEDICATION LIST DOCUMENTED IN MEDICAL RECORD: ICD-10-PCS | Mod: CPTII,S$GLB,,

## 2023-10-30 PROCEDURE — 3008F PR BODY MASS INDEX (BMI) DOCUMENTED: ICD-10-PCS | Mod: CPTII,S$GLB,,

## 2023-10-30 PROCEDURE — 3066F PR DOCUMENTATION OF TREATMENT FOR NEPHROPATHY: ICD-10-PCS | Mod: CPTII,S$GLB,,

## 2023-10-30 NOTE — PROGRESS NOTES
[facility] Follow Up Evaluation    Referred by: Apurva Duenas NP  Reason for referral: back pain    CC:   Chief Complaint   Patient presents with    Neck Pain     Physical therapy has helped some    Low-back Pain     Follow up injection, still having right sided pain          10/30/2023     9:26 AM 9/27/2023     9:35 AM 3/29/2021    12:54 PM   Last 3 PDI Scores   Pain Disability Index (PDI) 44 43 8         Interval HPI 10/30/2023: Cade Johnston returns to the office for follow up. He is s/p L5/S1 on 10/19/2023 with 50% relief lasting about 2 weeks. Today he is reporting continued right sided low back, 8/10, radiation into right buttock and right lateral hip.  He denies any further radiating pain no numbness, weakness or any new changes to his bowel bladder function.  He has been finding no significant relief with NSAIDs.  He has been attending formal physical therapy for his neck pain with excellent relief.      Pain intervention history:  - s/p L4/5 WADE on 2/10/21 with 50% relief  - s/p L5/S1 WADE on 3/15/21 with 85% relief for over 12 months   - s/p L5/S1 on 10/19/2023 with 50% relief lasting about 2 weeks    HPI:   Cade Johnston is a 57 y.o. male who complains of back pain    Onset: many years  Progression: since onset, pain is gradually worsening over the past 4-5 months  Current Pain Score: 8/10  Timing: constant  Quality: sharp, burning, pressure  Radiation: yes, into the right buttock  Associated numbness or weakness: no numbness, no weakness   Exacerbated by: sitting, prolonged standing  Allievated by: standing  Is Pain Level Acceptable?: No    Previous Therapies:  PT/OT: yes, completed  HEP: yes  Interventions:   Surgery:  Medications:   - NSAIDS:   - MSK Relaxants:   - TCAs:   - SNRIs:   - Topicals:   - Anticonvulsants:  - Opioids:     History:    Current Outpatient Medications:     albuterol (PROVENTIL/VENTOLIN HFA) 90 mcg/actuation inhaler, Inhale 2 puffs into the lungs every 6 (six)  hours as needed for Wheezing. Rescue, Disp: 1 Inhaler, Rfl: 2    aspirin (ECOTRIN) 81 MG EC tablet, Take 81 mg by mouth once daily. No Sig Provided, Disp: , Rfl:     blood sugar diagnostic Strp, Test q day, Disp: 100 strip, Rfl: 3    clopidogreL (PLAVIX) 75 mg tablet, TAKE 1 TABLET BY MOUTH EVERY DAY, Disp: 90 tablet, Rfl: 4    fluticasone furoate-vilanteroL (BREO ELLIPTA) 100-25 mcg/dose diskus inhaler, Inhale 1 puff into the lungs once daily. Controller, Disp: 1 each, Rfl: 11    ibuprofen (ADVIL,MOTRIN) 200 MG tablet, Take 200 mg by mouth every 6 (six) hours as needed for Pain., Disp: , Rfl:     ipratropium-albuteroL (COMBIVENT)  mcg/actuation inhaler, Inhale 1 puff into the lungs every 4 (four) hours as needed for Shortness of Breath. Rescue, Disp: 4 g, Rfl: 4    losartan-hydrochlorothiazide 100-12.5 mg (HYZAAR) 100-12.5 mg Tab, TAKE 1 TABLET BY MOUTH EVERY DAY, Disp: 90 tablet, Rfl: 1    metFORMIN (GLUCOPHAGE-XR) 500 MG ER 24hr tablet, TAKE 2 TABLETS BY MOUTH TWICE A DAY WITH MEALS, Disp: 360 tablet, Rfl: 3    tobramycin-dexAMETHasone 0.3-0.1% (TOBRADEX) 0.3-0.1 % DrpS, SMARTSI-2 Drop(s) In Eye(s) Every 4-6 Hours, Disp: , Rfl:     verapamiL (VERELAN) 120 MG C24P, TAKE 1 CAPSULE BY MOUTH ONCE DAILY, Disp: 90 capsule, Rfl: 4    sildenafil (REVATIO) 20 mg Tab, Take 1 tablet (20 mg total) by mouth 3 (three) times daily. for 180 doses, Disp: 30 tablet, Rfl: 5    Current Facility-Administered Medications:     sodium chloride 0.9% flush 10 mL, 10 mL, Intravenous, PRN, Refugio Mcdonough MD    Past Medical History:   Diagnosis Date    Anticoagulant long-term use     Cancer     skin cancer left ear    DJD (degenerative joint disease) of lumbar spine     DM type 2 (diabetes mellitus, type 2)     with diet alone    Dyslipidemia 2019    Essential hypertension 2015    Fatty liver     GERD (gastroesophageal reflux disease)     Hepatosplenomegaly     HTN (hypertension)     Hypogonadism male 2015     Mixed hyperlipidemia     resolved with diet    Nephrolithiasis     last stone 1/11    Palpitations 03/09/2016    Pulmonary sarcoidosis     Sarcoidosis of lung 1990    Sleep apnea     uses cpap    Type II or unspecified type diabetes mellitus without mention of complication, not stated as uncontrolled 04/27/2014    Ventricular hypokinesis     angiogram with mild LAD stenosis       Past Surgical History:   Procedure Laterality Date    ANGIOGRAM, CORONARY, WITH LEFT HEART CATHETERIZATION N/A 8/16/2022    Procedure: Angiogram, Coronary, with Left Heart Cath;  Surgeon: Refugio Mcdonough MD;  Location: [facility] CATH;  Service: Cardiology;  Laterality: N/A;    CARDIAC SURGERY      CIRCUMCISION      CORONARY ANGIOGRAPHY N/A 12/13/2019    Procedure: ANGIOGRAM, CORONARY ARTERY;  Surgeon: Amador Duran MD;  Location: [facility] CATH;  Service: Cardiology;  Laterality: N/A;    CORONARY STENT PLACEMENT  12/2019    x1    CYST REMOVAL Right 05/2022    thigh, Dr. Parkinson    elbow spur removal      right    EPIDURAL STEROID INJECTION INTO LUMBAR SPINE N/A 02/10/2021    Procedure: Injection-steroid-epidural-lumbar L4/5;  Surgeon: Richi Rangel MD;  Location: [facility] OR;  Service: Pain Management;  Laterality: N/A;    EPIDURAL STEROID INJECTION INTO LUMBAR SPINE N/A 03/15/2021    Procedure: Injection-steroid-epidural-lumbar L5/S1;  Surgeon: Richi Rangel MD;  Location: [facility] OR;  Service: Pain Management;  Laterality: N/A;    EPIDURAL STEROID INJECTION INTO LUMBAR SPINE N/A 10/16/2023    Procedure: Injection-steroid-epidural-lumbar;  Surgeon: Richi Rangel MD;  Location: [facility] OR;  Service: Pain Management;  Laterality: N/A;  L5/S1    EXTRACORPOREAL SHOCK WAVE LITHOTRIPSY  1996    [facility]    LEFT HEART CATHETERIZATION Left 12/13/2019    Procedure: Left heart cath;  Surgeon: Amador Duran MD;  Location: [facility] CATH;  Service: Cardiology;  Laterality: Left;    removal of skin cancer      L side of face    URETERAL EXPLORATION       exploration of the R ureter for ureteral injury [facility] - did well       Family History   Problem Relation Age of Onset    Hypertension Mother     Fibromyalgia Mother     Coronary artery disease Father     Diabetes Father     Liver disease Brother         hepatitis C from a blood transufusion       Social History     Socioeconomic History    Marital status:     Number of children: 2   Occupational History    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     Employer: [employer]   Tobacco Use    Smoking status: Former     Current packs/day: 0.00     Types: Cigarettes     Quit date: 1991     Years since quittin.9     Passive exposure: Never    Smokeless tobacco: Never   Substance and Sexual Activity    Alcohol use: No    Drug use: No   Social History Narrative    ** Merged History Encounter **            Review of patient's allergies indicates:   Allergen Reactions    Codeine Hives    Latex Rash     blisters       Review of Systems:  General ROS: negative for - fever  Psychological ROS: negative for - hostility  Hematological and Lymphatic ROS: negative for - bleeding problems  Endocrine ROS: negative for - unexpected weight changes  Respiratory ROS: no cough, shortness of breath, or wheezing  Cardiovascular ROS: no chest pain or dyspnea on exertion  Gastrointestinal ROS: no abdominal pain, change in bowel habits, or black or bloody stools  Musculoskeletal ROS: negative for - muscular weakness  Neurological ROS: negative for - bowel and bladder control changes or numbness/tingling  Dermatological ROS: negative for rash    Physical Exam:  Vitals:    10/30/23 0925   BP: (!) 161/75   Pulse: 75   Weight: 101.6 kg (223 lb 15.8 oz)   Height: 6' (1.829 m)   PainSc:   8   PainLoc: Back       Body mass index is 30.38 kg/m².     Gen: NAD  Gait: gait intact  Psych:  Mood appropriate for given condition  HEENT: eyes anicteric   GI: Abd soft  CV: RRR  Lungs:  breathing unlabored   Sensation: intact to light touch in all dermatomes tested from L2-S1 bilaterally  Reflexes: 2+ b/l patella and achilles  Palpation: Diffusely tender over lumbar paraspinals  +TTP over the rightt SI joint  Tone: normal in the b/l knees and hips   Skin: intact  Extremities: No edema in b/l ankles or hands  Provacative tests:        Right Left   L2/3 Iliacus Hip flexion  5  5   L3/4 Qudratus Femoris Knee Extension  5  5   L4/5 Tib Anterior Ankle Dorsiflexion   5  5   L5/S1 Extensor Hallicus Longus Great toe extension  5  5                 S1/S2 Gastroc/Soleus Plantar Flexion  5  5     Sensation: intact to lt touch bilaterally in c4-t1   Reflexes: 2+ b/l Bicep, tricep, BR   ROM: Cervical ROM full, shoulder, elbow and wrist ROM full  Tone:  Normal at elbow, wrist and shoulder   Inspection: no atrophy of bicep, FDI or APB noted     Motor:      Right Left   C4 Shoulder Abduction  5  5   C5 Elbow Flexion    5  5   C6 Wrist Extension  5  5   C7 Elbow Extension   5  5   C8/T1 Hand Intrinsics   5  5                              Imaging:  Xray lumbar spine 1/19/21  FINDINGS:  Bone density is normal.  The lumbar vertebral bodies maintain normal height and alignment.  There is moderate to marked disc space narrowing at the L4-5 level with mild disc space narrowing at the L3-4 level.  There is anterior osteophyte formation at multiple levels, most apparent at the L4-5 level as well as at the thoracolumbar junction.  There is facet joint arthropathy particularly at the lower 2 lumbar levels.    MRI lumbar spine 2/1/21  FINDINGS:  The vertebral body heights are preserved.  There is no significant spondylolisthesis.  There is no non degenerative marrow edema or infiltrative marrow process identified.  There is a small hemangioma within the L3 vertebral body.  There is disc height loss at L4-L5 and to a lesser extent L3-L4 and L5-S1.  The conus medullaris terminates at L1 level.  There is a small Tarlov cyst  slightly left of midline S2 level measures 10 x 6 x 12 mm.  There is no significant paraspinal soft tissue edema. The partially visualized kidneys demonstrate renal cysts bilaterally including parapelvic cysts on the left similar to a prior CT scan.    T12-L1: No significant disc bulge or protrusion is noted.  There is mild facet arthropathy.  There is no significant spinal canal or neural foraminal narrowing noted.  L1-L2: There is no significant disc bulge or protrusion and no significant spinal canal or neural foraminal narrowing.  There is mild facet arthropathy.  L2-L3: There is no significant disc bulge or protrusion.  There is mild right and moderate left facet arthropathy.  No significant spinal canal narrowing is identified.  There is mild neural foraminal narrowing on the left.  L3-L4: There is a mild circumferential disc bulge.  There is moderate facet arthropathy.  There is no significant spinal canal narrowing identified.  There is mild inferior neural foraminal narrowing bilaterally.  L4-L5: There is a right paracentral left paracentral disc extrusion measures approximately 12 mm transverse and approximately 14 mm craniocaudal.  The extrusion measures approximately 5 mm AP.  There is annular fissuring.  There is moderate bilateral facet arthropathy.  There is mild bilateral lateral recess narrowing.  There is no significant central spinal canal narrowing.  There is moderate left and mild right neural foraminal narrowing.  L5-S1: At L5-S1 there is a right paracentral to left paracentral disc protrusion with annular fissure.  There is moderate left greater than right facet arthropathy.  There is mild bilateral lateral recess narrowing.  No significant central spinal canal narrowing is noted.  There is moderate bilateral foraminal narrowing.    Labs:  BMP  Lab Results   Component Value Date     10/20/2023    K 3.9 10/20/2023     10/20/2023    CO2 23 10/20/2023    BUN 17 10/20/2023     CREATININE 0.9 10/20/2023    CALCIUM 9.5 10/20/2023    ANIONGAP 13 10/20/2023    ESTGFRAFRICA >60.0 04/21/2022    EGFRNONAA >60.0 04/21/2022     Lab Results   Component Value Date    ALT 37 10/20/2023    AST 25 10/20/2023    ALKPHOS 94 10/20/2023    BILITOT 1.5 (H) 10/20/2023       Assessment:   Problem List Items Addressed This Visit          Neuro    Lumbar radiculopathy     Other Visit Diagnoses       Dorsalgia, unspecified    -  Primary    Relevant Orders    MRI Lumbar Spine Without Contrast    Lumbar radiculopathy, chronic        Relevant Orders    MRI Lumbar Spine Without Contrast    Cervical spondylosis                    57 y.o. year old male with PMH HTN, DM II, CAD presents to the office with back pain.  He has chronic lower back pain for the past few year that has been worsening over the past 4-5 months.  Today his pain is 8/10, constant, deep, sharp, burning, in the right back into the right buttock.  Denies any history of rash/zoster      10/30/2023: Cade Johnston returns to the office for follow up. He is s/p L5/S1 on 10/19/2023 with 50% relief lasting about 2 weeks. Today he is reporting continued right sided low back, 8/10, radiation into right buttock and right lateral hip.  He denies any further radiating pain no numbness, weakness or any new changes to his bowel bladder function.  He has been finding no significant relief with NSAIDs.  He has been attending formal physical therapy for his neck pain with excellent relief.    - on exam he has full strength in his upper and lower extremities  - He is s/p L5/S1 on 10/19/2023 with 50% relief lasting about 2 weeks.  - I independently reviewed his lumbar MRI and it is c/w L4-5 right paracentral paracentral disc extrusion with annular fissuring and mild bilateral lateral recess narrowing and L5-S1 L5-S1 right paracentral to left paracentral disc protrusion with annular fissure with mild bilateral lateral recess narrowing and moderate bilateral foraminal  narrowing.  - he found some relief with the lumbar epidural however not long-lasting.  - he continues to have pain that limits his mobility interferes with his ADLs in his quality of life.  He is not finding significant relief with NSAIDs and at-home PT directed exercises.  - formal PT has provided him improvement with his neck pain.  - for his continued lower back pain I would like to order an updated lumbar MRI for further evaluation.  - he is likely having continued lumbar radicular pain.  We will call him with results of imaging and future treatment plan.  Can consider right-sided transforaminal WADE or potentially SI joint injection if no significant findings on MRI.  - we will need clearance to hold his Plavix for 5 days prior to WADE.      : Reviewed    The above note was completed, in part, with the aid of Dragon dictation software/hardware. Translation errors may be present.

## 2023-11-02 ENCOUNTER — OFFICE VISIT (OUTPATIENT)
Dept: FAMILY MEDICINE | Facility: CLINIC | Age: 58
End: 2023-11-02
Payer: MEDICARE

## 2023-11-02 VITALS
DIASTOLIC BLOOD PRESSURE: 70 MMHG | WEIGHT: 225.31 LBS | OXYGEN SATURATION: 96 % | HEIGHT: 72 IN | SYSTOLIC BLOOD PRESSURE: 110 MMHG | BODY MASS INDEX: 30.52 KG/M2 | HEART RATE: 77 BPM | TEMPERATURE: 98 F

## 2023-11-02 DIAGNOSIS — E78.5 DYSLIPIDEMIA: ICD-10-CM

## 2023-11-02 DIAGNOSIS — J43.2 CENTRILOBULAR EMPHYSEMA: ICD-10-CM

## 2023-11-02 DIAGNOSIS — I10 ESSENTIAL HYPERTENSION: ICD-10-CM

## 2023-11-02 DIAGNOSIS — D72.829 LEUKOCYTOSIS, UNSPECIFIED TYPE: ICD-10-CM

## 2023-11-02 DIAGNOSIS — D86.0 SARCOIDOSIS OF LUNG: ICD-10-CM

## 2023-11-02 DIAGNOSIS — I77.9 DISORDER OF ARTERIES AND ARTERIOLES, UNSPECIFIED: ICD-10-CM

## 2023-11-02 DIAGNOSIS — E11.9 CONTROLLED TYPE 2 DIABETES MELLITUS WITHOUT COMPLICATION, WITHOUT LONG-TERM CURRENT USE OF INSULIN: Primary | ICD-10-CM

## 2023-11-02 DIAGNOSIS — R19.7 DIARRHEA, UNSPECIFIED TYPE: ICD-10-CM

## 2023-11-02 PROCEDURE — 3044F HG A1C LEVEL LT 7.0%: CPT | Mod: CPTII,S$GLB,, | Performed by: INTERNAL MEDICINE

## 2023-11-02 PROCEDURE — 1160F RVW MEDS BY RX/DR IN RCRD: CPT | Mod: CPTII,S$GLB,, | Performed by: INTERNAL MEDICINE

## 2023-11-02 PROCEDURE — 3044F PR MOST RECENT HEMOGLOBIN A1C LEVEL <7.0%: ICD-10-PCS | Mod: CPTII,S$GLB,, | Performed by: INTERNAL MEDICINE

## 2023-11-02 PROCEDURE — 3066F PR DOCUMENTATION OF TREATMENT FOR NEPHROPATHY: ICD-10-PCS | Mod: CPTII,S$GLB,, | Performed by: INTERNAL MEDICINE

## 2023-11-02 PROCEDURE — 99214 PR OFFICE/OUTPT VISIT, EST, LEVL IV, 30-39 MIN: ICD-10-PCS | Mod: S$GLB,,, | Performed by: INTERNAL MEDICINE

## 2023-11-02 PROCEDURE — 3008F BODY MASS INDEX DOCD: CPT | Mod: CPTII,S$GLB,, | Performed by: INTERNAL MEDICINE

## 2023-11-02 PROCEDURE — 3074F PR MOST RECENT SYSTOLIC BLOOD PRESSURE < 130 MM HG: ICD-10-PCS | Mod: CPTII,S$GLB,, | Performed by: INTERNAL MEDICINE

## 2023-11-02 PROCEDURE — 99999 PR PBB SHADOW E&M-EST. PATIENT-LVL III: ICD-10-PCS | Mod: PBBFAC,,, | Performed by: INTERNAL MEDICINE

## 2023-11-02 PROCEDURE — 99999 PR PBB SHADOW E&M-EST. PATIENT-LVL III: CPT | Mod: PBBFAC,,, | Performed by: INTERNAL MEDICINE

## 2023-11-02 PROCEDURE — 1160F PR REVIEW ALL MEDS BY PRESCRIBER/CLIN PHARMACIST DOCUMENTED: ICD-10-PCS | Mod: CPTII,S$GLB,, | Performed by: INTERNAL MEDICINE

## 2023-11-02 PROCEDURE — 3066F NEPHROPATHY DOC TX: CPT | Mod: CPTII,S$GLB,, | Performed by: INTERNAL MEDICINE

## 2023-11-02 PROCEDURE — 99214 OFFICE O/P EST MOD 30 MIN: CPT | Mod: S$GLB,,, | Performed by: INTERNAL MEDICINE

## 2023-11-02 PROCEDURE — 3074F SYST BP LT 130 MM HG: CPT | Mod: CPTII,S$GLB,, | Performed by: INTERNAL MEDICINE

## 2023-11-02 PROCEDURE — 3061F NEG MICROALBUMINURIA REV: CPT | Mod: CPTII,S$GLB,, | Performed by: INTERNAL MEDICINE

## 2023-11-02 PROCEDURE — 3008F PR BODY MASS INDEX (BMI) DOCUMENTED: ICD-10-PCS | Mod: CPTII,S$GLB,, | Performed by: INTERNAL MEDICINE

## 2023-11-02 PROCEDURE — 3061F PR NEG MICROALBUMINURIA RESULT DOCUMENTED/REVIEW: ICD-10-PCS | Mod: CPTII,S$GLB,, | Performed by: INTERNAL MEDICINE

## 2023-11-02 PROCEDURE — 1159F PR MEDICATION LIST DOCUMENTED IN MEDICAL RECORD: ICD-10-PCS | Mod: CPTII,S$GLB,, | Performed by: INTERNAL MEDICINE

## 2023-11-02 PROCEDURE — 1159F MED LIST DOCD IN RCRD: CPT | Mod: CPTII,S$GLB,, | Performed by: INTERNAL MEDICINE

## 2023-11-02 PROCEDURE — 3078F PR MOST RECENT DIASTOLIC BLOOD PRESSURE < 80 MM HG: ICD-10-PCS | Mod: CPTII,S$GLB,, | Performed by: INTERNAL MEDICINE

## 2023-11-02 PROCEDURE — 3078F DIAST BP <80 MM HG: CPT | Mod: CPTII,S$GLB,, | Performed by: INTERNAL MEDICINE

## 2023-11-02 RX ORDER — LOSARTAN POTASSIUM 100 MG/1
100 TABLET ORAL DAILY
Qty: 30 TABLET | Refills: 11 | Status: SHIPPED | OUTPATIENT
Start: 2023-11-02 | End: 2024-11-01

## 2023-11-02 NOTE — PROGRESS NOTES
Subjective:       Patient ID: Cade Johnston is a 57 y.o. male.  Chief Complaint: Hypertension     HPI      HTN - low at home with sbp 76 and 80s.  Feels bad so starting halving his losartan hctz.  Now mildly high.     C/o diarrhea for 1 mo.  + mucous.  No abdominal pain (has right upper quad TTP for > 1 yr).  CT?      Elevated WBC on labs.  States had a URI infection with chills around that time.       PND/ seasonal allergies.  controlled.  Tried Singulair in past, but makes eyes to dry.  Zyrtec and Xyzal = tired.      Sarcoidosis:  some SOB, but stable.  Had to retire.  Had several episodes at work where he had trouble breathing and was alone not to be found.  45% lung fnx, sees pulmonary - increased sob - now with talking.  States lungs slightly worse.  On new inhaler Breo from pulm.  pulm recommended retiring.    low testosterone, Dr Larson;  Secondary erythrocytosis - gets frequent phlebotomies.  2nd to testosterone and sarcoidosis    COPD - stable     CAD s/p stent.  Angio 2022 clear.  Sees Dr Duran cardiology.   Disorder of arteries - stable      VÍCTOR - + fatigue; could not tolerate cpap 10 yr ago.     DM - controlled, but worse; poor diet and feels can get it back down; outside eye Dr LIU - controlled ldl < 70 goal; refusing statin in past.  High triglycerides  BPH/ ED - sees Dr Larson     Mood good.     Refuses further COVID vaccines       Assessment:       1. Controlled type 2 diabetes mellitus without complication, without long-term current use of insulin    2. Essential hypertension    3. Dyslipidemia    4. Sarcoidosis of lung    5. Diarrhea, unspecified type    6. Leukocytosis, unspecified type    7. Disorder of arteries and arterioles, unspecified    8. Centrilobular emphysema        Plan:       Controlled type 2 diabetes mellitus without complication, without long-term current use of insulin    Essential hypertension  -     losartan (COZAAR) 100 MG tablet; Take 1 tablet (100 mg total) by mouth once  daily.  Dispense: 30 tablet; Refill: 11    Dyslipidemia    Sarcoidosis of lung    Diarrhea, unspecified type  -     Stool Exam-Ova,Cysts,Parasites; Future; Expected date: 11/02/2023  -     WBC, Stool; Future; Expected date: 11/02/2023  -     Stool culture; Future; Expected date: 11/02/2023  -     Giardia / Cryptosporidum, EIA; Future; Expected date: 11/02/2023  -     Clostridium difficile EIA; Future  -     Occult blood x 1, stool; Future; Expected date: 11/02/2023    Leukocytosis, unspecified type  -     CBC Auto Differential; Future; Expected date: 11/02/2023    Disorder of arteries and arterioles, unspecified    Centrilobular emphysema              [place] in 2 weeks and can f/u until controlled and resolved.  Can also f/u routinge with you in 6 mo and me in 1 yr.   HTN - trial just losartan 100.  To low, try 50. To high, try 2.5 amlodipine.    Diarrhea - hold metformin 3 days.  If resolves, try different - pioglitazone or other.  If no change, resume.  R/p infection with mucous    Continue current management and monitor.  Other diagnoses were reviewed and found stable and will continue to monitor.  Counseled on regular exercise, maintenance of a healthy weight, balanced diet rich in fruits/vegetables and lean protein, and avoidance of unhealthy habits like smoking and excessive alcohol intake.   Also, counseled on importance of being compliant with medication, health appointments, diet and exercise.     Follow up in about 2 weeks (around 11/16/2023).  Apurva      Medication List with Changes/Refills   New Medications    LOSARTAN (COZAAR) 100 MG TABLET    Take 1 tablet (100 mg total) by mouth once daily.   Current Medications    ALBUTEROL (PROVENTIL/VENTOLIN HFA) 90 MCG/ACTUATION INHALER    Inhale 2 puffs into the lungs every 6 (six) hours as needed for Wheezing. Rescue    ASPIRIN (ECOTRIN) 81 MG EC TABLET    Take 81 mg by mouth once daily. No Sig Provided    BLOOD SUGAR DIAGNOSTIC STRP    Test q day     CLOPIDOGREL (PLAVIX) 75 MG TABLET    TAKE 1 TABLET BY MOUTH EVERY DAY    FLUTICASONE FUROATE-VILANTEROL (BREO ELLIPTA) 100-25 MCG/DOSE DISKUS INHALER    Inhale 1 puff into the lungs once daily. Controller    IBUPROFEN (ADVIL,MOTRIN) 200 MG TABLET    Take 200 mg by mouth every 6 (six) hours as needed for Pain.    IPRATROPIUM-ALBUTEROL (COMBIVENT)  MCG/ACTUATION INHALER    Inhale 1 puff into the lungs every 4 (four) hours as needed for Shortness of Breath. Rescue    METFORMIN (GLUCOPHAGE-XR) 500 MG ER 24HR TABLET    TAKE 2 TABLETS BY MOUTH TWICE A DAY WITH MEALS    SILDENAFIL (REVATIO) 20 MG TAB    Take 1 tablet (20 mg total) by mouth 3 (three) times daily. for 180 doses    TOBRAMYCIN-DEXAMETHASONE 0.3-0.1% (TOBRADEX) 0.3-0.1 % DRPS    SMARTSI-2 Drop(s) In Eye(s) Every 4-6 Hours    VERAPAMIL (VERELAN) 120 MG C24P    TAKE 1 CAPSULE BY MOUTH ONCE DAILY   Discontinued Medications    LOSARTAN-HYDROCHLOROTHIAZIDE 100-12.5 MG (HYZAAR) 100-12.5 MG TAB    TAKE 1 TABLET BY MOUTH EVERY DAY       BP Readings from Last 3 Encounters:   23 110/70   10/30/23 (!) 161/75   10/24/23 134/72     Hemoglobin A1C   Date Value Ref Range Status   10/20/2023 6.7 (H) 4.0 - 5.6 % Final     Comment:     ADA Screening Guidelines:  5.7-6.4%  Consistent with prediabetes  >or=6.5%  Consistent with diabetes    High levels of fetal hemoglobin interfere with the HbA1C  assay. Heterozygous hemoglobin variants (HbS, HgC, etc)do  not significantly interfere with this assay.   However, presence of multiple variants may affect accuracy.     2023 6.4 (H) 4.0 - 5.6 % Final     Comment:     ADA Screening Guidelines:  5.7-6.4%  Consistent with prediabetes  >or=6.5%  Consistent with diabetes    High levels of fetal hemoglobin interfere with the HbA1C  assay. Heterozygous hemoglobin variants (HbS, HgC, etc)do  not significantly interfere with this assay.   However, presence of multiple variants may affect accuracy.     10/18/2022 6.8 (H) 4.0 -  5.6 % Final     Comment:     ADA Screening Guidelines:  5.7-6.4%  Consistent with prediabetes  >or=6.5%  Consistent with diabetes    High levels of fetal hemoglobin interfere with the HbA1C  assay. Heterozygous hemoglobin variants (HbS, HgC, etc)do  not significantly interfere with this assay.   However, presence of multiple variants may affect accuracy.       Lab Results   Component Value Date    TSH 1.358 07/27/2021     Lab Results   Component Value Date    LDLCALC 92.6 10/20/2023    LDLCALC 67.8 04/25/2023    LDLCALC 76.4 10/18/2022     Lab Results   Component Value Date    TRIG 132 10/20/2023    TRIG 186 (H) 04/25/2023    TRIG 148 10/18/2022     Wt Readings from Last 3 Encounters:   11/02/23 102.2 kg (225 lb 5 oz)   10/30/23 101.6 kg (223 lb 15.8 oz)   10/24/23 101.6 kg (224 lb)     Lab Results   Component Value Date    HGB 17.6 10/20/2023    HCT 52.2 10/20/2023    WBC 13.21 (H) 10/20/2023    ALT 37 10/20/2023    AST 25 10/20/2023     10/20/2023    K 3.9 10/20/2023    CREATININE 0.9 10/20/2023    PSA 1.3 10/20/2023           Review of Systems        Objective:      Vitals:    11/02/23 1004   BP: 110/70   Pulse:    Temp:      Physical Exam  Vitals reviewed.   Constitutional:       Appearance: Normal appearance.   Eyes:      Conjunctiva/sclera: Conjunctivae normal.   Cardiovascular:      Rate and Rhythm: Normal rate.   Pulmonary:      Effort: Pulmonary effort is normal.      Breath sounds: Normal breath sounds.   Abdominal:      General: Bowel sounds are normal.      Palpations: Abdomen is soft.      Tenderness: There is abdominal tenderness.       Musculoskeletal:      Cervical back: Normal range of motion.      Comments: Normal ROM bilateral    Skin:     General: Skin is warm and dry.   Neurological:      Mental Status: He is alert.      Cranial Nerves: Cranial nerve deficit: grossly intact.   Psychiatric:      Comments: Alert and orientated

## 2023-11-03 ENCOUNTER — LAB VISIT (OUTPATIENT)
Dept: LAB | Facility: HOSPITAL | Age: 58
End: 2023-11-03
Attending: INTERNAL MEDICINE
Payer: MEDICARE

## 2023-11-03 DIAGNOSIS — R19.7 DIARRHEA, UNSPECIFIED TYPE: ICD-10-CM

## 2023-11-03 LAB
C DIFF GDH STL QL: POSITIVE
C DIFF TOX A+B STL QL IA: NEGATIVE
C DIFF TOX GENS STL QL NAA+PROBE: POSITIVE

## 2023-11-03 PROCEDURE — 87449 NOS EACH ORGANISM AG IA: CPT | Performed by: INTERNAL MEDICINE

## 2023-11-03 PROCEDURE — 87329 GIARDIA AG IA: CPT | Performed by: INTERNAL MEDICINE

## 2023-11-03 PROCEDURE — 82272 OCCULT BLD FECES 1-3 TESTS: CPT | Performed by: INTERNAL MEDICINE

## 2023-11-03 PROCEDURE — 87045 FECES CULTURE AEROBIC BACT: CPT | Performed by: INTERNAL MEDICINE

## 2023-11-03 PROCEDURE — 87427 SHIGA-LIKE TOXIN AG IA: CPT | Performed by: INTERNAL MEDICINE

## 2023-11-03 PROCEDURE — 87449 NOS EACH ORGANISM AG IA: CPT | Mod: 91 | Performed by: INTERNAL MEDICINE

## 2023-11-03 PROCEDURE — 87493 C DIFF AMPLIFIED PROBE: CPT | Performed by: INTERNAL MEDICINE

## 2023-11-03 PROCEDURE — 87209 SMEAR COMPLEX STAIN: CPT | Performed by: INTERNAL MEDICINE

## 2023-11-03 PROCEDURE — 87046 STOOL CULTR AEROBIC BACT EA: CPT | Performed by: INTERNAL MEDICINE

## 2023-11-03 PROCEDURE — 89055 LEUKOCYTE ASSESSMENT FECAL: CPT | Performed by: INTERNAL MEDICINE

## 2023-11-04 LAB
CRYPTOSP AG STL QL IA: NEGATIVE
G LAMBLIA AG STL QL IA: NEGATIVE
OB PNL STL: POSITIVE
WBC #/AREA STL HPF: NORMAL /[HPF]

## 2023-11-06 ENCOUNTER — TELEPHONE (OUTPATIENT)
Dept: FAMILY MEDICINE | Facility: CLINIC | Age: 58
End: 2023-11-06
Payer: MEDICARE

## 2023-11-06 DIAGNOSIS — A04.72 C. DIFFICILE DIARRHEA: Primary | ICD-10-CM

## 2023-11-06 LAB
BACTERIA STL CULT: NORMAL
E COLI SXT1 STL QL IA: NEGATIVE
E COLI SXT2 STL QL IA: NEGATIVE

## 2023-11-06 NOTE — TELEPHONE ENCOUNTER
Please, call and inform patient:   Patient has an infectious cause of his diarrhea call C. Diff.  Please take the antibiotic I sent in.    He can resume his metformin if he has not done so.  Move his apt with [facility] to 11/26 or later to give time for the antibiotic to be completed.  Have him contact us if his diarrhea is still there when he is done with the antibiotic.    He needs to wipe his bathroom, counters, toilet, etc with bleach wipes.    Ask if he can do a virtual visit with [facility] and check his BP from home.  If not, come in.

## 2023-11-07 ENCOUNTER — TELEPHONE (OUTPATIENT)
Dept: FAMILY MEDICINE | Facility: CLINIC | Age: 58
End: 2023-11-07
Payer: MEDICARE

## 2023-11-07 LAB — O+P STL MICRO: NORMAL

## 2023-11-07 NOTE — TELEPHONE ENCOUNTER
Informed patient of Dr. Smith message. PVU    Changed apt to later date and also made it a virtual. Wife will take b/p day of virtual

## 2023-11-08 ENCOUNTER — HOSPITAL ENCOUNTER (OUTPATIENT)
Dept: RADIOLOGY | Facility: HOSPITAL | Age: 58
Discharge: HOME | End: 2023-11-08
Payer: MEDICARE

## 2023-11-08 ENCOUNTER — TELEPHONE (OUTPATIENT)
Dept: FAMILY MEDICINE | Facility: CLINIC | Age: 58
End: 2023-11-08
Payer: MEDICARE

## 2023-11-08 DIAGNOSIS — M54.16 LUMBAR RADICULOPATHY, CHRONIC: ICD-10-CM

## 2023-11-08 DIAGNOSIS — M54.9 DORSALGIA, UNSPECIFIED: ICD-10-CM

## 2023-11-08 PROCEDURE — 72148 MRI LUMBAR SPINE W/O DYE: CPT | Mod: TC,PO

## 2023-11-08 PROCEDURE — 72148 MRI LUMBAR SPINE WITHOUT CONTRAST: ICD-10-PCS | Mod: 26,,, | Performed by: RADIOLOGY

## 2023-11-08 PROCEDURE — 72148 MRI LUMBAR SPINE W/O DYE: CPT | Mod: 26,,, | Performed by: RADIOLOGY

## 2023-11-08 NOTE — TELEPHONE ENCOUNTER
Pt states he was off the metformin as ordered and the diarrhea went away for the 3 days he was off metformin and diarrhea started back up when he got back on the metformin     Pt states he never started abx for C diff     Please advise

## 2023-11-08 NOTE — TELEPHONE ENCOUNTER
----- Message from Lola Herrera sent at 11/8/2023 10:07 AM CST -----  Regarding: Rx adverse reaction  Type:  Needs Medical Advice    Who Called: pt    Symptoms (please be specific): diarrhea     Pharmacy name and phone #:    [id number] [place] - [address]. AT [facility]  [address]  [place] [address]  Phone: [phone] Fax: [phone]    Would the patient rather a call back or a response via [facility]? Call back    Best Call Back Number: [phone]    Additional Information: Diarrhea started when pt started taking the metFORMIN (GLUCOPHAGE-XR) 500 MG ER 24hr tablet. Sts he would like a return call as soon as possible.  Please advise.  Thank you.

## 2023-11-09 ENCOUNTER — TELEPHONE (OUTPATIENT)
Dept: FAMILY MEDICINE | Facility: CLINIC | Age: 58
End: 2023-11-09
Payer: MEDICARE

## 2023-11-09 ENCOUNTER — TELEPHONE (OUTPATIENT)
Dept: PAIN MEDICINE | Facility: CLINIC | Age: 58
End: 2023-11-09
Payer: MEDICARE

## 2023-11-09 DIAGNOSIS — M47.816 LUMBAR SPONDYLOSIS: Primary | ICD-10-CM

## 2023-11-09 RX ORDER — SODIUM CHLORIDE, SODIUM LACTATE, POTASSIUM CHLORIDE, CALCIUM CHLORIDE 600; 310; 30; 20 MG/100ML; MG/100ML; MG/100ML; MG/100ML
INJECTION, SOLUTION INTRAVENOUS CONTINUOUS
Status: CANCELLED | OUTPATIENT
Start: 2023-11-09

## 2023-11-09 NOTE — TELEPHONE ENCOUNTER
Advised patient states he is on the way to the pharmacy to  his antibiotics and start taking.    States when I advised him on 11/6/ to whip the all counter tops and bathrooms down with cholrox he did that and his family is using a different rest room.

## 2023-11-09 NOTE — TELEPHONE ENCOUNTER
Patient states pharmacy will not have meds till tomorrow so he will not start the prescription till 11/10/23

## 2023-11-09 NOTE — TELEPHONE ENCOUNTER
----- Message from Lola Herrera sent at 11/9/2023  2:10 PM CST -----  Regarding: rx  Type:  Needs Medical Advice    Who Called: pt    Pharmacy name and phone #:    [id number] [place] - [address]. AT [facility]  [address]  [place] [address]  Phone: [phone] Fax: [phone]    Would the patient rather a call back or a response via [facility]? Call back    Best Call Back Number: [phone]      Additional Information: [facility] and they won't have it until next week.  PT was supposed to start taking it yesterday.   PT sts please have the doctor call him. Not the nurse. Please advise.  Thank you.

## 2023-11-09 NOTE — TELEPHONE ENCOUNTER
Please let patient know I reviewed his updated lumbar MRI.  Overall no significant interval changes.    I believe he may be having some axial facet mediated pain.  Please schedule him for the following procedure:    Physician - Dr Rangel    Type of Procedure/Injection - Lumbar Medial Branch Block  L4/5 and L5/S1           Laterality - Bilateral      Anxiolysis- RNIV      Need to hold medication - No      N/A      Clearance needed - No      Follow up - phone call next day

## 2023-11-09 NOTE — TELEPHONE ENCOUNTER
Please, call and inform patient:   The bacteria found in his stool needs to be treated.  It is a dangerous bacteria that is difficult to kill.  He had been on metformin with no issue prior to the diarrhea.  The metformin likely exacerbated the expression of the infection.  Please have him take the antibiotic.

## 2023-11-10 NOTE — TELEPHONE ENCOUNTER
Called and discussed provider review of recent MRI. Patient feels that is pain has gotten better and would like to hold off on the recommended lumbar medial branch block for now.Patient states that he will contact the office if he would like to proceed.

## 2023-11-13 ENCOUNTER — TELEPHONE (OUTPATIENT)
Dept: FAMILY MEDICINE | Facility: CLINIC | Age: 58
End: 2023-11-13
Payer: MEDICARE

## 2023-11-13 DIAGNOSIS — A04.72 C. DIFFICILE DIARRHEA: Primary | ICD-10-CM

## 2023-11-13 RX ORDER — VANCOMYCIN HYDROCHLORIDE 125 MG/1
125 CAPSULE ORAL 4 TIMES DAILY
Qty: 40 CAPSULE | Refills: 0 | Status: SHIPPED | OUTPATIENT
Start: 2023-11-13 | End: 2023-11-27

## 2023-11-13 NOTE — TELEPHONE ENCOUNTER
----- Message from Jennifer Appiah sent at 11/11/2023 10:58 AM CST -----  Regarding: advise  [facility]  Type: Needs Medical Advice  [facility]   Symptoms (please be specific):  fidaxomicin (DIFICID) 200 mg Tab- $1100   How long has patient had these symptoms:    Pharmacy name and phone #:     [id number] [place] - [address] AT [place] [address] [place] [address]  Phone: [phone] Fax: [phone]      Best Call Back Number: [phone]    Additional Information: Needs the brand changed due to being in the donut hole; call to advise thanks!

## 2023-11-13 NOTE — TELEPHONE ENCOUNTER
Patient states he can not afford the medication for the c diff. They want to charge him 1100.00 pharmacy states he is in a donut whole.      Is there another antibiotic he can take for the c diff?       Patient is calling the insurance to see if they can recommend a different medication also.     Fidaxomicin ( DIFICID)

## 2023-11-14 ENCOUNTER — LAB VISIT (OUTPATIENT)
Dept: LAB | Facility: HOSPITAL | Age: 58
End: 2023-11-14
Attending: INTERNAL MEDICINE
Payer: MEDICARE

## 2023-11-14 DIAGNOSIS — D72.829 LEUKOCYTOSIS, UNSPECIFIED TYPE: ICD-10-CM

## 2023-11-14 LAB
BASOPHILS # BLD AUTO: 0.07 K/UL (ref 0–0.2)
BASOPHILS NFR BLD: 0.8 % (ref 0–1.9)
DIFFERENTIAL METHOD: ABNORMAL
EOSINOPHIL # BLD AUTO: 0.2 K/UL (ref 0–0.5)
EOSINOPHIL NFR BLD: 1.9 % (ref 0–8)
ERYTHROCYTE [DISTWIDTH] IN BLOOD BY AUTOMATED COUNT: 13.3 % (ref 11.5–14.5)
HCT VFR BLD AUTO: 48.6 % (ref 40–54)
HGB BLD-MCNC: 16.2 G/DL (ref 14–18)
IMM GRANULOCYTES # BLD AUTO: 0.05 K/UL (ref 0–0.04)
IMM GRANULOCYTES NFR BLD AUTO: 0.6 % (ref 0–0.5)
LYMPHOCYTES # BLD AUTO: 1.3 K/UL (ref 1–4.8)
LYMPHOCYTES NFR BLD: 15.7 % (ref 18–48)
MCH RBC QN AUTO: 29.2 PG (ref 27–31)
MCHC RBC AUTO-ENTMCNC: 33.3 G/DL (ref 32–36)
MCV RBC AUTO: 88 FL (ref 82–98)
MONOCYTES # BLD AUTO: 0.6 K/UL (ref 0.3–1)
MONOCYTES NFR BLD: 7.4 % (ref 4–15)
NEUTROPHILS # BLD AUTO: 6.2 K/UL (ref 1.8–7.7)
NEUTROPHILS NFR BLD: 73.6 % (ref 38–73)
NRBC BLD-RTO: 0 /100 WBC
PLATELET # BLD AUTO: 194 K/UL (ref 150–450)
PMV BLD AUTO: 11 FL (ref 9.2–12.9)
RBC # BLD AUTO: 5.54 M/UL (ref 4.6–6.2)
WBC # BLD AUTO: 8.47 K/UL (ref 3.9–12.7)

## 2023-11-14 PROCEDURE — 36415 COLL VENOUS BLD VENIPUNCTURE: CPT | Mod: PO | Performed by: INTERNAL MEDICINE

## 2023-11-14 PROCEDURE — 85025 COMPLETE CBC W/AUTO DIFF WBC: CPT | Performed by: INTERNAL MEDICINE

## 2023-11-27 ENCOUNTER — OFFICE VISIT (OUTPATIENT)
Dept: FAMILY MEDICINE | Facility: CLINIC | Age: 58
End: 2023-11-27
Payer: MEDICARE

## 2023-11-27 DIAGNOSIS — I10 ESSENTIAL HYPERTENSION: Primary | ICD-10-CM

## 2023-11-27 DIAGNOSIS — A04.72 C. DIFFICILE DIARRHEA: ICD-10-CM

## 2023-11-27 PROCEDURE — 3066F NEPHROPATHY DOC TX: CPT | Mod: CPTII,95,, | Performed by: NURSE PRACTITIONER

## 2023-11-27 PROCEDURE — 3061F PR NEG MICROALBUMINURIA RESULT DOCUMENTED/REVIEW: ICD-10-PCS | Mod: CPTII,95,, | Performed by: NURSE PRACTITIONER

## 2023-11-27 PROCEDURE — 3066F PR DOCUMENTATION OF TREATMENT FOR NEPHROPATHY: ICD-10-PCS | Mod: CPTII,95,, | Performed by: NURSE PRACTITIONER

## 2023-11-27 PROCEDURE — 99213 PR OFFICE/OUTPT VISIT, EST, LEVL III, 20-29 MIN: ICD-10-PCS | Mod: 95,,, | Performed by: NURSE PRACTITIONER

## 2023-11-27 PROCEDURE — 3044F HG A1C LEVEL LT 7.0%: CPT | Mod: CPTII,95,, | Performed by: NURSE PRACTITIONER

## 2023-11-27 PROCEDURE — 99213 OFFICE O/P EST LOW 20 MIN: CPT | Mod: 95,,, | Performed by: NURSE PRACTITIONER

## 2023-11-27 PROCEDURE — 3044F PR MOST RECENT HEMOGLOBIN A1C LEVEL <7.0%: ICD-10-PCS | Mod: CPTII,95,, | Performed by: NURSE PRACTITIONER

## 2023-11-27 PROCEDURE — 4010F ACE/ARB THERAPY RXD/TAKEN: CPT | Mod: CPTII,95,, | Performed by: NURSE PRACTITIONER

## 2023-11-27 PROCEDURE — 4010F PR ACE/ARB THEARPY RXD/TAKEN: ICD-10-PCS | Mod: CPTII,95,, | Performed by: NURSE PRACTITIONER

## 2023-11-27 PROCEDURE — 3061F NEG MICROALBUMINURIA REV: CPT | Mod: CPTII,95,, | Performed by: NURSE PRACTITIONER

## 2023-11-27 NOTE — PROGRESS NOTES
Subjective:       Patient ID: Cade Johnston is a 58 y.o. male.    The patient location is: [place]  The chief complaint leading to consultation is: FU    Visit type: audiovisual    Face to Face time with patient: 10min  15 minutes of total time spent on the encounter, which includes face to face time and non-face to face time preparing to see the patient (eg, review of tests), Obtaining and/or reviewing separately obtained history, Documenting clinical information in the electronic or other health record, Independently interpreting results (not separately reported) and communicating results to the patient/family/caregiver, or Care coordination (not separately reported).     Each patient to whom he or she provides medical services by telemedicine is:  (1) informed of the relationship between the physician and patient and the respective role of any other health care provider with respect to management of the patient; and (2) notified that he or she may decline to receive medical services by telemedicine and may withdraw from such care at any time.    HPI  Patient presents for FU    HTN: recently hypotensive, HCTZ DCd. Currently on losartan 100mg daily --monitoring at home, 118//75. Energy improved     C diff: completed vancomycin last week. Diarrhea resolved    Father passed last week. Mother with dementia. Grieving, doing alright. Sleeping well     Review of Systems   Constitutional:  Negative for activity change and unexpected weight change.   HENT:  Negative for hearing loss, rhinorrhea and trouble swallowing.    Eyes:  Negative for discharge and visual disturbance.   Respiratory:  Negative for chest tightness and wheezing.    Cardiovascular:  Negative for chest pain and palpitations.   Gastrointestinal:  Negative for blood in stool, constipation, diarrhea and vomiting.   Endocrine: Negative for polydipsia and polyuria.   Genitourinary:  Negative for difficulty urinating, hematuria and urgency.   Musculoskeletal:   Negative for arthralgias, joint swelling and neck pain.   Neurological:  Negative for weakness and headaches.   Psychiatric/Behavioral:  Negative for confusion and dysphoric mood.          Objective:      Physical Exam  Constitutional:       General: He is not in acute distress.     Appearance: He is well-developed. He is not ill-appearing, toxic-appearing or diaphoretic.   HENT:      Right Ear: Hearing normal.      Left Ear: Hearing normal.   Pulmonary:      Effort: No tachypnea or respiratory distress.   Skin:     Coloration: Skin is not pale.   Neurological:      Mental Status: He is alert and oriented to person, place, and time.   Psychiatric:         Speech: Speech normal.         Behavior: Behavior normal.         Thought Content: Thought content normal.         Judgment: Judgment normal.         Assessment:       1. Essential hypertension    2. C. difficile diarrhea        Plan:       Essential hypertension   Controlled continue current medication     C. difficile diarrhea   Clinically resolved        FU 6 months with me  1 year with PCP    Medication List with Changes/Refills   Current Medications    ALBUTEROL (PROVENTIL/VENTOLIN HFA) 90 MCG/ACTUATION INHALER    Inhale 2 puffs into the lungs every 6 (six) hours as needed for Wheezing. Rescue    ASPIRIN (ECOTRIN) 81 MG EC TABLET    Take 81 mg by mouth once daily. No Sig Provided    BLOOD SUGAR DIAGNOSTIC STRP    Test q day    BUDESONIDE-FORMOTEROL 160-4.5 MCG (SYMBICORT) 160-4.5 MCG/ACTUATION HFAA    Inhale 2 puffs into the lungs every 12 (twelve) hours.    CLOPIDOGREL (PLAVIX) 75 MG TABLET    TAKE 1 TABLET BY MOUTH EVERY DAY    IBUPROFEN (ADVIL,MOTRIN) 200 MG TABLET    Take 200 mg by mouth every 6 (six) hours as needed for Pain.    IPRATROPIUM-ALBUTEROL (COMBIVENT)  MCG/ACTUATION INHALER    Inhale 1 puff into the lungs every 4 (four) hours as needed for Shortness of Breath. Rescue    LOSARTAN (COZAAR) 100 MG TABLET    Take 1 tablet (100 mg total) by  mouth once daily.    METFORMIN (GLUCOPHAGE-XR) 500 MG ER 24HR TABLET    TAKE 2 TABLETS BY MOUTH TWICE A DAY WITH MEALS    SILDENAFIL (REVATIO) 20 MG TAB    Take 1 tablet (20 mg total) by mouth 3 (three) times daily. for 180 doses    TOBRAMYCIN-DEXAMETHASONE 0.3-0.1% (TOBRADEX) 0.3-0.1 % DRPS    SMARTSI-2 Drop(s) In Eye(s) Every 4-6 Hours    VERAPAMIL (VERELAN) 120 MG C24P    TAKE 1 CAPSULE BY MOUTH ONCE DAILY

## 2023-12-07 PROBLEM — I77.9 DISORDER OF ARTERIES AND ARTERIOLES, UNSPECIFIED: Status: ACTIVE | Noted: 2023-12-07

## 2023-12-07 PROBLEM — J43.2 CENTRILOBULAR EMPHYSEMA: Status: ACTIVE | Noted: 2023-12-07

## 2023-12-18 ENCOUNTER — TELEPHONE (OUTPATIENT)
Dept: FAMILY MEDICINE | Facility: CLINIC | Age: 58
End: 2023-12-18
Payer: MEDICARE

## 2023-12-18 NOTE — TELEPHONE ENCOUNTER
Patient scheduled to see someone for medication issues and stomach issues.     He stopped taking inhallor/ and is taking metformin.

## 2023-12-18 NOTE — TELEPHONE ENCOUNTER
----- Message from Annette Lara sent at 12/14/2023 10:11 AM CST -----  Contact: Patient  Type:  Needs Medical Advice    Who Called:   Patient    Symptoms (please be specific):   Side effects from medication     Pharmacy name and phone #:        [id number] [place] - [address] [facility]  [address] [place] [address]  Phone: [phone] Fax: [phone]    Would the patient rather a call back or a response via [facility]?   Call back  Best Call Back Number:   [phone]    Additional Information:  States he is calling to follow up with Dr Smith about his metFORMIN (GLUCOPHAGE-XR) 500 MG ER 24hr tablet medication and side effects he is experiencing - please call to discuss - thank you

## 2023-12-28 ENCOUNTER — TELEPHONE (OUTPATIENT)
Dept: GASTROENTEROLOGY | Facility: CLINIC | Age: 58
End: 2023-12-28
Payer: MEDICARE

## 2024-01-05 ENCOUNTER — OFFICE VISIT (OUTPATIENT)
Dept: FAMILY MEDICINE | Facility: CLINIC | Age: 59
End: 2024-01-05
Payer: MEDICARE

## 2024-01-05 VITALS
OXYGEN SATURATION: 96 % | TEMPERATURE: 98 F | DIASTOLIC BLOOD PRESSURE: 74 MMHG | WEIGHT: 231.25 LBS | BODY MASS INDEX: 31.32 KG/M2 | SYSTOLIC BLOOD PRESSURE: 122 MMHG | HEART RATE: 89 BPM | HEIGHT: 72 IN

## 2024-01-05 DIAGNOSIS — E11.9 TYPE 2 DIABETES MELLITUS WITHOUT COMPLICATION, WITHOUT LONG-TERM CURRENT USE OF INSULIN: ICD-10-CM

## 2024-01-05 DIAGNOSIS — H10.13 ALLERGIC CONJUNCTIVITIS OF BOTH EYES: ICD-10-CM

## 2024-01-05 DIAGNOSIS — D86.0 SARCOIDOSIS OF LUNG: ICD-10-CM

## 2024-01-05 DIAGNOSIS — Z86.19 HISTORY OF CLOSTRIDIUM DIFFICILE INFECTION: ICD-10-CM

## 2024-01-05 DIAGNOSIS — R19.7 DIARRHEA, UNSPECIFIED TYPE: Primary | ICD-10-CM

## 2024-01-05 PROCEDURE — 99999 PR PBB SHADOW E&M-EST. PATIENT-LVL III: CPT | Mod: PBBFAC,,, | Performed by: NURSE PRACTITIONER

## 2024-01-05 PROCEDURE — 3074F SYST BP LT 130 MM HG: CPT | Mod: CPTII,S$GLB,, | Performed by: NURSE PRACTITIONER

## 2024-01-05 PROCEDURE — 1159F MED LIST DOCD IN RCRD: CPT | Mod: CPTII,S$GLB,, | Performed by: NURSE PRACTITIONER

## 2024-01-05 PROCEDURE — 3008F BODY MASS INDEX DOCD: CPT | Mod: CPTII,S$GLB,, | Performed by: NURSE PRACTITIONER

## 2024-01-05 PROCEDURE — 99214 OFFICE O/P EST MOD 30 MIN: CPT | Mod: S$GLB,,, | Performed by: NURSE PRACTITIONER

## 2024-01-05 PROCEDURE — 3078F DIAST BP <80 MM HG: CPT | Mod: CPTII,S$GLB,, | Performed by: NURSE PRACTITIONER

## 2024-01-05 RX ORDER — GLIPIZIDE 5 MG/1
5 TABLET, FILM COATED, EXTENDED RELEASE ORAL
Qty: 90 TABLET | Refills: 3 | Status: SHIPPED | OUTPATIENT
Start: 2024-01-05 | End: 2024-02-02

## 2024-01-05 RX ORDER — LANCETS
EACH MISCELLANEOUS
Qty: 100 EACH | Refills: 3 | Status: SHIPPED | OUTPATIENT
Start: 2024-01-05

## 2024-01-05 RX ORDER — OLOPATADINE HYDROCHLORIDE 1 MG/ML
1 SOLUTION/ DROPS OPHTHALMIC 2 TIMES DAILY
Qty: 5 ML | Refills: 0 | Status: SHIPPED | OUTPATIENT
Start: 2024-01-05 | End: 2024-01-29

## 2024-01-05 RX ORDER — INSULIN PUMP SYRINGE, 3 ML
EACH MISCELLANEOUS
Qty: 1 EACH | Refills: 0 | Status: SHIPPED | OUTPATIENT
Start: 2024-01-05 | End: 2025-01-04

## 2024-01-05 NOTE — PROGRESS NOTES
Subjective:       Patient ID: Cade Johnston is a 58 y.o. male.    Chief Complaint: GI Problem (Since October. Due to the metformin.)    HPI  Patient reports tolerating 500mg metformin but cannot tolearte 1000mg     Recall C diff infection November 2023. Completed Vancomycin --diarrhea resolved    When he resumed metformin diarrhea returned. When he held metformin diarrhea resolved   3 episodes a day when occurring   When off of metformin he will have formed stool    Itchy, watery eyes     Vitals:    01/05/24 1046   BP: 122/74   Pulse: 89   Temp: 98.1 °F (36.7 °C)     Review of Systems   Constitutional:  Negative for fever.   Gastrointestinal:  Positive for diarrhea.       Past Medical History:   Diagnosis Date    Anticoagulant long-term use     Cancer of skin of left ear     Coronary artery disease, nonobstructive     DJD (degenerative joint disease) of lumbar spine     DM type 2 (diabetes mellitus, type 2)     with diet alone    Dyslipidemia 08/13/2019    Essential hypertension 12/29/2015    Fatty liver     GERD (gastroesophageal reflux disease)     Hepatosplenomegaly     HTN (hypertension)     Hyperlipidemia     resolved with diet    Hypogonadism male 02/17/2015    Lumbar radiculopathy     Lumbar spondylosis     Nephrolithiasis     last stone 1/11    VÍCTOR on CPAP     Palpitations 03/09/2016    Sarcoidosis of lung 1990    Ventricular hypokinesis     angiogram with mild LAD stenosis     Objective:      Physical Exam  Constitutional:       General: He is not in acute distress.     Appearance: He is well-developed. He is not ill-appearing, toxic-appearing or diaphoretic.   HENT:      Right Ear: Hearing normal.      Left Ear: Hearing normal.   Pulmonary:      Effort: No tachypnea or respiratory distress.   Skin:     Coloration: Skin is not pale.   Neurological:      Mental Status: He is alert and oriented to person, place, and time.   Psychiatric:         Speech: Speech normal.         Behavior: Behavior normal.          Thought Content: Thought content normal.         Judgment: Judgment normal.         Assessment:       1. Diarrhea, unspecified type    2. History of Clostridium difficile infection    3. Type 2 diabetes mellitus without complication, without long-term current use of insulin    4. Allergic conjunctivitis of both eyes    5. Sarcoidosis of lung        Plan:       Diarrhea, unspecified type  -     CLOSTRIDIUM DIFFICILE; Future; Expected date: 01/05/2024    History of Clostridium difficile infection  -     CLOSTRIDIUM DIFFICILE; Future; Expected date: 01/05/2024    Type 2 diabetes mellitus without complication, without long-term current use of insulin  -     glipiZIDE 5 MG TR24; Take 1 tablet (5 mg total) by mouth daily with breakfast.  Dispense: 90 tablet; Refill: 3  -     blood-glucose meter kit; To check BG BID , to use with insurance preferred meter  Dispense: 1 each; Refill: 0  -     lancets Misc; To check BG BID, to use with insurance preferred meter  Dispense: 100 each; Refill: 3  -     blood sugar diagnostic Strp; To check BG BID, to use with insurance preferred meter  Dispense: 100 each; Refill: 3    Allergic conjunctivitis of both eyes  -     olopatadine (PATANOL) 0.1 % ophthalmic solution; Place 1 drop into both eyes 2 (two) times daily.  Dispense: 5 mL; Refill: 0    Sarcoidosis of lung        DC metformin  If diarrhea persists--complete stool study  FU 4 weeks with me         Medication List with Changes/Refills   New Medications    BLOOD SUGAR DIAGNOSTIC STRP    To check BG BID, to use with insurance preferred meter    BLOOD-GLUCOSE METER KIT    To check BG BID , to use with insurance preferred meter    GLIPIZIDE 5 MG TR24    Take 1 tablet (5 mg total) by mouth daily with breakfast.    LANCETS MISC    To check BG BID, to use with insurance preferred meter    OLOPATADINE (PATANOL) 0.1 % OPHTHALMIC SOLUTION    Place 1 drop into both eyes 2 (two) times daily.   Current Medications    ALBUTEROL  (PROVENTIL/VENTOLIN HFA) 90 MCG/ACTUATION INHALER    Inhale 2 puffs into the lungs every 6 (six) hours as needed for Wheezing. Rescue    CLOPIDOGREL (PLAVIX) 75 MG TABLET    TAKE 1 TABLET BY MOUTH EVERY DAY    FLUTICASONE-SALMETEROL DISKUS INHALER 250-50 MCG    Inhale 1 puff into the lungs 2 (two) times daily. Controller    IBUPROFEN (ADVIL,MOTRIN) 200 MG TABLET    Take 200 mg by mouth every 6 (six) hours as needed for Pain.    IPRATROPIUM-ALBUTEROL (COMBIVENT)  MCG/ACTUATION INHALER    Inhale 1 puff into the lungs every 4 (four) hours as needed for Shortness of Breath. Rescue    LOSARTAN (COZAAR) 100 MG TABLET    Take 1 tablet (100 mg total) by mouth once daily.    SILDENAFIL (REVATIO) 20 MG TAB    Take 1 tablet (20 mg total) by mouth 3 (three) times daily. for 180 doses    TOBRAMYCIN-DEXAMETHASONE 0.3-0.1% (TOBRADEX) 0.3-0.1 % DRPS    SMARTSI-2 Drop(s) In Eye(s) Every 4-6 Hours    VERAPAMIL (VERELAN) 120 MG C24P    TAKE 1 CAPSULE BY MOUTH ONCE DAILY   Discontinued Medications    BLOOD SUGAR DIAGNOSTIC STRP    Test q day    METFORMIN (GLUCOPHAGE-XR) 500 MG ER 24HR TABLET    TAKE 2 TABLETS BY MOUTH TWICE A DAY WITH MEALS

## 2024-01-07 NOTE — H&P
COLONOSCOPY HISTORY AND PE    Procedure : Colonoscopy      INDICATIONS: asymptomatic screening exam and personal history of colon polyps      Past Medical History:   Diagnosis Date    Anticoagulant long-term use     Cancer of skin of left ear     Coronary artery disease, nonobstructive     DJD (degenerative joint disease) of lumbar spine     DM type 2 (diabetes mellitus, type 2)     with diet alone    Dyslipidemia 08/13/2019    Essential hypertension 12/29/2015    Fatty liver     GERD (gastroesophageal reflux disease)     Hepatosplenomegaly     HTN (hypertension)     Hyperlipidemia     resolved with diet    Hypogonadism male 02/17/2015    Lumbar radiculopathy     Lumbar spondylosis     Nephrolithiasis     last stone 1/11    VÍCTOR on CPAP     Palpitations 03/09/2016    Sarcoidosis of lung 1990    Ventricular hypokinesis     angiogram with mild LAD stenosis       Past Surgical History:   Procedure Laterality Date    ANGIOGRAM, CORONARY, WITH LEFT HEART CATHETERIZATION N/A 8/16/2022    Procedure: Angiogram, Coronary, with Left Heart Cath;  Surgeon: Refugio Mcdonough MD;  [facility];  Service: Cardiology;  Laterality: N/A;    CARDIAC SURGERY      CIRCUMCISION      CORONARY ANGIOGRAPHY N/A 12/13/2019    Procedure: ANGIOGRAM, CORONARY ARTERY;  Surgeon: Amador Duran MD;  [facility];  Service: Cardiology;  Laterality: N/A;    CORONARY STENT PLACEMENT  12/2019    x1    CYST REMOVAL Right 05/2022    thigh, Dr. Parkinson    elbow spur removal      right    EPIDURAL STEROID INJECTION INTO LUMBAR SPINE N/A 02/10/2021    Procedure: Injection-steroid-epidural-lumbar L4/5;  Surgeon: Richi Rangel MD;  Location: [facility] OR;  Service: Pain Management;  Laterality: N/A;    EPIDURAL STEROID INJECTION INTO LUMBAR SPINE N/A 03/15/2021    Procedure: Injection-steroid-epidural-lumbar L5/S1;  Surgeon: Richi Rangel MD;  Location: [facility] OR;  Service: Pain Management;  Laterality: N/A;    EPIDURAL STEROID INJECTION INTO  LUMBAR SPINE N/A 10/16/2023    Procedure: Injection-steroid-epidural-lumbar;  Surgeon: Richi Rangel MD;  Location: [facility] OR;  Service: Pain Management;  Laterality: N/A;  L5/S1    EXTRACORPOREAL SHOCK WAVE LITHOTRIPSY      [facility]    LEFT HEART CATHETERIZATION Left 2019    Procedure: Left heart cath;  Surgeon: Amador Duran MD;  Location: [facility];  Service: Cardiology;  Laterality: Left;    removal of skin cancer      L side of face    URETERAL EXPLORATION      exploration of the R ureter for ureteral injury [facility] - did well       Review of patient's allergies indicates:   Allergen Reactions    Codeine Hives    Latex Rash     blisters       Current Facility-Administered Medications on File Prior to Encounter   Medication Dose Route Frequency Provider Last Rate Last Admin    sodium chloride 0.9% flush 10 mL  10 mL Intravenous PRN Refugio Mcdonough MD         Current Outpatient Medications on File Prior to Encounter   Medication Sig Dispense Refill    albuterol (PROVENTIL/VENTOLIN HFA) 90 mcg/actuation inhaler Inhale 2 puffs into the lungs every 6 (six) hours as needed for Wheezing. Rescue 1 Inhaler 2    clopidogreL (PLAVIX) 75 mg tablet TAKE 1 TABLET BY MOUTH EVERY DAY 90 tablet 4    ibuprofen (ADVIL,MOTRIN) 200 MG tablet Take 200 mg by mouth every 6 (six) hours as needed for Pain.      sildenafil (REVATIO) 20 mg Tab Take 1 tablet (20 mg total) by mouth 3 (three) times daily. 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     Employer: [employer]   Tobacco Use    Smoking status: Former     Current packs/day: 0.00     Types: Cigarettes     Quit date: 1991     Years since quittin.1     Passive exposure: Never    Smokeless tobacco: Never   Substance and Sexual Activity    Alcohol use: No    Drug use: No   Social History Narrative    ** Merged History Encounter **            Review of Systems -    Respiratory : no cough, shortness of breath, or wheezing  Cardiovascular  no chest pain or dyspnea on exertion  Gastrointestinal no abdominal pain, change in bowel habits, or black or bloody stools  Musculoskeletal no deformities, swelling  Neurological no TIA or stroke symptoms        Physical Exam:  General: NAD  AT NC EOMI  Mallampati Score   Neck supple, trachea midline  Lungs: nl excursions, no retractions.  Breathing comfortably  Abdomen ND soft NT.  No masses  Extremities: No CCE.      ASA:  II    PLAN  COLONOSCOPY.  The details of the procedure, the possible need for biopsy or polypectomy and the potential risks including bleeding, perforation, missed polyps were discussed in detail.

## 2024-01-08 ENCOUNTER — ANESTHESIA (OUTPATIENT)
Dept: ENDOSCOPY | Facility: HOSPITAL | Age: 59
End: 2024-01-08
Payer: MEDICARE

## 2024-01-08 ENCOUNTER — HOSPITAL ENCOUNTER (OUTPATIENT)
Facility: HOSPITAL | Age: 59
Discharge: HOME | End: 2024-01-08
Attending: COLON & RECTAL SURGERY | Admitting: COLON & RECTAL SURGERY
Payer: MEDICARE

## 2024-01-08 ENCOUNTER — ANESTHESIA EVENT (OUTPATIENT)
Dept: ENDOSCOPY | Facility: HOSPITAL | Age: 59
End: 2024-01-08
Payer: MEDICARE

## 2024-01-08 DIAGNOSIS — Z12.11 SCREEN FOR COLON CANCER: Primary | ICD-10-CM

## 2024-01-08 LAB — GLUCOSE SERPL-MCNC: 162 MG/DL (ref 70–110)

## 2024-01-08 PROCEDURE — 45385 COLONOSCOPY W/LESION REMOVAL: CPT | Mod: PT,PO | Performed by: COLON & RECTAL SURGERY

## 2024-01-08 PROCEDURE — 27201089 HC SNARE, DISP (ANY): Mod: PO | Performed by: COLON & RECTAL SURGERY

## 2024-01-08 PROCEDURE — 88305 TISSUE EXAM BY PATHOLOGIST: CPT | Mod: PO | Performed by: PATHOLOGY

## 2024-01-08 PROCEDURE — 25000003 PHARM REV CODE 250: Mod: PO | Performed by: NURSE ANESTHETIST, CERTIFIED REGISTERED

## 2024-01-08 PROCEDURE — 88305 TISSUE EXAM BY PATHOLOGIST: CPT | Mod: 26,,, | Performed by: PATHOLOGY

## 2024-01-08 PROCEDURE — 45385 COLONOSCOPY W/LESION REMOVAL: CPT | Mod: PT,,, | Performed by: COLON & RECTAL SURGERY

## 2024-01-08 PROCEDURE — D9220A PRA ANESTHESIA: Mod: PT,CRNA,, | Performed by: NURSE ANESTHETIST, CERTIFIED REGISTERED

## 2024-01-08 PROCEDURE — 63600175 PHARM REV CODE 636 W HCPCS: Mod: PO | Performed by: NURSE ANESTHETIST, CERTIFIED REGISTERED

## 2024-01-08 PROCEDURE — D9220A PRA ANESTHESIA: Mod: PT,ANES,, | Performed by: ANESTHESIOLOGY

## 2024-01-08 PROCEDURE — 37000008 HC ANESTHESIA 1ST 15 MINUTES: Mod: PO | Performed by: COLON & RECTAL SURGERY

## 2024-01-08 PROCEDURE — 63600175 PHARM REV CODE 636 W HCPCS: Mod: PO | Performed by: COLON & RECTAL SURGERY

## 2024-01-08 PROCEDURE — 37000009 HC ANESTHESIA EA ADD 15 MINS: Mod: PO | Performed by: COLON & RECTAL SURGERY

## 2024-01-08 RX ORDER — SODIUM CHLORIDE, SODIUM LACTATE, POTASSIUM CHLORIDE, CALCIUM CHLORIDE 600; 310; 30; 20 MG/100ML; MG/100ML; MG/100ML; MG/100ML
INJECTION, SOLUTION INTRAVENOUS CONTINUOUS
Status: DISCONTINUED | OUTPATIENT
Start: 2024-01-08 | End: 2024-01-08 | Stop reason: HOSPADM

## 2024-01-08 RX ORDER — SODIUM CHLORIDE 0.9 % (FLUSH) 0.9 %
10 SYRINGE (ML) INJECTION
Status: DISCONTINUED | OUTPATIENT
Start: 2024-01-08 | End: 2024-01-08 | Stop reason: HOSPADM

## 2024-01-08 RX ORDER — PROPOFOL 10 MG/ML
VIAL (ML) INTRAVENOUS
Status: DISCONTINUED | OUTPATIENT
Start: 2024-01-08 | End: 2024-01-08

## 2024-01-08 RX ORDER — LIDOCAINE HYDROCHLORIDE 20 MG/ML
INJECTION INTRAVENOUS
Status: DISCONTINUED | OUTPATIENT
Start: 2024-01-08 | End: 2024-01-08

## 2024-01-08 RX ADMIN — PROPOFOL 50 MG: 10 INJECTION, EMULSION INTRAVENOUS at 09:01

## 2024-01-08 RX ADMIN — PROPOFOL 50 MG: 10 INJECTION, EMULSION INTRAVENOUS at 08:01

## 2024-01-08 RX ADMIN — LIDOCAINE HYDROCHLORIDE 100 MG: 20 INJECTION INTRAVENOUS at 08:01

## 2024-01-08 RX ADMIN — SODIUM CHLORIDE, POTASSIUM CHLORIDE, SODIUM LACTATE AND CALCIUM CHLORIDE: 600; 310; 30; 20 INJECTION, SOLUTION INTRAVENOUS at 08:01

## 2024-01-08 RX ADMIN — PROPOFOL 100 MG: 10 INJECTION, EMULSION INTRAVENOUS at 08:01

## 2024-01-08 NOTE — PROVATION PATIENT INSTRUCTIONS
Discharge Summary/Instructions after an Endoscopic Procedure  Patient Name: Cade Johnston  Patient MRN: [id number]  Patient YOB: 1965 Monday, January 8, 2024  Holland Montes MD  Dear patient,  As a result of recent federal legislation (The Federal Cures Act), you may   receive lab or pathology results from your procedure in your [facility]   account before your physician is able to contact you. Your physician or   their representative will relay the results to you with their   recommendations at their soonest availability.  Thank you,  RESTRICTIONS:  During your procedure today, you received medications for sedation.  These   medications may affect your judgment, balance and coordination.  Therefore,   for 24 hours, you have the following restrictions:   - DO NOT drive a car, operate machinery, make legal/financial decisions,   sign important papers or drink alcohol.    ACTIVITY:  Today: no heavy lifting, straining or running due to procedural   sedation/anesthesia.  The following day: return to full activity including work.  DIET:  Eat and drink normally unless instructed otherwise.     TREATMENT FOR COMMON SIDE EFFECTS:  - Mild abdominal pain, nausea, belching, bloating or excessive gas:  rest,   eat lightly and use a heating pad.  - Sore Throat: treat with throat lozenges and/or gargle with warm salt   water.  - Because air was used during the procedure, expelling large amounts of air   from your rectum or belching is normal.  - If a bowel prep was taken, you may not have a bowel movement for 1-3 days.    This is normal.  SYMPTOMS TO WATCH FOR AND REPORT TO YOUR PHYSICIAN:  1. Abdominal pain or bloating, other than gas cramps.  2. Chest pain.  3. Back pain.  4. Signs of infection such as: chills or fever occurring within 24 hours   after the procedure.  5. Rectal bleeding, which would show as bright red, maroon, or black stools.   (A tablespoon of blood from the rectum is not serious, especially if    hemorrhoids are present.)  6. Vomiting.  7. Weakness or dizziness.  GO DIRECTLY TO THE NEAREST EMERGENCY ROOM IF YOU HAVE ANY OF THE FOLLOWING:      Difficulty breathing              Chills and/or fever over 101 F   Persistent vomiting and/or vomiting blood   Severe abdominal pain   Severe chest pain   Black, tarry stools   Bleeding- more than one tablespoon   Any other symptom or condition that you feel may need urgent attention  Your doctor recommends these additional instructions:  If any biopsies were taken, your doctors clinic will contact you in 1 to 2   weeks with any results.  You are being discharged to home.   Resume your previous diet.   Continue your present medications.   We are waiting for your pathology results.   Your physician has recommended a repeat colonoscopy in three years for   surveillance based on pathology results.  For questions, problems or results please call your physician - Holland Montes MD at Work:  [phone].  EMERGENCY PHONE NUMBER: [phone], LAB RESULTS: [phone]  IF A COMPLICATION OR EMERGENCY SITUATION ARISES AND YOU ARE UNABLE TO REACH   YOUR PHYSICIAN - GO DIRECTLY TO THE EMERGENCY ROOM.  ___________________________________________  Nurse Signature  ___________________________________________  Patient/Designated Responsible Party Signature  Holland Montes MD  1/8/2024 9:23:14 AM  This report has been verified and signed electronically.  Dear patient,  As a result of recent federal legislation (The Federal Cures Act), you may   receive lab or pathology results from your procedure in your [facility]   account before your physician is able to contact you. Your physician or   their representative will relay the results to you with their   recommendations at their soonest availability.  Thank you.  PROVATION

## 2024-01-08 NOTE — ANESTHESIA POSTPROCEDURE EVALUATION
Anesthesia Post Evaluation    Patient: Cade Johnston    Procedure(s) Performed: Procedure(s) (LRB):  COLONOSCOPY (N/A)    Final Anesthesia Type: general      Patient location during evaluation: PACU  Patient participation: Yes- Able to Participate  Level of consciousness: awake and alert and oriented  Post-procedure vital signs: reviewed and stable  Pain management: adequate  Airway patency: patent    PONV status at discharge: No PONV  Anesthetic complications: no      Cardiovascular status: blood pressure returned to baseline and stable  Respiratory status: unassisted and spontaneous ventilation  Hydration status: euvolemic  Follow-up not needed.              Vitals Value Taken Time   /74 01/08/24 0946   Temp 36.5 °C (97.7 °F) 01/08/24 0922   Pulse 62 01/08/24 0946   Resp 16 01/08/24 0946   SpO2 97 % 01/08/24 0946         Event Time   Out of Recovery 09:54:00         Pain/Peterson Score: Peterson Score: 10 (1/8/2024  9:46 AM)

## 2024-01-08 NOTE — ANESTHESIA PREPROCEDURE EVALUATION
01/08/2024  Cade Johnston is a 58 y.o., male.      Pre-op Assessment    I have reviewed the Patient Summary Reports.     I have reviewed the Nursing Notes. I have reviewed the NPO Status.   I have reviewed the Medications.     Review of Systems  Anesthesia Hx:  No problems with previous Anesthesia                Social:  Non-Smoker       Hematology/Oncology:                        --  Cancer in past history (skin CA):                     Cardiovascular:     Hypertension, well controlled   CAD  asymptomatic CABG/stent (old stent)        hyperlipidemia KOROMA                            Pulmonary:   COPD (sarcoidosis - SOB)     Sleep Apnea                Renal/:  Chronic Renal Disease renal calculi               Hepatic/GI:     GERD Liver Disease,            Musculoskeletal:  Arthritis          Spine Disorders: lumbar            Neurological:    Neuromuscular Disease,                                   Endocrine:  Diabetes, well controlled, type 2         Obesity / BMI > 30, Morbid Obesity / BMI > 40      Physical Exam  General: Well nourished, Cooperative, Alert and Oriented    Airway:  Mallampati: II   Mouth Opening: Normal  TM Distance: Normal  Neck ROM: Normal ROM    Anesthesia Plan  Type of Anesthesia, risks & benefits discussed:    Anesthesia Type: Gen ETT, Gen Supraglottic Airway, Gen Natural Airway, MAC  Intra-op Monitoring Plan: Standard ASA Monitors  Post Op Pain Control Plan: multimodal analgesia  Induction:  IV  Airway Plan: Direct, Video and Fiberoptic, Post-Induction  Informed Consent: Informed consent signed with the Patient and all parties understand the risks and agree with anesthesia plan.  All questions answered.   ASA Score: 3    Ready For Surgery From Anesthesia Perspective.   .

## 2024-01-08 NOTE — TRANSFER OF CARE
Anesthesia Transfer of Care Note    Patient: Cade Johnston    Procedure(s) Performed: Procedure(s) (LRB):  COLONOSCOPY (N/A)    Patient location: PACU    Anesthesia Type: general    Transport from OR: Transported from OR on room air with adequate spontaneous ventilation    Post pain: adequate analgesia    Post assessment: no apparent anesthetic complications and tolerated procedure well    Post vital signs: stable    Level of consciousness: sedated    Nausea/Vomiting: no nausea/vomiting    Complications: none    Transfer of care protocol was followed      Last vitals: Visit Vitals  BP (!) 161/90 (BP Location: Right arm, Patient Position: Lying)   Pulse 66   Temp 36.6 °C (97.9 °F) (Temporal)   Resp 17   Ht 6' (1.829 m)   Wt 99.8 kg (220 lb)   SpO2 98%   BMI 29.84 kg/m²

## 2024-01-09 ENCOUNTER — PATIENT MESSAGE (OUTPATIENT)
Dept: FAMILY MEDICINE | Facility: CLINIC | Age: 59
End: 2024-01-09
Payer: MEDICARE

## 2024-01-09 ENCOUNTER — TELEPHONE (OUTPATIENT)
Dept: FAMILY MEDICINE | Facility: CLINIC | Age: 59
End: 2024-01-09
Payer: MEDICARE

## 2024-01-09 VITALS
TEMPERATURE: 98 F | HEART RATE: 62 BPM | OXYGEN SATURATION: 97 % | HEIGHT: 72 IN | WEIGHT: 220 LBS | RESPIRATION RATE: 16 BRPM | DIASTOLIC BLOOD PRESSURE: 74 MMHG | BODY MASS INDEX: 29.8 KG/M2 | SYSTOLIC BLOOD PRESSURE: 142 MMHG

## 2024-01-09 NOTE — TELEPHONE ENCOUNTER
----- Message from Nikki Parkinson sent at 1/9/2024  4:11 AM CST -----  Regarding: Lab Client Services  Contact: [phone]  Good Morning,     My name is Nikki Parkinson, I work in the lab. We received a specimen for Clostridium Difficile test. The test was unable to be performed due to the stool sample was formed stool. If you have any questions regarding this, please contact Lab Client Services at [phone].      Thank you,

## 2024-01-10 LAB
FINAL PATHOLOGIC DIAGNOSIS: NORMAL
GROSS: NORMAL
Lab: NORMAL

## 2024-01-17 ENCOUNTER — TELEPHONE (OUTPATIENT)
Dept: SURGERY | Facility: CLINIC | Age: 59
End: 2024-01-17
Payer: MEDICARE

## 2024-01-17 NOTE — TELEPHONE ENCOUNTER
Informed pt the 3 polyps removed were tubulovillous adenomas and he will a f/u colonoscopy in 3 yrs.

## 2024-01-17 NOTE — TELEPHONE ENCOUNTER
----- Message from Tori Mcguire sent at 1/17/2024 10:05 AM CST -----  Type:  Test Results    Who Called:  Pt    Name of Test (Lab/Mammo/Etc):  biopsy    Date of Test:  1/8    Ordering Provider:  Dr Montes    Where the test was performed:  [facility]    Would the patient rather a call back or a response via [facility]?  Call back    Best Call Back Number:  [phone]    Additional Information:  Pt is trying to find out if results have came back and if so can someone can discuss them with him.  Please call back to advise. Thanks!

## 2024-01-22 DIAGNOSIS — E11.9 CONTROLLED TYPE 2 DIABETES MELLITUS WITHOUT COMPLICATION, WITHOUT LONG-TERM CURRENT USE OF INSULIN: ICD-10-CM

## 2024-01-22 RX ORDER — METFORMIN HYDROCHLORIDE 500 MG/1
TABLET, EXTENDED RELEASE ORAL
Qty: 360 TABLET | Refills: 3 | OUTPATIENT
Start: 2024-01-22

## 2024-01-22 NOTE — TELEPHONE ENCOUNTER
No care due was identified.  Health [facility] Embedded Care Due Messages. Reference number: [id number].   1/22/2024 12:14:31 AM CST

## 2024-01-22 NOTE — TELEPHONE ENCOUNTER
Refill Decision Note   Cade Johnston  is requesting a refill authorization.  Brief Assessment and Rationale for Refill:  Quick Discontinue     Medication Therapy Plan:  The original prescription was discontinued on 1/5/2024 by Apurva Duenas NP.      Comments:     Note composed:1:55 PM 01/22/2024             Appointments     Last Visit   11/2/2023 Joni Smith MD   Next Visit   Visit date not found Joni Smith MD           Appointments     Last Visit   11/2/2023 Joni Smith MD   Next Visit   Visit date not found Joni Smith MD

## 2024-01-28 DIAGNOSIS — H10.13 ALLERGIC CONJUNCTIVITIS OF BOTH EYES: ICD-10-CM

## 2024-01-28 NOTE — TELEPHONE ENCOUNTER
LOV:01/05/24  NOV:02/02/24  
Refill Routing Note   Medication(s) are not appropriate for processing by [facility] for the following reason(s):        Outside of protocol    [facility] action(s):  Route               Appointments  past 12m or future 3m with PCP    Date Provider   Last Visit   11/2/2023 Joni Smith MD   Next Visit   Visit date not found Joni Smith MD   ED visits in past 90 days: 0        Note composed:4:16 PM 01/28/2024           
Neuro

## 2024-01-29 RX ORDER — OLOPATADINE HYDROCHLORIDE 1 MG/ML
1 SOLUTION/ DROPS OPHTHALMIC 2 TIMES DAILY
Qty: 15 ML | Refills: 5 | Status: SHIPPED | OUTPATIENT
Start: 2024-01-29 | End: 2024-05-08

## 2024-02-02 ENCOUNTER — OFFICE VISIT (OUTPATIENT)
Dept: FAMILY MEDICINE | Facility: CLINIC | Age: 59
End: 2024-02-02
Payer: MEDICARE

## 2024-02-02 VITALS
BODY MASS INDEX: 31.38 KG/M2 | SYSTOLIC BLOOD PRESSURE: 132 MMHG | HEIGHT: 72 IN | HEART RATE: 72 BPM | OXYGEN SATURATION: 97 % | DIASTOLIC BLOOD PRESSURE: 80 MMHG | WEIGHT: 231.69 LBS | TEMPERATURE: 98 F

## 2024-02-02 DIAGNOSIS — J43.2 CENTRILOBULAR EMPHYSEMA: ICD-10-CM

## 2024-02-02 DIAGNOSIS — E11.9 TYPE 2 DIABETES MELLITUS WITHOUT COMPLICATION, WITHOUT LONG-TERM CURRENT USE OF INSULIN: Primary | ICD-10-CM

## 2024-02-02 DIAGNOSIS — I27.20 PULMONARY HYPERTENSION: ICD-10-CM

## 2024-02-02 PROCEDURE — 3008F BODY MASS INDEX DOCD: CPT | Mod: CPTII,S$GLB,, | Performed by: NURSE PRACTITIONER

## 2024-02-02 PROCEDURE — 3079F DIAST BP 80-89 MM HG: CPT | Mod: CPTII,S$GLB,, | Performed by: NURSE PRACTITIONER

## 2024-02-02 PROCEDURE — 99999 PR PBB SHADOW E&M-EST. PATIENT-LVL IV: CPT | Mod: PBBFAC,,, | Performed by: NURSE PRACTITIONER

## 2024-02-02 PROCEDURE — 3075F SYST BP GE 130 - 139MM HG: CPT | Mod: CPTII,S$GLB,, | Performed by: NURSE PRACTITIONER

## 2024-02-02 PROCEDURE — 4010F ACE/ARB THERAPY RXD/TAKEN: CPT | Mod: CPTII,S$GLB,, | Performed by: NURSE PRACTITIONER

## 2024-02-02 PROCEDURE — 99214 OFFICE O/P EST MOD 30 MIN: CPT | Mod: S$GLB,,, | Performed by: NURSE PRACTITIONER

## 2024-02-02 PROCEDURE — 1159F MED LIST DOCD IN RCRD: CPT | Mod: CPTII,S$GLB,, | Performed by: NURSE PRACTITIONER

## 2024-02-02 RX ORDER — GLIPIZIDE 10 MG/1
10 TABLET, FILM COATED, EXTENDED RELEASE ORAL
Qty: 90 TABLET | Refills: 3 | Status: SHIPPED | OUTPATIENT
Start: 2024-02-02 | End: 2025-02-01

## 2024-02-02 RX ORDER — GLIPIZIDE 10 MG/1
10 TABLET, FILM COATED, EXTENDED RELEASE ORAL
Qty: 90 TABLET | Refills: 3 | Status: SHIPPED | OUTPATIENT
Start: 2024-02-02 | End: 2024-02-02 | Stop reason: SDUPTHER

## 2024-02-02 RX ORDER — SILDENAFIL CITRATE 20 MG/1
20 TABLET ORAL 3 TIMES DAILY
Qty: 30 TABLET | Refills: 5 | Status: SHIPPED | OUTPATIENT
Start: 2024-02-02 | End: 2024-02-02

## 2024-02-02 RX ORDER — SILDENAFIL CITRATE 20 MG/1
20 TABLET ORAL 3 TIMES DAILY
Qty: 30 TABLET | Refills: 5 | Status: SHIPPED | OUTPATIENT
Start: 2024-02-02

## 2024-02-02 NOTE — PROGRESS NOTES
Subjective:       Patient ID: Cade Johnston is a 58 y.o. male.    Chief Complaint: Health Maintenance    HPI  T2DM: metformin changed to glipizide due to side effects (diarrhea)  He reports feeling amazing--complete resolution of abdominal distention, less SOB; increased energy. Life changing     Glucose up from baseline--currently fasting 160s-170s    Vitals:    02/02/24 1022   BP: 132/80   Pulse:    Temp:      Review of Systems   Constitutional:  Negative for fever.   Respiratory:  Negative for cough.    Cardiovascular:  Negative for chest pain.       Past Medical History:   Diagnosis Date    Anticoagulant long-term use     Cancer of skin of left ear     Coronary artery disease, nonobstructive     DJD (degenerative joint disease) of lumbar spine     DM type 2 (diabetes mellitus, type 2)     with diet alone    Dyslipidemia 08/13/2019    Essential hypertension 12/29/2015    Fatty liver     GERD (gastroesophageal reflux disease)     Hepatosplenomegaly     HTN (hypertension)     Hyperlipidemia     resolved with diet    Hypogonadism male 02/17/2015    Lumbar radiculopathy     Lumbar spondylosis     Nephrolithiasis     last stone 1/11    VÍCTOR on CPAP     Palpitations 03/09/2016    Sarcoidosis of lung 1990    Ventricular hypokinesis     angiogram with mild LAD stenosis     Objective:      Physical Exam  Constitutional:       General: He is not in acute distress.     Appearance: He is well-developed. He is not ill-appearing, toxic-appearing or diaphoretic.   HENT:      Right Ear: Hearing normal.      Left Ear: Hearing normal.   Cardiovascular:      Rate and Rhythm: Normal rate and regular rhythm.   Pulmonary:      Effort: No tachypnea or respiratory distress.   Skin:     Coloration: Skin is not pale.   Neurological:      Mental Status: He is alert and oriented to person, place, and time.   Psychiatric:         Speech: Speech normal.         Behavior: Behavior normal.         Thought Content: Thought content normal.          Judgment: Judgment normal.         Assessment:       1. Type 2 diabetes mellitus without complication, without long-term current use of insulin    2. Pulmonary hypertension    3. Centrilobular emphysema        Plan:       Type 2 diabetes mellitus without complication, without long-term current use of insulin  -     Discontinue: glipiZIDE (GLUCOTROL) 10 MG TR24; Take 1 tablet (10 mg total) by mouth daily with breakfast.  Dispense: 90 tablet; Refill: 3  -     Hemoglobin A1C; Future; Expected date: 02/02/2024  -     Comprehensive Metabolic Panel; Future; Expected date: 02/02/2024  -     glipiZIDE (GLUCOTROL) 10 MG TR24; Take 1 tablet (10 mg total) by mouth daily with breakfast.  Dispense: 90 tablet; Refill: 3    Pulmonary hypertension  -     Discontinue: sildenafil (REVATIO) 20 mg Tab; Take 1 tablet (20 mg total) by mouth 3 (three) times daily.  Dispense: 30 tablet; Refill: 5  -     sildenafil (REVATIO) 20 mg Tab; Take 1 tablet (20 mg total) by mouth 3 (three) times daily.  Dispense: 30 tablet; Refill: 5    Centrilobular emphysema          Increase glipizide  FU 3 months with me as scheduled labs prior        Medication List with Changes/Refills   Current Medications    ALBUTEROL (PROVENTIL/VENTOLIN HFA) 90 MCG/ACTUATION INHALER    Inhale 2 puffs into the lungs every 6 (six) hours as needed for Wheezing. Rescue    BLOOD SUGAR DIAGNOSTIC STRP    To check BG BID, to use with insurance preferred meter    BLOOD-GLUCOSE METER KIT    To check BG BID , to use with insurance preferred meter    CLOPIDOGREL (PLAVIX) 75 MG TABLET    TAKE 1 TABLET BY MOUTH EVERY DAY    FLUTICASONE-SALMETEROL DISKUS INHALER 250-50 MCG    Inhale 1 puff into the lungs 2 (two) times daily. Controller    IBUPROFEN (ADVIL,MOTRIN) 200 MG TABLET    Take 200 mg by mouth every 6 (six) hours as needed for Pain.    IPRATROPIUM-ALBUTEROL (COMBIVENT)  MCG/ACTUATION INHALER    Inhale 1 puff into the lungs every 4 (four) hours as needed for Shortness of  Breath. Rescue    LANCETS MISC    To check BG BID, to use with insurance preferred meter    LOSARTAN (COZAAR) 100 MG TABLET    Take 1 tablet (100 mg total) by mouth once daily.    OLOPATADINE (PATANOL) 0.1 % OPHTHALMIC SOLUTION    INSTILL 1 DROP INTO BOTH EYES TWICE A DAY    TOBRAMYCIN-DEXAMETHASONE 0.3-0.1% (TOBRADEX) 0.3-0.1 % DRPS    SMARTSI-2 Drop(s) In Eye(s) Every 4-6 Hours    VERAPAMIL (VERELAN) 120 MG C24P    TAKE 1 CAPSULE BY MOUTH ONCE DAILY   Changed and/or Refilled Medications    Modified Medication Previous Medication    GLIPIZIDE (GLUCOTROL) 10 MG TR24 glipiZIDE 5 MG TR24       Take 1 tablet (10 mg total) by mouth daily with breakfast.    Take 1 tablet (5 mg total) by mouth daily with breakfast.    SILDENAFIL (REVATIO) 20 MG TAB sildenafil (REVATIO) 20 mg Tab       Take 1 tablet (20 mg total) by mouth 3 (three) times daily.    Take 1 tablet (20 mg total) by mouth 3 (three) times daily. for 180 doses

## 2024-02-06 DIAGNOSIS — I25.10 CORONARY ARTERY DISEASE INVOLVING NATIVE CORONARY ARTERY OF NATIVE HEART WITHOUT ANGINA PECTORIS: ICD-10-CM

## 2024-02-06 DIAGNOSIS — I27.20 PULMONARY HYPERTENSION: ICD-10-CM

## 2024-02-06 RX ORDER — VERAPAMIL HYDROCHLORIDE 120 MG/1
CAPSULE, EXTENDED RELEASE ORAL
Qty: 90 CAPSULE | Refills: 4 | Status: SHIPPED | OUTPATIENT
Start: 2024-02-06

## 2024-02-18 NOTE — PROGRESS NOTES
Advanced adenoma (s), or multiple adenomas    Repeat colonoscopy in 3 years     If you have any questions or if I can clarify any of the above please contact me:    babberly [phone]   Pager [phone]  email [email or web address]  Nurse Bita Agudelo [phone]  Administrative assistan:  [phone]    Sincerely  H, Holland Montes MD, FACS, FASCRS  Staff Surgeon  Dept of Colon and Rectal Surgery  [facility] [phone]

## 2024-02-27 DIAGNOSIS — Z00.00 ENCOUNTER FOR MEDICARE ANNUAL WELLNESS EXAM: ICD-10-CM

## 2024-03-19 ENCOUNTER — PATIENT OUTREACH (OUTPATIENT)
Dept: ADMINISTRATIVE | Facility: HOSPITAL | Age: 59
End: 2024-03-19
Payer: MEDICARE

## 2024-03-19 NOTE — PROGRESS NOTES
Population Health Chart Review & Patient Outreach Details      Additional [facility] Health Notes:               Updates Requested / Reviewed:      Updated Care Coordination Note         Health Maintenance Topics Overdue:      VBHM Score: 2     Eye Exam  Foot Exam    Shingles/Zoster Vaccine  RSV Vaccine                  Health Maintenance Topic(s) Outreach Outcomes & Actions Taken:    Medication Adherence / Statins - Outreach Outcomes & Actions Taken  : Sent Provider Message to Review to Evaluate Pt for Statin, Add Exclusion Dx Codes, Document Exclusion in Problem List, Change Statin Intensity Level to Moderate or High Intensity if Applicable

## 2024-04-01 DIAGNOSIS — Z95.5 STENTED CORONARY ARTERY: ICD-10-CM

## 2024-04-01 RX ORDER — CLOPIDOGREL BISULFATE 75 MG/1
TABLET ORAL
Qty: 90 TABLET | Refills: 4 | Status: SHIPPED | OUTPATIENT
Start: 2024-04-01

## 2024-05-08 ENCOUNTER — PATIENT OUTREACH (OUTPATIENT)
Dept: ADMINISTRATIVE | Facility: HOSPITAL | Age: 59
End: 2024-05-08
Payer: MEDICARE

## 2024-05-08 ENCOUNTER — OFFICE VISIT (OUTPATIENT)
Dept: FAMILY MEDICINE | Facility: CLINIC | Age: 59
End: 2024-05-08
Payer: MEDICARE

## 2024-05-08 VITALS
HEIGHT: 72 IN | DIASTOLIC BLOOD PRESSURE: 70 MMHG | SYSTOLIC BLOOD PRESSURE: 132 MMHG | OXYGEN SATURATION: 97 % | BODY MASS INDEX: 31.2 KG/M2 | HEART RATE: 61 BPM | WEIGHT: 230.38 LBS

## 2024-05-08 DIAGNOSIS — I27.20 PULMONARY HYPERTENSION: ICD-10-CM

## 2024-05-08 DIAGNOSIS — J43.2 CENTRILOBULAR EMPHYSEMA: ICD-10-CM

## 2024-05-08 DIAGNOSIS — E11.40 TYPE 2 DIABETES MELLITUS WITH DIABETIC NEUROPATHY, WITHOUT LONG-TERM CURRENT USE OF INSULIN: ICD-10-CM

## 2024-05-08 DIAGNOSIS — D86.0 SARCOIDOSIS OF LUNG: ICD-10-CM

## 2024-05-08 DIAGNOSIS — I10 ESSENTIAL HYPERTENSION: ICD-10-CM

## 2024-05-08 DIAGNOSIS — J98.4 RESTRICTIVE LUNG DISEASE: ICD-10-CM

## 2024-05-08 DIAGNOSIS — I25.10 CORONARY ARTERY DISEASE INVOLVING NATIVE CORONARY ARTERY OF NATIVE HEART WITHOUT ANGINA PECTORIS: ICD-10-CM

## 2024-05-08 DIAGNOSIS — D75.1 POLYCYTHEMIA: ICD-10-CM

## 2024-05-08 DIAGNOSIS — E78.5 DYSLIPIDEMIA: ICD-10-CM

## 2024-05-08 DIAGNOSIS — M46.1 SACROILIITIS, NOT ELSEWHERE CLASSIFIED: ICD-10-CM

## 2024-05-08 DIAGNOSIS — K59.00 CONSTIPATION, UNSPECIFIED CONSTIPATION TYPE: ICD-10-CM

## 2024-05-08 DIAGNOSIS — Z00.00 ENCOUNTER FOR MEDICARE ANNUAL WELLNESS EXAM: ICD-10-CM

## 2024-05-08 DIAGNOSIS — Z00.00 ENCOUNTER FOR PREVENTIVE HEALTH EXAMINATION: Primary | ICD-10-CM

## 2024-05-08 DIAGNOSIS — I77.9 DISORDER OF ARTERIES AND ARTERIOLES, UNSPECIFIED: ICD-10-CM

## 2024-05-08 PROCEDURE — 4010F ACE/ARB THERAPY RXD/TAKEN: CPT | Mod: CPTII,S$GLB,, | Performed by: NURSE PRACTITIONER

## 2024-05-08 PROCEDURE — G0439 PPPS, SUBSEQ VISIT: HCPCS | Mod: S$GLB,,, | Performed by: NURSE PRACTITIONER

## 2024-05-08 PROCEDURE — 1160F RVW MEDS BY RX/DR IN RCRD: CPT | Mod: CPTII,S$GLB,, | Performed by: NURSE PRACTITIONER

## 2024-05-08 PROCEDURE — 3075F SYST BP GE 130 - 139MM HG: CPT | Mod: CPTII,S$GLB,, | Performed by: NURSE PRACTITIONER

## 2024-05-08 PROCEDURE — 99999 PR PBB SHADOW E&M-EST. PATIENT-LVL V: CPT | Mod: PBBFAC,,, | Performed by: NURSE PRACTITIONER

## 2024-05-08 PROCEDURE — 3078F DIAST BP <80 MM HG: CPT | Mod: CPTII,S$GLB,, | Performed by: NURSE PRACTITIONER

## 2024-05-08 PROCEDURE — 1159F MED LIST DOCD IN RCRD: CPT | Mod: CPTII,S$GLB,, | Performed by: NURSE PRACTITIONER

## 2024-05-08 NOTE — PROGRESS NOTES
Cade Johnston presented for an initial Medicare AWV today. The following components were reviewed and updated:    Medical history  Family History  Social history  Allergies and Current Medications  Health Risk Assessment  Health Maintenance  Care Team    **See Completed Assessments for Annual Wellness visit with in the encounter summary    The following assessments were completed:  Depression Screening  Cognitive function Screening    Timed Get Up Test  Whisper Test    Opioid documentation:  Patient does not have a current opioid prescription.        Vitals:    05/08/24 0735   BP: 132/70   BP Location: Left arm   Patient Position: Sitting   BP Method: Medium (Manual)   Pulse: 61   SpO2: 97%   Weight: 104.5 kg (230 lb 6.1 oz)   Height: 6' (1.829 m)     Body mass index is 31.25 kg/m².     Physical Exam  Pulmonary:      Effort: Pulmonary effort is normal. No respiratory distress.   Neurological:      Mental Status: He is alert and oriented to person, place, and time.   Psychiatric:         Mood and Affect: Mood normal.         Behavior: Behavior normal.         Thought Content: Thought content normal.         Judgment: Judgment normal.     Diagnoses and health risks identified today and associated recommendations/orders:  1. Encounter for preventive health examination  Reviewed and discussed health maintenance.    - Ambulatory referral/consult to Optometry; Future    2. Encounter for Medicare annual wellness exam  - Ambulatory Referral/Consult to Enhanced Annual Wellness Visit (eAWV)    3. Sacroiliitis, not elsewhere classified  Stable- continue current treatment and follow up routinely with PCP   Pain management prn ()    4. Type 2 diabetes mellitus with diabetic neuropathy, without long-term current use of insulin  Stable- continue current treatment and follow up routinely with PCP   Encouraged healthy eating, weight loss and routine exercise   Lab Results   Component Value Date    HGBA1C 6.7 (H) 10/20/2023     - Ambulatory referral/consult to Podiatry; Future  - Ambulatory referral/consult to Optometry; Future    5. Centrilobular emphysema  Stable- continue current treatment and follow up routinely with PCP and pulmonary (Dr. Espinosa)    6. Restrictive lung disease  Stable- continue current treatment and follow up routinely with PCP and pulmonary (Dr. Espinosa)    7. Sarcoidosis of lung  Stable- continue current treatment and follow up routinely with PCP and pulmonary (Dr. Espinosa)    8. Pulmonary hypertension  Stable- continue current treatment and follow up routinely with PCP and cardiology ()    9. Disorder of arteries and arterioles, unspecified  Stable- continue current treatment and follow up routinely with PCP and cardiology ()    10. Coronary artery disease involving native coronary artery of native heart without angina pectoris  Stable- continue current treatment and follow up routinely with PCP and cardiology ()    11. Polycythemia  Stable- continue current treatment and follow up routinely with PCP     12. Essential hypertension  Stable- continue current treatment and follow up routinely with PCP     13. Dyslipidemia  Stable- continue current treatment and follow up routinely with PCP and cardiology ()    14. Constipation, unspecified constipation type  - Ambulatory referral/consult to Gastroenterology; Future    Provided Cade with a 5-10 year written screening schedule and personal prevention plan. Recommendations were developed using the USPSTF age appropriate recommendations. Education, counseling, and referrals were provided as needed.  After Visit Summary printed and given to patient which includes a list of additional screenings\tests needed.    I offered to discuss advanced care planning, including how to pick a person who would make decisions for you if you were unable to make them for yourself, called a health care power of , and what kind of  decisions you might make such as use of life sustaining treatments such as ventilators and tube feeding when faced with a life limiting illness recorded on a living will that they will need to know. (How you want to be cared for as you near the end of your natural life)   X  Patient is unwilling to engage in a discussion regarding advance directives at this time.  Lisa Arce, NP

## 2024-05-08 NOTE — PATIENT INSTRUCTIONS
Counseling and Referral of Other Preventative  (Italic type indicates deductible and co-insurance are waived)    Patient Name: Cade Johnston  Today's Date: 5/8/2024    Health Maintenance       Date Due Completion Date    HIV Screening Never done ---    High Dose Statin Never done ---    Shingles Vaccine (1 of 2) Never done ---    Foot Exam 03/16/2023 3/16/2022 (Done)    Override on 3/16/2022: Done    Override on 9/8/2020: Done (PODIATRY)    Override on 7/31/2019: Done    Override on 11/7/2018: Done    Override on 5/11/2017: Done    COVID-19 Vaccine (2 - 2023-24 season) 09/01/2023 3/26/2021    Eye Exam 11/02/2023 11/2/2022    Override on 4/20/2017: Done (Peri Saleh,  will scan)    Override on 5/1/2015: Done (approx. date; Dr. Patsy Swenson, no retinopathy;)    Override on 4/21/2014: (N/S) (sees Dr. Rose, will make appt.)    Override on 1/3/2013: (N/S) (is UTD with Dr. Rose.)    RSV Vaccine (Age 60+ and Pregnant patients) (1 - 1-dose 60+ series) Never done ---    Hemoglobin A1c 04/20/2024 10/20/2023    Override on 4/21/2014: (N/S)    Override on 1/3/2013: Not Clinically Appropriate    Diabetes Urine Screening 10/20/2024 10/20/2023    Lipid Panel 10/20/2024 10/20/2023    Override on 1/3/2013: (N/S)    Aspirin/Antiplatelet Therapy 04/01/2025 4/1/2024    Colorectal Cancer Screening 01/08/2027 1/8/2024    Pneumococcal Vaccines (Age 0-64) (3 of 3 - PPSV23 or PCV20) 11/08/2028 11/7/2017    TETANUS VACCINE 01/19/2031 1/19/2021    Override on 9/25/2009: Done        Orders Placed This Encounter   Procedures    Ambulatory referral/consult to Podiatry       The following information is provided to all patients.  This information is to help you find resources for any of the problems found today that may be affecting your health:                  Living healthy guide: www.[facility].[place].[facility]      Understanding Diabetes: www.diabetes.org      Eating healthy: www.cdc.gov/healthyweight      CDC home safety checklist:  www.cdc.gov/steadi/patient.html      Agency on Aging: www.goea.[place].[facility]      Alcoholics anonymous (AA): www.aa.org      Physical Activity: www.jasmina.nih.gov/xn8xgzy      Tobacco use: www.quitwithusla.org

## 2024-05-08 NOTE — PROGRESS NOTES
Population Health Chart Review & Patient Outreach Details      Additional [facility] Health Notes:               Updates Requested / Reviewed:      Updated Care Coordination Note         Health Maintenance Topics Overdue:      VB Score: 1     Foot Exam    RSV Vaccine                  Health Maintenance Topic(s) Outreach Outcomes & Actions Taken:    Medication Adherence / Statins - Outreach Outcomes & Actions Taken  : Sent Provider Message to Review to Evaluate Pt for Statin, Add Exclusion Dx Codes, Document Exclusion in Problem List, Change Statin Intensity Level to Moderate or High Intensity if Applicable

## 2024-05-09 ENCOUNTER — OFFICE VISIT (OUTPATIENT)
Dept: GASTROENTEROLOGY | Facility: CLINIC | Age: 59
End: 2024-05-09
Payer: MEDICARE

## 2024-05-09 ENCOUNTER — HOSPITAL ENCOUNTER (OUTPATIENT)
Dept: RADIOLOGY | Facility: HOSPITAL | Age: 59
Discharge: HOME | End: 2024-05-09
Payer: MEDICARE

## 2024-05-09 ENCOUNTER — PATIENT MESSAGE (OUTPATIENT)
Dept: GASTROENTEROLOGY | Facility: CLINIC | Age: 59
End: 2024-05-09

## 2024-05-09 VITALS — WEIGHT: 230.81 LBS | BODY MASS INDEX: 31.26 KG/M2 | HEIGHT: 72 IN

## 2024-05-09 DIAGNOSIS — R14.3 FLATUS: ICD-10-CM

## 2024-05-09 DIAGNOSIS — R10.33 PERIUMBILICAL PAIN: ICD-10-CM

## 2024-05-09 DIAGNOSIS — R14.0 ABDOMINAL BLOATING: ICD-10-CM

## 2024-05-09 DIAGNOSIS — Z86.010 HISTORY OF COLON POLYPS: ICD-10-CM

## 2024-05-09 DIAGNOSIS — K59.00 CONSTIPATION, UNSPECIFIED CONSTIPATION TYPE: Primary | ICD-10-CM

## 2024-05-09 DIAGNOSIS — K59.00 CONSTIPATION, UNSPECIFIED CONSTIPATION TYPE: ICD-10-CM

## 2024-05-09 DIAGNOSIS — K57.90 DIVERTICULOSIS: ICD-10-CM

## 2024-05-09 PROCEDURE — 3008F BODY MASS INDEX DOCD: CPT | Mod: CPTII,S$GLB,,

## 2024-05-09 PROCEDURE — 1160F RVW MEDS BY RX/DR IN RCRD: CPT | Mod: CPTII,S$GLB,,

## 2024-05-09 PROCEDURE — 74018 RADEX ABDOMEN 1 VIEW: CPT | Mod: 26,,, | Performed by: STUDENT IN AN ORGANIZED HEALTH CARE EDUCATION/TRAINING PROGRAM

## 2024-05-09 PROCEDURE — 99204 OFFICE O/P NEW MOD 45 MIN: CPT | Mod: S$GLB,,,

## 2024-05-09 PROCEDURE — 99999 PR PBB SHADOW E&M-EST. PATIENT-LVL IV: CPT | Mod: PBBFAC,,,

## 2024-05-09 PROCEDURE — 4010F ACE/ARB THERAPY RXD/TAKEN: CPT | Mod: CPTII,S$GLB,,

## 2024-05-09 PROCEDURE — 1159F MED LIST DOCD IN RCRD: CPT | Mod: CPTII,S$GLB,,

## 2024-05-09 PROCEDURE — 74018 RADEX ABDOMEN 1 VIEW: CPT | Mod: TC,FY,PO

## 2024-05-09 NOTE — PROGRESS NOTES
Subjective:       Patient ID: Cade Johnston is a 58 y.o. male Body mass index is 31.31 kg/m².    Chief Complaint: Constipation    This patient is new to me.  Referring Provider: Lisa Arce for constipation.  Established patient of Dr. Gifford.     Constipation  This is a new problem. The current episode started more than 1 month ago (started 1.5 months ago after switching from metformin to glipizide). The problem has been gradually worsening since onset. Stool frequency: Currently having 1 BM every 2-3 days. The stool is described as firm (Rated stool 4, but small on Fallon scale; patient reports straining and does not feel complete after BMs). The patient is on a high fiber diet. He Exercises regularly. There has Been adequate water intake (Drinks 6-7 large glasses of water daily). Associated symptoms include abdominal pain (Denies pain currently; Intermittent periumbilical pain that worsens with palpation; described as a cramp; improves after BMs or passing gas; also reports loud gurgling noises in abdomen as though he has hunger pains, but is not hungry), back pain (chronic; does not associate back pain with constipation), bloating (Constant bloating that worsens after eating certain foods such as beans broccoli; improves after BMs or passing gas), flatus and hemorrhoids. Pertinent negatives include no diarrhea (Hx of diarrhea while on metformin, which resolved after medication was discontinued), difficulty urinating, fecal incontinence, fever, hematochezia, melena, nausea, rectal pain, vomiting or weight loss. Associated symptoms comments: Colonoscopy 01/08/24 - Diverticulosis in the sigmoid colon, in the descending colon and in the transverse colon. Three 6 to 8 mm polyps at the hepatic flexure, removed with a cold snare. Resected and retrieved.  The distal rectum and anal verge are normal on retroflexion view; patho: tubular adenoma. Patient report Dr. Gifford recommended 3 year surveillance colonoscopy.  Risk factors include obesity (Triggers: Certain foods (cheese and cornmeal)). He has tried laxatives, stool softeners, fiber and diet changes (Patient currently taking Colace daily, MiraLax 2 to 3 times a week, and Dulcolax every couple of weeks) for the symptoms. There is no history of abdominal surgery, endocrine disease, inflammatory bowel disease, irritable bowel syndrome, metabolic disease, neurologic disease, neuromuscular disease, psychiatric history or radiation treatment.     Review of Systems   Constitutional:  Negative for fever and weight loss.   Gastrointestinal:  Positive for abdominal pain (Denies pain currently; Intermittent periumbilical pain that worsens with palpation; described as a cramp; improves after BMs or passing gas; also reports loud gurgling noises in abdomen as though he has hunger pains, but is not hungry), bloating (Constant bloating that worsens after eating certain foods such as beans broccoli; improves after BMs or passing gas), constipation, flatus and hemorrhoids. Negative for diarrhea (Hx of diarrhea while on metformin, which resolved after medication was discontinued), hematochezia, melena, nausea, rectal pain and vomiting.   Genitourinary:  Negative for difficulty urinating.   Musculoskeletal:  Positive for back pain (chronic; does not associate back pain with constipation).       No LMP for male patient.  Past Medical History:   Diagnosis Date    Anticoagulant long-term use     Cancer of skin of left ear     Colon polyp     Coronary artery disease, nonobstructive     DJD (degenerative joint disease) of lumbar spine     DM type 2 (diabetes mellitus, type 2)     with diet alone    Dyslipidemia 08/13/2019    Essential hypertension 12/29/2015    Fatty liver     GERD (gastroesophageal reflux disease)     Hepatosplenomegaly     HTN (hypertension)     Hyperlipidemia     resolved with diet    Hypogonadism male 02/17/2015    Lumbar radiculopathy     Lumbar spondylosis      Nephrolithiasis     last stone 1/11    VÍCTOR on CPAP     Palpitations 03/09/2016    Sarcoidosis of lung 1990    Ventricular hypokinesis     angiogram with mild LAD stenosis     Past Surgical History:   Procedure Laterality Date    ANGIOGRAM, CORONARY, WITH LEFT HEART CATHETERIZATION N/A 08/16/2022    Procedure: Angiogram, Coronary, with Left Heart Cath;  Surgeon: Refugio Mcdonough MD;  Location: [facility] CATH;  Service: Cardiology;  Laterality: N/A;    CARDIAC SURGERY      CIRCUMCISION      COLONOSCOPY N/A 01/08/2024    Procedure: COLONOSCOPY;  Surgeon: HALLEY Montes MD;  Location: [facility] ENDO;  Service: Endoscopy;  Laterality: N/A;    CORONARY ANGIOGRAPHY N/A 12/13/2019    Procedure: ANGIOGRAM, CORONARY ARTERY;  Surgeon: Amador Duran MD;  [facility];  Service: Cardiology;  Laterality: N/A;    CORONARY STENT PLACEMENT  12/2019    x1    CYST REMOVAL Right 05/2022    thigh, Dr. Parkinson    elbow spur removal      right    EPIDURAL STEROID INJECTION INTO LUMBAR SPINE N/A 02/10/2021    Procedure: Injection-steroid-epidural-lumbar L4/5;  Surgeon: Richi Rangel MD;  Location: [facility] OR;  Service: Pain Management;  Laterality: N/A;    EPIDURAL STEROID INJECTION INTO LUMBAR SPINE N/A 03/15/2021    Procedure: Injection-steroid-epidural-lumbar L5/S1;  Surgeon: Richi Rangel MD;  Location: [facility] OR;  Service: Pain Management;  Laterality: N/A;    EPIDURAL STEROID INJECTION INTO LUMBAR SPINE N/A 10/16/2023    Procedure: Injection-steroid-epidural-lumbar;  Surgeon: Richi Rangel MD;  Location: [facility] OR;  Service: Pain Management;  Laterality: N/A;  L5/S1    EXTRACORPOREAL SHOCK WAVE LITHOTRIPSY  1996    [facility]    LEFT HEART CATHETERIZATION Left 12/13/2019    Procedure: Left heart cath;  Surgeon: Amador Duran MD;  Location: [facility] CATH;  Service: Cardiology;  Laterality: Left;    removal of skin cancer      L side of face    URETERAL EXPLORATION  1996    exploration of the R ureter for ureteral injury  [facility] - did well     Family History   Problem Relation Name Age of Onset    Hypertension Mother      Fibromyalgia Mother      Coronary artery disease Father      Diabetes Father      Liver disease Brother          hepatitis C from a blood transufusion    Colon cancer Neg Hx      Crohn's disease Neg Hx      Ulcerative colitis Neg Hx      Stomach cancer Neg Hx      Esophageal cancer Neg Hx       Social History     Tobacco Use    Smoking status: Former     Current packs/day: 0.00     Types: Cigarettes     Quit date: 1991     Years since quittin.4     Passive exposure: Never    Smokeless tobacco: Never   Substance Use Topics    Alcohol use: No    Drug use: No     Wt Readings from Last 10 Encounters:   24 104.7 kg (230 lb 13.2 oz)   24 104.5 kg (230 lb 6.1 oz)   24 105.1 kg (231 lb 11.3 oz)   24 99.8 kg (220 lb)   24 104.9 kg (231 lb 4.2 oz)   23 102.2 kg (225 lb 5 oz)   10/30/23 101.6 kg (223 lb 15.8 oz)   10/24/23 101.6 kg (224 lb)   10/11/23 101.6 kg (224 lb)   10/09/23 102.7 kg (226 lb 6.6 oz)     Lab Results   Component Value Date    WBC 8.47 2023    HGB 16.2 2023    HCT 48.6 2023    MCV 88 2023     2023     CMP  Sodium   Date Value Ref Range Status   10/20/2023 138 136 - 145 mmol/L Final     Potassium   Date Value Ref Range Status   10/20/2023 3.9 3.5 - 5.1 mmol/L Final     Chloride   Date Value Ref Range Status   10/20/2023 102 95 - 110 mmol/L Final     CO2   Date Value Ref Range Status   10/20/2023 23 23 - 29 mmol/L Final     Glucose   Date Value Ref Range Status   10/20/2023 154 (H) 70 - 110 mg/dL Final     BUN   Date Value Ref Range Status   10/20/2023 17 6 - 20 mg/dL Final     Creatinine   Date Value Ref Range Status   10/20/2023 0.9 0.5 - 1.4 mg/dL Final     Calcium   Date Value Ref Range Status   10/20/2023 9.5 8.7 - 10.5 mg/dL Final     Total Protein   Date Value Ref Range Status   10/20/2023 7.7  6.0 - 8.4 g/dL Final     Albumin   Date Value Ref Range Status   10/20/2023 4.2 3.5 - 5.2 g/dL Final     Total Bilirubin   Date Value Ref Range Status   10/20/2023 1.5 (H) 0.1 - 1.0 mg/dL Final     Comment:     For infants and newborns, interpretation of results should be based  on gestational age, weight and in agreement with clinical  observations.    Premature Infant recommended reference ranges:  Up to 24 hours.............<8.0 mg/dL  Up to 48 hours............<12.0 mg/dL  3-5 days..................<15.0 mg/dL  6-29 days.................<15.0 mg/dL       Alkaline Phosphatase   Date Value Ref Range Status   10/20/2023 94 55 - 135 U/L Final     AST   Date Value Ref Range Status   10/20/2023 25 10 - 40 U/L Final     ALT   Date Value Ref Range Status   10/20/2023 37 10 - 44 U/L Final     Anion Gap   Date Value Ref Range Status   10/20/2023 13 8 - 16 mmol/L Final     eGFR if    Date Value Ref Range Status   04/21/2022 >60.0 >60 mL/min/1.73 m^2 Final     eGFR if non    Date Value Ref Range Status   04/21/2022 >60.0 >60 mL/min/1.73 m^2 Final     Comment:     Calculation used to obtain the estimated glomerular filtration  rate (eGFR) is the CKD-EPI equation.        Lab Results   Component Value Date    TSH 1.358 07/27/2021     Reviewed prior medical records including radiology report of [facility] 05/09/2024, chest x-ray 09/11/2023, CT urogram abdomen and pelvis 01/15/2019, retroperitoneal ultrasound 07/20/2018, CT abdomen and pelvis 04/22/2014 & endoscopy history (see surgical history).    Objective:      Physical Exam  Vitals and nursing note reviewed.   Constitutional:       General: He is not in acute distress.     Appearance: Normal appearance. He is obese. He is not ill-appearing.   HENT:      Mouth/Throat:      Lips: Pink. No lesions.   Cardiovascular:      Rate and Rhythm: Normal rate and regular rhythm.      Heart sounds: Normal heart sounds.   Pulmonary:      Effort: Pulmonary effort  is normal. No respiratory distress.      Breath sounds: Normal breath sounds.   Abdominal:      General: Abdomen is protuberant. Bowel sounds are normal. There is no distension or abdominal bruit. There are no signs of injury.      Palpations: Abdomen is soft. There is no shifting dullness, fluid wave, hepatomegaly, splenomegaly or mass.      Tenderness: There is no abdominal tenderness. There is no guarding or rebound. Negative signs include Pennington's sign, Rovsing's sign and McBurney's sign.      Comments: Diastasis recti   Skin:     General: Skin is warm and dry.      Coloration: Skin is not jaundiced or pale.   Neurological:      Mental Status: He is alert and oriented to person, place, and time.   Psychiatric:         Attention and Perception: Attention normal.         Mood and Affect: Mood normal.         Speech: Speech normal.         Behavior: Behavior normal.         Assessment:       1. Constipation, unspecified constipation type    2. Periumbilical pain    3. Abdominal bloating    4. Flatus    5. Diverticulosis    6. History of colon polyps        Plan:       Constipation, unspecified constipation type  -Recommend daily exercise as tolerated, adequate water intake (six 8-oz glasses of water daily), and high fiber diet. OTC fiber supplements are recommended if diet does not reach daily fiber goal (20-30 grams daily), such as Metamucil, Citrucel, or FiberCon (take as directed, separate from other oral medications by >2 hours).  -     Comprehensive Metabolic Panel; Future; Expected date: 05/09/2024  -     TSH; Future; Expected date: 05/09/2024  -     Ambulatory referral/consult to Gastroenterology  -     X-Ray Abdomen AP 1 View; Future; Expected date: 05/09/2024  -START:  linaCLOtide (LINZESS) 72 mcg Cap capsule; Take 1 capsule (72 mcg total) by mouth before breakfast.  Dispense: 90 capsule; Refill: 0    Periumbilical pain  -     Comprehensive Metabolic Panel; Future; Expected date: 05/09/2024  -     X-Ray  Abdomen AP 1 View; Future; Expected date: 05/09/2024  - consider CT if pain worsens or does not improve    Abdominal bloating & Flatus  - recommend OTC simethicone as directed, such as Phazyme or Gas-x  - recommend low gas diet: Reduce or eliminate these foods from your diet: Broccoli, Cauliflower, [place] sprouts, Cabbage, Cooked dried beans, Carbonated beverages (sparkling water, soda, beer, champagne)  Other Causes Of Excess Gas Include:   1) EATING TOO FAST or TALKING WHILE YOU CHEW may cause you to swallow air. This increases the amount of gas in the stomach and may worsen your symptoms.  --> Chew each mouthful completely before swallowing. Take your time.  2) OVEREATING may increase the feeling of being bloated and cause more gas.  --> When you are full, stop eating.  3) CONSTIPATION can increase the amount of normal intestinal gas.  --> Avoid constipation by increasing the amount of fiber in your diet by including whole cereal grains, fresh vegetables (except those in the above list) and fresh fruits. High-fiber foods absorb water and carry it out of the body. When increasing the amount of fiber in your diet, you also need to increase the amount of water that you drink. You should drink at least eight 8-ounce glasses of water (two quarts) per day.  -     X-Ray Abdomen AP 1 View; Future; Expected date: 05/09/2024    Diverticulosis  - Recommend high fiber diet (20-30 grams of fiber daily)/OTC fiber supplements daily as directed. OTC fiber supplements are recommended if diet does not reach daily fiber goal (20-30 grams daily), such as Metamucil, Citrucel, or FiberCon (take as directed, separate from other oral medications by >2 hours).    History of colon polyps  - follow-up for surveillance colonoscopy 01/2027 per Dr. Montes's recommendations per patient report    Follow up in about 4 weeks (around 6/6/2024), or if symptoms worsen or fail to improve.      If no improvement in symptoms or symptoms worsen,  call/follow-up at clinic or go to ER.        Total time spent on the encounter includes face to face time and non-face to face time preparing to see the patient (eg, review of tests), Obtaining and/or reviewing separately obtained history, Documenting clinical information in the electronic or other health record, Independently interpreting results (not separately reported) and communicating results to the patient/family/caregiver, or Care coordination (not separately reported).     A dictation software program was used for this note. Please expect some simple typographical  errors in this note.

## 2024-05-13 ENCOUNTER — LAB VISIT (OUTPATIENT)
Dept: LAB | Facility: HOSPITAL | Age: 59
End: 2024-05-13
Payer: MEDICARE

## 2024-05-13 DIAGNOSIS — R14.3 FLATUS: ICD-10-CM

## 2024-05-13 DIAGNOSIS — R14.3 FLATUS: Primary | ICD-10-CM

## 2024-05-13 PROCEDURE — 87338 HPYLORI STOOL AG IA: CPT

## 2024-05-22 DIAGNOSIS — E11.9 TYPE 2 DIABETES MELLITUS WITHOUT COMPLICATION, WITHOUT LONG-TERM CURRENT USE OF INSULIN: ICD-10-CM

## 2024-05-22 RX ORDER — BLOOD-GLUCOSE METER
EACH MISCELLANEOUS
Qty: 100 STRIP | Refills: 3 | Status: SHIPPED | OUTPATIENT
Start: 2024-05-22

## 2024-05-22 NOTE — TELEPHONE ENCOUNTER
Refill Routing Note   Medication(s) are not appropriate for processing by [facility] for the following reason(s):        No active prescription written by provider    [facility] action(s):  Defer        Medication Therapy Plan: Last ordered: 4 months ago (1/5/2024) by Apurva Duenas NP      Appointments  past 12m or future 3m with PCP    Date Provider   Last Visit   11/2/2023 Joni Smith MD   Next Visit   Visit date not found Joni Smith MD   ED visits in past 90 days: 0        Note composed:10:48 AM 05/22/2024

## 2024-05-22 NOTE — TELEPHONE ENCOUNTER
No care due was identified.  [facility] Embedded Care Due Messages. Reference number: [id number].   5/22/2024 10:48:40 AM CDT

## 2024-05-24 LAB — H PYLORI AG STL QL IA: NOT DETECTED

## 2024-05-27 ENCOUNTER — OFFICE VISIT (OUTPATIENT)
Dept: FAMILY MEDICINE | Facility: CLINIC | Age: 59
End: 2024-05-27
Payer: MEDICARE

## 2024-05-27 VITALS
SYSTOLIC BLOOD PRESSURE: 136 MMHG | OXYGEN SATURATION: 98 % | HEART RATE: 70 BPM | DIASTOLIC BLOOD PRESSURE: 88 MMHG | BODY MASS INDEX: 31.39 KG/M2 | WEIGHT: 231.5 LBS

## 2024-05-27 DIAGNOSIS — I10 ESSENTIAL HYPERTENSION: ICD-10-CM

## 2024-05-27 DIAGNOSIS — D86.0 SARCOIDOSIS OF LUNG: ICD-10-CM

## 2024-05-27 DIAGNOSIS — Z12.5 ENCOUNTER FOR SCREENING FOR MALIGNANT NEOPLASM OF PROSTATE: ICD-10-CM

## 2024-05-27 DIAGNOSIS — E11.40 TYPE 2 DIABETES MELLITUS WITH DIABETIC NEUROPATHY, WITHOUT LONG-TERM CURRENT USE OF INSULIN: Primary | ICD-10-CM

## 2024-05-27 DIAGNOSIS — E78.2 MIXED HYPERLIPIDEMIA: ICD-10-CM

## 2024-05-27 PROCEDURE — 99214 OFFICE O/P EST MOD 30 MIN: CPT | Mod: S$GLB,,, | Performed by: NURSE PRACTITIONER

## 2024-05-27 PROCEDURE — 99999 PR PBB SHADOW E&M-EST. PATIENT-LVL IV: CPT | Mod: PBBFAC,,, | Performed by: NURSE PRACTITIONER

## 2024-05-27 PROCEDURE — 3079F DIAST BP 80-89 MM HG: CPT | Mod: CPTII,S$GLB,, | Performed by: NURSE PRACTITIONER

## 2024-05-27 PROCEDURE — 3044F HG A1C LEVEL LT 7.0%: CPT | Mod: CPTII,S$GLB,, | Performed by: NURSE PRACTITIONER

## 2024-05-27 PROCEDURE — 1159F MED LIST DOCD IN RCRD: CPT | Mod: CPTII,S$GLB,, | Performed by: NURSE PRACTITIONER

## 2024-05-27 PROCEDURE — 4010F ACE/ARB THERAPY RXD/TAKEN: CPT | Mod: CPTII,S$GLB,, | Performed by: NURSE PRACTITIONER

## 2024-05-27 PROCEDURE — 3008F BODY MASS INDEX DOCD: CPT | Mod: CPTII,S$GLB,, | Performed by: NURSE PRACTITIONER

## 2024-05-27 PROCEDURE — 3075F SYST BP GE 130 - 139MM HG: CPT | Mod: CPTII,S$GLB,, | Performed by: NURSE PRACTITIONER

## 2024-05-27 NOTE — PROGRESS NOTES
Subjective:       Patient ID: Cade Johnston is a 58 y.o. male.    Chief Complaint: Diabetes (6 month follow up)    HPI  Patient presents for routine follow up of chronic conditions    T2DM: stable A1C 6.9. diet ok --could improve   Fasting ranging 125-140s. Denies hypoglycemia   Scheduled to see optometry next month     HTN controlled    Sarcoidosis:  some SOB, but stable.  Had to retire.  Had several episodes at work where he had trouble breathing and was alone not to be found.  45% lung fnx, sees pulmonary - increased sob - now with talking. On Advair      CAD s/p stent.  Angio 2022 clear.  Sees Dr Lopez cardiology.      VÍCTOR -complaint on CPAP      HLD - controlled ldl < 70 goal; refusing statin in past.  High triglycerides improved     Vitals:    05/27/24 0849   BP: 136/88   Pulse: 70     Review of Systems   Constitutional:  Negative for fever.   Respiratory:  Positive for shortness of breath. Negative for cough.    Cardiovascular:  Negative for chest pain.       Past Medical History:   Diagnosis Date    Anticoagulant long-term use     Cancer of skin of left ear     Colon polyp     Coronary artery disease, nonobstructive     DJD (degenerative joint disease) of lumbar spine     DM type 2 (diabetes mellitus, type 2)     with diet alone    Dyslipidemia 08/13/2019    Essential hypertension 12/29/2015    Fatty liver     GERD (gastroesophageal reflux disease)     Hepatosplenomegaly     HTN (hypertension)     Hyperlipidemia     resolved with diet    Hypogonadism male 02/17/2015    Lumbar radiculopathy     Lumbar spondylosis     Nephrolithiasis     last stone 1/11    VÍCTOR on CPAP     Palpitations 03/09/2016    Sarcoidosis of lung 1990    Ventricular hypokinesis     angiogram with mild LAD stenosis     Objective:      Physical Exam  Vitals and nursing note reviewed.   Constitutional:       General: He is not in acute distress.     Appearance: He is not diaphoretic.   HENT:      Head: Normocephalic.   Eyes:      General:  Lids are normal.         Right eye: No discharge.         Left eye: No discharge.   Neck:      Trachea: No tracheal deviation.   Cardiovascular:      Rate and Rhythm: Normal rate and regular rhythm.      Heart sounds: Normal heart sounds.   Pulmonary:      Effort: Pulmonary effort is normal.      Breath sounds: No wheezing or rales.   Musculoskeletal:      Right lower leg: No edema.      Left lower leg: No edema.   Skin:     Coloration: Skin is not pale.   Neurological:      Mental Status: He is alert and oriented to person, place, and time.   Psychiatric:         Speech: Speech normal.         Behavior: Behavior normal.         Thought Content: Thought content normal.         Judgment: Judgment normal.         Assessment:       1. Type 2 diabetes mellitus with diabetic neuropathy, without long-term current use of insulin    2. Mixed hyperlipidemia    3. Essential hypertension    4. Sarcoidosis of lung    5. Encounter for screening for malignant neoplasm of prostate        Plan:       Type 2 diabetes mellitus with diabetic neuropathy, without long-term current use of insulin  -     Hemoglobin A1C; Future; Expected date: 11/27/2024  -     Lipid Panel; Future; Expected date: 11/27/2024  -     Microalbumin/creatinine urine ratio; Future; Expected date: 11/27/2024    Mixed hyperlipidemia    Essential hypertension  -     CBC Auto Differential; Future; Expected date: 11/27/2024    Sarcoidosis of lung    Encounter for screening for malignant neoplasm of prostate  -     PSA, Screening; Future; Expected date: 11/27/2024            Follow up in about 6 months (around 11/27/2024).    Medication List with Changes/Refills   Current Medications    ALBUTEROL (PROVENTIL/VENTOLIN HFA) 90 MCG/ACTUATION INHALER    Inhale 2 puffs into the lungs every 6 (six) hours as needed for Wheezing. Rescue    BLOOD-GLUCOSE METER KIT    To check BG BID , to use with insurance preferred meter    CLOPIDOGREL (PLAVIX) 75 MG TABLET    TAKE 1 TABLET BY  MOUTH EVERY DAY    FLUTICASONE-SALMETEROL DISKUS INHALER 250-50 MCG    Inhale 1 puff into the lungs 2 (two) times daily. Controller    GLIPIZIDE (GLUCOTROL) 10 MG TR24    Take 1 tablet (10 mg total) by mouth daily with breakfast.    IBUPROFEN (ADVIL,MOTRIN) 200 MG TABLET    Take 200 mg by mouth every 6 (six) hours as needed for Pain.    IPRATROPIUM-ALBUTEROL (COMBIVENT)  MCG/ACTUATION INHALER    Inhale 1 puff into the lungs every 4 (four) hours as needed for Shortness of Breath. Rescue    LANCETS MISC    To check BG BID, to use with insurance preferred meter    LINACLOTIDE (LINZESS) 72 MCG CAP CAPSULE    Take 1 capsule (72 mcg total) by mouth before breakfast.    LOSARTAN (COZAAR) 100 MG TABLET    Take 1 tablet (100 mg total) by mouth once daily.    ONETOUCH VERIO TEST STRIPS STRP    TO CHECK BLOOD GLUCOSE TWICE A DAY    SILDENAFIL (REVATIO) 20 MG TAB    Take 1 tablet (20 mg total) by mouth 3 (three) times daily.    VERAPAMIL (VERELAN) 120 MG C24P    TAKE 1 CAPSULE BY MOUTH ONCE DAILY

## 2024-06-07 ENCOUNTER — OFFICE VISIT (OUTPATIENT)
Dept: PODIATRY | Facility: CLINIC | Age: 59
End: 2024-06-07
Payer: MEDICARE

## 2024-06-07 VITALS — HEIGHT: 72 IN | WEIGHT: 231.5 LBS | BODY MASS INDEX: 31.36 KG/M2

## 2024-06-07 DIAGNOSIS — E11.40 TYPE 2 DIABETES MELLITUS WITH DIABETIC NEUROPATHY, WITHOUT LONG-TERM CURRENT USE OF INSULIN: ICD-10-CM

## 2024-06-07 PROCEDURE — 99213 OFFICE O/P EST LOW 20 MIN: CPT | Mod: S$GLB,,, | Performed by: PODIATRIST

## 2024-06-07 PROCEDURE — 3008F BODY MASS INDEX DOCD: CPT | Mod: CPTII,S$GLB,, | Performed by: PODIATRIST

## 2024-06-07 PROCEDURE — 4010F ACE/ARB THERAPY RXD/TAKEN: CPT | Mod: CPTII,S$GLB,, | Performed by: PODIATRIST

## 2024-06-07 PROCEDURE — 3044F HG A1C LEVEL LT 7.0%: CPT | Mod: CPTII,S$GLB,, | Performed by: PODIATRIST

## 2024-06-07 PROCEDURE — 1160F RVW MEDS BY RX/DR IN RCRD: CPT | Mod: CPTII,S$GLB,, | Performed by: PODIATRIST

## 2024-06-07 PROCEDURE — 1159F MED LIST DOCD IN RCRD: CPT | Mod: CPTII,S$GLB,, | Performed by: PODIATRIST

## 2024-06-07 PROCEDURE — 99999 PR PBB SHADOW E&M-EST. PATIENT-LVL III: CPT | Mod: PBBFAC,,, | Performed by: PODIATRIST

## 2024-06-07 NOTE — PROGRESS NOTES
Subjective:      Patient ID: Cade Johnston is a 58 y.o. male.    Chief Complaint: Nail Care    Cade is a 58 y.o. male who presents to the clinic upon referral from Dr. Arce  for evaluation and treatment of diabetic feet. Cade has a past medical history of Anticoagulant long-term use, Cancer of skin of left ear, Colon polyp, Coronary artery disease, nonobstructive, DJD (degenerative joint disease) of lumbar spine, DM type 2 (diabetes mellitus, type 2), Dyslipidemia (08/13/2019), Essential hypertension (12/29/2015), Fatty liver, GERD (gastroesophageal reflux disease), Hepatosplenomegaly, HTN (hypertension), Hyperlipidemia, Hypogonadism male (02/17/2015), Lumbar radiculopathy, Lumbar spondylosis, Nephrolithiasis, VÍCTOR on CPAP, Palpitations (03/09/2016), Sarcoidosis of lung (1990), and Ventricular hypokinesis.  No new pedal complaints since last seen, the neuroma pain has been better since wearing hoka shoes and the last injections. Has been caring for his ailing parents which has taken up his time and has not been able to get in to see me for a couple years. Does have some tingling in the toes and feet, has tried lyrica and gabapentin in the past and was allergic to them.      PCP: Joni Smith MD        Current shoe gear: Tennis shoes    Hemoglobin A1C   Date Value Ref Range Status   05/09/2024 6.9 (H) 4.0 - 5.6 % Final     Comment:     ADA Screening Guidelines:  5.7-6.4%  Consistent with prediabetes  >or=6.5%  Consistent with diabetes    High levels of fetal hemoglobin interfere with the HbA1C  assay. Heterozygous hemoglobin variants (HbS, HgC, etc)do  not significantly interfere with this assay.   However, presence of multiple variants may affect accuracy.     10/20/2023 6.7 (H) 4.0 - 5.6 % Final     Comment:     ADA Screening Guidelines:  5.7-6.4%  Consistent with prediabetes  >or=6.5%  Consistent with diabetes    High levels of fetal hemoglobin interfere with the HbA1C  assay. Heterozygous hemoglobin  variants (HbS, HgC, etc)do  not significantly interfere with this assay.   However, presence of multiple variants may affect accuracy.     04/25/2023 6.4 (H) 4.0 - 5.6 % Final     Comment:     ADA Screening Guidelines:  5.7-6.4%  Consistent with prediabetes  >or=6.5%  Consistent with diabetes    High levels of fetal hemoglobin interfere with the HbA1C  assay. Heterozygous hemoglobin variants (HbS, HgC, etc)do  not significantly interfere with this assay.   However, presence of multiple variants may affect accuracy.             Review of Systems   Constitutional: Negative for chills and fever.   Cardiovascular:  Positive for leg swelling. Negative for claudication.   Respiratory:  Negative for shortness of breath.    Skin:  Positive for nail changes. Negative for itching and rash.   Musculoskeletal:  Positive for arthritis. Negative for muscle cramps, muscle weakness and myalgias.   Gastrointestinal:  Negative for nausea and vomiting.   Neurological:  Positive for numbness and paresthesias. Negative for focal weakness and loss of balance.           Objective:      Physical Exam  Constitutional:       General: He is not in acute distress.     Appearance: He is well-developed. He is not diaphoretic.   Cardiovascular:      Pulses:           Dorsalis pedis pulses are 2+ on the right side and 2+ on the left side.        Posterior tibial pulses are 2+ on the right side and 2+ on the left side.      Comments: < 3 sec capillary refill time to toes 1-5 bilateral. Feet are warm to touch proximally with distal cooling b/l. There is no hair growth on the feet and toes b/l. There is mild edema b/l with varicosities present b/l.     Musculoskeletal:      Comments: Equinus noted b/l ankles with < 5 deg DF noted. MMT 5/5 in DF/PF/Inv/Ev resistance with no reproduction of pain in any direction. Passive range of motion of ankle and pedal joints is painless b/l.          Feet:      Right foot:      Protective Sensation: 10 sites  "tested.  10 sites sensed.      Left foot:      Protective Sensation: 10 sites tested.  10 sites sensed.   Skin:     General: Skin is warm and dry.      Coloration: Skin is not pale.      Findings: No abrasion, bruising, burn, ecchymosis, laceration, lesion, petechiae or rash.      Nails: There is no clubbing.      Comments: Skin is thin and atrophic bilateral    Left 1-2 and right hallux nails removed with nail bed hard eschar covering minimal erythema and no drainage.        Neurological:      Mental Status: He is alert and oriented to person, place, and time.      Sensory: Sensory deficit present.      Motor: No tremor, atrophy or abnormal muscle tone.      Comments: Negative tinel sign bilateral. Diminished vibratory and protective sensation bilateral   Psychiatric:         Behavior: Behavior normal.               Assessment:       Encounter Diagnosis   Name Primary?    Type 2 diabetes mellitus with diabetic neuropathy, without long-term current use of insulin          Plan:       Cade Kessler" was seen today for nail care.    Diagnoses and all orders for this visit:    Type 2 diabetes mellitus with diabetic neuropathy, without long-term current use of insulin  -     Ambulatory referral/consult to Podiatry      I counseled the patient on his conditions, their implications and medical management.    Shoe inspection. Diabetic Foot Education. Patient reminded of the importance of good nutrition and blood sugar control to help prevent podiatric complications of diabetes. Patient instructed on proper foot hygeine. We discussed wearing proper shoe gear, daily foot inspections, never walking without protective shoe gear, never putting sharp instruments to feet    Will start taking 600 mg alpha lipoic acid and b complex vitamin daily for the neuropathy    Return 1 year diabetic foot check up sooner PRN    Chung Peterson DPM                           "

## 2024-06-10 ENCOUNTER — TELEPHONE (OUTPATIENT)
Dept: GASTROENTEROLOGY | Facility: CLINIC | Age: 59
End: 2024-06-10
Payer: MEDICARE

## 2024-06-10 NOTE — TELEPHONE ENCOUNTER
Please let the patient know to discontinue Linzess and start MiraLax once daily (17 g by mouth) instead of as needed like he had been taking it in the past to see if this improves constipation, but prevents diarrhea.  I will consider medication called lactulose if MiraLax daily isn't controlling constipation.  Sincerely,  Jessi Mann, NP

## 2024-06-10 NOTE — TELEPHONE ENCOUNTER
Spoke with pt and he says that he started with constant diarrhea and rib pain so he has stopped the Linzess. He says he is now having the same symptoms prior to starting Linzess- lower abd pain and constipation. He is asking for further recommendations.

## 2024-06-10 NOTE — TELEPHONE ENCOUNTER
----- Message from Afua Green sent at 6/10/2024 10:18 AM CDT -----  Name of Who is Calling:CHRISTIAN MAYERS [[id number]]        What is the request in detail:Pt would like a callback from the office discuss having side affects from medication linaCLOtide (LINZESS) 72 mcg Cap capsule and medication had been stopped.Please advise thank you       Can the clinic reply by [facility]:NO        What Number to Call Back if not in [facility]:.Telephone Information:  Mobile          [phone]

## 2024-06-17 ENCOUNTER — TELEPHONE (OUTPATIENT)
Dept: GASTROENTEROLOGY | Facility: CLINIC | Age: 59
End: 2024-06-17
Payer: MEDICARE

## 2024-06-17 DIAGNOSIS — K59.00 CONSTIPATION, UNSPECIFIED CONSTIPATION TYPE: Primary | ICD-10-CM

## 2024-06-17 RX ORDER — LACTULOSE 10 G/15ML
10 SOLUTION ORAL 2 TIMES DAILY
Qty: 2700 ML | Refills: 0 | Status: SHIPPED | OUTPATIENT
Start: 2024-06-17 | End: 2024-09-15

## 2024-06-17 NOTE — TELEPHONE ENCOUNTER
Pt says he has started taking the Miralax after stopping Linzess and is still feeling like he is constipated. He has been taking the Miralax everyday. He would like other recommendations since this is not working. Please advise.

## 2024-06-17 NOTE — TELEPHONE ENCOUNTER
Please let the patient know I am sending in a medication called lactulose to take as prescribed for constipation.  After starting lactulose he can discontinue MiraLax.  I recommend him stay hydrated drinking 64 oz of water daily, getting adequate exercise (as tolerated), and eating high-fiber diet as well.  Sincerely,  Jessi Mann NP

## 2024-06-17 NOTE — TELEPHONE ENCOUNTER
----- Message from Marlys Mario sent at 6/17/2024 10:20 AM CDT -----  Type: Needs Medical Advice  Who Called:  pt  Symptoms (please be specific):  pt said the provider put him on some OTC meds and they are not working said he wanted to know what the next step was--please call and advise  Best Call Back Number: [phone] (home)     Additional Information: thank you

## 2024-06-19 ENCOUNTER — OFFICE VISIT (OUTPATIENT)
Dept: OPTOMETRY | Facility: CLINIC | Age: 59
End: 2024-06-19
Payer: MEDICARE

## 2024-06-19 DIAGNOSIS — H04.123 BILATERAL DRY EYES: ICD-10-CM

## 2024-06-19 DIAGNOSIS — E11.9 TYPE 2 DIABETES MELLITUS WITHOUT RETINOPATHY: Primary | ICD-10-CM

## 2024-06-19 DIAGNOSIS — Z00.00 ENCOUNTER FOR PREVENTIVE HEALTH EXAMINATION: ICD-10-CM

## 2024-06-19 DIAGNOSIS — E11.40 TYPE 2 DIABETES MELLITUS WITH DIABETIC NEUROPATHY, WITHOUT LONG-TERM CURRENT USE OF INSULIN: ICD-10-CM

## 2024-06-19 DIAGNOSIS — H25.13 NUCLEAR SCLEROSIS OF BOTH EYES: ICD-10-CM

## 2024-06-19 PROCEDURE — 1160F RVW MEDS BY RX/DR IN RCRD: CPT | Mod: CPTII,S$GLB,, | Performed by: OPTOMETRIST

## 2024-06-19 PROCEDURE — 3044F HG A1C LEVEL LT 7.0%: CPT | Mod: CPTII,S$GLB,, | Performed by: OPTOMETRIST

## 2024-06-19 PROCEDURE — 2023F DILAT RTA XM W/O RTNOPTHY: CPT | Mod: CPTII,S$GLB,, | Performed by: OPTOMETRIST

## 2024-06-19 PROCEDURE — 99999 PR PBB SHADOW E&M-EST. PATIENT-LVL III: CPT | Mod: PBBFAC,,, | Performed by: OPTOMETRIST

## 2024-06-19 PROCEDURE — 4010F ACE/ARB THERAPY RXD/TAKEN: CPT | Mod: CPTII,S$GLB,, | Performed by: OPTOMETRIST

## 2024-06-19 PROCEDURE — 92004 COMPRE OPH EXAM NEW PT 1/>: CPT | Mod: S$GLB,,, | Performed by: OPTOMETRIST

## 2024-06-19 PROCEDURE — 1159F MED LIST DOCD IN RCRD: CPT | Mod: CPTII,S$GLB,, | Performed by: OPTOMETRIST

## 2024-06-19 NOTE — PROGRESS NOTES
HPI    New pt here for annual diabetic exam. Last exam- 1 yr [facility]    DVA stable. Pt wearing +2.25 readers with relief. C/o floaters OU x 1 yr.   Pt denies flashes/pain. Pt denies use of gtt.     Hemoglobin A1C       Date                     Value               Ref Range             Status                05/09/2024               6.9 (H)             4.0 - 5.6 %           Final                 10/20/2023               6.7 (H)             4.0 - 5.6 %           Final                 04/25/2023               6.4 (H)             4.0 - 5.6 %           Final                 Last edited by Halima Gallo on 6/19/2024  8:58 AM.            Assessment /Plan     For exam results, see Encounter Report.    Type 2 diabetes mellitus without retinopathy    Type 2 diabetes mellitus with diabetic neuropathy, without long-term current use of insulin  -     Ambulatory referral/consult to Optometry    Encounter for preventive health examination  -     Ambulatory referral/consult to Optometry    Nuclear sclerosis of both eyes    Bilateral dry eyes      1,2. No diabetic retinopathy, no csme. Return in 1 year for dilated eye exam.  4. Educated pt on presence of cataracts and effects on vision. No surgery at this time. Recheck in one year.  5. Causing pm tired eyes, Recommend artificial tears. 1 drop 2x per day. Chronicity of disease and treatment discussed.

## 2024-06-26 ENCOUNTER — TELEPHONE (OUTPATIENT)
Dept: GASTROENTEROLOGY | Facility: CLINIC | Age: 59
End: 2024-06-26
Payer: MEDICARE

## 2024-06-26 NOTE — TELEPHONE ENCOUNTER
----- Message from Kennedy Flores sent at 6/26/2024  2:26 PM CDT -----  Regarding: rt call  Type:  Patient Returning Call    Who Called:pt    Who Left Message for Patient:unknown    Does the patient know what this is regarding?:yes    Best Call Back Number:[phone]      Additional Information: pt st that he is still having the same symptoms while taking the lactulose (CHRONULAC) 20 gram/30 mL Soln. Please call to discuss.

## 2024-06-26 NOTE — TELEPHONE ENCOUNTER
Pt says that stool is now soft but he has to strain to start a BM. He also says that he does not feel like he is unable to empty out all of his stool. He says that he believes he has some kind of obstruction and may have hemorrhoids as well. Please advise.

## 2024-07-09 ENCOUNTER — LAB VISIT (OUTPATIENT)
Dept: LAB | Facility: HOSPITAL | Age: 59
End: 2024-07-09
Payer: MEDICARE

## 2024-07-09 ENCOUNTER — OFFICE VISIT (OUTPATIENT)
Dept: GASTROENTEROLOGY | Facility: CLINIC | Age: 59
End: 2024-07-09
Payer: MEDICARE

## 2024-07-09 VITALS — WEIGHT: 231.25 LBS | BODY MASS INDEX: 31.32 KG/M2 | HEIGHT: 72 IN

## 2024-07-09 DIAGNOSIS — R14.0 ABDOMINAL BLOATING: ICD-10-CM

## 2024-07-09 DIAGNOSIS — R10.33 PERIUMBILICAL ABDOMINAL PAIN: ICD-10-CM

## 2024-07-09 DIAGNOSIS — K59.04 CHRONIC IDIOPATHIC CONSTIPATION: Primary | ICD-10-CM

## 2024-07-09 DIAGNOSIS — K64.9 HEMORRHOIDS, UNSPECIFIED HEMORRHOID TYPE: ICD-10-CM

## 2024-07-09 DIAGNOSIS — Z86.010 HISTORY OF COLON POLYPS: ICD-10-CM

## 2024-07-09 DIAGNOSIS — R19.8 STRAINING DURING BOWEL MOVEMENTS: ICD-10-CM

## 2024-07-09 DIAGNOSIS — K57.90 DIVERTICULOSIS: ICD-10-CM

## 2024-07-09 LAB
CREAT SERPL-MCNC: 0.9 MG/DL (ref 0.5–1.4)
EST. GFR  (NO RACE VARIABLE): >60 ML/MIN/1.73 M^2

## 2024-07-09 PROCEDURE — 99214 OFFICE O/P EST MOD 30 MIN: CPT | Mod: S$GLB,,,

## 2024-07-09 PROCEDURE — 1159F MED LIST DOCD IN RCRD: CPT | Mod: CPTII,S$GLB,,

## 2024-07-09 PROCEDURE — 99999 PR PBB SHADOW E&M-EST. PATIENT-LVL IV: CPT | Mod: PBBFAC,,,

## 2024-07-09 PROCEDURE — 36415 COLL VENOUS BLD VENIPUNCTURE: CPT | Mod: PO

## 2024-07-09 PROCEDURE — 3008F BODY MASS INDEX DOCD: CPT | Mod: CPTII,S$GLB,,

## 2024-07-09 PROCEDURE — 4010F ACE/ARB THERAPY RXD/TAKEN: CPT | Mod: CPTII,S$GLB,,

## 2024-07-09 PROCEDURE — 1160F RVW MEDS BY RX/DR IN RCRD: CPT | Mod: CPTII,S$GLB,,

## 2024-07-09 PROCEDURE — 3044F HG A1C LEVEL LT 7.0%: CPT | Mod: CPTII,S$GLB,,

## 2024-07-09 PROCEDURE — 82565 ASSAY OF CREATININE: CPT

## 2024-07-09 RX ORDER — HYDROCORTISONE 25 MG/G
CREAM TOPICAL 2 TIMES DAILY
Qty: 28 G | Refills: 0 | Status: SHIPPED | OUTPATIENT
Start: 2024-07-09

## 2024-07-09 RX ORDER — LUBIPROSTONE 8 UG/1
8 CAPSULE ORAL 2 TIMES DAILY
Qty: 60 CAPSULE | Refills: 0 | Status: SHIPPED | OUTPATIENT
Start: 2024-07-09 | End: 2024-08-08

## 2024-07-09 NOTE — PROGRESS NOTES
Subjective:       Patient ID: Cade Johnston is a 58 y.o. male Body mass index is 31.36 kg/m².    Chief Complaint: Constipation    Established patient of Dr. Gifford and myself.     Constipation  This is a chronic problem. The current episode started more than 1 month ago (started 1.5 months ago after switching from metformin to glipizide). The problem is unchanged. Stool frequency: Currently having 1-2 small BM evyer 2-3 days; straining and does not feel complete after BMs; feels as though something is blocking stool from evacuating. The stool is described as firm, pencil thin and pellet like (Rated stool 1 and 4 on Alcorn scale). The patient is on a high fiber diet. He Exercises regularly. There has Been adequate water intake. Associated symptoms include abdominal pain (Denies pain currently, but does report intermittent periumbilical pain that worsens prior to BMs described as a cramp and intermittent periumbilical pain/pinch that worsens when constipated/bloated; improves after BMs or passing gas), back pain (chronic; does not associate back pain with constipation), bloating (Constant bloating that worsens when constipated or after eating certain foods such as beans broccoli; improves after BMs or passing gas; h. pylori stool negative 05/13/24), flatus and hemorrhoids (Patient reports sensation of difficult evacuation of stool requiring him to strain, which he thinks may be associated with hemorrhoid; reports concern because his wife is currently being treated for rectal cancer). Pertinent negatives include no diarrhea (Hx of diarrhea while on metformin, which resolved after medication was discontinued), difficulty urinating, fecal incontinence, fever, hematochezia, melena, nausea, rectal pain, vomiting or weight loss. Associated symptoms comments: Also reports loud gurgling noises in abdomen as though he has hunger pains, but is not hungry. Colonoscopy 01/08/24 - Diverticulosis in the sigmoid colon, in the  descending colon and in the transverse colon. Three 6 to 8 mm polyps at the hepatic flexure, removed with a cold snare. Resected and retrieved.  The distal rectum and anal verge are normal on retroflexion view; patho: tubular adenoma. Patient report Dr. Gifford recommended 3 year surveillance colonoscopy. Risk factors include obesity (Triggers: Certain foods (cheese and cornmeal)). He has tried laxatives, stool softeners, fiber and diet changes (Patient currently taking lactulose 30 mL b.i.d. and MiraLax daily; past treatments:  Linzess 72 mcg (caused abdominal pain to worsen), Colace daily, & Dulcolax every couple of weeks) for the symptoms. The treatment provided mild relief. There is no history of abdominal surgery, endocrine disease, inflammatory bowel disease, irritable bowel syndrome, metabolic disease, neurologic disease, neuromuscular disease, psychiatric history or radiation treatment.     Review of Systems   Constitutional:  Negative for activity change, appetite change, chills, diaphoresis, fatigue, fever, unexpected weight change and weight loss.   HENT:  Negative for sore throat and trouble swallowing.    Respiratory:  Negative for cough, choking and shortness of breath.    Cardiovascular:  Negative for chest pain.   Gastrointestinal:  Positive for abdominal pain (Denies pain currently, but does report intermittent periumbilical pain that worsens prior to BMs described as a cramp and intermittent periumbilical pain/pinch that worsens when constipated/bloated; improves after BMs or passing gas), bloating (Constant bloating that worsens when constipated or after eating certain foods such as beans broccoli; improves after BMs or passing gas; h. pylori stool negative 05/13/24), constipation, flatus and hemorrhoids (Patient reports sensation of difficult evacuation of stool requiring him to strain, which he thinks may be associated with hemorrhoid; reports concern because his wife is currently being treated  for rectal cancer). Negative for abdominal distention, anal bleeding, blood in stool, diarrhea (Hx of diarrhea while on metformin, which resolved after medication was discontinued), hematochezia, melena, nausea, rectal pain and vomiting.   Genitourinary:  Negative for difficulty urinating.   Musculoskeletal:  Positive for back pain (chronic; does not associate back pain with constipation).       No LMP for male patient.  Past Medical History:   Diagnosis Date    Anticoagulant long-term use     Cancer of skin of left ear     Colon polyp     Coronary artery disease, nonobstructive     DJD (degenerative joint disease) of lumbar spine     DM type 2 (diabetes mellitus, type 2)     with diet alone    Dyslipidemia 08/13/2019    Essential hypertension 12/29/2015    Fatty liver     GERD (gastroesophageal reflux disease)     Hepatosplenomegaly     HTN (hypertension)     Hyperlipidemia     resolved with diet    Hypogonadism male 02/17/2015    Lumbar radiculopathy     Lumbar spondylosis     Nephrolithiasis     last stone 1/11    VÍCTOR on CPAP     Palpitations 03/09/2016    Sarcoidosis of lung 1990    Ventricular hypokinesis     angiogram with mild LAD stenosis     Past Surgical History:   Procedure Laterality Date    ANGIOGRAM, CORONARY, WITH LEFT HEART CATHETERIZATION N/A 08/16/2022    Procedure: Angiogram, Coronary, with Left Heart Cath;  Surgeon: Refugio Mcdonough MD;  Location: [facility], [place] CATH;  Service: Cardiology;  Laterality: N/A;    CARDIAC SURGERY      CIRCUMCISION      COLONOSCOPY N/A 01/08/2024    Procedure: COLONOSCOPY;  Surgeon: HALLEY Montes MD;  Location: [facility] ENDO;  Service: Endoscopy;  Laterality: N/A;    CORONARY ANGIOGRAPHY N/A 12/13/2019    Procedure: ANGIOGRAM, CORONARY ARTERY;  Surgeon: Amador Duran MD;  Location: [facility] [place] CATH;  Service: Cardiology;  Laterality: N/A;    CORONARY STENT PLACEMENT  12/2019    x1    CYST REMOVAL Right 05/2022    thigh, Dr. Parkinson    elbow spur removal      right    EPIDURAL  STEROID INJECTION INTO LUMBAR SPINE N/A 02/10/2021    Procedure: Injection-steroid-epidural-lumbar L4/5;  Surgeon: Richi Rangel MD;  Location: [facility] OR;  Service: Pain Management;  Laterality: N/A;    EPIDURAL STEROID INJECTION INTO LUMBAR SPINE N/A 03/15/2021    Procedure: Injection-steroid-epidural-lumbar L5/S1;  Surgeon: Richi Rangel MD;  Location: [facility] OR;  Service: Pain Management;  Laterality: N/A;    EPIDURAL STEROID INJECTION INTO LUMBAR SPINE N/A 10/16/2023    Procedure: Injection-steroid-epidural-lumbar;  Surgeon: Richi Rangel MD;  Location: [facility] OR;  Service: Pain Management;  Laterality: N/A;  L5/S1    EXTRACORPOREAL SHOCK WAVE LITHOTRIPSY      [facility]    LEFT HEART CATHETERIZATION Left 2019    Procedure: Left heart cath;  Surgeon: Amador Duran MD;  Location: [facility] CATH;  Service: Cardiology;  Laterality: Left;    removal of skin cancer      L side of face    URETERAL EXPLORATION      exploration of the R ureter for ureteral injury [facility] - did well     Family History   Problem Relation Name Age of Onset    Hypertension Mother      Fibromyalgia Mother      Coronary artery disease Father      Diabetes Father      Liver disease Brother          hepatitis C from a blood transufusion    Colon cancer Neg Hx      Crohn's disease Neg Hx      Ulcerative colitis Neg Hx      Stomach cancer Neg Hx      Esophageal cancer Neg Hx      Glaucoma Neg Hx      Macular degeneration Neg Hx       Social History     Tobacco Use    Smoking status: Former     Current packs/day: 0.00     Types: Cigarettes     Quit date: 1991     Years since quittin.6     Passive exposure: Never    Smokeless tobacco: Never   Substance Use Topics    Alcohol use: No    Drug use: No     Wt Readings from Last 10 Encounters:   24 104.9 kg (231 lb 4.2 oz)   24 105 kg (231 lb 7.7 oz)   24 105 kg (231 lb 7.7 oz)   24 104.7 kg (230 lb 13.2 oz)   24 104.5 kg  (230 lb 6.1 oz)   02/02/24 105.1 kg (231 lb 11.3 oz)   01/03/24 99.8 kg (220 lb)   01/05/24 104.9 kg (231 lb 4.2 oz)   11/02/23 102.2 kg (225 lb 5 oz)   10/30/23 101.6 kg (223 lb 15.8 oz)     Lab Results   Component Value Date    WBC 8.47 11/14/2023    HGB 16.2 11/14/2023    HCT 48.6 11/14/2023    MCV 88 11/14/2023     11/14/2023     CMP  Sodium   Date Value Ref Range Status   05/09/2024 139 136 - 145 mmol/L Final     Potassium   Date Value Ref Range Status   05/09/2024 4.4 3.5 - 5.1 mmol/L Final     Chloride   Date Value Ref Range Status   05/09/2024 106 95 - 110 mmol/L Final     CO2   Date Value Ref Range Status   05/09/2024 24 23 - 29 mmol/L Final     Glucose   Date Value Ref Range Status   05/09/2024 165 (H) 70 - 110 mg/dL Final     BUN   Date Value Ref Range Status   05/09/2024 16 6 - 20 mg/dL Final     Creatinine   Date Value Ref Range Status   05/09/2024 1.0 0.5 - 1.4 mg/dL Final     Calcium   Date Value Ref Range Status   05/09/2024 9.2 8.7 - 10.5 mg/dL Final     Total Protein   Date Value Ref Range Status   05/09/2024 7.0 6.0 - 8.4 g/dL Final     Albumin   Date Value Ref Range Status   05/09/2024 3.7 3.5 - 5.2 g/dL Final     Total Bilirubin   Date Value Ref Range Status   05/09/2024 1.0 0.1 - 1.0 mg/dL Final     Comment:     For infants and newborns, interpretation of results should be based  on gestational age, weight and in agreement with clinical  observations.    Premature Infant recommended reference ranges:  Up to 24 hours.............<8.0 mg/dL  Up to 48 hours............<12.0 mg/dL  3-5 days..................<15.0 mg/dL  6-29 days.................<15.0 mg/dL       Alkaline Phosphatase   Date Value Ref Range Status   05/09/2024 122 55 - 135 U/L Final     AST   Date Value Ref Range Status   05/09/2024 19 10 - 40 U/L Final     ALT   Date Value Ref Range Status   05/09/2024 27 10 - 44 U/L Final     Anion Gap   Date Value Ref Range Status   05/09/2024 9 8 - 16 mmol/L Final     eGFR if African  American   Date Value Ref Range Status   04/21/2022 >60.0 >60 mL/min/1.73 m^2 Final     eGFR if non    Date Value Ref Range Status   04/21/2022 >60.0 >60 mL/min/1.73 m^2 Final     Comment:     Calculation used to obtain the estimated glomerular filtration  rate (eGFR) is the CKD-EPI equation.        Lab Results   Component Value Date    TSH 1.326 05/09/2024     Reviewed prior medical records including radiology report of [facility] 05/09/2024, chest x-ray 09/11/2023, CT urogram abdomen and pelvis 01/15/2019, retroperitoneal ultrasound 07/20/2018, CT abdomen and pelvis 04/22/2014 & endoscopy history (see surgical history).    Objective:      Physical Exam  Vitals and nursing note reviewed.   Constitutional:       General: He is not in acute distress.     Appearance: Normal appearance. He is obese. He is not ill-appearing.   HENT:      Mouth/Throat:      Lips: Pink. No lesions.   Cardiovascular:      Rate and Rhythm: Normal rate and regular rhythm.      Heart sounds: Normal heart sounds.   Pulmonary:      Effort: Pulmonary effort is normal. No respiratory distress.      Breath sounds: Normal breath sounds.   Abdominal:      General: Abdomen is protuberant. Bowel sounds are normal. There is no distension or abdominal bruit. There are no signs of injury.      Palpations: Abdomen is soft. There is no shifting dullness, fluid wave, hepatomegaly, splenomegaly or mass.      Tenderness: There is abdominal tenderness in the periumbilical area. There is no guarding or rebound. Negative signs include Pennington's sign, Rovsing's sign and McBurney's sign.      Comments: Diastasis recti   Skin:     General: Skin is warm and dry.      Coloration: Skin is not jaundiced or pale.   Neurological:      Mental Status: He is alert and oriented to person, place, and time.   Psychiatric:         Attention and Perception: Attention normal.         Mood and Affect: Mood normal.         Speech: Speech normal.         Behavior: Behavior  normal.         Assessment:       1. Chronic idiopathic constipation    2. Straining during bowel movements    3. Periumbilical abdominal pain    4. Abdominal bloating    5. Hemorrhoids, unspecified hemorrhoid type    6. Diverticulosis    7. History of colon polyps          Plan:       Chronic idiopathic constipation & Straining during bowel movements  -Recommend daily exercise as tolerated, adequate water intake (six 8-oz glasses of water daily), and high fiber diet. OTC fiber supplements are recommended if diet does not reach daily fiber goal (20-30 grams daily), such as Metamucil, Citrucel, or FiberCon (take as directed, separate from other oral medications by >2 hours).  -Recommend trying OTC MiraLax once daily p.r.n. (17g PO) as directed  - CONTINUE OTC fiber supplements (take as directed, separate from other oral medications by >2 hours).  - CAN CONTINUE OTC stool softener such as Colace as directed to avoid hard stools and straining with bowel movements PRN  -START: lubiprostone (AMITIZA) 8 MCG Cap; Take 1 capsule (8 mcg total) by mouth 2 (two) times daily.  Dispense: 60 capsule; Refill: 0  - discontinue Lactulose after starting amitiza    Periumbilical abdominal pain  -     CT Abdomen Pelvis With IV Contrast Routine Oral Contrast; Future; Expected date: 07/09/2024  -     Creatinine, serum; Future; Expected date: 07/09/2024    Abdominal bloating  - recommend OTC simethicone as directed, such as Phazyme or Gas-x  - recommend low gas diet: Reduce or eliminate these foods from your diet: Broccoli, Cauliflower, Douglass sprouts, Cabbage, Cooked dried beans, Carbonated beverages (sparkling water, soda, beer, champagne)  Other Causes Of Excess Gas Include:   1) EATING TOO FAST or TALKING WHILE YOU CHEW may cause you to swallow air. This increases the amount of gas in the stomach and may worsen your symptoms.  --> Chew each mouthful completely before swallowing. Take your time.  2) OVEREATING may increase the  feeling of being bloated and cause more gas.  --> When you are full, stop eating.  3) CONSTIPATION can increase the amount of normal intestinal gas.  --> Avoid constipation by increasing the amount of fiber in your diet by including whole cereal grains, fresh vegetables (except those in the above list) and fresh fruits. High-fiber foods absorb water and carry it out of the body. When increasing the amount of fiber in your diet, you also need to increase the amount of water that you drink. You should drink at least eight 8-ounce glasses of water (two quarts) per day.    Hemorrhoids, unspecified hemorrhoid type  - avoid constipation and straining with bowel movements; may use OTC stool softener as directed and increase fiber in diet (20-30 grams daily)/OTC fiber supplement such as metamucil (take as directed)  - recommend SITZ baths  - START: hydrocortisone 2.5 % cream; Apply topically 2 (two) times daily.  Dispense: 28 g; Refill: 0  -     Ambulatory referral/consult to Colorectal Surgery; Future; Expected date: 07/16/2024    Diverticulosis  Recommend high fiber diet (20-30 grams of fiber daily)/OTC fiber supplements daily as directed.    History of colon polyps  - follow-up for surveillance colonoscopy 01/2027 per Dr. Montes's recommendations per patient report    Follow up in about 4 weeks (around 8/6/2024), or if symptoms worsen or fail to improve.      If no improvement in symptoms or symptoms worsen, call/follow-up at clinic or go to ER.        Total time spent on the encounter includes face to face time and non-face to face time preparing to see the patient (eg, review of tests), Obtaining and/or reviewing separately obtained history, Documenting clinical information in the electronic or other health record, Independently interpreting results (not separately reported) and communicating results to the patient/family/caregiver, or Care coordination (not separately reported).     A dictation software program was  used for this note. Please expect some simple typographical  errors in this note.

## 2024-07-15 ENCOUNTER — TELEPHONE (OUTPATIENT)
Dept: FAMILY MEDICINE | Facility: CLINIC | Age: 59
End: 2024-07-15
Payer: MEDICARE

## 2024-07-15 NOTE — TELEPHONE ENCOUNTER
----- Message from Lola Herrera sent at 7/15/2024 11:54 AM CDT -----  Regarding: reaction to taking a rx  Contact: pt  Type:  Needs Medical Advice    Who Called: pt    Symptoms (please be specific): reaction to taking a rx .  Severe constipation and abdominal pain    Best Call Back Number: [phone]    Additional Information: pt sts that he is having a reaction to taking the glipiZIDE (GLUCOTROL) 10 MG TR24

## 2024-07-15 NOTE — TELEPHONE ENCOUNTER
OK to discontinue    Recommend strict diabetic diet to hopefully avoid further medications since he has had side effects with multiple in the past. If wanting to discuss alternative med offer appt can be virtual

## 2024-07-15 NOTE — TELEPHONE ENCOUNTER
advised pt if he feels like he is having an allergic reaction he needs to be evaluated at a ER or UC.  pt states he is no longer taking the glipizide and he is feeling more normal again. Pt not taking glipizide for 4 days.   Pt just wanted to let you know to see what you think about this

## 2024-07-17 ENCOUNTER — TELEPHONE (OUTPATIENT)
Dept: GASTROENTEROLOGY | Facility: CLINIC | Age: 59
End: 2024-07-17
Payer: MEDICARE

## 2024-07-17 NOTE — TELEPHONE ENCOUNTER
----- Message from Mervat Boston sent at 7/17/2024  9:31 AM CDT -----  Regarding: Call back  Type:  Needs Medical Advice    Who Called: Pt      Would the patient rather a call back or a response via [facility]? Call back    Best Call Back Number: [phone]      Additional Information: The Pharmacist and Gastro DR  told Pt the meds he is taking for his diabetes is cause the issues he was having , pt sts he stop taking meds and is now back to normal. Pt sts he have a CT scan and Scope and needs to know if he still needs to do it. Thank you

## 2024-07-31 ENCOUNTER — HOSPITAL ENCOUNTER (OUTPATIENT)
Dept: RADIOLOGY | Facility: HOSPITAL | Age: 59
Discharge: HOME | End: 2024-07-31
Payer: MEDICARE

## 2024-07-31 ENCOUNTER — TELEPHONE (OUTPATIENT)
Dept: GASTROENTEROLOGY | Facility: CLINIC | Age: 59
End: 2024-07-31
Payer: MEDICARE

## 2024-07-31 DIAGNOSIS — R10.33 PERIUMBILICAL ABDOMINAL PAIN: ICD-10-CM

## 2024-07-31 DIAGNOSIS — K80.20 GALLSTONES: ICD-10-CM

## 2024-07-31 DIAGNOSIS — R93.3 ABNORMAL CT SCAN, COLON: Primary | ICD-10-CM

## 2024-07-31 PROCEDURE — 74177 CT ABD & PELVIS W/CONTRAST: CPT | Mod: TC,PO

## 2024-07-31 PROCEDURE — 25500020 PHARM REV CODE 255: Mod: PO

## 2024-07-31 PROCEDURE — A9698 NON-RAD CONTRAST MATERIALNOC: HCPCS | Mod: PO

## 2024-07-31 PROCEDURE — 74177 CT ABD & PELVIS W/CONTRAST: CPT | Mod: 26,,, | Performed by: STUDENT IN AN ORGANIZED HEALTH CARE EDUCATION/TRAINING PROGRAM

## 2024-07-31 RX ADMIN — IOHEXOL 100 ML: 350 INJECTION, SOLUTION INTRAVENOUS at 08:07

## 2024-07-31 RX ADMIN — IOHEXOL 1000 ML: 12 SOLUTION ORAL at 08:07

## 2024-07-31 NOTE — TELEPHONE ENCOUNTER
----- Message from Jessi Mann NP sent at 7/31/2024  1:05 PM CDT -----  Please call to inform & review the results with the patient - let the per radiology report his CT abdomen pelvis showed mild diffuse wall thickening in distal descending colon, sigmoid colon, and rectum.  He also has diverticulosis (recommend high-fiber diet), gallstones with mild gallbladder sludge, probable fatty liver, stable left kidney cyst, stable calcified lymph nodes in mediastinum, and stable chronic pleural thickening with calcifications in both lungs.  SCHEDULE colonoscopy to further investigate wall thickening in colon and abnormal CT of colon.  I am putting in a referral to General surgery to further investigate gallstones and gallbladder sludge. For fatty liver recommend: low fat, low cholesterol diet, maintain good control of blood sugars and cholesterol levels, exercise, weight loss (if overweight), minimize/avoid alcohol and tylenol products, & follow-up with PCP for continued evaluation and management; if specialist is needed, recommend seeing hepatology. Continue with previous recommendations. If no improvement in symptoms or symptoms worsen, call/follow-up at clinic. Please call with any questions/concerns.  Thank you,  KAMALJIT Porras, FNP-C

## 2024-08-11 DIAGNOSIS — K59.04 CHRONIC IDIOPATHIC CONSTIPATION: ICD-10-CM

## 2024-08-12 RX ORDER — LUBIPROSTONE 8 UG/1
8 CAPSULE ORAL 2 TIMES DAILY
Qty: 60 CAPSULE | Refills: 2 | Status: SHIPPED | OUTPATIENT
Start: 2024-08-12

## 2024-08-14 ENCOUNTER — OFFICE VISIT (OUTPATIENT)
Dept: SURGERY | Facility: CLINIC | Age: 59
End: 2024-08-14
Payer: MEDICARE

## 2024-08-14 VITALS
RESPIRATION RATE: 16 BRPM | BODY MASS INDEX: 30.48 KG/M2 | OXYGEN SATURATION: 97 % | TEMPERATURE: 98 F | HEIGHT: 72 IN | HEART RATE: 67 BPM | WEIGHT: 225.06 LBS | DIASTOLIC BLOOD PRESSURE: 68 MMHG | SYSTOLIC BLOOD PRESSURE: 118 MMHG

## 2024-08-14 DIAGNOSIS — L29.0 PRURITUS ANI: Primary | ICD-10-CM

## 2024-08-14 DIAGNOSIS — K59.00 CONSTIPATION, UNSPECIFIED CONSTIPATION TYPE: ICD-10-CM

## 2024-08-14 DIAGNOSIS — R15.0 INCOMPLETE DEFECATION: ICD-10-CM

## 2024-08-14 DIAGNOSIS — K64.9 HEMORRHOIDS, UNSPECIFIED HEMORRHOID TYPE: ICD-10-CM

## 2024-08-14 PROCEDURE — 4010F ACE/ARB THERAPY RXD/TAKEN: CPT | Mod: CPTII,S$GLB,, | Performed by: COLON & RECTAL SURGERY

## 2024-08-14 PROCEDURE — 99999 PR PBB SHADOW E&M-EST. PATIENT-LVL V: CPT | Mod: PBBFAC,,, | Performed by: COLON & RECTAL SURGERY

## 2024-08-14 PROCEDURE — 46600 DIAGNOSTIC ANOSCOPY SPX: CPT | Mod: S$GLB,,, | Performed by: COLON & RECTAL SURGERY

## 2024-08-14 PROCEDURE — 3074F SYST BP LT 130 MM HG: CPT | Mod: CPTII,S$GLB,, | Performed by: COLON & RECTAL SURGERY

## 2024-08-14 PROCEDURE — 99203 OFFICE O/P NEW LOW 30 MIN: CPT | Mod: 25,S$GLB,, | Performed by: COLON & RECTAL SURGERY

## 2024-08-14 PROCEDURE — 3044F HG A1C LEVEL LT 7.0%: CPT | Mod: CPTII,S$GLB,, | Performed by: COLON & RECTAL SURGERY

## 2024-08-14 PROCEDURE — 3078F DIAST BP <80 MM HG: CPT | Mod: CPTII,S$GLB,, | Performed by: COLON & RECTAL SURGERY

## 2024-08-14 PROCEDURE — 3008F BODY MASS INDEX DOCD: CPT | Mod: CPTII,S$GLB,, | Performed by: COLON & RECTAL SURGERY

## 2024-08-14 PROCEDURE — 1159F MED LIST DOCD IN RCRD: CPT | Mod: CPTII,S$GLB,, | Performed by: COLON & RECTAL SURGERY

## 2024-08-14 NOTE — PROGRESS NOTES
PI:  Cade Johnston is a 58 y.o. male with history of anal discomfort, minimal bleeding with BMs     The patient reports that he has had significant change in bowel habits since last Thanksgiving.    Metformin was causing diarrhea.  Switched to glipizide which cause constipation.    Has used lactulose which did not help.    Linzess caused cramping abdominal pain and diarrhea   Now on lubiprostone for 3 weeks.  He has improved.  Having bowel movements 3 times a week.  Still having incomplete soft stools.  Wipes a lot.  Does not feel empty.  Reports occasional blood on toilet paper.  However, he is much improved in terms of his anal discomfort, swelling and bleeding.       Past Medical History:   Diagnosis Date    Anticoagulant long-term use     Cancer of skin of left ear     Colon polyp     Coronary artery disease, nonobstructive     DJD (degenerative joint disease) of lumbar spine     DM type 2 (diabetes mellitus, type 2)     with diet alone    Dyslipidemia 08/13/2019    Essential hypertension 12/29/2015    Fatty liver     GERD (gastroesophageal reflux disease)     Hepatosplenomegaly     HTN (hypertension)     Hyperlipidemia     resolved with diet    Hypogonadism male 02/17/2015    Lumbar radiculopathy     Lumbar spondylosis     Nephrolithiasis     last stone 1/11    VÍCTOR on CPAP     Palpitations 03/09/2016    Sarcoidosis of lung 1990    Ventricular hypokinesis     angiogram with mild LAD stenosis        Past Surgical History:   Procedure Laterality Date    ANGIOGRAM, CORONARY, WITH LEFT HEART CATHETERIZATION N/A 08/16/2022    Procedure: Angiogram, Coronary, with Left Heart Cath;  Surgeon: Refugio Mcdonough MD;  Location: [facility] CATH;  Service: Cardiology;  Laterality: N/A;    CARDIAC SURGERY      CIRCUMCISION      COLONOSCOPY N/A 01/08/2024    Procedure: COLONOSCOPY;  Surgeon: HALLEY Montes MD;  Location: [facility] ENDO;  Service: Endoscopy;  Laterality: N/A;    CORONARY ANGIOGRAPHY N/A 12/13/2019    Procedure:  ANGIOGRAM, CORONARY ARTERY;  Surgeon: Amador Duran MD;  Location: [facility] CATH;  Service: Cardiology;  Laterality: N/A;    CORONARY STENT PLACEMENT  12/2019    x1    CYST REMOVAL Right 05/2022    thigh, Dr. Parkinson    elbow spur removal      right    EPIDURAL STEROID INJECTION INTO LUMBAR SPINE N/A 02/10/2021    Procedure: Injection-steroid-epidural-lumbar L4/5;  Surgeon: Richi Rangel MD;  Location: [facility] OR;  Service: Pain Management;  Laterality: N/A;    EPIDURAL STEROID INJECTION INTO LUMBAR SPINE N/A 03/15/2021    Procedure: Injection-steroid-epidural-lumbar L5/S1;  Surgeon: Richi Rangel MD;  Location: [facility] OR;  Service: Pain Management;  Laterality: N/A;    EPIDURAL STEROID INJECTION INTO LUMBAR SPINE N/A 10/16/2023    Procedure: Injection-steroid-epidural-lumbar;  Surgeon: Richi Rangel MD;  Location: [facility] OR;  Service: Pain Management;  Laterality: N/A;  L5/S1    EXTRACORPOREAL SHOCK WAVE LITHOTRIPSY  1996    [facility]    LEFT HEART CATHETERIZATION Left 12/13/2019    Procedure: Left heart cath;  Surgeon: Amador Duran MD;  Location: [facility] CATH;  Service: Cardiology;  Laterality: Left;    removal of skin cancer      L side of face    URETERAL EXPLORATION  1996    exploration of the R ureter for ureteral injury [facility] - did well       Review of patient's allergies indicates:   Allergen Reactions    Metformin      Severe abdominal bloating; diarrhea    Codeine Hives    Latex Rash     blisters       Family History   Problem Relation Name Age of Onset    Hypertension Mother      Fibromyalgia Mother      Coronary artery disease Father      Diabetes Father      Liver disease Brother          hepatitis C from a blood transufusion    Colon cancer Neg Hx      Crohn's disease Neg Hx      Ulcerative colitis Neg Hx      Stomach cancer Neg Hx      Esophageal cancer Neg Hx      Glaucoma Neg Hx      Macular degeneration Neg Hx         Social History     Socioeconomic History    Marital  status:     Number of children: 2   Occupational History    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     Employer: [employer]   Tobacco Use    Smoking status: Former     Current packs/day: 0.00     Types: Cigarettes     Quit date: 1991     Years since quittin.7     Passive exposure: Past    Smokeless tobacco: Never   Substance and Sexual Activity    Alcohol use: No    Drug use: No   Social History Narrative    ** Merged History Encounter **          Social Determinants of Health     Financial Resource Strain: Low Risk  (2024)    Overall Financial Resource Strain (CARDIA)     Difficulty of Paying Living Expenses: Not hard at all   Food Insecurity: No Food Insecurity (2024)    Hunger Vital Sign     Worried About Running Out of Food in the Last Year: Never true     Ran Out of Food in the Last Year: Never true   Transportation Needs: No Transportation Needs (2024)    PRAPARE - Transportation     Lack of Transportation (Medical): No     Lack of Transportation (Non-Medical): No   Physical Activity: Sufficiently Active (2024)    Exercise Vital Sign     Days of Exercise per Week: 3 days     Minutes of Exercise per Session: 60 min   Stress: Stress Concern Present (2024)    [employer] - Occupational Stress Questionnaire     Feeling of Stress : Rather much   Housing Stability: Low Risk  (2024)    Housing Stability Vital Sign     Unable to Pay for Housing in the Last Year: No     Homeless in the Last Year: No       ROS:  GENERAL: No fever, chills, fatigability or weight loss.  Integument: No rashes, redness, icterus  CHEST: Denies KOROMA, cyanosis, wheezing, cough and sputum production.  CARDIOVASCULAR: Denies chest pain, PND, orthopnea or reduced exercise tolerance.  GI: Denies abd pain, dysphagia, nausea, vomiting, no hematemesis   : Denies burning on urination, no hematuria, no bacteriuria  MSK: No deformities, swelling, joint pain  swelling  Neurologic: No HAs, seizures, weakness, paresthesias, gait problems    PE:  General appearance  healthy  /68 (BP Location: Left arm, Patient Position: Sitting, BP Method: Medium (Manual))   Pulse 67   Temp 97.5 °F (36.4 °C) (Temporal)   Resp 16   Ht 6' (1.829 m)   Wt 102.1 kg (225 lb 1.4 oz)   SpO2 97%   BMI 30.53 kg/m²   Sclera/ Skin anicteric  AT NC EOMI  Neck supple trachea midline   Chest symmetric, nl excursion, no retractions, breathing comfortably  EXT - no CCE  Neuro:  Mood/ affect nl, alert and oriented x 3, moves all ext's, gait nl    Rectal  Inspection     BRIDGER   Normal tone, no mass, good squeeze, normal relaxation with Valsalva      Assessment:  Constipation, regular bowel habits, improved with lubiprostone  Anal discomfort secondary to poor evacuations  Pruritus ani  secondary to incomplete evacuations, residual stool    Plan:   Discussed at length with the patient that he does not need surgery or intervention   We need to focus on better bowel movements.    He will continue  lubiprostone   He is to start Metamucil 1 rounded tsp twice a day.    He has to use probiotic once a day.    Returned to office in 6 weeks      Anoscopy Procedure Note:   Verbal consent obtained    Indications:   history of anal discomfort    Post procedure diagnosis:   incomplete bowel movements, pruritus ani    Procedure: anoscopy    Surgeon SUSY    Assistant:  medical assistant    Specimen  none    Findings:   residual stool in rectal vault, soft    Right posterior hemorrhoid grade 1  Right anterior hemorrhoid grade 1  Left lateral hemorrhoid grade 2    Pt tolerated procedure well.      No complications.

## 2024-08-30 NOTE — PROGRESS NOTES
Pt received 300 mg testosterone injection IM in RUQ of buttocks. Pt tolerated procedure well. Pt refused to stay for observation.  
no

## 2024-09-10 ENCOUNTER — OFFICE VISIT (OUTPATIENT)
Dept: SURGERY | Facility: CLINIC | Age: 59
End: 2024-09-10
Payer: MEDICARE

## 2024-09-10 ENCOUNTER — TELEPHONE (OUTPATIENT)
Dept: SURGERY | Facility: CLINIC | Age: 59
End: 2024-09-10

## 2024-09-10 VITALS
WEIGHT: 228.38 LBS | SYSTOLIC BLOOD PRESSURE: 143 MMHG | BODY MASS INDEX: 30.93 KG/M2 | TEMPERATURE: 98 F | HEIGHT: 72 IN | HEART RATE: 66 BPM | DIASTOLIC BLOOD PRESSURE: 78 MMHG

## 2024-09-10 DIAGNOSIS — K80.20 GALLSTONES: ICD-10-CM

## 2024-09-10 PROCEDURE — 3077F SYST BP >= 140 MM HG: CPT | Mod: CPTII,S$GLB,, | Performed by: STUDENT IN AN ORGANIZED HEALTH CARE EDUCATION/TRAINING PROGRAM

## 2024-09-10 PROCEDURE — 99203 OFFICE O/P NEW LOW 30 MIN: CPT | Mod: S$GLB,,, | Performed by: STUDENT IN AN ORGANIZED HEALTH CARE EDUCATION/TRAINING PROGRAM

## 2024-09-10 PROCEDURE — 3008F BODY MASS INDEX DOCD: CPT | Mod: CPTII,S$GLB,, | Performed by: STUDENT IN AN ORGANIZED HEALTH CARE EDUCATION/TRAINING PROGRAM

## 2024-09-10 PROCEDURE — 3078F DIAST BP <80 MM HG: CPT | Mod: CPTII,S$GLB,, | Performed by: STUDENT IN AN ORGANIZED HEALTH CARE EDUCATION/TRAINING PROGRAM

## 2024-09-10 PROCEDURE — 4010F ACE/ARB THERAPY RXD/TAKEN: CPT | Mod: CPTII,S$GLB,, | Performed by: STUDENT IN AN ORGANIZED HEALTH CARE EDUCATION/TRAINING PROGRAM

## 2024-09-10 PROCEDURE — 99999 PR PBB SHADOW E&M-EST. PATIENT-LVL IV: CPT | Mod: PBBFAC,,, | Performed by: STUDENT IN AN ORGANIZED HEALTH CARE EDUCATION/TRAINING PROGRAM

## 2024-09-10 PROCEDURE — 1159F MED LIST DOCD IN RCRD: CPT | Mod: CPTII,S$GLB,, | Performed by: STUDENT IN AN ORGANIZED HEALTH CARE EDUCATION/TRAINING PROGRAM

## 2024-09-10 PROCEDURE — 3044F HG A1C LEVEL LT 7.0%: CPT | Mod: CPTII,S$GLB,, | Performed by: STUDENT IN AN ORGANIZED HEALTH CARE EDUCATION/TRAINING PROGRAM

## 2024-09-10 NOTE — TELEPHONE ENCOUNTER
----- Message from Marcelino Darling sent at 9/10/2024  8:02 AM CDT -----  Contact: Self  Type: Needs Medical Advice  Who Called:  Patient    Best Call Back Number: [phone]  Additional Information: Patient has an 8:15 appt, but is running late due to traffic, but he is about to be there

## 2024-09-10 NOTE — PROGRESS NOTES
History & Physical    Subjective     History of Present Illness:  Patient is a 58 y.o. male presents with intermittent constipation and diarrhea which he attributes to changes in his diabetic medication regimen.  During workup, he had imaging which was concerning for possible gallstones/sludge.  Patient denies postprandial right upper quadrant abdominal pain.    Chief Complaint   Patient presents with    Consult     Gallstones       Review of patient's allergies indicates:   Allergen Reactions    Metformin      Severe abdominal bloating; diarrhea    Codeine Hives    Latex Rash     blisters       Current Outpatient Medications   Medication Sig Dispense Refill    clopidogreL (PLAVIX) 75 mg tablet TAKE 1 TABLET BY MOUTH EVERY DAY 90 tablet 4    hydrocortisone 2.5 % cream Apply topically 2 (two) times daily. 28 g 0    ipratropium-albuteroL (COMBIVENT)  mcg/actuation inhaler Inhale 1 puff into the lungs every 4 (four) hours as needed for Shortness of Breath. Rescue 4 g 4    losartan (COZAAR) 100 MG tablet Take 1 tablet (100 mg total) by mouth once daily. 30 tablet 11    lubiprostone (AMITIZA) 8 MCG Cap TAKE 1 CAPSULE BY MOUTH 2 TIMES DAILY. 60 capsule 2    verapamiL (VERELAN) 120 MG C24P TAKE 1 CAPSULE BY MOUTH ONCE DAILY 90 capsule 4    albuterol (PROVENTIL/VENTOLIN HFA) 90 mcg/actuation inhaler Inhale 2 puffs into the lungs every 6 (six) hours as needed for Wheezing. Rescue (Patient not taking: Reported on 9/10/2024) 1 Inhaler 2    blood-glucose meter kit To check BG BID , to use with insurance preferred meter (Patient not taking: Reported on 9/10/2024) 1 each 0    fluticasone-salmeterol diskus inhaler 250-50 mcg Inhale 1 puff into the lungs 2 (two) times daily. Controller (Patient not taking: Reported on 9/10/2024) 1 each 5    ibuprofen (ADVIL,MOTRIN) 200 MG tablet Take 200 mg by mouth every 6 (six) hours as needed for Pain. (Patient not taking: Reported on 9/10/2024)      lancets Misc To check BG BID, to use  with insurance preferred meter (Patient not taking: Reported on 9/10/2024) 100 each 3    ONETOUCH VERIO TEST STRIPS Strp TO CHECK BLOOD GLUCOSE TWICE A DAY (Patient not taking: Reported on 9/10/2024) 100 strip 3    sildenafil (REVATIO) 20 mg Tab Take 1 tablet (20 mg total) by mouth 3 (three) times daily. (Patient not taking: Reported on 9/10/2024) 30 tablet 5     No current facility-administered medications for this visit.       Past Medical History:   Diagnosis Date    Anticoagulant long-term use     Cancer of skin of left ear     Colon polyp     Coronary artery disease, nonobstructive     DJD (degenerative joint disease) of lumbar spine     DM type 2 (diabetes mellitus, type 2)     with diet alone    Dyslipidemia 08/13/2019    Essential hypertension 12/29/2015    Fatty liver     GERD (gastroesophageal reflux disease)     Hepatosplenomegaly     HTN (hypertension)     Hyperlipidemia     resolved with diet    Hypogonadism male 02/17/2015    Lumbar radiculopathy     Lumbar spondylosis     Nephrolithiasis     last stone 1/11    VÍCTOR on CPAP     Palpitations 03/09/2016    Sarcoidosis of lung 1990    Ventricular hypokinesis     angiogram with mild LAD stenosis     Past Surgical History:   Procedure Laterality Date    ANGIOGRAM, CORONARY, WITH LEFT HEART CATHETERIZATION N/A 08/16/2022    Procedure: Angiogram, Coronary, with Left Heart Cath;  Surgeon: Refugio Mcdonough MD;  Location: [facility] CATH;  Service: Cardiology;  Laterality: N/A;    CARDIAC SURGERY      CIRCUMCISION      COLONOSCOPY N/A 01/08/2024    Procedure: COLONOSCOPY;  Surgeon: HALLEY Montes MD;  Location: [facility] ENDO;  Service: Endoscopy;  Laterality: N/A;    CORONARY ANGIOGRAPHY N/A 12/13/2019    Procedure: ANGIOGRAM, CORONARY ARTERY;  Surgeon: Amador Duran MD;  Location: [facility] CATH;  Service: Cardiology;  Laterality: N/A;    CORONARY STENT PLACEMENT  12/2019    x1    CYST REMOVAL Right 05/2022    thigh, Dr. Parkinson    elbow spur removal      right     EPIDURAL STEROID INJECTION INTO LUMBAR SPINE N/A 02/10/2021    Procedure: Injection-steroid-epidural-lumbar L4/5;  Surgeon: Richi Rangel MD;  Location: [facility] OR;  Service: Pain Management;  Laterality: N/A;    EPIDURAL STEROID INJECTION INTO LUMBAR SPINE N/A 03/15/2021    Procedure: Injection-steroid-epidural-lumbar L5/S1;  Surgeon: Richi Rangel MD;  Location: [facility] OR;  Service: Pain Management;  Laterality: N/A;    EPIDURAL STEROID INJECTION INTO LUMBAR SPINE N/A 10/16/2023    Procedure: Injection-steroid-epidural-lumbar;  Surgeon: Richi Rangel MD;  Location: [facility] OR;  Service: Pain Management;  Laterality: N/A;  L5/S1    EXTRACORPOREAL SHOCK WAVE LITHOTRIPSY      [facility]    LEFT HEART CATHETERIZATION Left 2019    Procedure: Left heart cath;  Surgeon: Amador Duran MD;  Location: [facility] CATH;  Service: Cardiology;  Laterality: Left;    removal of skin cancer      L side of face    URETERAL EXPLORATION      exploration of the R ureter for ureteral injury [facility] - did well     Family History   Problem Relation Name Age of Onset    Hypertension Mother      Fibromyalgia Mother      Coronary artery disease Father      Diabetes Father      Liver disease Brother          hepatitis C from a blood transufusion    Colon cancer Neg Hx      Crohn's disease Neg Hx      Ulcerative colitis Neg Hx      Stomach cancer Neg Hx      Esophageal cancer Neg Hx      Glaucoma Neg Hx      Macular degeneration Neg Hx       Social History     Tobacco Use    Smoking status: Former     Current packs/day: 0.00     Types: Cigarettes     Quit date: 1991     Years since quittin.7     Passive exposure: Past    Smokeless tobacco: Never   Substance Use Topics    Alcohol use: No    Drug use: No        Review of Systems:  Review of Systems   Constitutional: Negative.  Negative for fatigue and fever.   HENT: Negative.     Eyes: Negative.    Respiratory: Negative.  Negative for shortness  of breath.    Cardiovascular: Negative.  Negative for chest pain.   Gastrointestinal:  Positive for constipation and diarrhea.   Endocrine: Negative.    Genitourinary: Negative.    Musculoskeletal: Negative.    Skin: Negative.    Allergic/Immunologic: Negative.    Neurological: Negative.    Hematological: Negative.    Psychiatric/Behavioral: Negative.            Objective     Vital Signs (Most Recent)  Temp: 97.6 °F (36.4 °C) (09/10/24 0832)  Pulse: 66 (09/10/24 0832)  BP: (!) 143/78 (09/10/24 0832)  6' (1.829 m)  103.6 kg (228 lb 6.3 oz)     Physical Exam:  Physical Exam  Constitutional:       General: He is not in acute distress.     Appearance: Normal appearance. He is not ill-appearing, toxic-appearing or diaphoretic.   HENT:      Head: Normocephalic.      Nose: Nose normal.   Eyes:      Conjunctiva/sclera: Conjunctivae normal.   Cardiovascular:      Rate and Rhythm: Normal rate and regular rhythm.   Pulmonary:      Effort: Pulmonary effort is normal.   Abdominal:      Palpations: Abdomen is soft.   Musculoskeletal:         General: Normal range of motion.      Cervical back: Normal range of motion.   Skin:     General: Skin is warm.   Neurological:      General: No focal deficit present.      Mental Status: He is alert.   Psychiatric:         Mood and Affect: Mood normal.         Laboratory  Mild historical elevation in bilirubin which seems consistent although normal on last assessment    Diagnostic Results:  CT scan was reviewed.  There appears to be layering sludge within the gallbladder.       Assessment and Plan   58-year-old male with likely gallstones/sludge.  This does not seem to be causing any symptoms.  Found incidentally during workup for constipation/diarrhea.    PLAN:  Risks versus benefits of cholecystectomy were discussed.  As patient is asymptomatic, he wishes to observe for now.  He will call the set up follow up with me as needed.  Concerning signs or symptoms that would warrant more urgent  evaluation were discussed.

## 2024-09-18 ENCOUNTER — OFFICE VISIT (OUTPATIENT)
Dept: CARDIOLOGY | Facility: CLINIC | Age: 59
End: 2024-09-18
Payer: MEDICARE

## 2024-09-18 VITALS
SYSTOLIC BLOOD PRESSURE: 154 MMHG | DIASTOLIC BLOOD PRESSURE: 87 MMHG | HEART RATE: 78 BPM | HEIGHT: 72 IN | BODY MASS INDEX: 30.81 KG/M2 | WEIGHT: 227.5 LBS

## 2024-09-18 DIAGNOSIS — E78.5 DYSLIPIDEMIA: ICD-10-CM

## 2024-09-18 DIAGNOSIS — I25.10 CORONARY ARTERY DISEASE INVOLVING NATIVE CORONARY ARTERY OF NATIVE HEART WITHOUT ANGINA PECTORIS: Primary | ICD-10-CM

## 2024-09-18 DIAGNOSIS — I27.20 PULMONARY HYPERTENSION: ICD-10-CM

## 2024-09-18 DIAGNOSIS — Z95.5 STENTED CORONARY ARTERY: ICD-10-CM

## 2024-09-18 DIAGNOSIS — I10 ESSENTIAL HYPERTENSION: ICD-10-CM

## 2024-09-18 PROCEDURE — 99214 OFFICE O/P EST MOD 30 MIN: CPT | Mod: S$GLB,,, | Performed by: INTERNAL MEDICINE

## 2024-09-18 PROCEDURE — 4010F ACE/ARB THERAPY RXD/TAKEN: CPT | Mod: CPTII,S$GLB,, | Performed by: INTERNAL MEDICINE

## 2024-09-18 PROCEDURE — 1159F MED LIST DOCD IN RCRD: CPT | Mod: CPTII,S$GLB,, | Performed by: INTERNAL MEDICINE

## 2024-09-18 PROCEDURE — 3077F SYST BP >= 140 MM HG: CPT | Mod: CPTII,S$GLB,, | Performed by: INTERNAL MEDICINE

## 2024-09-18 PROCEDURE — 3008F BODY MASS INDEX DOCD: CPT | Mod: CPTII,S$GLB,, | Performed by: INTERNAL MEDICINE

## 2024-09-18 PROCEDURE — 3044F HG A1C LEVEL LT 7.0%: CPT | Mod: CPTII,S$GLB,, | Performed by: INTERNAL MEDICINE

## 2024-09-18 PROCEDURE — 99999 PR PBB SHADOW E&M-EST. PATIENT-LVL III: CPT | Mod: PBBFAC,,, | Performed by: INTERNAL MEDICINE

## 2024-09-18 PROCEDURE — 1160F RVW MEDS BY RX/DR IN RCRD: CPT | Mod: CPTII,S$GLB,, | Performed by: INTERNAL MEDICINE

## 2024-09-18 PROCEDURE — 3079F DIAST BP 80-89 MM HG: CPT | Mod: CPTII,S$GLB,, | Performed by: INTERNAL MEDICINE

## 2024-09-18 NOTE — PROGRESS NOTES
Subjective:    Patient ID:  Cade Johnston is a 58 y.o. male who presents for follow-up of Coronary Artery Disease and Hypertension      HPI  He comes for follow up with no major problems, no chest pain, no shortness of breath.  His only concern is that sometimes his blood pressure gets low with some lightheadedness and dizziness as well as feeling tired and fatigued.    Review of Systems   Constitutional: Negative for decreased appetite, malaise/fatigue, weight gain and weight loss.   Cardiovascular:  Negative for chest pain, dyspnea on exertion, leg swelling, palpitations and syncope.   Respiratory:  Negative for cough and shortness of breath.    Gastrointestinal: Negative.    Neurological:  Negative for weakness.   All other systems reviewed and are negative.     Objective:      Physical Exam  Vitals and nursing note reviewed.   Constitutional:       Appearance: Normal appearance. He is well-developed.   HENT:      Head: Normocephalic.   Eyes:      Pupils: Pupils are equal, round, and reactive to light.   Neck:      Thyroid: No thyromegaly.      Vascular: No carotid bruit or JVD.   Cardiovascular:      Rate and Rhythm: Normal rate and regular rhythm.      Chest Wall: PMI is not displaced.      Pulses: Normal pulses and intact distal pulses.      Heart sounds: Normal heart sounds. No murmur heard.     No gallop.   Pulmonary:      Effort: Pulmonary effort is normal.      Breath sounds: Normal breath sounds.   Abdominal:      Palpations: Abdomen is soft. There is no mass.      Tenderness: There is no abdominal tenderness.   Musculoskeletal:         General: Normal range of motion.      Cervical back: Normal range of motion and neck supple.   Skin:     General: Skin is warm.   Neurological:      Mental Status: He is alert and oriented to person, place, and time.      Sensory: No sensory deficit.      Deep Tendon Reflexes: Reflexes are normal and symmetric.         Most Recent EKG Results  Results for orders placed or  performed during the hospital encounter of 08/16/22   EKG 12-lead    Collection Time: 08/16/22  6:42 AM    Narrative    Test Reason : R94.39,I25.10,    Vent. Rate : 064 BPM     Atrial Rate : 064 BPM     P-R Int : 186 ms          QRS Dur : 094 ms      QT Int : 394 ms       P-R-T Axes : -02 -13 045 degrees     QTc Int : 406 ms    Normal sinus rhythm  Normal ECG  When compared with ECG of 22-JUL-2022 08:59,  Previous ECG has undetermined rhythm, needs review  Questionable change in The axis  Confirmed by Jesus Gray MD ([id number]) on 8/21/2022 11:14:17 AM    Referred By: NICK MORA MD           Confirmed By:Jesus Gray MD       Most Recent Echocardiogram Results  Results for orders placed during the hospital encounter of 12/07/21    Cardiac MRI Morphology    Narrative  Table formatting from the original result was not included.        Cardiovascular Magnetic Resonance Study Report    Patient Name:  [facility] Institution:  [facility]  Patient Sex:  M Study Date:  2021-12-07  YOB: 1965 Study Description:  MRI CARDIAC MORPHOLOGY FUNCTION W WO  Age:  56 Y. AccessionNumber:  [id number]  Patient ID:  [id number] Version:  5.11.4  Custom Patient ID:   Referring Physician:  GUANAKO LOPEZ  Height:  185 cm  Weight:  104.8 kg  BSA:  2.29 m²([place])          Indication: Sarcoid      Procedure:  1. T1 weighted dark blood without contrast  2. Cine SSFP  3. Phase Contrast CMR flow measurements  4. Late gadolinium enhancement utilizing 20cc of Gadolinium ([place])  5. Perfusion  6. Dark Blood with and without fat saturation  7. Mapping      Study Quality:    Rhythm and HR: regular and 70s    Breathhold quality:    Interpretation:  Left Ventricle:  The left ventricular volumes are normal. There are no regional wall motion abnormalities seen. The calculated LVEF is 53%.    LVEDV: 130cc  LVEDV:  57cc/m2 for BSA (nl 53-97)  LVESV:   62cc  LVESV:   27cc/m2 for BSA (nl 10-34)  LVEF: 53%  LV mass:  49g/m2 (nl 42-78)  Stroke volume:  69cc by ventricular trace  Stroke volume:   47cc by Q-flow analysis  Regurgitant volume:  1cc by Q-flow analysis  Total volume through the aortic valve: 48cc by Q-flow analysis    Right Ventricle:  The right ventricular volumes are normal.  The calculated RVEF is 54%.    RVEDV: 125cc  RVEDV:  54cc/m2 for BSA (nl )  RVESV:  58cc  RVESV:   25cc/m2 for BSA (nl 20-48)  RVEF: 54%  Stroke volume:   67cc by ventricular trace  Stroke volume:   48cc by Q-flow analysis  Regurgitant volume:  3cc by Q-flow analysis  Total volume through the pulmonary valve: 51cc by Q-flow analysis    Atria:  a.  Right:  The right atrium is normal in size with an area of  16cm2.  b.  Left:  The left atrium is normal in size. Volume measures  18cc/m2 by area length method.    Cardiac Valves:  a.  Mitral:  The mitral valve is structurally normal.  b.  Aortic:  The aortic valve is structurally normal  c.  Pulmonic:  The pulmonic valve is structurally normal  d.  Tricuspid:  The tricuspid valve is structurally normal.    Pulmonic vasculature  Axial   RPA:  23mm  LPA:  16mm  MPA: 29mm    Aorta  Axial   Ascending aorta: 26mm  Descending aorta: 23mm  Aortic arch:  Left sided    Cine 3 chamber and paracoronal  LVOT: 23mm  Sinus of Valsalva: 31mm  Sinotubular junction: 24mm    Mapping  T1 map : 1000 ± 58  T2 map: 44 ± 5  T2 star Map: 30.92 ± 1.68 ms R²=0.983 [Fe] = 0.68 ± 0.05 mg/g dw.  msec  Please note that clinically important iron loading is defined by T2*<20ms and severe cardiac iron loading is considered present if the T2* <10ms    Late hyperenhancement: None    Pericardium  Effusion: Trivial      Conclusion:  1. LV volumes are normal. LV mass is normal. LVEF = 53%.  2. RV volumes are normal. RVEF = 54%.  3. No LGE appreciated.  4. No CMR findings consistent with cardiac sarcoid appreciated.      Most Recent Nuclear Stress Test Results  Results for orders placed during the hospital encounter of  07/22/22    Nuclear Stress - Cardiology Interpreted    Interpretation Summary    Abnormal myocardial perfusion scan.    There is a moderate intensity, moderate sized, reversible perfusion abnormality that is consistent with ischemia in the inferior wall(s).    There are no other significant perfusion abnormalities.    The gated perfusion images showed an ejection fraction of 69% at rest.    There is normal wall motion at rest.    The EKG portion of this study is negative for ischemia.    The patient reported chest pain during the stress test.    When compared to the previous study from 9/10/2019, some ischemia appreciated.      Most Recent Cardiac PET Stress Test Results  No results found for this or any previous visit.      Most Recent Cardiovascular Angiogram results  Results for orders placed during the hospital encounter of 08/16/22    Cardiac catheterization    Conclusion  · Nonobstructive coronary artery disease is a widely patent stent in the LAD  · The left ventricular systolic function was normal.  · The left ventricular end diastolic pressure was elevated.    The procedure log was documented by Documenter: RT Pamella and verified by Refugio Mcdonough MD.    Date: 8/16/2022  Time: 8:33 AM      Other Most Recent Cardiology Results  Results for orders placed in visit on 11/12/19    CV Exercise HORACIO    Interpretation Summary  · Normal HORACIO on the right leg 1.2  · Elevated HORACIO on the left leg 1.55      Labs reviewed    Assessment:       1. Coronary artery disease involving native coronary artery of native heart without angina pectoris    2. Stented coronary artery    3. Dyslipidemia    4. Essential hypertension    5. Pulmonary hypertension         Plan:   Cut losartan in half if necessary  Continue:  Antiplatelet, ARB, and Calcium blocker  Regular exercise program  Weight loss  Low cholesterol diet      Follow-up in 9 months with Delia Coffey

## 2024-09-27 ENCOUNTER — OFFICE VISIT (OUTPATIENT)
Dept: FAMILY MEDICINE | Facility: CLINIC | Age: 59
End: 2024-09-27
Payer: MEDICARE

## 2024-09-27 ENCOUNTER — LAB VISIT (OUTPATIENT)
Dept: LAB | Facility: HOSPITAL | Age: 59
End: 2024-09-27
Attending: INTERNAL MEDICINE
Payer: MEDICARE

## 2024-09-27 VITALS
HEIGHT: 72 IN | BODY MASS INDEX: 30.99 KG/M2 | OXYGEN SATURATION: 95 % | WEIGHT: 228.81 LBS | SYSTOLIC BLOOD PRESSURE: 122 MMHG | DIASTOLIC BLOOD PRESSURE: 80 MMHG | HEART RATE: 74 BPM

## 2024-09-27 DIAGNOSIS — E11.40 TYPE 2 DIABETES MELLITUS WITH DIABETIC NEUROPATHY, WITHOUT LONG-TERM CURRENT USE OF INSULIN: ICD-10-CM

## 2024-09-27 DIAGNOSIS — Z12.5 ENCOUNTER FOR SCREENING FOR MALIGNANT NEOPLASM OF PROSTATE: ICD-10-CM

## 2024-09-27 DIAGNOSIS — I10 ESSENTIAL HYPERTENSION: ICD-10-CM

## 2024-09-27 DIAGNOSIS — E11.40 TYPE 2 DIABETES MELLITUS WITH DIABETIC NEUROPATHY, WITHOUT LONG-TERM CURRENT USE OF INSULIN: Primary | ICD-10-CM

## 2024-09-27 LAB
ALBUMIN SERPL BCP-MCNC: 4 G/DL (ref 3.5–5.2)
ALBUMIN/CREAT UR: 38.8 UG/MG (ref 0–30)
ALP SERPL-CCNC: 123 U/L (ref 55–135)
ALT SERPL W/O P-5'-P-CCNC: 36 U/L (ref 10–44)
ANION GAP SERPL CALC-SCNC: 17 MMOL/L (ref 8–16)
AST SERPL-CCNC: 29 U/L (ref 10–40)
BASOPHILS # BLD AUTO: 0.1 K/UL (ref 0–0.2)
BASOPHILS NFR BLD: 1.5 % (ref 0–1.9)
BILIRUB SERPL-MCNC: 1.1 MG/DL (ref 0.1–1)
BUN SERPL-MCNC: 15 MG/DL (ref 6–20)
CALCIUM SERPL-MCNC: 9.7 MG/DL (ref 8.7–10.5)
CHLORIDE SERPL-SCNC: 103 MMOL/L (ref 95–110)
CHOLEST SERPL-MCNC: 159 MG/DL (ref 120–199)
CHOLEST/HDLC SERPL: 6.1 {RATIO} (ref 2–5)
CO2 SERPL-SCNC: 22 MMOL/L (ref 23–29)
COMPLEXED PSA SERPL-MCNC: 1.3 NG/ML (ref 0–4)
CREAT SERPL-MCNC: 1.1 MG/DL (ref 0.5–1.4)
CREAT UR-MCNC: 98 MG/DL (ref 23–375)
DIFFERENTIAL METHOD BLD: ABNORMAL
EOSINOPHIL # BLD AUTO: 0.1 K/UL (ref 0–0.5)
EOSINOPHIL NFR BLD: 1.9 % (ref 0–8)
ERYTHROCYTE [DISTWIDTH] IN BLOOD BY AUTOMATED COUNT: 13.2 % (ref 11.5–14.5)
EST. GFR  (NO RACE VARIABLE): >60 ML/MIN/1.73 M^2
ESTIMATED AVG GLUCOSE: 189 MG/DL (ref 68–131)
GLUCOSE SERPL-MCNC: 254 MG/DL (ref 70–110)
HBA1C MFR BLD: 8.2 % (ref 4–5.6)
HCT VFR BLD AUTO: 49.2 % (ref 40–54)
HDLC SERPL-MCNC: 26 MG/DL (ref 40–75)
HDLC SERPL: 16.4 % (ref 20–50)
HGB BLD-MCNC: 16.8 G/DL (ref 14–18)
IMM GRANULOCYTES # BLD AUTO: 0.1 K/UL (ref 0–0.04)
IMM GRANULOCYTES NFR BLD AUTO: 1.5 % (ref 0–0.5)
LDLC SERPL CALC-MCNC: ABNORMAL MG/DL (ref 63–159)
LYMPHOCYTES # BLD AUTO: 1 K/UL (ref 1–4.8)
LYMPHOCYTES NFR BLD: 15.1 % (ref 18–48)
MCH RBC QN AUTO: 29.1 PG (ref 27–31)
MCHC RBC AUTO-ENTMCNC: 34.1 G/DL (ref 32–36)
MCV RBC AUTO: 85 FL (ref 82–98)
MICROALBUMIN UR DL<=1MG/L-MCNC: 38 UG/ML
MONOCYTES # BLD AUTO: 0.5 K/UL (ref 0.3–1)
MONOCYTES NFR BLD: 7.1 % (ref 4–15)
NEUTROPHILS # BLD AUTO: 5 K/UL (ref 1.8–7.7)
NEUTROPHILS NFR BLD: 72.9 % (ref 38–73)
NONHDLC SERPL-MCNC: 133 MG/DL
NRBC BLD-RTO: 0 /100 WBC
PLATELET # BLD AUTO: 189 K/UL (ref 150–450)
PMV BLD AUTO: 10.1 FL (ref 9.2–12.9)
POTASSIUM SERPL-SCNC: 4.2 MMOL/L (ref 3.5–5.1)
PROT SERPL-MCNC: 7.6 G/DL (ref 6–8.4)
RBC # BLD AUTO: 5.77 M/UL (ref 4.6–6.2)
SODIUM SERPL-SCNC: 142 MMOL/L (ref 136–145)
TRIGL SERPL-MCNC: 546 MG/DL (ref 30–150)
WBC # BLD AUTO: 6.88 K/UL (ref 3.9–12.7)

## 2024-09-27 PROCEDURE — 84153 ASSAY OF PSA TOTAL: CPT | Performed by: NURSE PRACTITIONER

## 2024-09-27 PROCEDURE — 80061 LIPID PANEL: CPT | Performed by: NURSE PRACTITIONER

## 2024-09-27 PROCEDURE — 99999 PR PBB SHADOW E&M-EST. PATIENT-LVL III: CPT | Mod: PBBFAC,,, | Performed by: PHYSICIAN ASSISTANT

## 2024-09-27 PROCEDURE — 3079F DIAST BP 80-89 MM HG: CPT | Mod: CPTII,S$GLB,, | Performed by: PHYSICIAN ASSISTANT

## 2024-09-27 PROCEDURE — 3008F BODY MASS INDEX DOCD: CPT | Mod: CPTII,S$GLB,, | Performed by: PHYSICIAN ASSISTANT

## 2024-09-27 PROCEDURE — 3074F SYST BP LT 130 MM HG: CPT | Mod: CPTII,S$GLB,, | Performed by: PHYSICIAN ASSISTANT

## 2024-09-27 PROCEDURE — 4010F ACE/ARB THERAPY RXD/TAKEN: CPT | Mod: CPTII,S$GLB,, | Performed by: PHYSICIAN ASSISTANT

## 2024-09-27 PROCEDURE — 3052F HG A1C>EQUAL 8.0%<EQUAL 9.0%: CPT | Mod: CPTII,S$GLB,, | Performed by: PHYSICIAN ASSISTANT

## 2024-09-27 PROCEDURE — 85025 COMPLETE CBC W/AUTO DIFF WBC: CPT | Mod: PO | Performed by: NURSE PRACTITIONER

## 2024-09-27 PROCEDURE — 80053 COMPREHEN METABOLIC PANEL: CPT | Mod: PO | Performed by: PHYSICIAN ASSISTANT

## 2024-09-27 PROCEDURE — 82570 ASSAY OF URINE CREATININE: CPT | Performed by: NURSE PRACTITIONER

## 2024-09-27 PROCEDURE — 36415 COLL VENOUS BLD VENIPUNCTURE: CPT | Mod: PO | Performed by: NURSE PRACTITIONER

## 2024-09-27 PROCEDURE — 3060F POS MICROALBUMINURIA REV: CPT | Mod: CPTII,S$GLB,, | Performed by: PHYSICIAN ASSISTANT

## 2024-09-27 PROCEDURE — 82043 UR ALBUMIN QUANTITATIVE: CPT | Performed by: NURSE PRACTITIONER

## 2024-09-27 PROCEDURE — 3066F NEPHROPATHY DOC TX: CPT | Mod: CPTII,S$GLB,, | Performed by: PHYSICIAN ASSISTANT

## 2024-09-27 PROCEDURE — 83036 HEMOGLOBIN GLYCOSYLATED A1C: CPT | Performed by: NURSE PRACTITIONER

## 2024-09-27 PROCEDURE — 99214 OFFICE O/P EST MOD 30 MIN: CPT | Mod: S$GLB,,, | Performed by: PHYSICIAN ASSISTANT

## 2024-09-27 RX ORDER — TIRZEPATIDE 2.5 MG/.5ML
2.5 INJECTION, SOLUTION SUBCUTANEOUS
Qty: 2 ML | Refills: 5 | Status: SHIPPED | OUTPATIENT
Start: 2024-09-27

## 2024-09-27 NOTE — PROGRESS NOTES
Subjective:      Patient ID: Cade Johnston is a 58 y.o. male.    Chief Complaint: Diabetes    Patient is new to me.    HPI  Patient has PMH of sarcoidosis of lung, centrilobular emphysema, HTN, dyslipidemia, pulmonary HTN, CAD with stent, ED, Type 2 DM, colon polyps, and sleep apnea.    Patient reports glipizide and metformin have given him severe side GI effects in the past.  Not taking any medication for this at this time.    Patient reports that his fasting blood sugars have been in the 200s in the mornings for a month or more.  Lab Results   Component Value Date    HGBA1C 8.2 (H) 09/27/2024      Wife has cancer, lost both of his parents this year.    Review of Systems   Eyes:  Positive for visual disturbance (sometimes).   Respiratory:  Positive for shortness of breath (baseline).    Cardiovascular:  Negative for chest pain.   Gastrointestinal:  Positive for abdominal distention. Negative for abdominal pain, blood in stool, constipation, diarrhea, nausea and vomiting.   Neurological:  Positive for light-headedness (sometimes).       Objective:   /80   Pulse 74   Ht 6' (1.829 m)   Wt 103.8 kg (228 lb 13.4 oz)   SpO2 95%   BMI 31.04 kg/m²     Physical Exam  Vitals reviewed.   Constitutional:       Appearance: Normal appearance. He is well-developed.   HENT:      Head: Normocephalic and atraumatic.      Right Ear: External ear normal.      Left Ear: External ear normal.   Eyes:      Conjunctiva/sclera: Conjunctivae normal.   Cardiovascular:      Rate and Rhythm: Normal rate and regular rhythm.      Heart sounds: Normal heart sounds. No murmur heard.     No friction rub. No gallop.   Pulmonary:      Effort: Pulmonary effort is normal. No respiratory distress.      Breath sounds: Normal breath sounds. No wheezing, rhonchi or rales.   Abdominal:      Palpations: Abdomen is soft.      Tenderness: There is generalized abdominal tenderness.   Musculoskeletal:         General: Normal range of motion.   Skin:      General: Skin is warm and dry.      Findings: No rash.   Neurological:      General: No focal deficit present.      Mental Status: He is alert and oriented to person, place, and time.   Psychiatric:         Mood and Affect: Mood normal.         Behavior: Behavior normal.         Judgment: Judgment normal.       Assessment:      1. Type 2 diabetes mellitus with diabetic neuropathy, without long-term current use of insulin       Plan:   1. Type 2 diabetes mellitus with diabetic neuropathy, without long-term current use of insulin (Primary)  Discussed possible GI side effects with patient.  - tirzepatide (MOUNJARO) 2.5 mg/0.5 mL PnIj; Inject 2.5 mg into the skin every 7 days.  Dispense: 2 mL; Refill: 5  - Comprehensive Metabolic Panel; Future    Follow up in one month with SUMEET Duenas.  Patient agreed with plan and expressed understanding.  I spent 30 minutes on this encounter, time includes face-to-face, chart review, documentation, test review and orders.    Thank you for allowing me to serve you,

## 2024-10-04 ENCOUNTER — PATIENT MESSAGE (OUTPATIENT)
Dept: FAMILY MEDICINE | Facility: CLINIC | Age: 59
End: 2024-10-04
Payer: MEDICARE

## 2024-10-04 ENCOUNTER — OFFICE VISIT (OUTPATIENT)
Dept: SURGERY | Facility: CLINIC | Age: 59
End: 2024-10-04
Payer: MEDICARE

## 2024-10-04 VITALS
TEMPERATURE: 97 F | WEIGHT: 227.31 LBS | HEART RATE: 73 BPM | OXYGEN SATURATION: 97 % | RESPIRATION RATE: 16 BRPM | BODY MASS INDEX: 30.79 KG/M2 | DIASTOLIC BLOOD PRESSURE: 80 MMHG | HEIGHT: 72 IN | SYSTOLIC BLOOD PRESSURE: 136 MMHG

## 2024-10-04 DIAGNOSIS — L29.0 PRURITUS ANI: Primary | ICD-10-CM

## 2024-10-04 DIAGNOSIS — K59.00 CONSTIPATION, UNSPECIFIED CONSTIPATION TYPE: ICD-10-CM

## 2024-10-04 DIAGNOSIS — R15.0 INCOMPLETE DEFECATION: ICD-10-CM

## 2024-10-04 PROCEDURE — 99999 PR PBB SHADOW E&M-EST. PATIENT-LVL IV: CPT | Mod: PBBFAC,,, | Performed by: COLON & RECTAL SURGERY

## 2024-10-04 RX ORDER — ASPIRIN/ACETAMINOPHEN/CAFFEINE 250-250-65
TABLET ORAL
COMMUNITY
End: 2024-10-04 | Stop reason: ALTCHOICE

## 2024-10-04 RX ORDER — BROMPHENIRAMINE MALEATE, DEXTROMETHORPHAN HBR, PHENYLEPHRINE HCL, DIPHENHYDRAMINE HCL, PHENYLEPHRINE HCL 0.52G
0.52 KIT ORAL DAILY
COMMUNITY

## 2024-10-04 NOTE — PROGRESS NOTES
PI:  Cade Johnston is a 58 y.o. male with history of constipation, anal irritation due to fecal smearing, incomplete defecation    Feels much better.  Using Metamucil daily.  BMs regular, formed, no straining, easy to clean          Past Medical History:   Diagnosis Date    Anticoagulant long-term use     Cancer of skin of left ear     Colon polyp     Coronary artery disease, nonobstructive     DJD (degenerative joint disease) of lumbar spine     DM type 2 (diabetes mellitus, type 2)     with diet alone    Dyslipidemia 08/13/2019    Essential hypertension 12/29/2015    Fatty liver     GERD (gastroesophageal reflux disease)     Hepatosplenomegaly     HTN (hypertension)     Hyperlipidemia     resolved with diet    Hypogonadism male 02/17/2015    Lumbar radiculopathy     Lumbar spondylosis     Nephrolithiasis     last stone 1/11    VÍCTOR on CPAP     Palpitations 03/09/2016    Sarcoidosis of lung 1990    Ventricular hypokinesis     angiogram with mild LAD stenosis        Past Surgical History:   Procedure Laterality Date    ANGIOGRAM, CORONARY, WITH LEFT HEART CATHETERIZATION N/A 08/16/2022    Procedure: Angiogram, Coronary, with Left Heart Cath;  Surgeon: Refugio Mcdonough MD;  Location: [facility] CATH;  Service: Cardiology;  Laterality: N/A;    CARDIAC SURGERY      CIRCUMCISION      COLONOSCOPY N/A 01/08/2024    Procedure: COLONOSCOPY;  Surgeon: HALLEY Montes MD;  Location: [facility] ENDO;  Service: Endoscopy;  Laterality: N/A;    CORONARY ANGIOGRAPHY N/A 12/13/2019    Procedure: ANGIOGRAM, CORONARY ARTERY;  Surgeon: Amador Duran MD;  Location: [facility] CATH;  Service: Cardiology;  Laterality: N/A;    CORONARY STENT PLACEMENT  12/2019    x1    CYST REMOVAL Right 05/2022    thigh, Dr. Parkinson    elbow spur removal      right    EPIDURAL STEROID INJECTION INTO LUMBAR SPINE N/A 02/10/2021    Procedure: Injection-steroid-epidural-lumbar L4/5;  Surgeon: Richi Rangel MD;  Location: [facility] OR;  Service: Pain Management;   Laterality: N/A;    EPIDURAL STEROID INJECTION INTO LUMBAR SPINE N/A 03/15/2021    Procedure: Injection-steroid-epidural-lumbar L5/S1;  Surgeon: Richi Rangel MD;  Location: [facility] OR;  Service: Pain Management;  Laterality: N/A;    EPIDURAL STEROID INJECTION INTO LUMBAR SPINE N/A 10/16/2023    Procedure: Injection-steroid-epidural-lumbar;  Surgeon: Richi Rangel MD;  Location: [facility] OR;  Service: Pain Management;  Laterality: N/A;  L5/S1    EXTRACORPOREAL SHOCK WAVE LITHOTRIPSY      [facility]    LEFT HEART CATHETERIZATION Left 2019    Procedure: Left heart cath;  Surgeon: Amador Duran MD;  Location: [facility] CATH;  Service: Cardiology;  Laterality: Left;    removal of skin cancer      L side of face    URETERAL EXPLORATION      exploration of the R ureter for ureteral injury [facility] - did well       Review of patient's allergies indicates:   Allergen Reactions    Metformin      Severe abdominal bloating; diarrhea    Codeine Hives    Latex Rash     blisters       Family History   Problem Relation Name Age of Onset    Hypertension Mother      Fibromyalgia Mother      Coronary artery disease Father      Diabetes Father      Liver disease Brother          hepatitis C from a blood transufusion    Colon cancer Neg Hx      Crohn's disease Neg Hx      Ulcerative colitis Neg Hx      Stomach cancer Neg Hx      Esophageal cancer Neg Hx      Glaucoma Neg Hx      Macular degeneration Neg Hx         Social History     Socioeconomic History    Marital status:     Number of children: 2   Occupational History    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     Employer: [employer]   Tobacco Use    Smoking status: Former     Current packs/day: 0.00     Types: Cigarettes     Quit date: 1991     Years since quittin.8     Passive exposure: Past    Smokeless tobacco: Never   Substance and Sexual Activity    Alcohol use: No    Drug use: No   Social History  Narrative    ** Merged History Encounter **          Social Drivers of Health     Financial Resource Strain: Low Risk  (5/8/2024)    Overall Financial Resource Strain (CARDIA)     Difficulty of Paying Living Expenses: Not hard at all   Food Insecurity: No Food Insecurity (5/8/2024)    Hunger Vital Sign     Worried About Running Out of Food in the Last Year: Never true     Ran Out of Food in the Last Year: Never true   Transportation Needs: No Transportation Needs (5/8/2024)    PRAPARE - Transportation     Lack of Transportation (Medical): No     Lack of Transportation (Non-Medical): No   Physical Activity: Sufficiently Active (5/8/2024)    Exercise Vital Sign     Days of Exercise per Week: 3 days     Minutes of Exercise per Session: 60 min   Stress: Stress Concern Present (5/8/2024)    [employer] - Occupational Stress Questionnaire     Feeling of Stress : Rather much   Housing Stability: Low Risk  (5/8/2024)    Housing Stability Vital Sign     Unable to Pay for Housing in the Last Year: No     Homeless in the Last Year: No       ROS:  GENERAL: No fever, chills, fatigability or weight loss.  Integument: No rashes, redness, icterus  CHEST: Denies KOROMA, cyanosis, wheezing, cough and sputum production.  CARDIOVASCULAR: Denies chest pain, PND, orthopnea or reduced exercise tolerance.  GI: Denies abd pain, dysphagia, nausea, vomiting, no hematemesis   : Denies burning on urination, no hematuria, no bacteriuria  MSK: No deformities, swelling, joint pain swelling  Neurologic: No HAs, seizures, weakness, paresthesias, gait problems    PE:  General appearance in NAD  /80 (BP Location: Left arm, Patient Position: Sitting)   Pulse 73   Temp 97.2 °F (36.2 °C) (Temporal)   Resp 16   Ht 6' (1.829 m)   Wt 103.1 kg (227 lb 4.7 oz)   SpO2 97%   BMI 30.83 kg/m²   Sclera/ Skin anicteric  AT NC EOMI  Neck supple trachea midline   Chest symmetric, nl excursion, no retractions, breathing  comfortably  EXT - no CCE  Neuro:  Mood/ affect nl, alert and oriented x 3, moves all ext's, gait nl        Assessment:  Constipation, fecal smear, incomplete defecation improved  Anal irritation, discomfort, pruritus - resolved    Plan:  High fiber diet - 25 grams per day.  Emphasis on whole grain breads such as double fiber wheat bread and high fiber cereals and bars.    Drink 8 glasses of water per day 64 - 96 oz per day  Fiber supplements such as KONSYL, METAMUCIL can be taken 1-2 x per day  Regular exercise - 30 min brisk walking 5 days per week  RTC prn

## 2024-10-15 ENCOUNTER — OFFICE VISIT (OUTPATIENT)
Dept: SURGERY | Facility: CLINIC | Age: 59
End: 2024-10-15
Payer: MEDICARE

## 2024-10-15 ENCOUNTER — TELEPHONE (OUTPATIENT)
Dept: FAMILY MEDICINE | Facility: CLINIC | Age: 59
End: 2024-10-15
Payer: MEDICARE

## 2024-10-15 VITALS
BODY MASS INDEX: 30.64 KG/M2 | TEMPERATURE: 98 F | WEIGHT: 226.19 LBS | SYSTOLIC BLOOD PRESSURE: 116 MMHG | DIASTOLIC BLOOD PRESSURE: 56 MMHG | HEART RATE: 83 BPM | HEIGHT: 72 IN

## 2024-10-15 DIAGNOSIS — Z01.810 PREOP CARDIOVASCULAR EXAM: ICD-10-CM

## 2024-10-15 DIAGNOSIS — K80.20 CALCULUS OF GALLBLADDER WITHOUT CHOLECYSTITIS WITHOUT OBSTRUCTION: Primary | ICD-10-CM

## 2024-10-15 DIAGNOSIS — K59.04 CHRONIC IDIOPATHIC CONSTIPATION: ICD-10-CM

## 2024-10-15 PROCEDURE — 99999 PR PBB SHADOW E&M-EST. PATIENT-LVL IV: CPT | Mod: PBBFAC,,, | Performed by: STUDENT IN AN ORGANIZED HEALTH CARE EDUCATION/TRAINING PROGRAM

## 2024-10-15 RX ORDER — SODIUM CHLORIDE 9 MG/ML
INJECTION, SOLUTION INTRAVENOUS CONTINUOUS
OUTPATIENT
Start: 2024-10-15

## 2024-10-15 RX ORDER — LUBIPROSTONE 8 UG/1
8 CAPSULE ORAL 2 TIMES DAILY WITH MEALS
Qty: 180 CAPSULE | Refills: 2 | Status: SHIPPED | OUTPATIENT
Start: 2024-10-15 | End: 2025-07-12

## 2024-10-15 RX ORDER — CEFAZOLIN SODIUM 2 G/50ML
2 SOLUTION INTRAVENOUS
OUTPATIENT
Start: 2024-10-15

## 2024-10-15 NOTE — PROGRESS NOTES
History & Physical    Subjective     History of Present Illness:  Patient is a 58 y.o. male presents with intermittent constipation and diarrhea which he attributes to changes in his diabetic medication regimen.  During workup, he had imaging which was concerning for possible gallstones/sludge.  Patient denies postprandial right upper quadrant abdominal pain.    Interval history:   Patient was seen in the ER yesterday for right upper quadrant abdominal pain attributed to gallstones.  Symptoms have since improved.  Patient has a remote history of cardiac stenting in his on Plavix.  He also takes Mounjaro.    Chief Complaint   Patient presents with    Cholelithiasis     F/U ER Gallbladder       Review of patient's allergies indicates:   Allergen Reactions    Metformin      Severe abdominal bloating; diarrhea    Codeine Hives    Latex Rash     blisters       Current Outpatient Medications   Medication Sig Dispense Refill    albuterol (PROVENTIL/VENTOLIN HFA) 90 mcg/actuation inhaler Inhale 2 puffs into the lungs every 6 (six) hours as needed for Wheezing. Rescue 1 Inhaler 2    amoxicillin-clavulanate 875-125mg (AUGMENTIN) 875-125 mg per tablet Take 1 tablet by mouth 2 (two) times daily. for 7 days 14 tablet 0    clopidogreL (PLAVIX) 75 mg tablet TAKE 1 TABLET BY MOUTH EVERY DAY 90 tablet 4    ibuprofen (ADVIL,MOTRIN) 200 MG tablet Take 200 mg by mouth every 6 (six) hours as needed for Pain.      ipratropium-albuteroL (COMBIVENT)  mcg/actuation inhaler Inhale 1 puff into the lungs every 4 (four) hours as needed for Shortness of Breath. Rescue 4 g 4    Lactobacillus acidophilus (PROBIOTIC ACIDOPHILUS ORAL) Take by mouth.      losartan (COZAAR) 100 MG tablet Take 1 tablet (100 mg total) by mouth once daily. 30 tablet 11    lubiprostone (AMITIZA) 8 MCG Cap TAKE 1 CAPSULE BY MOUTH 2 TIMES DAILY. 60 capsule 2    ondansetron (ZOFRAN-ODT) 4 MG TbDL Take 1 tablet (4 mg total) by mouth every 6 (six) hours as needed. 20  tablet 0    oxyCODONE-acetaminophen (PERCOCET) 5-325 mg per tablet Take 1 tablet by mouth every 8 (eight) hours as needed (Severe pain). 12 tablet 0    psyllium 0.52 gram capsule Take 0.52 g by mouth once daily.      tirzepatide (MOUNJARO) 2.5 mg/0.5 mL PnIj Inject 2.5 mg into the skin every 7 days. 2 mL 5    verapamiL (VERELAN) 120 MG C24P TAKE 1 CAPSULE BY MOUTH ONCE DAILY 90 capsule 4    blood-glucose meter kit To check BG BID , to use with insurance preferred meter (Patient not taking: Reported on 10/15/2024) 1 each 0    fluticasone-salmeterol diskus inhaler 250-50 mcg Inhale 1 puff into the lungs 2 (two) times daily. Controller (Patient not taking: Reported on 10/15/2024) 1 each 5    hydrocortisone 2.5 % cream Apply topically 2 (two) times daily. (Patient not taking: Reported on 10/15/2024) 28 g 0    lancets Misc To check BG BID, to use with insurance preferred meter (Patient not taking: Reported on 10/15/2024) 100 each 3    ONETOUCH VERIO TEST STRIPS Strp TO CHECK BLOOD GLUCOSE TWICE A DAY (Patient not taking: Reported on 10/15/2024) 100 strip 3     No current facility-administered medications for this visit.       Past Medical History:   Diagnosis Date    Anticoagulant long-term use     Cancer of skin of left ear     Colon polyp     Coronary artery disease, nonobstructive     DJD (degenerative joint disease) of lumbar spine     DM type 2 (diabetes mellitus, type 2)     with diet alone    Dyslipidemia 08/13/2019    Essential hypertension 12/29/2015    Fatty liver     GERD (gastroesophageal reflux disease)     Hepatosplenomegaly     HTN (hypertension)     Hyperlipidemia     resolved with diet    Hypogonadism male 02/17/2015    Lumbar radiculopathy     Lumbar spondylosis     Nephrolithiasis     last stone 1/11    VÍCTOR on CPAP     Palpitations 03/09/2016    Sarcoidosis of lung 1990    Ventricular hypokinesis     angiogram with mild LAD stenosis     Past Surgical History:   Procedure Laterality Date    ANGIOGRAM,  CORONARY, WITH LEFT HEART CATHETERIZATION N/A 08/16/2022    Procedure: Angiogram, Coronary, with Left Heart Cath;  Surgeon: Refugio Mcdonough MD;  Location: [facility] CATH;  Service: Cardiology;  Laterality: N/A;    CARDIAC SURGERY      CIRCUMCISION      COLONOSCOPY N/A 01/08/2024    Procedure: COLONOSCOPY;  Surgeon: HALLEY Montes MD;  Location: [facility] ENDO;  Service: Endoscopy;  Laterality: N/A;    CORONARY ANGIOGRAPHY N/A 12/13/2019    Procedure: ANGIOGRAM, CORONARY ARTERY;  Surgeon: Amador Duran MD;  Location: [facility] CATH;  Service: Cardiology;  Laterality: N/A;    CORONARY STENT PLACEMENT  12/2019    x1    CYST REMOVAL Right 05/2022    thigh, Dr. Parkinson    elbow spur removal      right    EPIDURAL STEROID INJECTION INTO LUMBAR SPINE N/A 02/10/2021    Procedure: Injection-steroid-epidural-lumbar L4/5;  Surgeon: Richi Rangel MD;  Location: [facility] OR;  Service: Pain Management;  Laterality: N/A;    EPIDURAL STEROID INJECTION INTO LUMBAR SPINE N/A 03/15/2021    Procedure: Injection-steroid-epidural-lumbar L5/S1;  Surgeon: Richi Rangel MD;  Location: [facility] OR;  Service: Pain Management;  Laterality: N/A;    EPIDURAL STEROID INJECTION INTO LUMBAR SPINE N/A 10/16/2023    Procedure: Injection-steroid-epidural-lumbar;  Surgeon: Richi Rangel MD;  Location: [facility] OR;  Service: Pain Management;  Laterality: N/A;  L5/S1    EXTRACORPOREAL SHOCK WAVE LITHOTRIPSY  1996    [facility]    LEFT HEART CATHETERIZATION Left 12/13/2019    Procedure: Left heart cath;  Surgeon: Amador Duran MD;  Location: [facility] CATH;  Service: Cardiology;  Laterality: Left;    removal of skin cancer      L side of face    URETERAL EXPLORATION  1996    exploration of the R ureter for ureteral injury [facility] - did well     Family History   Problem Relation Name Age of Onset    Hypertension Mother      Fibromyalgia Mother      Coronary artery disease Father      Diabetes Father      Liver disease Brother           hepatitis C from a blood transufusion    Colon cancer Neg Hx      Crohn's disease Neg Hx      Ulcerative colitis Neg Hx      Stomach cancer Neg Hx      Esophageal cancer Neg Hx      Glaucoma Neg Hx      Macular degeneration Neg Hx       Social History     Tobacco Use    Smoking status: Former     Current packs/day: 0.00     Types: Cigarettes     Quit date: 1991     Years since quittin.8     Passive exposure: Past    Smokeless tobacco: Never   Substance Use Topics    Alcohol use: No    Drug use: No        Review of Systems:  Review of Systems   Constitutional: Negative.  Negative for fatigue and fever.   HENT: Negative.     Eyes: Negative.    Respiratory: Negative.  Negative for shortness of breath.    Cardiovascular: Negative.  Negative for chest pain.   Gastrointestinal:  Positive for abdominal pain.   Endocrine: Negative.    Genitourinary: Negative.    Musculoskeletal: Negative.    Skin: Negative.    Allergic/Immunologic: Negative.    Neurological: Negative.    Hematological: Negative.    Psychiatric/Behavioral: Negative.            Objective     Vital Signs (Most Recent)  Temp: 97.5 °F (36.4 °C) (10/15/24 0916)  Pulse: 83 (10/15/24 0916)  BP: (!) 116/56 (10/15/24 0916)  6' (1.829 m)  102.6 kg (226 lb 3.1 oz)     Physical Exam:  Physical Exam  Constitutional:       General: He is not in acute distress.     Appearance: Normal appearance. He is not ill-appearing, toxic-appearing or diaphoretic.   HENT:      Head: Normocephalic.      Nose: Nose normal.   Eyes:      Conjunctiva/sclera: Conjunctivae normal.   Cardiovascular:      Rate and Rhythm: Normal rate and regular rhythm.   Pulmonary:      Effort: Pulmonary effort is normal.   Abdominal:      Palpations: Abdomen is soft.   Musculoskeletal:         General: Normal range of motion.      Cervical back: Normal range of motion.   Skin:     General: Skin is warm.   Neurological:      General: No focal deficit present.      Mental Status: He is alert.    Psychiatric:         Mood and Affect: Mood normal.         Laboratory  Again noted was mild elevation in bilirubin which seems to be chronic    Diagnostic Results:  Ultrasound from the ER shows gallstones without secondary signs of cholecystitis     Assessment and Plan   58-year-old male previously seen with gallstones.  He had a recent episode that landed him in the ER.  Did not seem to have cholecystitis at that point in time and was instructed to follow up outpatient.    PLAN:  Risks versus benefits of cholecystectomy were again discussed.  We did discuss that surgery may fail to alleviate his symptoms moving forward.  Patient has a history of right kidney surgery.  Recommended proceeding with robotic cholecystectomy as this would be the safest option for him.  Consent was obtained.  Surgery was scheduled for 10/30.  Hold Plavix 5-7 days prior to procedure.  Hold Mounjaro at least 1 week prior to procedure.

## 2024-10-15 NOTE — H&P (VIEW-ONLY)
History & Physical    Subjective     History of Present Illness:  Patient is a 58 y.o. male presents with intermittent constipation and diarrhea which he attributes to changes in his diabetic medication regimen.  During workup, he had imaging which was concerning for possible gallstones/sludge.  Patient denies postprandial right upper quadrant abdominal pain.    Interval history:   Patient was seen in the ER yesterday for right upper quadrant abdominal pain attributed to gallstones.  Symptoms have since improved.  Patient has a remote history of cardiac stenting in his on Plavix.  He also takes Mounjaro.    Chief Complaint   Patient presents with    Cholelithiasis     F/U ER Gallbladder       Review of patient's allergies indicates:   Allergen Reactions    Metformin      Severe abdominal bloating; diarrhea    Codeine Hives    Latex Rash     blisters       Current Outpatient Medications   Medication Sig Dispense Refill    albuterol (PROVENTIL/VENTOLIN HFA) 90 mcg/actuation inhaler Inhale 2 puffs into the lungs every 6 (six) hours as needed for Wheezing. Rescue 1 Inhaler 2    amoxicillin-clavulanate 875-125mg (AUGMENTIN) 875-125 mg per tablet Take 1 tablet by mouth 2 (two) times daily. for 7 days 14 tablet 0    clopidogreL (PLAVIX) 75 mg tablet TAKE 1 TABLET BY MOUTH EVERY DAY 90 tablet 4    ibuprofen (ADVIL,MOTRIN) 200 MG tablet Take 200 mg by mouth every 6 (six) hours as needed for Pain.      ipratropium-albuteroL (COMBIVENT)  mcg/actuation inhaler Inhale 1 puff into the lungs every 4 (four) hours as needed for Shortness of Breath. Rescue 4 g 4    Lactobacillus acidophilus (PROBIOTIC ACIDOPHILUS ORAL) Take by mouth.      losartan (COZAAR) 100 MG tablet Take 1 tablet (100 mg total) by mouth once daily. 30 tablet 11    lubiprostone (AMITIZA) 8 MCG Cap TAKE 1 CAPSULE BY MOUTH 2 TIMES DAILY. 60 capsule 2    ondansetron (ZOFRAN-ODT) 4 MG TbDL Take 1 tablet (4 mg total) by mouth every 6 (six) hours as needed. 20  tablet 0    oxyCODONE-acetaminophen (PERCOCET) 5-325 mg per tablet Take 1 tablet by mouth every 8 (eight) hours as needed (Severe pain). 12 tablet 0    psyllium 0.52 gram capsule Take 0.52 g by mouth once daily.      tirzepatide (MOUNJARO) 2.5 mg/0.5 mL PnIj Inject 2.5 mg into the skin every 7 days. 2 mL 5    verapamiL (VERELAN) 120 MG C24P TAKE 1 CAPSULE BY MOUTH ONCE DAILY 90 capsule 4    blood-glucose meter kit To check BG BID , to use with insurance preferred meter (Patient not taking: Reported on 10/15/2024) 1 each 0    fluticasone-salmeterol diskus inhaler 250-50 mcg Inhale 1 puff into the lungs 2 (two) times daily. Controller (Patient not taking: Reported on 10/15/2024) 1 each 5    hydrocortisone 2.5 % cream Apply topically 2 (two) times daily. (Patient not taking: Reported on 10/15/2024) 28 g 0    lancets Misc To check BG BID, to use with insurance preferred meter (Patient not taking: Reported on 10/15/2024) 100 each 3    ONETOUCH VERIO TEST STRIPS Strp TO CHECK BLOOD GLUCOSE TWICE A DAY (Patient not taking: Reported on 10/15/2024) 100 strip 3     No current facility-administered medications for this visit.       Past Medical History:   Diagnosis Date    Anticoagulant long-term use     Cancer of skin of left ear     Colon polyp     Coronary artery disease, nonobstructive     DJD (degenerative joint disease) of lumbar spine     DM type 2 (diabetes mellitus, type 2)     with diet alone    Dyslipidemia 08/13/2019    Essential hypertension 12/29/2015    Fatty liver     GERD (gastroesophageal reflux disease)     Hepatosplenomegaly     HTN (hypertension)     Hyperlipidemia     resolved with diet    Hypogonadism male 02/17/2015    Lumbar radiculopathy     Lumbar spondylosis     Nephrolithiasis     last stone 1/11    VÍCTOR on CPAP     Palpitations 03/09/2016    Sarcoidosis of lung 1990    Ventricular hypokinesis     angiogram with mild LAD stenosis     Past Surgical History:   Procedure Laterality Date    ANGIOGRAM,  CORONARY, WITH LEFT HEART CATHETERIZATION N/A 08/16/2022    Procedure: Angiogram, Coronary, with Left Heart Cath;  Surgeon: Refugio Mcdonough MD;  Location: [facility] CATH;  Service: Cardiology;  Laterality: N/A;    CARDIAC SURGERY      CIRCUMCISION      COLONOSCOPY N/A 01/08/2024    Procedure: COLONOSCOPY;  Surgeon: HALLEY Montes MD;  Location: [facility] ENDO;  Service: Endoscopy;  Laterality: N/A;    CORONARY ANGIOGRAPHY N/A 12/13/2019    Procedure: ANGIOGRAM, CORONARY ARTERY;  Surgeon: Amador Duran MD;  Location: [facility] CATH;  Service: Cardiology;  Laterality: N/A;    CORONARY STENT PLACEMENT  12/2019    x1    CYST REMOVAL Right 05/2022    thigh, Dr. Parkinsno    elbow spur removal      right    EPIDURAL STEROID INJECTION INTO LUMBAR SPINE N/A 02/10/2021    Procedure: Injection-steroid-epidural-lumbar L4/5;  Surgeon: Richi Rangel MD;  Location: [facility] OR;  Service: Pain Management;  Laterality: N/A;    EPIDURAL STEROID INJECTION INTO LUMBAR SPINE N/A 03/15/2021    Procedure: Injection-steroid-epidural-lumbar L5/S1;  Surgeon: Richi Rangel MD;  Location: [facility] OR;  Service: Pain Management;  Laterality: N/A;    EPIDURAL STEROID INJECTION INTO LUMBAR SPINE N/A 10/16/2023    Procedure: Injection-steroid-epidural-lumbar;  Surgeon: Richi Rangel MD;  Location: [facility] OR;  Service: Pain Management;  Laterality: N/A;  L5/S1    EXTRACORPOREAL SHOCK WAVE LITHOTRIPSY  1996    [facility]    LEFT HEART CATHETERIZATION Left 12/13/2019    Procedure: Left heart cath;  Surgeon: Amador Duran MD;  Location: [facility] CATH;  Service: Cardiology;  Laterality: Left;    removal of skin cancer      L side of face    URETERAL EXPLORATION  1996    exploration of the R ureter for ureteral injury [facility] - did well     Family History   Problem Relation Name Age of Onset    Hypertension Mother      Fibromyalgia Mother      Coronary artery disease Father      Diabetes Father      Liver disease Brother           hepatitis C from a blood transufusion    Colon cancer Neg Hx      Crohn's disease Neg Hx      Ulcerative colitis Neg Hx      Stomach cancer Neg Hx      Esophageal cancer Neg Hx      Glaucoma Neg Hx      Macular degeneration Neg Hx       Social History     Tobacco Use    Smoking status: Former     Current packs/day: 0.00     Types: Cigarettes     Quit date: 1991     Years since quittin.8     Passive exposure: Past    Smokeless tobacco: Never   Substance Use Topics    Alcohol use: No    Drug use: No        Review of Systems:  Review of Systems   Constitutional: Negative.  Negative for fatigue and fever.   HENT: Negative.     Eyes: Negative.    Respiratory: Negative.  Negative for shortness of breath.    Cardiovascular: Negative.  Negative for chest pain.   Gastrointestinal:  Positive for abdominal pain.   Endocrine: Negative.    Genitourinary: Negative.    Musculoskeletal: Negative.    Skin: Negative.    Allergic/Immunologic: Negative.    Neurological: Negative.    Hematological: Negative.    Psychiatric/Behavioral: Negative.            Objective     Vital Signs (Most Recent)  Temp: 97.5 °F (36.4 °C) (10/15/24 0916)  Pulse: 83 (10/15/24 0916)  BP: (!) 116/56 (10/15/24 0916)  6' (1.829 m)  102.6 kg (226 lb 3.1 oz)     Physical Exam:  Physical Exam  Constitutional:       General: He is not in acute distress.     Appearance: Normal appearance. He is not ill-appearing, toxic-appearing or diaphoretic.   HENT:      Head: Normocephalic.      Nose: Nose normal.   Eyes:      Conjunctiva/sclera: Conjunctivae normal.   Cardiovascular:      Rate and Rhythm: Normal rate and regular rhythm.   Pulmonary:      Effort: Pulmonary effort is normal.   Abdominal:      Palpations: Abdomen is soft.   Musculoskeletal:         General: Normal range of motion.      Cervical back: Normal range of motion.   Skin:     General: Skin is warm.   Neurological:      General: No focal deficit present.      Mental Status: He is alert.    Psychiatric:         Mood and Affect: Mood normal.         Laboratory  Again noted was mild elevation in bilirubin which seems to be chronic    Diagnostic Results:  Ultrasound from the ER shows gallstones without secondary signs of cholecystitis     Assessment and Plan   58-year-old male previously seen with gallstones.  He had a recent episode that landed him in the ER.  Did not seem to have cholecystitis at that point in time and was instructed to follow up outpatient.    PLAN:  Risks versus benefits of cholecystectomy were again discussed.  We did discuss that surgery may fail to alleviate his symptoms moving forward.  Patient has a history of right kidney surgery.  Recommended proceeding with robotic cholecystectomy as this would be the safest option for him.  Consent was obtained.  Surgery was scheduled for 10/30.  Hold Plavix 5-7 days prior to procedure.  Hold Mounjaro at least 1 week prior to procedure.

## 2024-10-15 NOTE — TELEPHONE ENCOUNTER
Per anesthesia protocol Dr Mg is required to notify the provider managing Mr. Johnston's diabetes that he plans to perform a Robotic Cholecystectomy on 10/30/24. That plan includes stopping his Mounjaro on 10/28/24 the day he receives his injection.     Any instruction to the patient as far as resumption of his GLP-1 agonist or if a bridge is needed would be appreciated.     Please advise

## 2024-10-15 NOTE — TELEPHONE ENCOUNTER
----- Message from Nurse Isabel sent at 10/15/2024  3:11 PM CDT -----  Regarding: Mounjaro/FYI  Per anesthesia protocol Dr Mg is required to notify the provider managing Mr. Johnston's diabetes that he plans to perform a Robotic Cholecystectomy on 10/30/24. That plan includes stopping his Mounjaro on 10/28/24 the day he receives his injection.    Any instruction to the patient as far as resumption of his GLP-1 agonist or if a bridge is needed would be appreciated.    Thanks,  Chalo Jo LPN  General Surgery

## 2024-10-15 NOTE — PATIENT INSTRUCTIONS
Surgery is scheduled for 10/30/24 arrival time will be given by the the preop nurse.  You may receive two calls from the Preop Nurses one a few days before surgery the second the day before surgery with the arrival time.  The preop nurse will call you from [phone]  Nothing to eat or drink after midnight.  Someone to drive you home if you are same day surgery.    THE PREOP NURSE WILL CALL, SOMETIMES AS LATE AS 4 or 5 PM IN THE AFTERNOON THE DAY BEFORE SURGERY.    Shower the night before and the morning of your procedure with Chlorhexidine Surgical Scrub also known as Hibiclens it can be purchased at most Pharmacy's no prescription needed.    Special Instruction:     Your procedure/surgery is scheduled at the [facility] location formerly known as [facility] at [address] [place].     Contact Chalo Jo LPN for questions are concerns. [phone]     Hold Plavix on Friday 10/25/24  Do not take Mounjaro dose on 10/28.

## 2024-10-16 ENCOUNTER — E-CONSULT (OUTPATIENT)
Dept: CARDIOLOGY | Facility: CLINIC | Age: 59
End: 2024-10-16
Payer: MEDICARE

## 2024-10-16 DIAGNOSIS — Z01.818 ENCOUNTER FOR PREOPERATIVE ASSESSMENT: Primary | ICD-10-CM

## 2024-10-16 DIAGNOSIS — Z95.5 S/P CORONARY ARTERY STENT PLACEMENT: ICD-10-CM

## 2024-10-16 DIAGNOSIS — I25.10 CORONARY ARTERY DISEASE, UNSPECIFIED VESSEL OR LESION TYPE, UNSPECIFIED WHETHER ANGINA PRESENT, UNSPECIFIED WHETHER NATIVE OR TRANSPLANTED HEART: ICD-10-CM

## 2024-10-16 NOTE — CONSULTS
[facility] - Cardiology  Response for E-Consult     Patient Name: Cade Johnston  MRN: [id number]  Primary Care Provider: Joni Smith MD   Requesting Provider: Michael Mg MD  E-Consult to General Cardiology  Consult performed by: Refugio Mcdonough MD  Consult ordered by: Michael Mg MD  Reason for consult: Preop clearance  Assessment/Recommendations: 58-year-old man with history of coronary artery disease and previous PCI.    No recent overt cardiac decompensation.  Okay to hold Plavix 5-7 days before procedure  No contraindication for surgery/anesthesia from cardiac standpoint              58-year-old man with history of coronary artery disease and previous PCI.    No recent overt cardiac decompensation.  Okay to hold Plavix 5-7 days before procedure  No contraindication for surgery/anesthesia from cardiac standpoint      Total time of Consultation: 15 minute    I did not speak to the requesting provider verbally about this.     *This eConsult is based on the clinical data available to me and is furnished without benefit of a physical examination. The eConsult will need to be interpreted in light of any clinical issues or changes in patient status not available to me at the time of filing this eConsults. Significant changes in patient condition or level of acuity should result in immediate formal consultation and reevaluation. Please alert me if you have further questions.    Thank you for this eConsult referral.     Refugio Mcdonough MD  [facility] - Cardiology

## 2024-10-19 DIAGNOSIS — I10 ESSENTIAL HYPERTENSION: ICD-10-CM

## 2024-10-19 NOTE — TELEPHONE ENCOUNTER
No care due was identified.  [facility] Embedded Care Due Messages. Reference number: [id number].   10/19/2024 7:57:25 AM CDT

## 2024-10-21 RX ORDER — LOSARTAN POTASSIUM 100 MG/1
100 TABLET ORAL DAILY
Qty: 90 TABLET | Refills: 3 | Status: SHIPPED | OUTPATIENT
Start: 2024-10-21

## 2024-10-23 PROBLEM — J92.0 CALCIFIED PLEURAL PLAQUE DUE TO ASBESTOS EXPOSURE: Status: ACTIVE | Noted: 2024-10-23

## 2024-10-23 PROBLEM — J94.8 CALCIFIED PLEURAL PLAQUE ON CHEST X-RAY: Status: RESOLVED | Noted: 2024-10-23 | Resolved: 2024-10-23

## 2024-10-23 PROBLEM — J94.8 CALCIFIED PLEURAL PLAQUE ON CHEST X-RAY: Status: ACTIVE | Noted: 2024-10-23

## 2024-10-28 ENCOUNTER — OFFICE VISIT (OUTPATIENT)
Dept: FAMILY MEDICINE | Facility: CLINIC | Age: 59
End: 2024-10-28
Payer: MEDICARE

## 2024-10-28 VITALS
TEMPERATURE: 98 F | HEIGHT: 72 IN | BODY MASS INDEX: 30.4 KG/M2 | OXYGEN SATURATION: 97 % | WEIGHT: 224.44 LBS | HEART RATE: 73 BPM | RESPIRATION RATE: 16 BRPM | SYSTOLIC BLOOD PRESSURE: 130 MMHG | DIASTOLIC BLOOD PRESSURE: 65 MMHG

## 2024-10-28 DIAGNOSIS — J98.4 RESTRICTIVE LUNG DISEASE: ICD-10-CM

## 2024-10-28 DIAGNOSIS — E11.40 TYPE 2 DIABETES MELLITUS WITH DIABETIC NEUROPATHY, WITHOUT LONG-TERM CURRENT USE OF INSULIN: Primary | ICD-10-CM

## 2024-10-28 DIAGNOSIS — E78.2 MIXED HYPERLIPIDEMIA: ICD-10-CM

## 2024-10-28 DIAGNOSIS — D86.0 SARCOIDOSIS OF LUNG: ICD-10-CM

## 2024-10-28 PROCEDURE — 4010F ACE/ARB THERAPY RXD/TAKEN: CPT | Mod: CPTII,S$GLB,, | Performed by: NURSE PRACTITIONER

## 2024-10-28 PROCEDURE — 3066F NEPHROPATHY DOC TX: CPT | Mod: CPTII,S$GLB,, | Performed by: NURSE PRACTITIONER

## 2024-10-28 PROCEDURE — 1159F MED LIST DOCD IN RCRD: CPT | Mod: CPTII,S$GLB,, | Performed by: NURSE PRACTITIONER

## 2024-10-28 PROCEDURE — 99214 OFFICE O/P EST MOD 30 MIN: CPT | Mod: S$GLB,,, | Performed by: NURSE PRACTITIONER

## 2024-10-28 PROCEDURE — 3078F DIAST BP <80 MM HG: CPT | Mod: CPTII,S$GLB,, | Performed by: NURSE PRACTITIONER

## 2024-10-28 PROCEDURE — 99999 PR PBB SHADOW E&M-EST. PATIENT-LVL IV: CPT | Mod: PBBFAC,,, | Performed by: NURSE PRACTITIONER

## 2024-10-28 PROCEDURE — 3008F BODY MASS INDEX DOCD: CPT | Mod: CPTII,S$GLB,, | Performed by: NURSE PRACTITIONER

## 2024-10-28 PROCEDURE — 3052F HG A1C>EQUAL 8.0%<EQUAL 9.0%: CPT | Mod: CPTII,S$GLB,, | Performed by: NURSE PRACTITIONER

## 2024-10-28 PROCEDURE — 3075F SYST BP GE 130 - 139MM HG: CPT | Mod: CPTII,S$GLB,, | Performed by: NURSE PRACTITIONER

## 2024-10-28 PROCEDURE — 3060F POS MICROALBUMINURIA REV: CPT | Mod: CPTII,S$GLB,, | Performed by: NURSE PRACTITIONER

## 2024-10-29 ENCOUNTER — ANESTHESIA EVENT (OUTPATIENT)
Dept: SURGERY | Facility: HOSPITAL | Age: 59
End: 2024-10-29
Payer: MEDICARE

## 2024-10-30 ENCOUNTER — HOSPITAL ENCOUNTER (OUTPATIENT)
Facility: HOSPITAL | Age: 59
Discharge: HOME | End: 2024-10-30
Attending: STUDENT IN AN ORGANIZED HEALTH CARE EDUCATION/TRAINING PROGRAM | Admitting: STUDENT IN AN ORGANIZED HEALTH CARE EDUCATION/TRAINING PROGRAM
Payer: MEDICARE

## 2024-10-30 ENCOUNTER — ANESTHESIA (OUTPATIENT)
Dept: SURGERY | Facility: HOSPITAL | Age: 59
End: 2024-10-30
Payer: MEDICARE

## 2024-10-30 DIAGNOSIS — K80.20 CALCULUS OF GALLBLADDER WITHOUT CHOLECYSTITIS WITHOUT OBSTRUCTION: Primary | ICD-10-CM

## 2024-10-30 DIAGNOSIS — Z01.818 PREOP TESTING: ICD-10-CM

## 2024-10-30 LAB
APTT PPP: 33 SEC (ref 21–32)
INR PPP: 1.1 (ref 0.8–1.2)
PROTHROMBIN TIME: 11.4 SEC (ref 9–12.5)

## 2024-10-30 PROCEDURE — 88304 TISSUE EXAM BY PATHOLOGIST: CPT | Mod: TC | Performed by: PATHOLOGY

## 2024-10-30 PROCEDURE — 25000003 PHARM REV CODE 250: Performed by: ANESTHESIOLOGY

## 2024-10-30 PROCEDURE — 85730 THROMBOPLASTIN TIME PARTIAL: CPT | Performed by: ANESTHESIOLOGY

## 2024-10-30 PROCEDURE — 63600175 PHARM REV CODE 636 W HCPCS: Performed by: ANESTHESIOLOGY

## 2024-10-30 PROCEDURE — 36415 COLL VENOUS BLD VENIPUNCTURE: CPT | Performed by: ANESTHESIOLOGY

## 2024-10-30 PROCEDURE — 93010 ELECTROCARDIOGRAM REPORT: CPT | Mod: ,,, | Performed by: INTERNAL MEDICINE

## 2024-10-30 PROCEDURE — 63600175 PHARM REV CODE 636 W HCPCS: Performed by: NURSE ANESTHETIST, CERTIFIED REGISTERED

## 2024-10-30 PROCEDURE — 71000016 HC POSTOP RECOV ADDL HR: Performed by: STUDENT IN AN ORGANIZED HEALTH CARE EDUCATION/TRAINING PROGRAM

## 2024-10-30 PROCEDURE — 63600175 PHARM REV CODE 636 W HCPCS: Performed by: STUDENT IN AN ORGANIZED HEALTH CARE EDUCATION/TRAINING PROGRAM

## 2024-10-30 PROCEDURE — 71000015 HC POSTOP RECOV 1ST HR: Performed by: STUDENT IN AN ORGANIZED HEALTH CARE EDUCATION/TRAINING PROGRAM

## 2024-10-30 PROCEDURE — 71000033 HC RECOVERY, INTIAL HOUR: Performed by: STUDENT IN AN ORGANIZED HEALTH CARE EDUCATION/TRAINING PROGRAM

## 2024-10-30 PROCEDURE — 36000711: Performed by: STUDENT IN AN ORGANIZED HEALTH CARE EDUCATION/TRAINING PROGRAM

## 2024-10-30 PROCEDURE — 85610 PROTHROMBIN TIME: CPT | Performed by: ANESTHESIOLOGY

## 2024-10-30 PROCEDURE — 71000039 HC RECOVERY, EACH ADD'L HOUR: Performed by: STUDENT IN AN ORGANIZED HEALTH CARE EDUCATION/TRAINING PROGRAM

## 2024-10-30 PROCEDURE — 37000008 HC ANESTHESIA 1ST 15 MINUTES: Performed by: STUDENT IN AN ORGANIZED HEALTH CARE EDUCATION/TRAINING PROGRAM

## 2024-10-30 PROCEDURE — 36000710: Performed by: STUDENT IN AN ORGANIZED HEALTH CARE EDUCATION/TRAINING PROGRAM

## 2024-10-30 PROCEDURE — 27201423 OPTIME MED/SURG SUP & DEVICES STERILE SUPPLY: Performed by: STUDENT IN AN ORGANIZED HEALTH CARE EDUCATION/TRAINING PROGRAM

## 2024-10-30 PROCEDURE — 37000009 HC ANESTHESIA EA ADD 15 MINS: Performed by: STUDENT IN AN ORGANIZED HEALTH CARE EDUCATION/TRAINING PROGRAM

## 2024-10-30 PROCEDURE — 47562 LAPAROSCOPIC CHOLECYSTECTOMY: CPT | Mod: ,,, | Performed by: STUDENT IN AN ORGANIZED HEALTH CARE EDUCATION/TRAINING PROGRAM

## 2024-10-30 PROCEDURE — 25000003 PHARM REV CODE 250: Performed by: NURSE ANESTHETIST, CERTIFIED REGISTERED

## 2024-10-30 PROCEDURE — 25000003 PHARM REV CODE 250: Performed by: STUDENT IN AN ORGANIZED HEALTH CARE EDUCATION/TRAINING PROGRAM

## 2024-10-30 PROCEDURE — 93005 ELECTROCARDIOGRAM TRACING: CPT

## 2024-10-30 RX ORDER — OXYCODONE AND ACETAMINOPHEN 5; 325 MG/1; MG/1
1 TABLET ORAL EVERY 4 HOURS PRN
Qty: 21 TABLET | Refills: 0 | Status: SHIPPED | OUTPATIENT
Start: 2024-10-30

## 2024-10-30 RX ORDER — ACETAMINOPHEN 10 MG/ML
INJECTION, SOLUTION INTRAVENOUS
Status: DISCONTINUED | OUTPATIENT
Start: 2024-10-30 | End: 2024-10-30

## 2024-10-30 RX ORDER — DEXAMETHASONE SODIUM PHOSPHATE 4 MG/ML
INJECTION, SOLUTION INTRA-ARTICULAR; INTRALESIONAL; INTRAMUSCULAR; INTRAVENOUS; SOFT TISSUE
Status: DISCONTINUED | OUTPATIENT
Start: 2024-10-30 | End: 2024-10-30

## 2024-10-30 RX ORDER — OXYCODONE HYDROCHLORIDE 5 MG/1
5 TABLET ORAL ONCE
Status: COMPLETED | OUTPATIENT
Start: 2024-10-30 | End: 2024-10-30

## 2024-10-30 RX ORDER — BUPIVACAINE HYDROCHLORIDE AND EPINEPHRINE 2.5; 5 MG/ML; UG/ML
INJECTION, SOLUTION EPIDURAL; INFILTRATION; INTRACAUDAL; PERINEURAL
Status: DISCONTINUED | OUTPATIENT
Start: 2024-10-30 | End: 2024-10-30 | Stop reason: HOSPADM

## 2024-10-30 RX ORDER — LIDOCAINE HYDROCHLORIDE 20 MG/ML
INJECTION INTRAVENOUS
Status: DISCONTINUED | OUTPATIENT
Start: 2024-10-30 | End: 2024-10-30

## 2024-10-30 RX ORDER — SODIUM CHLORIDE 9 MG/ML
INJECTION, SOLUTION INTRAVENOUS CONTINUOUS
Status: DISCONTINUED | OUTPATIENT
Start: 2024-10-30 | End: 2024-10-30 | Stop reason: HOSPADM

## 2024-10-30 RX ORDER — ROCURONIUM BROMIDE 10 MG/ML
INJECTION, SOLUTION INTRAVENOUS
Status: DISCONTINUED | OUTPATIENT
Start: 2024-10-30 | End: 2024-10-30

## 2024-10-30 RX ORDER — PROPOFOL 10 MG/ML
VIAL (ML) INTRAVENOUS
Status: DISCONTINUED | OUTPATIENT
Start: 2024-10-30 | End: 2024-10-30

## 2024-10-30 RX ORDER — LIDOCAINE HYDROCHLORIDE 10 MG/ML
1 INJECTION, SOLUTION EPIDURAL; INFILTRATION; INTRACAUDAL; PERINEURAL ONCE
Status: DISCONTINUED | OUTPATIENT
Start: 2024-10-30 | End: 2024-10-30 | Stop reason: HOSPADM

## 2024-10-30 RX ORDER — ONDANSETRON HYDROCHLORIDE 2 MG/ML
4 INJECTION, SOLUTION INTRAVENOUS ONCE AS NEEDED
Status: COMPLETED | OUTPATIENT
Start: 2024-10-30 | End: 2024-10-30

## 2024-10-30 RX ORDER — NEOSTIGMINE METHYLSULFATE 1 MG/ML
INJECTION INTRAVENOUS
Status: DISCONTINUED | OUTPATIENT
Start: 2024-10-30 | End: 2024-10-30

## 2024-10-30 RX ORDER — CEFAZOLIN 2 G/1
2 INJECTION, POWDER, FOR SOLUTION INTRAMUSCULAR; INTRAVENOUS
Status: DISCONTINUED | OUTPATIENT
Start: 2024-10-30 | End: 2024-10-30 | Stop reason: HOSPADM

## 2024-10-30 RX ORDER — FENTANYL CITRATE 50 UG/ML
INJECTION, SOLUTION INTRAMUSCULAR; INTRAVENOUS
Status: DISCONTINUED | OUTPATIENT
Start: 2024-10-30 | End: 2024-10-30

## 2024-10-30 RX ORDER — SUCCINYLCHOLINE CHLORIDE 20 MG/ML
INJECTION INTRAMUSCULAR; INTRAVENOUS
Status: DISCONTINUED | OUTPATIENT
Start: 2024-10-30 | End: 2024-10-30

## 2024-10-30 RX ORDER — ONDANSETRON HYDROCHLORIDE 2 MG/ML
INJECTION, SOLUTION INTRAMUSCULAR; INTRAVENOUS
Status: DISCONTINUED | OUTPATIENT
Start: 2024-10-30 | End: 2024-10-30

## 2024-10-30 RX ORDER — HYDROMORPHONE HYDROCHLORIDE 2 MG/ML
0.2 INJECTION, SOLUTION INTRAMUSCULAR; INTRAVENOUS; SUBCUTANEOUS EVERY 5 MIN PRN
Status: DISCONTINUED | OUTPATIENT
Start: 2024-10-30 | End: 2024-10-30 | Stop reason: HOSPADM

## 2024-10-30 RX ORDER — EPHEDRINE SULFATE 50 MG/ML
INJECTION, SOLUTION INTRAVENOUS
Status: DISCONTINUED | OUTPATIENT
Start: 2024-10-30 | End: 2024-10-30

## 2024-10-30 RX ORDER — FENTANYL CITRATE 50 UG/ML
25 INJECTION, SOLUTION INTRAMUSCULAR; INTRAVENOUS EVERY 5 MIN PRN
Status: COMPLETED | OUTPATIENT
Start: 2024-10-30 | End: 2024-10-30

## 2024-10-30 RX ADMIN — FENTANYL CITRATE 25 MCG: 50 INJECTION INTRAMUSCULAR; INTRAVENOUS at 11:10

## 2024-10-30 RX ADMIN — HYDROMORPHONE HYDROCHLORIDE 0.2 MG: 2 INJECTION INTRAMUSCULAR; INTRAVENOUS; SUBCUTANEOUS at 11:10

## 2024-10-30 RX ADMIN — NEOSTIGMINE METHYLSULFATE 4 MG: 1 INJECTION INTRAVENOUS at 10:10

## 2024-10-30 RX ADMIN — EPHEDRINE SULFATE 10 MG: 50 INJECTION, SOLUTION INTRAMUSCULAR; INTRAVENOUS; SUBCUTANEOUS at 09:10

## 2024-10-30 RX ADMIN — FENTANYL CITRATE 75 MCG: 50 INJECTION, SOLUTION INTRAMUSCULAR; INTRAVENOUS at 09:10

## 2024-10-30 RX ADMIN — ROCURONIUM BROMIDE 30 MG: 10 INJECTION, SOLUTION INTRAVENOUS at 09:10

## 2024-10-30 RX ADMIN — ACETAMINOPHEN 1000 MG: 10 INJECTION INTRAVENOUS at 09:10

## 2024-10-30 RX ADMIN — EPHEDRINE SULFATE 20 MG: 50 INJECTION, SOLUTION INTRAMUSCULAR; INTRAVENOUS; SUBCUTANEOUS at 09:10

## 2024-10-30 RX ADMIN — FENTANYL CITRATE 50 MCG: 50 INJECTION, SOLUTION INTRAMUSCULAR; INTRAVENOUS at 10:10

## 2024-10-30 RX ADMIN — DEXAMETHASONE SODIUM PHOSPHATE 4 MG: 4 INJECTION, SOLUTION INTRA-ARTICULAR; INTRALESIONAL; INTRAMUSCULAR; INTRAVENOUS; SOFT TISSUE at 09:10

## 2024-10-30 RX ADMIN — ROCURONIUM BROMIDE 10 MG: 10 INJECTION, SOLUTION INTRAVENOUS at 09:10

## 2024-10-30 RX ADMIN — GLYCOPYRROLATE 0.4 MG: 0.2 INJECTION, SOLUTION INTRAMUSCULAR; INTRAVENOUS at 10:10

## 2024-10-30 RX ADMIN — CEFAZOLIN 2 G: 2 INJECTION, POWDER, FOR SOLUTION INTRAMUSCULAR; INTRAVENOUS at 09:10

## 2024-10-30 RX ADMIN — LIDOCAINE HYDROCHLORIDE 100 MG: 20 INJECTION, SOLUTION INTRAVENOUS at 09:10

## 2024-10-30 RX ADMIN — ONDANSETRON 4 MG: 2 INJECTION INTRAMUSCULAR; INTRAVENOUS at 11:10

## 2024-10-30 RX ADMIN — ONDANSETRON 4 MG: 2 INJECTION INTRAMUSCULAR; INTRAVENOUS at 09:10

## 2024-10-30 RX ADMIN — PROPOFOL 150 MG: 10 INJECTION, EMULSION INTRAVENOUS at 09:10

## 2024-10-30 RX ADMIN — GLYCOPYRROLATE 0.2 MG: 0.2 INJECTION, SOLUTION INTRAMUSCULAR; INTRAVENOUS at 09:10

## 2024-10-30 RX ADMIN — OXYCODONE 5 MG: 5 TABLET ORAL at 11:10

## 2024-10-30 RX ADMIN — SUCCINYLCHOLINE CHLORIDE 150 MG: 20 INJECTION, SOLUTION INTRAMUSCULAR; INTRAVENOUS at 09:10

## 2024-10-30 RX ADMIN — FENTANYL CITRATE 25 MCG: 50 INJECTION, SOLUTION INTRAMUSCULAR; INTRAVENOUS at 09:10

## 2024-10-30 NOTE — PLAN OF CARE
Released from Pacu per Anesthesia when criteria met pain controlled skin w+d No nausea No emesis dermabond sites x 4 dry intact aaox4 encouraged deep breaths instr on IS encouraged use  Pt has all belongings in post op   isntr not to lift anything heavy and take it easy  DUE TO VOID

## 2024-10-30 NOTE — PLAN OF CARE
Pt prepped for surgery, ABX at BS. Pt belongings, including clothes are in personal belongings bag at post-op locker. Spouse, Naomi, signed up for text message alerts. Breathing exercises completed by Respiratory. Blood work collected by lab. EKG & CXR complete. All questions answered, POC explained, v/u, call bell in reach.

## 2024-10-30 NOTE — DISCHARGE SUMMARY
[facility]  Brief Operative Note    Surgery Date: 10/30/2024     Surgeons and Role:     * Michael Mg MD - Primary    Assisting Surgeon: None    Pre-op Diagnosis:  Calculus of gallbladder without cholecystitis without obstruction [K80.20]    Post-op Diagnosis:  Post-Op Diagnosis Codes:     * Calculus of gallbladder without cholecystitis without obstruction [K80.20]    Procedure(s) (LRB):  ROBOTIC CHOLECYSTECTOMY (N/A)    Anesthesia: General    Operative Findings:  Cholecystectomy    Estimated Blood Loss: * minimal         Specimens:   Specimen (24h ago, onward)       Start     Ordered    10/30/24 1001  Specimen to Pathology - Surgery  Once        Comments: Pre-op Diagnosis: Calculus of gallbladder without cholecystitis without obstruction [K80.20]Procedure(s):ROBOTIC CHOLECYSTECTOMY Number of specimens: 1Name of specimens: GALLBLADDER     Question:  Release to patient  Answer:  Immediate    10/30/24 1000                      Discharge Note    OUTCOME: Patient tolerated treatment/procedure well without complication and is now ready for discharge.    DISPOSITION: Home or Self Care    FINAL DIAGNOSIS:  Calculus of gallbladder without cholecystitis without obstruction    FOLLOWUP: In clinic    DISCHARGE INSTRUCTIONS:    Discharge Procedure Orders   Diet Adult Regular     Lifting restrictions   Order Comments: No more than 10 lbs     No driving until:   Order Comments: Off narcotics     Notify your health care provider if you experience any of the following:  increased confusion or weakness     Notify your health care provider if you experience any of the following:  persistent dizziness, light-headedness, or visual disturbances     Notify your health care provider if you experience any of the following:  worsening rash     Notify your health care provider if you experience any of the following:  severe persistent headache     Notify your health care provider if you experience any of the  following:  difficulty breathing or increased cough     Notify your health care provider if you experience any of the following:  redness, tenderness, or signs of infection (pain, swelling, redness, odor or green/yellow discharge around incision site)     Notify your health care provider if you experience any of the following:  severe uncontrolled pain     Notify your health care provider if you experience any of the following:  persistent nausea and vomiting or diarrhea     Notify your health care provider if you experience any of the following:  temperature >100.4     No dressing needed   Order Comments: Okay to shower.  No swimming or soaking for 2 weeks.

## 2024-10-30 NOTE — DISCHARGE INSTRUCTIONS
Post op instructions for prevention of DVT  What is deep vein thrombosis?  Deep vein thrombosis (DVT) is the medical term for blood clots in the deep veins of the leg.  These blood clots can be dangerous.  A DVT can block a blood vessel and keep blood from getting where it needs to go.  Another problem is that the clot can travel to other parts of the body such as the lungs.  A clot that travels to the lungs is called a pulmonary embolus (PE) and can cause serious problems with breathing which can lead to death.  Am I at risk for DVT/PE?  If you are not very active, you are at risk of DVT.  Anyone confined to bed, sitting for long periods of time, recovering from surgery, etc. increases the risk of DVT.  Other risk factors are cancer diagnosis, certain medications, estrogen replacement in any form,older age, obesity, pregnancy, smoking, history of clotting disorders, and dehydration.  How will I know if I have a DVT?  Swelling in the lower leg  Pain  Warmth, redness, hardness or bulging of the vein  If you have any of these symptoms, call your doctors office right away.  Some people will not have any symptoms until the clot moves to the lungs.  What are the symptoms of a PE?  Panting, shortness of breath, or trouble breathing  Sharp, knife-like chest pain when you breathe  Coughing or coughing up blood  Rapid heartbeat  If you have any of these symptoms or get worse quickly, call 911 for emergency treatment.  How can I prevent a DVT?  Avoid long periods of inactivity and dont cross your legs--get up and walk around every hour or so.  Stay active--walking after surgery is highly encouraged.  This means you should get out of the house and walk in the neighborhood.  Going up and down stairs will not impair healing (unless advised against such activity by your doctor).    Drink plenty of noncaffeinated, nonalcoholic fluids each day to prevent dehydration.  Wear special support stockings, if they have been advised by  "your doctor.  If you travel, stop at least once an hour and walk around.  Avoid smoking (assistance with stopping is available through your healthcare provider)  Always notify your doctor if you are not able to follow the post operative instructions that are given to you at the time of discharge.  It may be necessary to prescribe one of the medications available to prevent DVT.    Discharge Instructions: After Your Surgery/Procedure  Youve just had surgery. During surgery you were given medicine called anesthesia to keep you relaxed and free of pain. After surgery you may have some pain or nausea. This is common. Here are some tips for feeling better and getting well after surgery.     Stay on schedule with your medication.   Going home  Your doctor or nurse will show you how to take care of yourself when you go home. He or she will also answer your questions. Have an adult family member or friend drive you home.      For your safety we recommend these precaution for the first 24 hours after your procedure:  Do not drive or use heavy equipment.  Do not make important decisions or sign legal papers.  Do not drink alcohol.  Have someone stay with you, if needed. He or she can watch for problems and help keep you safe.  Your concentration, balance, coordination, and judgement may be impaired for many hours after anesthesia.  Use caution when ambulating or standing up.     You may feel weak and "washed out" after anesthesia and surgery.      Subtle residual effects of general anesthesia or sedation with regional / local anesthesia can last more than 24 hours.  Rest for the remainder of the day or longer if your Doctor/Surgeon has advised you to do so.  Although you may feel normal within the first 24 hours, your reflexes and mental ability may be impaired without you realizing it.  You may feel dizzy, lightheaded or sleepy for 24 hours or longer.      Be sure to go to all follow-up visits with your doctor. And rest " after your surgery for as long as your doctor tells you to.  Coping with pain  If you have pain after surgery, pain medicine will help you feel better. Take it as told, before pain becomes severe. Also, ask your doctor or pharmacist about other ways to control pain. This might be with heat, ice, or relaxation. And follow any other instructions your surgeon or nurse gives you.  Tips for taking pain medicine  To get the best relief possible, remember these points:  Pain medicines can upset your stomach. Taking them with a little food may help.  Most pain relievers taken by mouth need at least 20 to 30 minutes to start to work.  Taking medicine on a schedule can help you remember to take it. Try to time your medicine so that you can take it before starting an activity. This might be before you get dressed, go for a walk, or sit down for dinner.  Constipation is a common side effect of pain medicines. Call your doctor before taking any medicines such as laxatives or stool softeners to help ease constipation. Also ask if you should skip any foods. Drinking lots of fluids and eating foods such as fruits and vegetables that are high in fiber can also help. Remember, do not take laxatives unless your surgeon has prescribed them.  Drinking alcohol and taking pain medicine can cause dizziness and slow your breathing. It can even be deadly. Do not drink alcohol while taking pain medicine.  Pain medicine can make you react more slowly to things. Do not drive or run machinery while taking pain medicine.  Your health care provider may tell you to take acetaminophen to help ease your pain. Ask him or her how much you are supposed to take each day. Acetaminophen or other pain relievers may interact with your prescription medicines or other over-the-counter (OTC) drugs. Some prescription medicines have acetaminophen and other ingredients. Using both prescription and OTC acetaminophen for pain can cause you to overdose. Read the  labels on your OTC medicines with care. This will help you to clearly know the list of ingredients, how much to take, and any warnings. It may also help you not take too much acetaminophen. If you have questions or do not understand the information, ask your pharmacist or health care provider to explain it to you before you take the OTC medicine.  Managing nausea  Some people have an upset stomach after surgery. This is often because of anesthesia, pain, or pain medicine, or the stress of surgery. These tips will help you handle nausea and eat healthy foods as you get better. If you were on a special food plan before surgery, ask your doctor if you should follow it while you get better. These tips may help:  Do not push yourself to eat. Your body will tell you when to eat and how much.  Start off with clear liquids and soup. They are easier to digest.  Next try semi-solid foods, such as mashed potatoes, applesauce, and gelatin, as you feel ready.  Slowly move to solid foods. Dont eat fatty, rich, or spicy foods at first.  Do not force yourself to have 3 large meals a day. Instead eat smaller amounts more often.  Take pain medicines with a small amount of solid food, such as crackers or toast, to avoid nausea.     Call your surgeon if  You still have pain an hour after taking medicine. The medicine may not be strong enough.  You feel too sleepy, dizzy, or groggy. The medicine may be too strong.  You have side effects like nausea, vomiting, or skin changes, such as rash, itching, or hives.       If you have obstructive sleep apnea  You were given anesthesia medicine during surgery to keep you comfortable and free of pain. After surgery, you may have more apnea spells because of this medicine and other medicines you were given. The spells may last longer than usual.   At home:  Keep using the continuous positive airway pressure (CPAP) device when you sleep. Unless your health care provider tells you not to, use it  when you sleep, day or night. CPAP is a common device used to treat obstructive sleep apnea.  Talk with your provider before taking any pain medicine, muscle relaxants, or sedatives. Your provider will tell you about the possible dangers of taking these medicines.  © 0765-9863 [employer]. [address] [place] [id number]. All rights reserved. This information is not intended as a substitute for professional medical care. Always follow your healthcare professional's instructions.    Using an Incentive Spirometer    An incentive spirometer is a device that helps you do deep breathing exercises. These exercises expand your lungs, aid in circulation, and help prevent pneumonia. Deep breathing exercises also help you breathe better and improve the function of your lungs by:  Keeping your lungs clear  Strengthening your breathing muscles  Helping prevent respiratory complications or problems  The incentive spirometer gives you a way to take an active part in recover. A nurse or therapist will teach you breathing exercises. To do these exercises, you will breathe in through your mouth and not your nose. The incentive spirometer only works correctly if you breathe in through your mouth.  Steps to clear lungs  Step 1. Exhale normally. Then, inhale normally.  Relax and breathe out.  Step 2. Place your lips tightly around the mouthpiece.  Make sure the device is upright and not tilted.  Step 3. Inhale as much air as you can through the mouthpiece (don't breath through your nose).  Inhale slowly and deeply.  Hold your breath long enough to keep the balls or disk raised for at least 3 to 5 seconds, or as instructed by your healthcare provider.  Some spirometers have an indicator to let you know that you are breathing in too fast. If the indicator goes off, breathe in more slowly.  Step 4. Repeat the exercise regularly.  Do this exercise every hour while you're awake, or as instructed by your healthcare  provider.  If you were taught deep breathing and coughing exercises, do them regularly as instructed by your healthcare provider.       We hope your stay was comfortable as you heal now, mend and rest.   For we have enjoyed taking care of you by giving your our best.    And as you get better, by regaining your health and strength;  We count it as a privilege to have served you and hope your time at [facility] was well spent.    Thank  You!!!

## 2024-10-30 NOTE — PLAN OF CARE
Patient able to void with no problems noted.  Pain controlled.  No complaints of nausea/vomiting.  Discharge instructions given to pt and family/friend, verbalized understanding and questions answered. Handouts provided. Belongings given back to pt. IV removed- catheter intact. Discharge via wheelchair.

## 2024-10-30 NOTE — OP NOTE
[facility]  Surgery Department  Operative Note    SUMMARY     Date of Procedure: 10/30/2024     Procedure: Procedure(s) (LRB):  ROBOTIC CHOLECYSTECTOMY (N/A)     Surgeons and Role:     * Michael Mg MD - Primary    Assisting Surgeon: Carrol HERNANDEZA    Pre-Operative Diagnosis: Calculus of gallbladder without cholecystitis without obstruction [K80.20]    Post-Operative Diagnosis: Post-Op Diagnosis Codes:     * Calculus of gallbladder without cholecystitis without obstruction [K80.20]    Anesthesia: General    Operative Findings (including complications, if any):  Cholecystectomy for stones    Description of Technical Procedures:  This is a 58-year-old male who presented with symptomatic gallstones.  He did consent to cholecystectomy.  He was taken to the OR, placed supine, general endotracheal anesthesia was induced, the abdomen was prepped and draped in the usual fashion, a time-out was completed.  Access to the abdomen was achieved using Optiview technique in the left upper quadrant.  The abdomen was insufflated to 15 mmHg a scope was inserted.  There was no apparent injury during entry.  Three 8 mm robotic trocars were placed across the abdomen under direct visualization and the robot was docked.  The gallbladder was identified.  There were omental adhesions consistent with mild cholecystitis.  These were lysed using a combination of sharp and electrocautery dissection.  The dome was then grasped and retracted cephalad.  Peritoneum was stripped off the base exposing the cystic duct and artery as they entered the gallbladder.  These were clipped and then divided.  The gallbladder was removed off the fossa using electrocautery, placed in an Endo-Catch bag, and removed from the left upper quadrant incision.  The right upper quadrant was inspected for hemostasis which was adequate.  Remaining trocars were removed and insufflation was allowed to escape.  Fascia at the extraction  site was closed with an 0 Vicryl suture.  Skin was closed with 4-0 Monocryl.  Dermabond was applied as a dressing.  Patient tolerated the entire procedure without apparent complication, was extubated in the OR, brought to recovery in stable condition.  All counts were correct.    Significant Surgical Tasks Conducted by the Assistant(s), if Applicable:  Patient positioning and prep, bedside assist, skin closure, bandage application.    Estimated Blood Loss (EBL):  Minimal           Implants: * No implants in log *    Specimens:   Specimen (24h ago, onward)       Start     Ordered    10/30/24 1001  Specimen to Pathology - Surgery  Once        Comments: Pre-op Diagnosis: Calculus of gallbladder without cholecystitis without obstruction [K80.20]Procedure(s):ROBOTIC CHOLECYSTECTOMY Number of specimens: 1Name of specimens: GALLBLADDER     Question:  Release to patient  Answer:  Immediate    10/30/24 1000                            Condition: Good    Disposition: PACU - hemodynamically stable.    Attestation: Op Note Attestation: I was physically present and scrubbed for the entire procedure.

## 2024-10-31 VITALS
TEMPERATURE: 98 F | HEART RATE: 82 BPM | RESPIRATION RATE: 19 BRPM | SYSTOLIC BLOOD PRESSURE: 170 MMHG | WEIGHT: 220 LBS | OXYGEN SATURATION: 97 % | DIASTOLIC BLOOD PRESSURE: 79 MMHG | BODY MASS INDEX: 29.8 KG/M2 | HEIGHT: 72 IN

## 2024-11-04 LAB
OHS QRS DURATION: 96 MS
OHS QTC CALCULATION: 435 MS

## 2024-11-12 ENCOUNTER — OFFICE VISIT (OUTPATIENT)
Dept: SURGERY | Facility: CLINIC | Age: 59
End: 2024-11-12
Payer: MEDICARE

## 2024-11-12 VITALS
SYSTOLIC BLOOD PRESSURE: 158 MMHG | TEMPERATURE: 98 F | BODY MASS INDEX: 30.46 KG/M2 | DIASTOLIC BLOOD PRESSURE: 75 MMHG | HEART RATE: 63 BPM | WEIGHT: 224.88 LBS | HEIGHT: 72 IN

## 2024-11-12 DIAGNOSIS — Z98.890 POST-OPERATIVE STATE: Primary | ICD-10-CM

## 2024-11-12 PROCEDURE — 99999 PR PBB SHADOW E&M-EST. PATIENT-LVL III: CPT | Mod: PBBFAC,,, | Performed by: STUDENT IN AN ORGANIZED HEALTH CARE EDUCATION/TRAINING PROGRAM

## 2024-11-12 NOTE — PROGRESS NOTES
Postop note     Patient underwent cholecystectomy.  Doing well.      Incisions are healing well     Pathology: Benign gallbladder     Overall doing well.  Follow up as needed.

## 2024-11-27 ENCOUNTER — LAB VISIT (OUTPATIENT)
Dept: LAB | Facility: HOSPITAL | Age: 59
End: 2024-11-27
Attending: INTERNAL MEDICINE
Payer: MEDICARE

## 2024-11-27 ENCOUNTER — OFFICE VISIT (OUTPATIENT)
Dept: FAMILY MEDICINE | Facility: CLINIC | Age: 59
End: 2024-11-27
Payer: MEDICARE

## 2024-11-27 VITALS
HEIGHT: 72 IN | OXYGEN SATURATION: 96 % | SYSTOLIC BLOOD PRESSURE: 132 MMHG | HEART RATE: 71 BPM | TEMPERATURE: 98 F | WEIGHT: 224.44 LBS | BODY MASS INDEX: 30.4 KG/M2 | DIASTOLIC BLOOD PRESSURE: 76 MMHG

## 2024-11-27 DIAGNOSIS — E78.1 HIGH TRIGLYCERIDES: ICD-10-CM

## 2024-11-27 DIAGNOSIS — E11.9 CONTROLLED TYPE 2 DIABETES MELLITUS WITHOUT COMPLICATION, WITHOUT LONG-TERM CURRENT USE OF INSULIN: ICD-10-CM

## 2024-11-27 DIAGNOSIS — E11.9 CONTROLLED TYPE 2 DIABETES MELLITUS WITHOUT COMPLICATION, WITHOUT LONG-TERM CURRENT USE OF INSULIN: Primary | ICD-10-CM

## 2024-11-27 DIAGNOSIS — J43.2 CENTRILOBULAR EMPHYSEMA: ICD-10-CM

## 2024-11-27 DIAGNOSIS — E78.2 MIXED HYPERLIPIDEMIA: ICD-10-CM

## 2024-11-27 DIAGNOSIS — I27.20 PULMONARY HYPERTENSION: ICD-10-CM

## 2024-11-27 DIAGNOSIS — E11.40 TYPE 2 DIABETES MELLITUS WITH DIABETIC NEUROPATHY, WITHOUT LONG-TERM CURRENT USE OF INSULIN: ICD-10-CM

## 2024-11-27 DIAGNOSIS — D86.0 SARCOIDOSIS OF LUNG: ICD-10-CM

## 2024-11-27 DIAGNOSIS — E11.65 UNCONTROLLED TYPE 2 DIABETES MELLITUS WITH HYPERGLYCEMIA: ICD-10-CM

## 2024-11-27 DIAGNOSIS — I10 ESSENTIAL HYPERTENSION: ICD-10-CM

## 2024-11-27 DIAGNOSIS — J98.4 RESTRICTIVE LUNG DISEASE: ICD-10-CM

## 2024-11-27 DIAGNOSIS — I25.10 CORONARY ARTERY DISEASE INVOLVING NATIVE CORONARY ARTERY OF NATIVE HEART WITHOUT ANGINA PECTORIS: ICD-10-CM

## 2024-11-27 LAB
ESTIMATED AVG GLUCOSE: 143 MG/DL (ref 68–131)
HBA1C MFR BLD: 6.6 % (ref 4–5.6)

## 2024-11-27 PROCEDURE — 36415 COLL VENOUS BLD VENIPUNCTURE: CPT | Mod: PO | Performed by: INTERNAL MEDICINE

## 2024-11-27 PROCEDURE — 1159F MED LIST DOCD IN RCRD: CPT | Mod: CPTII,S$GLB,, | Performed by: INTERNAL MEDICINE

## 2024-11-27 PROCEDURE — 3008F BODY MASS INDEX DOCD: CPT | Mod: CPTII,S$GLB,, | Performed by: INTERNAL MEDICINE

## 2024-11-27 PROCEDURE — G2211 COMPLEX E/M VISIT ADD ON: HCPCS | Mod: S$GLB,,, | Performed by: INTERNAL MEDICINE

## 2024-11-27 PROCEDURE — 3078F DIAST BP <80 MM HG: CPT | Mod: CPTII,S$GLB,, | Performed by: INTERNAL MEDICINE

## 2024-11-27 PROCEDURE — 3060F POS MICROALBUMINURIA REV: CPT | Mod: CPTII,S$GLB,, | Performed by: INTERNAL MEDICINE

## 2024-11-27 PROCEDURE — 3052F HG A1C>EQUAL 8.0%<EQUAL 9.0%: CPT | Mod: CPTII,S$GLB,, | Performed by: INTERNAL MEDICINE

## 2024-11-27 PROCEDURE — 99214 OFFICE O/P EST MOD 30 MIN: CPT | Mod: S$GLB,,, | Performed by: INTERNAL MEDICINE

## 2024-11-27 PROCEDURE — 4010F ACE/ARB THERAPY RXD/TAKEN: CPT | Mod: CPTII,S$GLB,, | Performed by: INTERNAL MEDICINE

## 2024-11-27 PROCEDURE — 99999 PR PBB SHADOW E&M-EST. PATIENT-LVL IV: CPT | Mod: PBBFAC,,, | Performed by: INTERNAL MEDICINE

## 2024-11-27 PROCEDURE — 1160F RVW MEDS BY RX/DR IN RCRD: CPT | Mod: CPTII,S$GLB,, | Performed by: INTERNAL MEDICINE

## 2024-11-27 PROCEDURE — 3066F NEPHROPATHY DOC TX: CPT | Mod: CPTII,S$GLB,, | Performed by: INTERNAL MEDICINE

## 2024-11-27 PROCEDURE — 3075F SYST BP GE 130 - 139MM HG: CPT | Mod: CPTII,S$GLB,, | Performed by: INTERNAL MEDICINE

## 2024-11-27 PROCEDURE — 83036 HEMOGLOBIN GLYCOSYLATED A1C: CPT | Performed by: INTERNAL MEDICINE

## 2024-11-27 RX ORDER — TIRZEPATIDE 5 MG/.5ML
5 INJECTION, SOLUTION SUBCUTANEOUS
Qty: 4 EACH | Refills: 11 | Status: SHIPPED | OUTPATIENT
Start: 2024-11-27

## 2024-11-27 NOTE — PROGRESS NOTES
Subjective:       Patient ID: Cade Johnston is a 59 y.o. male.  Chief Complaint: Hypertension     HPI    Recent cholecystectomy.  Diarrhea resolved with this and DC metformin.       HTN - controlled; some low at home with sbp upper 90s.  Will cut his losartan in half if low = ok.       DM - uncontrolled; could not tolerate metformin (diarrhea/felt bad) or glipizide (sv constipation); tolerating 2.5 mounjaro.  outside eye Dr LIU - controlled ldl < 70 goal; refusing statin in past.    High triglycerides - uncontrolled    PND/ seasonal allergies.  controlled.  Tried Singulair in past, but makes eyes to dry.  Zyrtec and Xyzal = tired.      Sarcoidosis:  some SOB, but stable.  Had to retire.  Had several episodes at work where he had trouble breathing and was alone not to be found.  45% lung fnx, sees pulmonary - increased sob - now with talking.  States lungs slightly worse.  On new inhaler Breo from pulm.  pulm recommended retiring.    low testosterone, Dr Larson;  Secondary erythrocytosis - gets frequent phlebotomies.  2nd to testosterone and sarcoidosis    COPD - stable     CAD s/p stent.  Angio 2022 clear.  No chest pain  Sees Dr Duran cardiology.   Disorder of arteries - stable      VÍCTOR - + fatigue; could not tolerate cpap 10 yr ago.     Mood good.     Refuses further COVID vaccines     Assessment:       1. Controlled type 2 diabetes mellitus without complication, without long-term current use of insulin    2. Essential hypertension    3. Mixed hyperlipidemia    4. Centrilobular emphysema    5. Restrictive lung disease    6. Sarcoidosis of lung    7. Coronary artery disease involving native coronary artery of native heart without angina pectoris    8. Pulmonary hypertension    9. Type 2 diabetes mellitus with diabetic neuropathy, without long-term current use of insulin    10. Uncontrolled type 2 diabetes mellitus with hyperglycemia    11. High triglycerides        Plan:       Controlled type 2 diabetes  mellitus without complication, without long-term current use of insulin  -     Hemoglobin A1C; Future; Expected date: 02/25/2025  -     Hemoglobin A1C; Future; Expected date: 11/27/2024    Essential hypertension  -     Comprehensive Metabolic Panel; Future; Expected date: 02/25/2025    Mixed hyperlipidemia  -     Lipid Panel; Future; Expected date: 02/25/2025    Centrilobular emphysema    Restrictive lung disease    Sarcoidosis of lung    Coronary artery disease involving native coronary artery of native heart without angina pectoris    Pulmonary hypertension    Type 2 diabetes mellitus with diabetic neuropathy, without long-term current use of insulin    Uncontrolled type 2 diabetes mellitus with hyperglycemia  -     tirzepatide (MOUNJARO) 5 mg/0.5 mL PnIj; Inject 5 mg into the skin every 7 days.  Dispense: 4 each; Refill: 11    High triglycerides            Apurva in 3 mo for DM and triglycerides.  Start statin if still high trig.  Otc constipation discussed with inc in Mounjaro.   Ok to message for increase dose after 4 doses.  Visit today included increased complexity associated with the care of the episodic problem DM addressed and managing the longitudinal care of the patient due to the serious and/or complex managed problem(s) DM.  Continue current management and monitor.  Other diagnoses were reviewed and found stable and will continue to monitor.  Counseled on regular exercise, maintenance of a healthy weight, balanced diet rich in fruits/vegetables and lean protein, and avoidance of unhealthy habits like smoking and excessive alcohol intake.   Also, counseled on importance of being compliant with medication, health appointments, diet and exercise.     Follow up in about 3 months (around 2/27/2025).      Medication List with Changes/Refills   New Medications    TIRZEPATIDE (MOUNJARO) 5 MG/0.5 ML PNIJ    Inject 5 mg into the skin every 7 days.   Current Medications    ALBUTEROL (PROVENTIL/VENTOLIN HFA) 90  MCG/ACTUATION INHALER    Inhale 2 puffs into the lungs every 6 (six) hours as needed for Wheezing. Rescue    BLOOD-GLUCOSE METER KIT    To check BG BID , to use with insurance preferred meter    CLOPIDOGREL (PLAVIX) 75 MG TABLET    TAKE 1 TABLET BY MOUTH EVERY DAY    IBUPROFEN (ADVIL,MOTRIN) 200 MG TABLET    Take 200 mg by mouth every 6 (six) hours as needed for Pain.    IPRATROPIUM-ALBUTEROL (COMBIVENT)  MCG/ACTUATION INHALER    Inhale 1 puff into the lungs every 4 (four) hours as needed for Shortness of Breath. Rescue    LACTOBACILLUS ACIDOPHILUS (PROBIOTIC ACIDOPHILUS ORAL)    Take by mouth.    LANCETS MISC    To check BG BID, to use with insurance preferred meter    LOSARTAN (COZAAR) 100 MG TABLET    Take 1 tablet (100 mg total) by mouth once daily.    LUBIPROSTONE (AMITIZA) 8 MCG CAP    Take 1 capsule (8 mcg total) by mouth 2 (two) times daily with meals.    ONETOUCH VERIO TEST STRIPS STRP    TO CHECK BLOOD GLUCOSE TWICE A DAY    TIRZEPATIDE (MOUNJARO) 2.5 MG/0.5 ML PNIJ    Inject 2.5 mg into the skin every 7 days.    VERAPAMIL (VERELAN) 120 MG C24P    TAKE 1 CAPSULE BY MOUTH ONCE DAILY   Discontinued Medications    FLUTICASONE-SALMETEROL DISKUS INHALER 250-50 MCG    Inhale 1 puff into the lungs 2 (two) times daily. Controller    OXYCODONE-ACETAMINOPHEN (PERCOCET) 5-325 MG PER TABLET    Take 1 tablet by mouth every 4 (four) hours as needed.       BP Readings from Last 3 Encounters:   11/27/24 132/76   11/12/24 (!) 158/75   10/30/24 (!) 170/79     Hemoglobin A1C   Date Value Ref Range Status   09/27/2024 8.2 (H) 4.0 - 5.6 % Final     Comment:     ADA Screening Guidelines:  5.7-6.4%  Consistent with prediabetes  >or=6.5%  Consistent with diabetes    High levels of fetal hemoglobin interfere with the HbA1C  assay. Heterozygous hemoglobin variants (HbS, HgC, etc)do  not significantly interfere with this assay.   However, presence of multiple variants may affect accuracy.     05/09/2024 6.9 (H) 4.0 - 5.6 %  Final     Comment:     ADA Screening Guidelines:  5.7-6.4%  Consistent with prediabetes  >or=6.5%  Consistent with diabetes    High levels of fetal hemoglobin interfere with the HbA1C  assay. Heterozygous hemoglobin variants (HbS, HgC, etc)do  not significantly interfere with this assay.   However, presence of multiple variants may affect accuracy.     10/20/2023 6.7 (H) 4.0 - 5.6 % Final     Comment:     ADA Screening Guidelines:  5.7-6.4%  Consistent with prediabetes  >or=6.5%  Consistent with diabetes    High levels of fetal hemoglobin interfere with the HbA1C  assay. Heterozygous hemoglobin variants (HbS, HgC, etc)do  not significantly interfere with this assay.   However, presence of multiple variants may affect accuracy.       Lab Results   Component Value Date    TSH 1.326 05/09/2024     Lab Results   Component Value Date    LDLCALC Invalid, Trig>400.0 09/27/2024    LDLCALC 92.6 10/20/2023    LDLCALC 67.8 04/25/2023     Lab Results   Component Value Date    TRIG 546 (H) 09/27/2024    TRIG 132 10/20/2023    TRIG 186 (H) 04/25/2023     Wt Readings from Last 3 Encounters:   11/27/24 101.8 kg (224 lb 6.9 oz)   11/12/24 102 kg (224 lb 13.9 oz)   10/30/24 99.8 kg (220 lb)     Lab Results   Component Value Date    HGB 17.7 10/14/2024    HCT 52.5 10/14/2024    WBC 8.78 10/14/2024    ALT 29 10/14/2024    AST 30 10/14/2024     10/14/2024    K 4.0 10/14/2024    CREATININE 0.82 10/14/2024    PSA 1.3 09/27/2024           Review of Systems        Objective:      Vitals:    11/27/24 0927   BP: 132/76   Pulse: 71   Temp: 98.3 °F (36.8 °C)     Physical Exam  Vitals reviewed.   Constitutional:       Appearance: Normal appearance.   Eyes:      Conjunctiva/sclera: Conjunctivae normal.   Cardiovascular:      Rate and Rhythm: Normal rate.   Pulmonary:      Effort: Pulmonary effort is normal.      Breath sounds: Normal breath sounds.   Musculoskeletal:      Cervical back: Normal range of motion.      Comments: Normal ROM  bilateral    Skin:     General: Skin is warm and dry.   Neurological:      Mental Status: He is alert.      Cranial Nerves: Cranial nerve deficit: grossly intact.   Psychiatric:      Comments: Alert and orientated

## 2025-02-17 DIAGNOSIS — I27.20 PULMONARY HYPERTENSION: ICD-10-CM

## 2025-02-17 DIAGNOSIS — I25.10 CORONARY ARTERY DISEASE INVOLVING NATIVE CORONARY ARTERY OF NATIVE HEART WITHOUT ANGINA PECTORIS: ICD-10-CM

## 2025-02-17 RX ORDER — VERAPAMIL HYDROCHLORIDE 120 MG/1
120 CAPSULE, EXTENDED RELEASE ORAL
Qty: 90 CAPSULE | Refills: 4 | Status: SHIPPED | OUTPATIENT
Start: 2025-02-17

## 2025-02-20 ENCOUNTER — LAB VISIT (OUTPATIENT)
Dept: LAB | Facility: HOSPITAL | Age: 60
End: 2025-02-20
Attending: INTERNAL MEDICINE
Payer: MEDICARE

## 2025-02-20 DIAGNOSIS — E11.9 CONTROLLED TYPE 2 DIABETES MELLITUS WITHOUT COMPLICATION, WITHOUT LONG-TERM CURRENT USE OF INSULIN: ICD-10-CM

## 2025-02-20 DIAGNOSIS — I10 ESSENTIAL HYPERTENSION: ICD-10-CM

## 2025-02-20 DIAGNOSIS — E78.2 MIXED HYPERLIPIDEMIA: ICD-10-CM

## 2025-02-20 LAB
ALBUMIN SERPL BCP-MCNC: 3.7 G/DL (ref 3.5–5.2)
ALP SERPL-CCNC: 130 U/L (ref 40–150)
ALT SERPL W/O P-5'-P-CCNC: 17 U/L (ref 10–44)
ANION GAP SERPL CALC-SCNC: 9 MMOL/L (ref 8–16)
AST SERPL-CCNC: 19 U/L (ref 10–40)
BILIRUB SERPL-MCNC: 0.9 MG/DL (ref 0.1–1)
BUN SERPL-MCNC: 17 MG/DL (ref 8–23)
CALCIUM SERPL-MCNC: 8.9 MG/DL (ref 8.7–10.5)
CHLORIDE SERPL-SCNC: 109 MMOL/L (ref 95–110)
CHOLEST SERPL-MCNC: 145 MG/DL (ref 120–199)
CHOLEST/HDLC SERPL: 5.2 {RATIO} (ref 2–5)
CO2 SERPL-SCNC: 22 MMOL/L (ref 23–29)
CREAT SERPL-MCNC: 1.1 MG/DL (ref 0.5–1.4)
EST. GFR  (NO RACE VARIABLE): >60 ML/MIN/1.73 M^2
ESTIMATED AVG GLUCOSE: 117 MG/DL (ref 68–131)
GLUCOSE SERPL-MCNC: 170 MG/DL (ref 70–110)
HBA1C MFR BLD: 5.7 % (ref 4–5.6)
HDLC SERPL-MCNC: 28 MG/DL (ref 40–75)
HDLC SERPL: 19.3 % (ref 20–50)
LDLC SERPL CALC-MCNC: 63.2 MG/DL (ref 63–159)
NONHDLC SERPL-MCNC: 117 MG/DL
POTASSIUM SERPL-SCNC: 4.6 MMOL/L (ref 3.5–5.1)
PROT SERPL-MCNC: 7 G/DL (ref 6–8.4)
SODIUM SERPL-SCNC: 140 MMOL/L (ref 136–145)
TRIGL SERPL-MCNC: 269 MG/DL (ref 30–150)

## 2025-02-20 PROCEDURE — 83036 HEMOGLOBIN GLYCOSYLATED A1C: CPT | Performed by: INTERNAL MEDICINE

## 2025-02-20 PROCEDURE — 80061 LIPID PANEL: CPT | Performed by: INTERNAL MEDICINE

## 2025-02-20 PROCEDURE — 36415 COLL VENOUS BLD VENIPUNCTURE: CPT | Mod: PO | Performed by: INTERNAL MEDICINE

## 2025-02-20 PROCEDURE — 80053 COMPREHEN METABOLIC PANEL: CPT | Performed by: INTERNAL MEDICINE

## 2025-02-22 DIAGNOSIS — Z00.00 ENCOUNTER FOR MEDICARE ANNUAL WELLNESS EXAM: ICD-10-CM

## 2025-02-27 ENCOUNTER — OFFICE VISIT (OUTPATIENT)
Dept: FAMILY MEDICINE | Facility: CLINIC | Age: 60
End: 2025-02-27
Payer: MEDICARE

## 2025-02-27 VITALS
OXYGEN SATURATION: 100 % | SYSTOLIC BLOOD PRESSURE: 118 MMHG | TEMPERATURE: 98 F | BODY MASS INDEX: 29.48 KG/M2 | WEIGHT: 217.63 LBS | HEIGHT: 72 IN | DIASTOLIC BLOOD PRESSURE: 78 MMHG | HEART RATE: 77 BPM

## 2025-02-27 DIAGNOSIS — I10 ESSENTIAL HYPERTENSION: ICD-10-CM

## 2025-02-27 DIAGNOSIS — I27.20 PULMONARY HYPERTENSION: ICD-10-CM

## 2025-02-27 DIAGNOSIS — E11.40 TYPE 2 DIABETES MELLITUS WITH DIABETIC NEUROPATHY, WITHOUT LONG-TERM CURRENT USE OF INSULIN: Primary | ICD-10-CM

## 2025-02-27 DIAGNOSIS — D86.0 SARCOIDOSIS OF LUNG: ICD-10-CM

## 2025-02-27 DIAGNOSIS — Z71.85 VACCINE COUNSELING: ICD-10-CM

## 2025-02-27 DIAGNOSIS — J43.2 CENTRILOBULAR EMPHYSEMA: ICD-10-CM

## 2025-02-27 DIAGNOSIS — Z12.5 ENCOUNTER FOR SCREENING FOR MALIGNANT NEOPLASM OF PROSTATE: ICD-10-CM

## 2025-02-27 PROCEDURE — 99999 PR PBB SHADOW E&M-EST. PATIENT-LVL IV: CPT | Mod: PBBFAC,,, | Performed by: NURSE PRACTITIONER

## 2025-02-27 RX ORDER — LOSARTAN POTASSIUM 50 MG/1
50 TABLET ORAL DAILY
Qty: 90 TABLET | Refills: 3 | Status: SHIPPED | OUTPATIENT
Start: 2025-02-27

## 2025-02-27 NOTE — PROGRESS NOTES
Subjective:       Patient ID: Cade Johnston is a 59 y.o. male.    Chief Complaint: Follow-up    HPI  Recent cholecystectomy.  Diarrhea resolved with this     HTN - controlled-- getting low/symptomatic following weight loss. No longer taking verapamil; cutting losartan in half      DM - controlled A1C 5.7 on Mounjaro 5mg weekly      HLD - controlled LDL < 70 goal  High triglycerides - uncontrolled     Sarcoidosis:  some SOB, but stable.  Had to retire.  following with pulmonology     COPD - stable     CAD s/p stent.  Angio 2022 clear.  No chest pain  Sees Dr Mcdonough           Vitals:    02/27/25 0910   BP: 118/78   Pulse: 77   Temp: 98.1 °F (36.7 °C)     Review of Systems   Constitutional:  Negative for fever.   Cardiovascular:  Negative for chest pain.       Past Medical History:   Diagnosis Date    Anticoagulant long-term use     Cancer of skin of left ear     Colon polyp     Coronary artery disease, nonobstructive     DJD (degenerative joint disease) of lumbar spine     DM type 2 (diabetes mellitus, type 2)     with diet alone    Dyslipidemia 08/13/2019    Essential hypertension 12/29/2015    Fatty liver     GERD (gastroesophageal reflux disease)     Hepatosplenomegaly     HTN (hypertension)     Hyperlipidemia     resolved with diet    Hypogonadism male 02/17/2015    Lumbar radiculopathy     Lumbar spondylosis     Nephrolithiasis     last stone 1/11    VÍCTOR on CPAP     Palpitations 03/09/2016    Sarcoidosis of lung 1990    Ventricular hypokinesis     angiogram with mild LAD stenosis     Objective:      Physical Exam  Vitals and nursing note reviewed.   Constitutional:       General: He is not in acute distress.     Appearance: He is not diaphoretic.   HENT:      Head: Normocephalic.   Eyes:      General: Lids are normal.         Right eye: No discharge.         Left eye: No discharge.   Neck:      Trachea: No tracheal deviation.   Cardiovascular:      Rate and Rhythm: Normal rate and regular rhythm.      Heart  sounds: Normal heart sounds.   Pulmonary:      Effort: Pulmonary effort is normal.   Skin:     Coloration: Skin is not pale.   Neurological:      Mental Status: He is alert and oriented to person, place, and time.   Psychiatric:         Speech: Speech normal.         Behavior: Behavior normal.         Thought Content: Thought content normal.         Judgment: Judgment normal.         Assessment:       1. Type 2 diabetes mellitus with diabetic neuropathy, without long-term current use of insulin    2. Essential hypertension    3. Sarcoidosis of lung    4. Pulmonary hypertension    5. Centrilobular emphysema    6. Encounter for screening for malignant neoplasm of prostate    7. Vaccine counseling        Plan:       Type 2 diabetes mellitus with diabetic neuropathy, without long-term current use of insulin  -     Comprehensive Metabolic Panel; Future; Expected date: 08/27/2025  -     Lipid Panel; Future; Expected date: 08/27/2025  -     Hemoglobin A1C; Future; Expected date: 08/27/2025    Essential hypertension  -     CBC Auto Differential; Future; Expected date: 08/27/2025  -     TSH; Future; Expected date: 08/27/2025  -     losartan (COZAAR) 50 MG tablet; Take 1 tablet (50 mg total) by mouth once daily.  Dispense: 90 tablet; Refill: 3    Sarcoidosis of lung  Centrilobular emphysema   Stable continue management with pulmonology     Pulmonary hypertension   PA pressure 39 on echo 2021; FU routinely with cardiology     Encounter for screening for malignant neoplasm of prostate  -     PSA, Screening; Future; Expected date: 08/27/2025    Vaccine counseling  -     pneumoc 20-angel conj-dip cr(PF) (PREVNAR-20 (PF)) injection Syrg 0.5 mL        Follow up in about 6 months (around 8/27/2025).    Medication List with Changes/Refills   Current Medications    ALBUTEROL (PROVENTIL/VENTOLIN HFA) 90 MCG/ACTUATION INHALER    Inhale 2 puffs into the lungs every 6 (six) hours as needed for Wheezing. Rescue    BLOOD-GLUCOSE METER KIT     To check BG BID , to use with insurance preferred meter    CLOPIDOGREL (PLAVIX) 75 MG TABLET    TAKE 1 TABLET BY MOUTH EVERY DAY    IBUPROFEN (ADVIL,MOTRIN) 200 MG TABLET    Take 200 mg by mouth every 6 (six) hours as needed for Pain.    IPRATROPIUM-ALBUTEROL (COMBIVENT)  MCG/ACTUATION INHALER    Inhale 1 puff into the lungs every 4 (four) hours as needed for Shortness of Breath. Rescue    LACTOBACILLUS ACIDOPHILUS (PROBIOTIC ACIDOPHILUS ORAL)    Take by mouth.    LANCETS MISC    To check BG BID, to use with insurance preferred meter    LUBIPROSTONE (AMITIZA) 8 MCG CAP    Take 1 capsule (8 mcg total) by mouth 2 (two) times daily with meals.    ONETOUCH VERIO TEST STRIPS STRP    TO CHECK BLOOD GLUCOSE TWICE A DAY    TIRZEPATIDE (MOUNJARO) 5 MG/0.5 ML PNIJ    Inject 5 mg into the skin every 7 days.   Changed and/or Refilled Medications    Modified Medication Previous Medication    LOSARTAN (COZAAR) 50 MG TABLET losartan (COZAAR) 100 MG tablet       Take 1 tablet (50 mg total) by mouth once daily.    Take 1 tablet (100 mg total) by mouth once daily.   Discontinued Medications    TIRZEPATIDE (MOUNJARO) 2.5 MG/0.5 ML PNIJ    Inject 2.5 mg into the skin every 7 days.    VERAPAMIL (VERELAN) 120 MG C24P    TAKE 1 CAPSULE BY MOUTH EVERY DAY

## 2025-04-14 DIAGNOSIS — I27.20 PULMONARY HYPERTENSION: ICD-10-CM

## 2025-04-15 ENCOUNTER — PATIENT MESSAGE (OUTPATIENT)
Dept: FAMILY MEDICINE | Facility: CLINIC | Age: 60
End: 2025-04-15
Payer: MEDICARE

## 2025-04-16 RX ORDER — SILDENAFIL CITRATE 20 MG/1
20 TABLET ORAL 3 TIMES DAILY
Qty: 30 TABLET | Refills: 5 | Status: SHIPPED | OUTPATIENT
Start: 2025-04-16

## 2025-05-16 ENCOUNTER — OFFICE VISIT (OUTPATIENT)
Dept: OTOLARYNGOLOGY | Facility: CLINIC | Age: 60
End: 2025-05-16
Payer: MEDICARE

## 2025-05-16 VITALS — WEIGHT: 219.81 LBS | BODY MASS INDEX: 29.81 KG/M2

## 2025-05-16 DIAGNOSIS — G47.30 SLEEP APNEA, UNSPECIFIED TYPE: ICD-10-CM

## 2025-05-16 DIAGNOSIS — R09.82 PND (POST-NASAL DRIP): ICD-10-CM

## 2025-05-16 DIAGNOSIS — R09.A2 GLOBUS SENSATION: Primary | ICD-10-CM

## 2025-05-16 DIAGNOSIS — J30.9 ALLERGIC RHINITIS, UNSPECIFIED SEASONALITY, UNSPECIFIED TRIGGER: ICD-10-CM

## 2025-05-16 PROCEDURE — 99999 PR PBB SHADOW E&M-EST. PATIENT-LVL III: CPT | Mod: PBBFAC,,, | Performed by: NURSE PRACTITIONER

## 2025-05-16 RX ORDER — AZELASTINE 1 MG/ML
1 SPRAY, METERED NASAL 2 TIMES DAILY
Qty: 30 ML | Refills: 11 | Status: SHIPPED | OUTPATIENT
Start: 2025-05-16 | End: 2025-09-03

## 2025-05-16 RX ORDER — PREDNISONE 20 MG/1
20 TABLET ORAL DAILY
Qty: 5 TABLET | Refills: 0 | Status: SHIPPED | OUTPATIENT
Start: 2025-05-16 | End: 2025-05-21

## 2025-05-16 RX ORDER — FLUTICASONE PROPIONATE 50 MCG
1 SPRAY, SUSPENSION (ML) NASAL 2 TIMES DAILY
Qty: 16 G | Refills: 11 | Status: SHIPPED | OUTPATIENT
Start: 2025-05-16

## 2025-05-16 NOTE — PROGRESS NOTES
Otolaryngology Clinic Note    Subjective:       Patient ID: Cade Johnston is a 59 y.o. male.    Chief Complaint: throat problem      History of Present Illness: Cade Johnston is a 59 y.o. male presenting with swallowing concerns    History of Present Illness    CHIEF COMPLAINT:  Patient presents today for concern about swollen uvula.    HISTORY OF PRESENT ILLNESS:  He reports uvula swelling for the past two weeks with history of similar episodes. He describes progressively worsening gag reflex and constant sensation in the back of the throat. When swallowing hard, he notes sensation of something rubbing. Symptoms worsen after eating with increased irritation. He also reports post-nasal drip. He denies coughing, throat clearing, or globus sensation.    ALLERGIES:  He has seasonal allergies that typically worsen during pollen season. He takes Zyrtec daily during pollen season for management.    DIET AND LIFESTYLE:  He regularly consumes spicy, greasy, and acidic foods. He waits 2.5-3 hours after eating before lying down. His daily fluid intake includes 1-2 cups (10 oz each) of coffee in the morning, soft drink at lunch, tea, and large quantities of water throughout the day.    MEDICAL HISTORY:  He underwent gallbladder removal in October, which has alleviated many of his symptoms. He had a colonoscopy earlier this year but denies having an EGD.    CURRENT MEDICATIONS:  He takes Mounjaro for diabetes management and Zyrtec for seasonal allergies.      ROS:  General: -fever, -chills, -fatigue, -weight gain, -weight loss  ENT: -ear pain, +nasal congestion, -sore throat, +gagging, +sense of lump/mass in throat when swallowing, +post nasal drip  Cardiovascular: -chest pain, -palpitations, -lower extremity edema  Respiratory: -cough, -shortness of breath  Allergic: +seasonal allergies               Past Surgical History:   Procedure Laterality Date    ANGIOGRAM, CORONARY, WITH LEFT HEART CATHETERIZATION N/A 08/16/2022     Procedure: Angiogram, Coronary, with Left Heart Cath;  Surgeon: Refugio Mcdonough MD;  Location: [facility] CATH;  Service: Cardiology;  Laterality: N/A;    CARDIAC SURGERY      CIRCUMCISION      COLONOSCOPY N/A 01/08/2024    Procedure: COLONOSCOPY;  Surgeon: HALLEY Montes MD;  Location: [facility] ENDO;  Service: Endoscopy;  Laterality: N/A;    CORONARY ANGIOGRAPHY N/A 12/13/2019    Procedure: ANGIOGRAM, CORONARY ARTERY;  Surgeon: Amador Duran MD;  Location: [facility] CATH;  Service: Cardiology;  Laterality: N/A;    CORONARY STENT PLACEMENT  12/2019    x1    CYST REMOVAL Right 05/2022    thigh, Dr. Parkinson    elbow spur removal      right    EPIDURAL STEROID INJECTION INTO LUMBAR SPINE N/A 02/10/2021    Procedure: Injection-steroid-epidural-lumbar L4/5;  Surgeon: Richi Rangel MD;  Location: [facility] OR;  Service: Pain Management;  Laterality: N/A;    EPIDURAL STEROID INJECTION INTO LUMBAR SPINE N/A 03/15/2021    Procedure: Injection-steroid-epidural-lumbar L5/S1;  Surgeon: Richi Rangel MD;  Location: [facility] OR;  Service: Pain Management;  Laterality: N/A;    EPIDURAL STEROID INJECTION INTO LUMBAR SPINE N/A 10/16/2023    Procedure: Injection-steroid-epidural-lumbar;  Surgeon: Richi Rangel MD;  Location: [facility] OR;  Service: Pain Management;  Laterality: N/A;  L5/S1    EXTRACORPOREAL SHOCK WAVE LITHOTRIPSY  1996    [facility]    LEFT HEART CATHETERIZATION Left 12/13/2019    Procedure: Left heart cath;  Surgeon: Amador Duran MD;  Location: [facility] CATH;  Service: Cardiology;  Laterality: Left;    removal of skin cancer      L side of face    ROBOT-ASSISTED CHOLECYSTECTOMY N/A 10/30/2024    Procedure: ROBOTIC CHOLECYSTECTOMY;  Surgeon: Michael Mg MD;  Location: [facility] OR;  Service: General;  Laterality: N/A;    URETERAL EXPLORATION  1996    exploration of the R ureter for ureteral injury [facility] - did well     Past Medical History:   Diagnosis Date    Anticoagulant long-term use     Cancer  of skin of left ear     Colon polyp     Coronary artery disease, nonobstructive     DJD (degenerative joint disease) of lumbar spine     DM type 2 (diabetes mellitus, type 2)     with diet alone    Dyslipidemia 08/13/2019    Essential hypertension 12/29/2015    Fatty liver     GERD (gastroesophageal reflux disease)     Hepatosplenomegaly     HTN (hypertension)     Hyperlipidemia     resolved with diet    Hypogonadism male 02/17/2015    Lumbar radiculopathy     Lumbar spondylosis     Nephrolithiasis     last stone 1/11    VÍCTOR on CPAP     Palpitations 03/09/2016    Sarcoidosis of lung 1990    Ventricular hypokinesis     angiogram with mild LAD stenosis     Social Drivers of Health     Tobacco Use: Medium Risk (2/27/2025)    Patient History     Smoking Tobacco Use: Former     Smokeless Tobacco Use: Never     Passive Exposure: Past   Alcohol Use: Not At Risk (5/8/2024)    AUDIT-C     Frequency of Alcohol Consumption: Never     Average Number of Drinks: Patient does not drink     Frequency of Binge Drinking: Never   Financial Resource Strain: Low Risk  (5/8/2024)    Overall Financial Resource Strain (CARDIA)     Difficulty of Paying Living Expenses: Not hard at all   Food Insecurity: No Food Insecurity (5/8/2024)    Hunger Vital Sign     Worried About Running Out of Food in the Last Year: Never true     Ran Out of Food in the Last Year: Never true   Transportation Needs: No Transportation Needs (5/8/2024)    PRAPARE - Transportation     Lack of Transportation (Medical): No     Lack of Transportation (Non-Medical): No   Physical Activity: Sufficiently Active (5/8/2024)    Exercise Vital Sign     Days of Exercise per Week: 3 days     Minutes of Exercise per Session: 60 min   Stress: Stress Concern Present (5/8/2024)    [employer] - Occupational Stress Questionnaire     Feeling of Stress : Rather much   Housing Stability: Unknown (5/8/2024)    Housing Stability Vital Sign     Unable to Pay for  Housing in the Last Year: No     Number of Times Moved in the Last Year: Not on file     Homeless in the Last Year: No   Depression: Low Risk  (2/27/2025)    Depression     Last PHQ-4: Flowsheet Data: 0   Utilities: Not At Risk (5/8/2024)    [facility] Utilities     Threatened with loss of utilities: No   Health Literacy: Adequate Health Literacy (5/8/2024)     Health Literacy     Frequency of need for help with medical instructions: Never   Social Isolation: Not on file     Review of patient's allergies indicates:   Allergen Reactions    Metformin      Severe abdominal bloating; diarrhea    Codeine Hives    Latex Rash     blisters     Current Outpatient Medications   Medication Instructions    albuterol (PROVENTIL/VENTOLIN HFA) 90 mcg/actuation inhaler 2 puffs, Inhalation, Every 6 hours PRN, Rescue    blood-glucose meter kit To check BG BID , to use with insurance preferred meter    clopidogreL (PLAVIX) 75 mg tablet TAKE 1 TABLET BY MOUTH EVERY DAY    ibuprofen (ADVIL,MOTRIN) 200 mg, Every 6 hours PRN    ipratropium-albuteroL (COMBIVENT)  mcg/actuation inhaler 1 puff, Inhalation, Every 4 hours PRN, Rescue    Lactobacillus acidophilus (PROBIOTIC ACIDOPHILUS ORAL) Take by mouth.    lancets Misc To check BG BID, to use with insurance preferred meter    losartan (COZAAR) 50 mg, Oral, Daily    lubiprostone (AMITIZA) 8 mcg, Oral, 2 times daily with meals    MOUNJARO 5 mg, Subcutaneous, Every 7 days    ONETOUCH VERIO TEST STRIPS Strp TO CHECK BLOOD GLUCOSE TWICE A DAY    predniSONE (DELTASONE) 20 mg, Oral, Daily    sildenafil (REVATIO) 20 mg, Oral, 3 times daily         ENT ROS negative except as stated above.     Patient answers are not available for this visit.            Objective:      There were no vitals filed for this visit.    General: NAD, well appearing  Eyes: Normal conjunctiva and lids  Face: symmetric, nerve intact  Nose: The nose is without any evidence of any deformity. The nasal mucosa is moist. The  septum is deviated to the left There is no evidence of septal hematoma. The turbinates are with hypertrophy  Ears: The ears are with normal-appearing pinna. Examination of the canals is normal appearing bilaterally. There is no drainage or erythema noted. The tympanic membranes are normal appearing with pearly color, normal-appearing landmarks and normal light reflex. Hearing is grossly intact.  Mouth: No obvious abnormalities to the lips. Poor dentition. The gingivae are without any obvious evidence of infection or lesion. The oral mucosa is moist and pink. There are no obvious masses to the hard or soft palate.   Oropharynx: The uvula is midline.  The tongue is midline. The posterior pharynx is without erythema or exudate. The tonsils are normal appearing.  Salivary glands: The salivary glands are symmetric and not enlarged, no masses  Neck: No lymphadenopathy, trachea midline, thryoid not enlarged.  Psych: Normal mood and affect.   Neuro: Grossly intact  Speech: fluent    Procedure: Flexible laryngoscopy  Indications: globus, PND  Verbal consent obtained  Anesthesia: lidocaine and phenylephrine nasal spray  Procedure: Scope was passed into right or left nare, to nasopharynx and down to visualize the glottis. Findings below. Patient tolerated procedure well.     - Nose deviated septum, clear rhinorrhea, PND  - Nasopharynx- WNL, no masses  - Oropharynx- BOT symmetric, no masses  - Hypopharynx and piriform sinuses- no masses on trumpet maneuver  - Supraglottis- Arytenoids intact, no masses, + edematous no erythematous  - Glottis- Bilateral vocal folds intact with full motion, no masses  - Glottic closure- complete     Lab Results   Component Value Date/Time    HGBA1C 5.7 (H) 02/20/2025 07:55 AM        Assessment and Plan:       1. Globus sensation    2. Sleep apnea, unspecified type    3. PND (post-nasal drip)    4. Allergic rhinitis, unspecified seasonality, unspecified trigger          Assessment & Plan    E11.65  Type 2 diabetes mellitus with hyperglycemia  J30.2 Other seasonal allergic rhinitis  K21.9 Gastro-esophageal reflux disease without esophagitis  R13.0 Aphagia  R09.82 Postnasal drip  J38.7 Other diseases of larynx  Z90.49 Acquired absence of other specified parts of digestive tract    SEASONAL ALLERGIC RHINITIS:  - Considered symptoms of uvula swelling and nasal drip, likely related to allergies.  - Opted for short course of oral steroids to address potential uvula swelling.  - Will treat nasal symptoms with combination of saline rinse and nasal sprays before considering silent reflux as a cause.  - Started prednisone 20 mg daily for 5 days, to be taken in the morning.  - Started Flonase nasal steroid spray.  - Started Astelin antihistamine nasal spray.  - Started saline rinse twice daily using distilled water and salt packs.  - Explained potential link between nasal drip and throat sensation.    GASTROESOPHAGEAL REFLUX DISEASE:  - Discussed possibility of silent reflux and its symptoms.  - Educated on triggers for reflux, including dietary factors and caffeine intake, and importance of proper timing between eating and lying down.  - Patient to cut back on caffeine intake, particularly soft drinks.  - Patient to avoid spicy, greasy, and fried foods that may trigger reflux.  - Patient to maintain at least 3-hour interval between eating and lying down.    LARYNGEAL DISEASE:  - Noted mild swelling on voice box, but prioritized treating allergy symptoms before pursuing reflux treatment.    TYPE 2 DIABETES MELLITUS:  - Reviewed recent A1C (5.7) to ensure steroid treatment is appropriate given diabetes.    POSTNASAL DRIP:  - Patient to continue water intake of at least 64 oz daily.    ACQUIRED ABSENCE OF DIGESTIVE TRACT PARTS:  - Assessed recent gallbladder removal and its impact on digestive symptoms.    FOLLOW-UP:  - Follow up in 8 weeks to reassess symptoms.          Plan of care was discussed in detail with the  patient, who agreed with the plan as above. All questions were answered in detail. 40 minutes spent in direct patient care, chart review and documentation     [facility]  Otolaryngology     This note was generated with the assistance of ambient listening technology. Verbal consent was obtained by the patient and accompanying visitor(s) for the recording of patient appointment to facilitate this note. I attest to having reviewed and edited the generated note for accuracy, though some syntax or spelling errors may persist. Please contact the author of this note for any clarification.

## 2025-05-16 NOTE — PATIENT INSTRUCTIONS
"ENT SINUS REGIMEN:    "ENT-APPROVED" NASAL SPRAYS:  Flonase / fluticasone / Nasacort / Rhinocort (steroid spray) is best for stuffy, pressure, fullness. Available over-the-counter without a prescription.   Astepro / azelastine (antihistamine spray) is best for itchy, drippy, sneezy. Available over-the-counter without a prescription.     Use as directed, spraying 1-2 times in each nostril each day. It may take 2-3 days to 2-3 weeks to begin seeing improvement. This medication needs to be taken consistently to see results. Overall, this is a well-tolerated medication with low side effects. The benefit of nasal steroids as opposed to oral steroids is that the nasal steroid spray works primarily in the nose. Common side effects can include: headache, nasal dryness, minor nose bleed.  Rare side effects may include:  septal perforation, elevation in eye pressure, dry eyes, change in smell, allergic reaction.  Notify your provider if you have any concerns or experience these symptoms.     Nasal spray instructions:  Blow nose first gently to clean. Keep chin level with the floor (do not tilt head forward or back). Using the opposite hand (example: right hand for left nostril, left hand for right nostril) insert nasal spray taking caution to direct it AWAY from the middle wall inside the nose (septum) to avoid irritating nasal septum which could cause nosebleed.  Do not tilt spray up but rather flat and out along the roof of your mouth to spray. Angle the tip of the spray out slightly toward the direction of the ears; then spray. Do not take quick vigorous sniff but rather slow gentle inhalation while waiting for medication to absorb into nasal passages. Then administer second spray in same way.     Nasal saline rinse kit (use Neti pot or NeilMed sinus rinse kit) -- Rinse your sinuses once to twice daily to reduce the allergen burden in your nose. Use sterile water (boiled tap water which has cooled) or distilled bottled " water. Add 1/4 teaspoon sea salt and a pinch of baking soda or a mixture packet from the maker of your sinus rinse kit.  Rinse through both sides of nose to cleanse sinus and nasal passages, bending forward with head tilted down. Keep your mouth open, without holding your breath. Squeeze bottle gently until solution starts draining from the opposite nasal passage. After bottle is empty, blow nose very gently, without pinching nose completely, to avoid pressure on eardrums.  There are useful YouTube videos that show demonstration of how to do these properly.     Ponaris Nasal Emollient is used for the relief of: nasal congestion due to colds, nasal irritation, allergy exacerbations, nasal crusting. Specifically prepared iodized organic oils of pine, eucalyptus, peppermint, cajeput, and cottonseed. To order Ponaris: ask your pharmacist to order it for you or we carry it in our pharmacy downstairs on the first floor.      PLEASE PERFORM SINUS RINSES 2 TIMES DAILY UNTIL YOUR NEXT VISIT.          DIRECTIONS FOR SINUS SALINE RINSE To see demonstration: Enter http://www.VAZATA.com/watch?v=[email or web address] into the browser address box, or go to You tube, and under the search box, enter sinus rinse. Click on NeilMed Sinus Rinse Video    Step 1    Step 1 Please wash your hands. Fill the clean bottle with the designated volume of warm distilled water, filtered water or previously boiled water. You may warm the water in a microwave but we recommend that you warm it in increments of five seconds. This is to avoid excessive heating of the water and damage to the device or scalding your nasal passage.    Step 2    Step 2 Cut the SINUS RINSE mixture packet at the corner and pour its contents into the bottle. Tighten the cap & tube on the bottle securely, place one finger over the tip of the cap and shake the bottle gently to dissolve the mixture.      Step 3    Step 3 Standing in front of a sink, bend forward to your comfort  level and tilt your head down. Keeping your mouth open without holding your breath, place cap snugly against your nasal passage and SQUEEZE BOTTLE GENTLY until the solution starts draining from the OPPOSITE nasal passage or from your mouth. Keep squeezing the bottle GENTLY until at least 1/4 to 1/2 (60 to 120 mL) of the bottle is used for a proper rinse. Do not swallow the solution.    Step 4    Blow your nose gently, without pinching your nose completely because this will apply pressure on the eardrums. If tolerable, sniff in any residual solution remaining in the nasal passage once or twice prior to blowing your nose as this may clean out the posterior nasopharyngeal area (the area at the back of your nasal passage). Some solution will reach the back of the throat, so please spit it out. To help improve drainage of any residual solution, blow your nose gently while tilting your head to the opposite side of the nasal passage that you just rinsed.    Step 5    Now repeat steps 3 & 4 for your other nasal passage.    Step 6     Air dry the Sinus Rinse bottle, cap, and tube on a clean paper towel, a glass plate to store the bottle cap and tube. If there is any solution leftover, please discard it. We recommend you make a fresh solution each time you rinse. Rinse 5 times each day, OR as directed by your physician. Warnings: DO NOT RINSE IF NASAL PASSAGE IS COMPLETELY BLOCKED OR IF YOU HAVE AN EAR INFECTION OR BLOCKED EARS. If you have had ear surgery, please contact your physician prior to irrigation. If you experience any pressure in your ears, stop the rinse and get further directions from your physician or contact our office during regular business hours. To avoid any ear discomfort: Heat the solution to lukewarm, do not use hot, boiling or cold water. Keep your mouth open. Do not hold your breath and if possible make the sound GARY....GARY... Make sure to take the position as shown. Gently squeeze 1/4 of the bottle at  a time (60mL / 2 ounces). Stop the rinse if you feel any solution sensation near the ears. You may rinse with a partially blocked nasal passage. Please do not use for any other purposes. Please rinse at least ONE HOUR PRIOR to bedtime, in order to avoid any residual solution dripping in the throat.    >> Before using the SINUS RINSE kit, please inspect the cap, tube and bottle carefully for wear and tear. If any of the components appear discolored or cracked, please contact [employer] to obtain a replacement. You must follow the cleaning instructions provided in this brochure or cleaning instruction card prior to each use.    >> The SINUS RINSE bottle and mixture are to be used only for nasalirrigation. Do not use for any other purposes.    >> We recommend that you use the rinse ONE HOUR PRIOR to bedtime in order to avoid any residual solution dripping in the throat.    Tips to avoid ear discomfort while rinsing    If you have had ear surgery, please contact your physician prior to irrigation. Do not use if you have an ear infection or blocked ears. Rinse with lukewarm water. Keep your mouth open. Do not hold your breath while rinsing. While rinsing, make sure to tilt your. Gently squeeze the bottle while rinsing; do not squeeze the bottle very forcefully. Stop the rinse if you feel a sensation of fluid near your ears.    Tips to avoid unexpected drainage after rinsing    In rare situations, especially if you have had sinus surgery, the saline solution can pool in the sinus cavities and nasal passages and then drip from your nostrils hours after rinsing. To avoid this harmless but annoying inconvenience, take one extra step after rinsing: lean forward, tilt your head sideways and gently blow your nose. Then, tilt your head to the other side and blow again. You may need to repeat this several times. This will help rid your nasal passages of any excess mucus and remaining saline solution. If you find yourself  experiencing delayed drainage often, do not rinse right before leaving your house or going to bed.

## 2025-05-30 DIAGNOSIS — Z95.5 STENTED CORONARY ARTERY: ICD-10-CM

## 2025-05-30 RX ORDER — CLOPIDOGREL BISULFATE 75 MG/1
75 TABLET ORAL
Qty: 90 TABLET | Refills: 4 | Status: SHIPPED | OUTPATIENT
Start: 2025-05-30

## 2025-06-03 ENCOUNTER — OFFICE VISIT (OUTPATIENT)
Dept: OTOLARYNGOLOGY | Facility: CLINIC | Age: 60
End: 2025-06-03
Payer: MEDICARE

## 2025-06-03 VITALS — BODY MASS INDEX: 29.77 KG/M2 | HEIGHT: 72 IN | WEIGHT: 219.81 LBS

## 2025-06-03 DIAGNOSIS — J39.2 OROPHARYNGEAL MASS: ICD-10-CM

## 2025-06-03 DIAGNOSIS — R09.A2 GLOBUS SENSATION: Primary | ICD-10-CM

## 2025-06-03 PROCEDURE — 99214 OFFICE O/P EST MOD 30 MIN: CPT | Mod: 25,S$GLB,, | Performed by: NURSE PRACTITIONER

## 2025-06-03 PROCEDURE — 4010F ACE/ARB THERAPY RXD/TAKEN: CPT | Mod: CPTII,S$GLB,, | Performed by: NURSE PRACTITIONER

## 2025-06-03 PROCEDURE — 99999 PR PBB SHADOW E&M-EST. PATIENT-LVL III: CPT | Mod: PBBFAC,,, | Performed by: NURSE PRACTITIONER

## 2025-06-03 PROCEDURE — 31575 DIAGNOSTIC LARYNGOSCOPY: CPT | Mod: S$GLB,,, | Performed by: NURSE PRACTITIONER

## 2025-06-03 PROCEDURE — 1159F MED LIST DOCD IN RCRD: CPT | Mod: CPTII,S$GLB,, | Performed by: NURSE PRACTITIONER

## 2025-06-03 PROCEDURE — 3072F LOW RISK FOR RETINOPATHY: CPT | Mod: CPTII,S$GLB,, | Performed by: NURSE PRACTITIONER

## 2025-06-03 PROCEDURE — 3008F BODY MASS INDEX DOCD: CPT | Mod: CPTII,S$GLB,, | Performed by: NURSE PRACTITIONER

## 2025-06-03 PROCEDURE — 3044F HG A1C LEVEL LT 7.0%: CPT | Mod: CPTII,S$GLB,, | Performed by: NURSE PRACTITIONER

## 2025-06-11 DIAGNOSIS — R09.A2 GLOBUS SENSATION: Primary | ICD-10-CM

## 2025-06-12 ENCOUNTER — HOSPITAL ENCOUNTER (OUTPATIENT)
Dept: RADIOLOGY | Facility: HOSPITAL | Age: 60
Discharge: HOME | End: 2025-06-12
Attending: NURSE PRACTITIONER
Payer: MEDICARE

## 2025-06-12 DIAGNOSIS — R09.A2 GLOBUS SENSATION: ICD-10-CM

## 2025-06-12 PROCEDURE — 70491 CT SOFT TISSUE NECK W/DYE: CPT | Mod: 26,,, | Performed by: RADIOLOGY

## 2025-06-12 PROCEDURE — 70491 CT SOFT TISSUE NECK W/DYE: CPT | Mod: TC,PO

## 2025-06-12 PROCEDURE — 25500020 PHARM REV CODE 255: Mod: PO | Performed by: NURSE PRACTITIONER

## 2025-06-12 RX ADMIN — IOHEXOL 75 ML: 350 INJECTION, SOLUTION INTRAVENOUS at 05:06

## 2025-06-13 ENCOUNTER — OFFICE VISIT (OUTPATIENT)
Dept: OTOLARYNGOLOGY | Facility: CLINIC | Age: 60
End: 2025-06-13
Payer: MEDICARE

## 2025-06-13 VITALS — WEIGHT: 218.5 LBS | HEIGHT: 72 IN | BODY MASS INDEX: 29.59 KG/M2

## 2025-06-13 DIAGNOSIS — J39.2 OROPHARYNGEAL MASS: Primary | ICD-10-CM

## 2025-06-13 PROBLEM — E78.2 HYPERLIPEMIA, MIXED: Status: ACTIVE | Noted: 2025-06-13

## 2025-06-13 PROCEDURE — 99999 PR PBB SHADOW E&M-EST. PATIENT-LVL IV: CPT | Mod: PBBFAC,,, | Performed by: STUDENT IN AN ORGANIZED HEALTH CARE EDUCATION/TRAINING PROGRAM

## 2025-06-13 NOTE — PROGRESS NOTES
Otolaryngology Clinic Note    Subjective:       Patient ID: Cade Johnston is a 59 y.o. male.    Chief Complaint: Consult (Review ct scan)      History of Present Illness: Cade Johnston is a 59 y.o. male presenting with swallowing concerns    History of Present Illness    CHIEF COMPLAINT:  Patient presents today for concern about swollen uvula. He reports uvula swelling for the past two weeks with history of similar episodes. He describes progressively worsening gag reflex and constant sensation in the back of the throat. When swallowing hard, he notes sensation of something rubbing. Symptoms worsen after eating with increased irritation. He also reports post-nasal drip. He denies coughing, throat clearing, or globus sensation. He has seasonal allergies that typically worsen during pollen season. He takes Zyrtec daily during pollen season for management. He regularly consumes spicy, greasy, and acidic foods. He waits 2.5-3 hours after eating before lying down. His daily fluid intake includes 1-2 cups (10 oz each) of coffee in the morning, soft drink at lunch, tea, and large quantities of water throughout the day. He takes Zyrtec for seasonal allergies.    ROS:  General: -fever, -chills, -fatigue, -weight gain, -weight loss  ENT: -ear pain, +nasal congestion, -sore throat, +gagging, +sense of lump/mass in throat when swallowing, +post nasal drip  Cardiovascular: -chest pain, -palpitations, -lower extremity edema  Respiratory: -cough, -shortness of breath  Allergic: +seasonal allergies     6/3/25:   History of Present Illness    Mr. Johnston presents today with throat discomfort on the right side. He reports a sensation of something in throat that has worsened over the past 3 weeks, predominantly affecting the right side. Symptoms are most severe in the morning and during teeth brushing. The sensation is exacerbated by touching the outside of throat on the right side but then feels something on the left side. He  experiences mild hoarseness. He previously experienced uvular swelling which has since improved. He denies presence of lumps or bumps in head and neck region. He uses Flonase twice daily, Astelin twice daily, and saline rinse twice daily. A recent 5-day course of steroids exacerbated his symptoms. He is a former smoker who quit over 30 years ago.    6/13/25: RTC, saw chris prior with L BOT mass. Has sarcoidosis, quit smoking 30+ years ago. Feels like something hung in left side of throat. Swallowing seems off. No ear pain. On monjouro, has weight loss. Feels neck mass on left.     Past Surgical History:   Procedure Laterality Date    ANGIOGRAM, CORONARY, WITH LEFT HEART CATHETERIZATION N/A 08/16/2022    Procedure: Angiogram, Coronary, with Left Heart Cath;  Surgeon: Refugio Mcdonough MD;  Location: [facility] CATH;  Service: Cardiology;  Laterality: N/A;    CARDIAC SURGERY      CIRCUMCISION      COLONOSCOPY N/A 01/08/2024    Procedure: COLONOSCOPY;  Surgeon: HALLEY Montes MD;  Location: [facility] ENDO;  Service: Endoscopy;  Laterality: N/A;    CORONARY ANGIOGRAPHY N/A 12/13/2019    Procedure: ANGIOGRAM, CORONARY ARTERY;  Surgeon: Amador Duran MD;  Location: [facility] CATH;  Service: Cardiology;  Laterality: N/A;    CORONARY STENT PLACEMENT  12/2019    x1    CYST REMOVAL Right 05/2022    thigh, Dr. Parkinson    elbow spur removal      right    EPIDURAL STEROID INJECTION INTO LUMBAR SPINE N/A 02/10/2021    Procedure: Injection-steroid-epidural-lumbar L4/5;  Surgeon: Richi aRngel MD;  Location: [facility] OR;  Service: Pain Management;  Laterality: N/A;    EPIDURAL STEROID INJECTION INTO LUMBAR SPINE N/A 03/15/2021    Procedure: Injection-steroid-epidural-lumbar L5/S1;  Surgeon: Richi Rangel MD;  Location: [facility] OR;  Service: Pain Management;  Laterality: N/A;    EPIDURAL STEROID INJECTION INTO LUMBAR SPINE N/A 10/16/2023    Procedure: Injection-steroid-epidural-lumbar;  Surgeon: Richi Rangel MD;  Location: [facility] OR;   Service: Pain Management;  Laterality: N/A;  L5/S1    EXTRACORPOREAL SHOCK WAVE LITHOTRIPSY  1996    [facility]    LEFT HEART CATHETERIZATION Left 12/13/2019    Procedure: Left heart cath;  Surgeon: Amador Duran MD;  Location: [facility] CATH;  Service: Cardiology;  Laterality: Left;    removal of skin cancer      L side of face    ROBOT-ASSISTED CHOLECYSTECTOMY N/A 10/30/2024    Procedure: ROBOTIC CHOLECYSTECTOMY;  Surgeon: Michael Mg MD;  Location: [facility] OR;  Service: General;  Laterality: N/A;    URETERAL EXPLORATION  1996    exploration of the R ureter for ureteral injury [facility] - did well     Past Medical History:   Diagnosis Date    Anticoagulant long-term use     Cancer of skin of left ear     Colon polyp     Coronary artery disease, nonobstructive     DJD (degenerative joint disease) of lumbar spine     DM type 2 (diabetes mellitus, type 2)     with diet alone    Dyslipidemia 08/13/2019    Essential hypertension 12/29/2015    Fatty liver     GERD (gastroesophageal reflux disease)     Hepatosplenomegaly     HTN (hypertension)     Hyperlipidemia     resolved with diet    Hypogonadism male 02/17/2015    Lumbar radiculopathy     Lumbar spondylosis     Nephrolithiasis     last stone 1/11    VÍCTOR on CPAP     Palpitations 03/09/2016    Sarcoidosis of lung 1990    Ventricular hypokinesis     angiogram with mild LAD stenosis     Social Drivers of Health     Tobacco Use: Medium Risk (2/27/2025)    Patient History     Smoking Tobacco Use: Former     Smokeless Tobacco Use: Never     Passive Exposure: Past   Alcohol Use: Not At Risk (5/8/2024)    AUDIT-C     Frequency of Alcohol Consumption: Never     Average Number of Drinks: Patient does not drink     Frequency of Binge Drinking: Never   Financial Resource Strain: Low Risk  (5/8/2024)    Overall Financial Resource Strain (CARDIA)     Difficulty of Paying Living Expenses: Not hard at all   Food Insecurity: No Food Insecurity (5/8/2024)     Hunger Vital Sign     Worried About Running Out of Food in the Last Year: Never true     Ran Out of Food in the Last Year: Never true   Transportation Needs: No Transportation Needs (5/8/2024)    PRAPARE - Transportation     Lack of Transportation (Medical): No     Lack of Transportation (Non-Medical): No   Physical Activity: Sufficiently Active (5/8/2024)    Exercise Vital Sign     Days of Exercise per Week: 3 days     Minutes of Exercise per Session: 60 min   Stress: Stress Concern Present (5/8/2024)    Citizen of [place] of Occupational Health - Occupational Stress Questionnaire     Feeling of Stress : Rather much   Housing Stability: Unknown (5/8/2024)    Housing Stability Vital Sign     Unable to Pay for Housing in the Last Year: No     Number of Times Moved in the Last Year: Not on file     Homeless in the Last Year: No   Depression: Low Risk  (2/27/2025)    Depression     Last PHQ-4: Flowsheet Data: 0   Utilities: Not At Risk (5/8/2024)    [facility] Utilities     Threatened with loss of utilities: No   Health Literacy: Adequate Health Literacy (5/8/2024)     Health Literacy     Frequency of need for help with medical instructions: Never   Social Isolation: Not on file     Review of patient's allergies indicates:   Allergen Reactions    Metformin      Severe abdominal bloating; diarrhea    Codeine Hives    Latex Rash     blisters     Current Outpatient Medications   Medication Instructions    albuterol (PROVENTIL/VENTOLIN HFA) 90 mcg/actuation inhaler 2 puffs, Inhalation, Every 6 hours PRN, Rescue    azelastine (ASTELIN) 137 mcg, Nasal, 2 times daily    blood-glucose meter kit To check BG BID , to use with insurance preferred meter    clopidogreL (PLAVIX) 75 mg, Oral    fluticasone propionate (FLONASE) 50 mcg, Each Nostril, 2 times daily    ibuprofen (ADVIL,MOTRIN) 200 mg, Every 6 hours PRN    ipratropium-albuteroL (COMBIVENT)  mcg/actuation inhaler 1 puff, Inhalation, Every 4 hours PRN, Rescue     Lactobacillus acidophilus (PROBIOTIC ACIDOPHILUS ORAL) Take by mouth.    lancets Misc To check BG BID, to use with insurance preferred meter    losartan (COZAAR) 50 mg, Oral, Daily    lubiprostone (AMITIZA) 8 mcg, Oral, 2 times daily with meals    MOUNJARO 5 mg, Subcutaneous, Every 7 days    ONETOUCH VERIO TEST STRIPS Strp TO CHECK BLOOD GLUCOSE TWICE A DAY    sildenafil (REVATIO) 20 mg, Oral, 3 times daily         ENT ROS negative except as stated above.     Patient answers are not available for this visit.            Objective:      There were no vitals filed for this visit.    General: NAD, well appearing  Eyes: Normal conjunctiva and lids  Face: symmetric, nerve intact  Nose: The nose is without any evidence of any deformity. The nasal mucosa is moist. The septum is deviated to the left There is no evidence of septal hematoma. The turbinates are with hypertrophy  Ears: The ears are with normal-appearing pinna. Examination of the canals is normal appearing bilaterally. There is no drainage or erythema noted. The tympanic membranes are normal appearing with pearly color, normal-appearing landmarks and normal light reflex. Hearing is grossly intact.  Mouth: No obvious abnormalities to the lips. Poor dentition. The gingivae are without any obvious evidence of infection or lesion. The oral mucosa is moist and pink. There are no obvious masses to the hard or soft palate.   Oropharynx: The uvula is midline.  The tongue is midline. The posterior pharynx is without erythema or exudate. The tonsils are normal appearing 1+. Cannot feel mass well at GTS area with palpation  Salivary glands: The salivary glands are symmetric and not enlarged, no masses  Neck: left level II tender 2 cm lymphadenopathy, trachea midline, thryoid not enlarged.  Psych: Normal mood and affect.   Neuro: Grossly intact  Speech: fluent    Prior scope reviewed    - Nose deviated septum, clear rhinorrhea, PND  - Nasopharynx- WNL, no masses  -  Oropharynx- left BOT asymmetry, mass noted to left side  - Hypopharynx and piriform sinuses- no masses on trumpet maneuver  - Supraglottis- Arytenoids intact, no masses, + edematous no erythematous,   - Glottis- Bilateral vocal folds intact with full motion, no masses  - Glottic closure- complete     Left BOT      Lab Results   Component Value Date/Time    HGBA1C 5.7 (H) 02/20/2025 07:55 AM      Narrative & Impression  EXAMINATION:  CT SOFT TISSUE NECK WITH CONTRAST     CLINICAL HISTORY:  mass on left BOT; Foreign body sensation, throat     TECHNIQUE:  Low dose axial images as well as sagittal and coronal reconstructions were performed from the skull base to the clavicles with the administration of intravenous contrast.  75 mL of Omnipaque intravenous contrast were administered.     COMPARISON:  None.     FINDINGS:  Skull Base and Brain (limited evaluation): No significant abnormality.     Sinuses and Mastoid Air Cells: Essentially clear.     Salivary Glands: Parotid and submandibular glands are bilaterally symmetric and demonstrate no gross abnormalities.     Pharynx/Larynx: Metallic beam hardening artifact from dental amalgam limits evaluation of the oral cavity and surrounding structures.  Abnormal enhancing soft tissue lesion involving the left palatine tonsillar fossa, left glossotonsillar sulcus, and base of tongue, measuring on the order of 2.7 x 1.3 cm in AP by TV dimensions (series 6, image 32), worrisome for primary oropharyngeal malignancy.  Nasopharyngeal, hypopharyngeal and laryngeal soft tissues otherwise appear within normal limits.     Cervical Lymph Nodes: Enlarged left level II lymph node with central necrosis/cystic change, measuring 1.4 x 1.2 x 1.9 cm (series 2, 102), concerning for a metastatic lymph node.  Few additional non pathologically enlarged left level II/III lymph nodes with rounded morphology.  Multiple calcified non pathologically enlarged superior mediastinal lymph nodes are  identified, nonspecific and may reflect sequelae of prior granulomatous disease.     Thyroid: Normal in size and configuration.     Vasculature (limited evaluation): Vascular structures of the neck appear patent.     Upper Airways and Lungs: Trachea is midline and the proximal airways are patent. Biapical nonspecific fibronodular pleuroparenchymal scarring.     Bones: Age-appropriate degenerative changes of the osseous structures.  No acute fracture or suspicious lytic or sclerotic lesion.     Soft tissues:  No evidence of acute soft tissue abnormality or other discrete soft tissue mass.     Impression:     1. Enhancing soft tissue lesion involving the left palatine tonsillar fossa, left glossotonsillar sulcus and base of tongue, worrisome for primary oropharyngeal malignancy.  2. Left level II metastatic lymph node with central necrosis/cystic change.  3. Few additional non pathologically enlarged left level II/III lymph nodes with rounded morphology.  4. Multiple scattered non pathologically enlarged calcified superior mediastinal lymph nodes, nonspecific and may reflect sequelae of prior granulomatous disease.  This report was flagged in Epic as abnormal.        Electronically signed by:Giovani Brunson  Date:                                            06/13/2025    Assessment and Plan:       1. 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 Bx asap.   Will order CT chest for staging    Is on plavix. Sees Dr. Lopez.     I discussed the risks of microlaryngoscopy with possible laser including pain, recurrence / persistent, worsening voice, swallows disturbance (typically temporary), injury to dentition, inability to expose the lesion 2/2 anatomy, airway fire (if laser), taste changes/tongue numbness, pneumothorax.         Plan of care was discussed in detail with the patient, who agreed with the plan as above. All questions were answered in detail.    Denise TAYLOR  MD Jennifer  Otolaryngology

## 2025-06-13 NOTE — H&P (VIEW-ONLY)
Otolaryngology Clinic Note    Subjective:       Patient ID: Cade Johnston is a 59 y.o. male.    Chief Complaint: Consult (Review ct scan)      History of Present Illness: Cade Johnston is a 59 y.o. male presenting with swallowing concerns    History of Present Illness    CHIEF COMPLAINT:  Patient presents today for concern about swollen uvula. He reports uvula swelling for the past two weeks with history of similar episodes. He describes progressively worsening gag reflex and constant sensation in the back of the throat. When swallowing hard, he notes sensation of something rubbing. Symptoms worsen after eating with increased irritation. He also reports post-nasal drip. He denies coughing, throat clearing, or globus sensation. He has seasonal allergies that typically worsen during pollen season. He takes Zyrtec daily during pollen season for management. He regularly consumes spicy, greasy, and acidic foods. He waits 2.5-3 hours after eating before lying down. His daily fluid intake includes 1-2 cups (10 oz each) of coffee in the morning, soft drink at lunch, tea, and large quantities of water throughout the day. He takes Zyrtec for seasonal allergies.    ROS:  General: -fever, -chills, -fatigue, -weight gain, -weight loss  ENT: -ear pain, +nasal congestion, -sore throat, +gagging, +sense of lump/mass in throat when swallowing, +post nasal drip  Cardiovascular: -chest pain, -palpitations, -lower extremity edema  Respiratory: -cough, -shortness of breath  Allergic: +seasonal allergies     6/3/25:   History of Present Illness    Mr. Johnston presents today with throat discomfort on the right side. He reports a sensation of something in throat that has worsened over the past 3 weeks, predominantly affecting the right side. Symptoms are most severe in the morning and during teeth brushing. The sensation is exacerbated by touching the outside of throat on the right side but then feels something on the left side. He  experiences mild hoarseness. He previously experienced uvular swelling which has since improved. He denies presence of lumps or bumps in head and neck region. He uses Flonase twice daily, Astelin twice daily, and saline rinse twice daily. A recent 5-day course of steroids exacerbated his symptoms. He is a former smoker who quit over 30 years ago.    6/13/25: RTC, saw chris prior with L BOT mass. Has sarcoidosis, quit smoking 30+ years ago. Feels like something hung in left side of throat. Swallowing seems off. No ear pain. On monjouro, has weight loss. Feels neck mass on left.     Past Surgical History:   Procedure Laterality Date    ANGIOGRAM, CORONARY, WITH LEFT HEART CATHETERIZATION N/A 08/16/2022    Procedure: Angiogram, Coronary, with Left Heart Cath;  Surgeon: Refugio Mcdonough MD;  Location: [facility] CATH;  Service: Cardiology;  Laterality: N/A;    CARDIAC SURGERY      CIRCUMCISION      COLONOSCOPY N/A 01/08/2024    Procedure: COLONOSCOPY;  Surgeon: HALLEY Montes MD;  Location: [facility] ENDO;  Service: Endoscopy;  Laterality: N/A;    CORONARY ANGIOGRAPHY N/A 12/13/2019    Procedure: ANGIOGRAM, CORONARY ARTERY;  Surgeon: Amador Duran MD;  Location: [facility] CATH;  Service: Cardiology;  Laterality: N/A;    CORONARY STENT PLACEMENT  12/2019    x1    CYST REMOVAL Right 05/2022    thigh, Dr. Parkinson    elbow spur removal      right    EPIDURAL STEROID INJECTION INTO LUMBAR SPINE N/A 02/10/2021    Procedure: Injection-steroid-epidural-lumbar L4/5;  Surgeon: Richi Rangel MD;  Location: [facility] OR;  Service: Pain Management;  Laterality: N/A;    EPIDURAL STEROID INJECTION INTO LUMBAR SPINE N/A 03/15/2021    Procedure: Injection-steroid-epidural-lumbar L5/S1;  Surgeon: Richi Rangel MD;  Location: [facility] OR;  Service: Pain Management;  Laterality: N/A;    EPIDURAL STEROID INJECTION INTO LUMBAR SPINE N/A 10/16/2023    Procedure: Injection-steroid-epidural-lumbar;  Surgeon: Richi Rangel MD;  Location: [facility] OR;   Service: Pain Management;  Laterality: N/A;  L5/S1    EXTRACORPOREAL SHOCK WAVE LITHOTRIPSY  1996    [facility]    LEFT HEART CATHETERIZATION Left 12/13/2019    Procedure: Left heart cath;  Surgeon: Amador Duran MD;  Location: [facility] CATH;  Service: Cardiology;  Laterality: Left;    removal of skin cancer      L side of face    ROBOT-ASSISTED CHOLECYSTECTOMY N/A 10/30/2024    Procedure: ROBOTIC CHOLECYSTECTOMY;  Surgeon: Michael Mg MD;  Location: [facility] OR;  Service: General;  Laterality: N/A;    URETERAL EXPLORATION  1996    exploration of the R ureter for ureteral injury [facility] - did well     Past Medical History:   Diagnosis Date    Anticoagulant long-term use     Cancer of skin of left ear     Colon polyp     Coronary artery disease, nonobstructive     DJD (degenerative joint disease) of lumbar spine     DM type 2 (diabetes mellitus, type 2)     with diet alone    Dyslipidemia 08/13/2019    Essential hypertension 12/29/2015    Fatty liver     GERD (gastroesophageal reflux disease)     Hepatosplenomegaly     HTN (hypertension)     Hyperlipidemia     resolved with diet    Hypogonadism male 02/17/2015    Lumbar radiculopathy     Lumbar spondylosis     Nephrolithiasis     last stone 1/11    VÍCTOR on CPAP     Palpitations 03/09/2016    Sarcoidosis of lung 1990    Ventricular hypokinesis     angiogram with mild LAD stenosis     Social Drivers of Health     Tobacco Use: Medium Risk (2/27/2025)    Patient History     Smoking Tobacco Use: Former     Smokeless Tobacco Use: Never     Passive Exposure: Past   Alcohol Use: Not At Risk (5/8/2024)    AUDIT-C     Frequency of Alcohol Consumption: Never     Average Number of Drinks: Patient does not drink     Frequency of Binge Drinking: Never   Financial Resource Strain: Low Risk  (5/8/2024)    Overall Financial Resource Strain (CARDIA)     Difficulty of Paying Living Expenses: Not hard at all   Food Insecurity: No Food Insecurity (5/8/2024)     Hunger Vital Sign     Worried About Running Out of Food in the Last Year: Never true     Ran Out of Food in the Last Year: Never true   Transportation Needs: No Transportation Needs (5/8/2024)    PRAPARE - Transportation     Lack of Transportation (Medical): No     Lack of Transportation (Non-Medical): No   Physical Activity: Sufficiently Active (5/8/2024)    Exercise Vital Sign     Days of Exercise per Week: 3 days     Minutes of Exercise per Session: 60 min   Stress: Stress Concern Present (5/8/2024)    [employer] [place] [employer] - Occupational Stress Questionnaire     Feeling of Stress : Rather much   Housing Stability: Unknown (5/8/2024)    Housing Stability Vital Sign     Unable to Pay for Housing in the Last Year: No     Number of Times Moved in the Last Year: Not on file     Homeless in the Last Year: No   Depression: Low Risk  (2/27/2025)    Depression     Last PHQ-4: Flowsheet Data: 0   Utilities: Not At Risk (5/8/2024)    [facility] Utilities     Threatened with loss of utilities: No   Health Literacy: Adequate Health Literacy (5/8/2024)     Health Literacy     Frequency of need for help with medical instructions: Never   Social Isolation: Not on file     Review of patient's allergies indicates:   Allergen Reactions    Metformin      Severe abdominal bloating; diarrhea    Codeine Hives    Latex Rash     blisters     Current Outpatient Medications   Medication Instructions    albuterol (PROVENTIL/VENTOLIN HFA) 90 mcg/actuation inhaler 2 puffs, Inhalation, Every 6 hours PRN, Rescue    azelastine (ASTELIN) 137 mcg, Nasal, 2 times daily    blood-glucose meter kit To check BG BID , to use with insurance preferred meter    clopidogreL (PLAVIX) 75 mg, Oral    fluticasone propionate (FLONASE) 50 mcg, Each Nostril, 2 times daily    ibuprofen (ADVIL,MOTRIN) 200 mg, Every 6 hours PRN    ipratropium-albuteroL (COMBIVENT)  mcg/actuation inhaler 1 puff, Inhalation, Every 4 hours PRN, Rescue     Lactobacillus acidophilus (PROBIOTIC ACIDOPHILUS ORAL) Take by mouth.    lancets Misc To check BG BID, to use with insurance preferred meter    losartan (COZAAR) 50 mg, Oral, Daily    lubiprostone (AMITIZA) 8 mcg, Oral, 2 times daily with meals    MOUNJARO 5 mg, Subcutaneous, Every 7 days    ONETOUCH VERIO TEST STRIPS Strp TO CHECK BLOOD GLUCOSE TWICE A DAY    sildenafil (REVATIO) 20 mg, Oral, 3 times daily         ENT ROS negative except as stated above.     Patient answers are not available for this visit.            Objective:      There were no vitals filed for this visit.    General: NAD, well appearing  Eyes: Normal conjunctiva and lids  Face: symmetric, nerve intact  Nose: The nose is without any evidence of any deformity. The nasal mucosa is moist. The septum is deviated to the left There is no evidence of septal hematoma. The turbinates are with hypertrophy  Ears: The ears are with normal-appearing pinna. Examination of the canals is normal appearing bilaterally. There is no drainage or erythema noted. The tympanic membranes are normal appearing with pearly color, normal-appearing landmarks and normal light reflex. Hearing is grossly intact.  Mouth: No obvious abnormalities to the lips. Poor dentition. The gingivae are without any obvious evidence of infection or lesion. The oral mucosa is moist and pink. There are no obvious masses to the hard or soft palate.   Oropharynx: The uvula is midline.  The tongue is midline. The posterior pharynx is without erythema or exudate. The tonsils are normal appearing 1+. Cannot feel mass well at GTS area with palpation  Salivary glands: The salivary glands are symmetric and not enlarged, no masses  Neck: left level II tender 2 cm lymphadenopathy, trachea midline, thryoid not enlarged.  Psych: Normal mood and affect.   Neuro: Grossly intact  Speech: fluent    Prior scope reviewed    - Nose deviated septum, clear rhinorrhea, PND  - Nasopharynx- WNL, no masses  -  Oropharynx- left BOT asymmetry, mass noted to left side  - Hypopharynx and piriform sinuses- no masses on trumpet maneuver  - Supraglottis- Arytenoids intact, no masses, + edematous no erythematous,   - Glottis- Bilateral vocal folds intact with full motion, no masses  - Glottic closure- complete     Left BOT      Lab Results   Component Value Date/Time    HGBA1C 5.7 (H) 02/20/2025 07:55 AM      Narrative & Impression  EXAMINATION:  CT SOFT TISSUE NECK WITH CONTRAST     CLINICAL HISTORY:  mass on left BOT; Foreign body sensation, throat     TECHNIQUE:  Low dose axial images as well as sagittal and coronal reconstructions were performed from the skull base to the clavicles with the administration of intravenous contrast.  75 mL of Omnipaque intravenous contrast were administered.     COMPARISON:  None.     FINDINGS:  Skull Base and Brain (limited evaluation): No significant abnormality.     Sinuses and Mastoid Air Cells: Essentially clear.     Salivary Glands: Parotid and submandibular glands are bilaterally symmetric and demonstrate no gross abnormalities.     Pharynx/Larynx: Metallic beam hardening artifact from dental amalgam limits evaluation of the oral cavity and surrounding structures.  Abnormal enhancing soft tissue lesion involving the left palatine tonsillar fossa, left glossotonsillar sulcus, and base of tongue, measuring on the order of 2.7 x 1.3 cm in AP by TV dimensions (series 6, image 32), worrisome for primary oropharyngeal malignancy.  Nasopharyngeal, hypopharyngeal and laryngeal soft tissues otherwise appear within normal limits.     Cervical Lymph Nodes: Enlarged left level II lymph node with central necrosis/cystic change, measuring 1.4 x 1.2 x 1.9 cm (series 2, 102), concerning for a metastatic lymph node.  Few additional non pathologically enlarged left level II/III lymph nodes with rounded morphology.  Multiple calcified non pathologically enlarged superior mediastinal lymph nodes are  identified, nonspecific and may reflect sequelae of prior granulomatous disease.     Thyroid: Normal in size and configuration.     Vasculature (limited evaluation): Vascular structures of the neck appear patent.     Upper Airways and Lungs: Trachea is midline and the proximal airways are patent. Biapical nonspecific fibronodular pleuroparenchymal scarring.     Bones: Age-appropriate degenerative changes of the osseous structures.  No acute fracture or suspicious lytic or sclerotic lesion.     Soft tissues:  No evidence of acute soft tissue abnormality or other discrete soft tissue mass.     Impression:     1. Enhancing soft tissue lesion involving the left palatine tonsillar fossa, left glossotonsillar sulcus and base of tongue, worrisome for primary oropharyngeal malignancy.  2. Left level II metastatic lymph node with central necrosis/cystic change.  3. Few additional non pathologically enlarged left level II/III lymph nodes with rounded morphology.  4. Multiple scattered non pathologically enlarged calcified superior mediastinal lymph nodes, nonspecific and may reflect sequelae of prior granulomatous disease.  This report was flagged in Epic as abnormal.        Electronically signed by:Giovani Brunson  Date:                                            06/13/2025    Assessment and Plan:       1. 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 Bx asap.   Will order CT chest for staging    Is on plavix. Sees Dr. Lopez.     I discussed the risks of microlaryngoscopy with possible laser including pain, recurrence / persistent, worsening voice, swallows disturbance (typically temporary), injury to dentition, inability to expose the lesion 2/2 anatomy, airway fire (if laser), taste changes/tongue numbness, pneumothorax.         Plan of care was discussed in detail with the patient, who agreed with the plan as above. All questions were answered in detail.    Denise TAYLOR  MD Jennifer  Otolaryngology

## 2025-06-16 ENCOUNTER — HOSPITAL ENCOUNTER (OUTPATIENT)
Facility: HOSPITAL | Age: 60
Discharge: HOME | End: 2025-06-16
Attending: STUDENT IN AN ORGANIZED HEALTH CARE EDUCATION/TRAINING PROGRAM | Admitting: STUDENT IN AN ORGANIZED HEALTH CARE EDUCATION/TRAINING PROGRAM
Payer: MEDICARE

## 2025-06-16 ENCOUNTER — ANESTHESIA (OUTPATIENT)
Dept: SURGERY | Facility: HOSPITAL | Age: 60
End: 2025-06-16
Payer: MEDICARE

## 2025-06-16 ENCOUNTER — ANESTHESIA EVENT (OUTPATIENT)
Dept: SURGERY | Facility: HOSPITAL | Age: 60
End: 2025-06-16
Payer: MEDICARE

## 2025-06-16 VITALS
TEMPERATURE: 97 F | OXYGEN SATURATION: 98 % | SYSTOLIC BLOOD PRESSURE: 163 MMHG | BODY MASS INDEX: 29.53 KG/M2 | HEIGHT: 72 IN | HEART RATE: 59 BPM | WEIGHT: 218 LBS | RESPIRATION RATE: 15 BRPM | DIASTOLIC BLOOD PRESSURE: 78 MMHG

## 2025-06-16 DIAGNOSIS — J39.2 OROPHARYNGEAL MASS: Primary | ICD-10-CM

## 2025-06-16 DIAGNOSIS — C10.8 CANCER OF OVERLAPPING SITES OF OROPHARYNX: ICD-10-CM

## 2025-06-16 LAB — GLUCOSE SERPL-MCNC: 118 MG/DL (ref 70–110)

## 2025-06-16 PROCEDURE — 63600175 PHARM REV CODE 636 W HCPCS: Mod: JZ,TB,PO | Performed by: NURSE ANESTHETIST, CERTIFIED REGISTERED

## 2025-06-16 PROCEDURE — 37000009 HC ANESTHESIA EA ADD 15 MINS: Mod: PO | Performed by: STUDENT IN AN ORGANIZED HEALTH CARE EDUCATION/TRAINING PROGRAM

## 2025-06-16 PROCEDURE — 36000708 HC OR TIME LEV III 1ST 15 MIN: Mod: PO | Performed by: STUDENT IN AN ORGANIZED HEALTH CARE EDUCATION/TRAINING PROGRAM

## 2025-06-16 PROCEDURE — 88342 IMHCHEM/IMCYTCHM 1ST ANTB: CPT | Mod: TC,59 | Performed by: STUDENT IN AN ORGANIZED HEALTH CARE EDUCATION/TRAINING PROGRAM

## 2025-06-16 PROCEDURE — 25000003 PHARM REV CODE 250: Mod: PO | Performed by: STUDENT IN AN ORGANIZED HEALTH CARE EDUCATION/TRAINING PROGRAM

## 2025-06-16 PROCEDURE — 63600175 PHARM REV CODE 636 W HCPCS: Mod: PO | Performed by: STUDENT IN AN ORGANIZED HEALTH CARE EDUCATION/TRAINING PROGRAM

## 2025-06-16 PROCEDURE — 71000015 HC POSTOP RECOV 1ST HR: Mod: PO | Performed by: STUDENT IN AN ORGANIZED HEALTH CARE EDUCATION/TRAINING PROGRAM

## 2025-06-16 PROCEDURE — 36000709 HC OR TIME LEV III EA ADD 15 MIN: Mod: PO | Performed by: STUDENT IN AN ORGANIZED HEALTH CARE EDUCATION/TRAINING PROGRAM

## 2025-06-16 PROCEDURE — 88305 TISSUE EXAM BY PATHOLOGIST: CPT | Mod: 26,,, | Performed by: STUDENT IN AN ORGANIZED HEALTH CARE EDUCATION/TRAINING PROGRAM

## 2025-06-16 PROCEDURE — 25000003 PHARM REV CODE 250: Mod: PO | Performed by: NURSE ANESTHETIST, CERTIFIED REGISTERED

## 2025-06-16 PROCEDURE — 88342 IMHCHEM/IMCYTCHM 1ST ANTB: CPT | Mod: 26,,, | Performed by: STUDENT IN AN ORGANIZED HEALTH CARE EDUCATION/TRAINING PROGRAM

## 2025-06-16 PROCEDURE — 71000033 HC RECOVERY, INTIAL HOUR: Mod: PO | Performed by: STUDENT IN AN ORGANIZED HEALTH CARE EDUCATION/TRAINING PROGRAM

## 2025-06-16 PROCEDURE — 37000008 HC ANESTHESIA 1ST 15 MINUTES: Mod: PO | Performed by: STUDENT IN AN ORGANIZED HEALTH CARE EDUCATION/TRAINING PROGRAM

## 2025-06-16 PROCEDURE — 25000003 PHARM REV CODE 250: Mod: PO | Performed by: ANESTHESIOLOGY

## 2025-06-16 PROCEDURE — 31536 LARYNGOSCOPY W/BX & OP SCOPE: CPT | Mod: ,,, | Performed by: STUDENT IN AN ORGANIZED HEALTH CARE EDUCATION/TRAINING PROGRAM

## 2025-06-16 RX ORDER — LIDOCAINE HYDROCHLORIDE 20 MG/ML
INJECTION INTRAVENOUS
Status: DISCONTINUED | OUTPATIENT
Start: 2025-06-16 | End: 2025-06-16

## 2025-06-16 RX ORDER — SODIUM CHLORIDE, SODIUM LACTATE, POTASSIUM CHLORIDE, CALCIUM CHLORIDE 600; 310; 30; 20 MG/100ML; MG/100ML; MG/100ML; MG/100ML
INJECTION, SOLUTION INTRAVENOUS CONTINUOUS
Status: DISCONTINUED | OUTPATIENT
Start: 2025-06-16 | End: 2025-06-16 | Stop reason: HOSPADM

## 2025-06-16 RX ORDER — ROCURONIUM BROMIDE 10 MG/ML
INJECTION, SOLUTION INTRAVENOUS
Status: DISCONTINUED | OUTPATIENT
Start: 2025-06-16 | End: 2025-06-16

## 2025-06-16 RX ORDER — OXYCODONE HYDROCHLORIDE 5 MG/1
5 TABLET ORAL ONCE AS NEEDED
Status: COMPLETED | OUTPATIENT
Start: 2025-06-16 | End: 2025-06-16

## 2025-06-16 RX ORDER — METOCLOPRAMIDE HYDROCHLORIDE 5 MG/ML
10 INJECTION INTRAMUSCULAR; INTRAVENOUS EVERY 10 MIN PRN
Status: DISCONTINUED | OUTPATIENT
Start: 2025-06-16 | End: 2025-06-16 | Stop reason: HOSPADM

## 2025-06-16 RX ORDER — MIDAZOLAM HYDROCHLORIDE 1 MG/ML
INJECTION INTRAMUSCULAR; INTRAVENOUS
Status: DISCONTINUED | OUTPATIENT
Start: 2025-06-16 | End: 2025-06-16

## 2025-06-16 RX ORDER — FENTANYL CITRATE 50 UG/ML
INJECTION, SOLUTION INTRAMUSCULAR; INTRAVENOUS
Status: DISCONTINUED | OUTPATIENT
Start: 2025-06-16 | End: 2025-06-16

## 2025-06-16 RX ORDER — OXYMETAZOLINE HCL 0.05 %
SPRAY, NON-AEROSOL (ML) NASAL
Status: DISCONTINUED | OUTPATIENT
Start: 2025-06-16 | End: 2025-06-16 | Stop reason: HOSPADM

## 2025-06-16 RX ORDER — ACETAMINOPHEN 10 MG/ML
INJECTION, SOLUTION INTRAVENOUS
Status: DISCONTINUED | OUTPATIENT
Start: 2025-06-16 | End: 2025-06-16

## 2025-06-16 RX ORDER — KETOROLAC TROMETHAMINE 30 MG/ML
INJECTION, SOLUTION INTRAMUSCULAR; INTRAVENOUS
Status: DISCONTINUED | OUTPATIENT
Start: 2025-06-16 | End: 2025-06-16

## 2025-06-16 RX ORDER — FENTANYL CITRATE 50 UG/ML
25 INJECTION, SOLUTION INTRAMUSCULAR; INTRAVENOUS EVERY 5 MIN PRN
Status: DISCONTINUED | OUTPATIENT
Start: 2025-06-16 | End: 2025-06-16 | Stop reason: HOSPADM

## 2025-06-16 RX ORDER — ONDANSETRON HYDROCHLORIDE 2 MG/ML
INJECTION, SOLUTION INTRAVENOUS
Status: DISCONTINUED | OUTPATIENT
Start: 2025-06-16 | End: 2025-06-16

## 2025-06-16 RX ORDER — SUCCINYLCHOLINE CHLORIDE 20 MG/ML
INJECTION INTRAMUSCULAR; INTRAVENOUS
Status: DISCONTINUED | OUTPATIENT
Start: 2025-06-16 | End: 2025-06-16

## 2025-06-16 RX ORDER — PROPOFOL 10 MG/ML
VIAL (ML) INTRAVENOUS
Status: DISCONTINUED | OUTPATIENT
Start: 2025-06-16 | End: 2025-06-16

## 2025-06-16 RX ORDER — SODIUM CHLORIDE 0.9 % (FLUSH) 0.9 %
3 SYRINGE (ML) INJECTION
Status: DISCONTINUED | OUTPATIENT
Start: 2025-06-16 | End: 2025-06-16 | Stop reason: HOSPADM

## 2025-06-16 RX ORDER — DEXAMETHASONE SODIUM PHOSPHATE 4 MG/ML
INJECTION, SOLUTION INTRA-ARTICULAR; INTRALESIONAL; INTRAMUSCULAR; INTRAVENOUS; SOFT TISSUE
Status: DISCONTINUED | OUTPATIENT
Start: 2025-06-16 | End: 2025-06-16

## 2025-06-16 RX ADMIN — SUCCINYLCHOLINE CHLORIDE 140 MG: 20 INJECTION, SOLUTION INTRAMUSCULAR; INTRAVENOUS at 09:06

## 2025-06-16 RX ADMIN — SODIUM CHLORIDE, POTASSIUM CHLORIDE, SODIUM LACTATE AND CALCIUM CHLORIDE: 600; 310; 30; 20 INJECTION, SOLUTION INTRAVENOUS at 08:06

## 2025-06-16 RX ADMIN — ACETAMINOPHEN 1000 MG: 10 INJECTION INTRAVENOUS at 09:06

## 2025-06-16 RX ADMIN — ROCURONIUM BROMIDE 10 MG: 10 INJECTION, SOLUTION INTRAVENOUS at 09:06

## 2025-06-16 RX ADMIN — PROPOFOL 150 MG: 10 INJECTION, EMULSION INTRAVENOUS at 09:06

## 2025-06-16 RX ADMIN — OXYCODONE 5 MG: 5 TABLET ORAL at 10:06

## 2025-06-16 RX ADMIN — DEXAMETHASONE SODIUM PHOSPHATE 12 MG: 4 INJECTION, SOLUTION INTRAMUSCULAR; INTRAVENOUS at 09:06

## 2025-06-16 RX ADMIN — FENTANYL CITRATE 50 MCG: 0.05 INJECTION, SOLUTION INTRAMUSCULAR; INTRAVENOUS at 09:06

## 2025-06-16 RX ADMIN — ONDANSETRON 8 MG: 2 INJECTION, SOLUTION INTRAMUSCULAR; INTRAVENOUS at 09:06

## 2025-06-16 RX ADMIN — LIDOCAINE HYDROCHLORIDE 75 MG: 20 INJECTION INTRAVENOUS at 09:06

## 2025-06-16 RX ADMIN — KETOROLAC TROMETHAMINE 15 MG: 30 INJECTION, SOLUTION INTRAMUSCULAR; INTRAVENOUS at 09:06

## 2025-06-16 RX ADMIN — MIDAZOLAM HYDROCHLORIDE 2 MG: 1 INJECTION, SOLUTION INTRAMUSCULAR; INTRAVENOUS at 09:06

## 2025-06-16 NOTE — ANESTHESIA POSTPROCEDURE EVALUATION
Anesthesia Post Evaluation    Patient: Cade Johnston    Procedure(s) Performed: Procedure(s) (LRB):  LARYNGOSCOPY, DIRECT, WITH BIOPSY IF INDICATED (Bilateral)    Final Anesthesia Type: general      Patient location during evaluation: PACU  Patient participation: Yes- Able to Participate  Level of consciousness: awake and alert and oriented  Post-procedure vital signs: reviewed and stable  Pain management: adequate  Airway patency: patent  VÍCTOR mitigation strategies: Multimodal analgesia, Extubation while patient is awake, Verification of full reversal of neuromuscular block and Extubation and recovery carried out in lateral, semiupright, or other nonsupine position  PONV status at discharge: No PONV  Anesthetic complications: no      Cardiovascular status: blood pressure returned to baseline  Respiratory status: unassisted, spontaneous ventilation and room air  Hydration status: euvolemic  Follow-up not needed.              Vitals Value Taken Time   /78 06/16/25 10:30   Temp 36 °C (96.8 °F) 06/16/25 09:50   Pulse 59 06/16/25 10:30   Resp 15 06/16/25 10:30   SpO2 98 % 06/16/25 10:30         Event Time   Out of Recovery 10:05:00         Pain/Peterson Score: Pain Rating Prior to Med Admin: 8 (6/16/2025 10:11 AM)  Peterson Score: 10 (6/16/2025 10:15 AM)

## 2025-06-16 NOTE — OP NOTE
Otolaryngology- Head & Neck Surgery  Operative Report    Cade Johnston  [id number]  1965    Date of surgery: 6/16/2025    Preoperative Diagnosis:   Oropharyngeal mass [J39.2]    Postoperative Diagnosis:    Same    Procedure:   1. Microdirect laryngoscopy with biopsy pharynx    Attending:  Denise Rodriguez MD    Assist: none    Anesthesia: General     EBL: < 5 ml    Complications: None    Findings: inferior left tonsil with minor exophytic mass noted, small area of normal appearing lateral pharyngeal mass, exophytic mass left base of tongue, glossotonsillar sulcus. Does not cross midline. Supraglottis, glottis, right side all WNL otherwise. No other masses noted.       Specimen: left tonsil and right base of tongue masses    Disposition: Stable, to PACU    Preoperative Indication:   Cade Johnston is a 59 y.o. male who has been noted to have left base of tongue mass noted on scope and CT with 1 month history of throat complaints. He agreed to proceed with biopsy of this area to determine next steps.       Description of Procedure:  Patient was brought to the operating room and placed on the table in supine position.  Anesthesia was obtained via endotracheal tube.  The eyes were taped shut and a timeout was performed.     Tooth guard was placed. Anterior commissure laryngoscope was used to assess his oropharynx, hypopharynx, larynx with findings above. He was noted to have abnormal mass left inferior tonsil, gap of normal appearing lateral pharyngeal mucosa and exophytic left base of tongue/glossotonsillar sulcus mass. Several biopsies were taken from each area with cup forceps. Minor bleeding was controlled with afrin patties. Pictures were taken as above. Oropharynx was suctioned. Instruments and tooth guard were removed.      At the end of the procedure, the patient was awakened from anesthesia and transferred to the PACU in good condition.      Denise Rodriguez MD  Otolaryngology Attending

## 2025-06-16 NOTE — TRANSFER OF CARE
Anesthesia Transfer of Care Note    Patient: Cade Johnston    Procedure(s) Performed: Procedure(s) (LRB):  LARYNGOSCOPY, DIRECT, WITH BIOPSY IF INDICATED (Bilateral)    Patient location: PACU    Anesthesia Type: general    Transport from OR: Transported from OR on room air with adequate spontaneous ventilation    Post pain: adequate analgesia    Post assessment: no apparent anesthetic complications and tolerated procedure well    Post vital signs: stable    Level of consciousness: sedated    Nausea/Vomiting: no nausea/vomiting    Complications: none    Transfer of care protocol was followed      Last vitals: Visit Vitals  BP (!) 171/81 (BP Location: Right arm, Patient Position: Sitting)   Pulse 60   Temp 36.6 °C (97.9 °F) (Skin)   Resp 15   Ht 6' (1.829 m)   Wt 98.9 kg (218 lb)   SpO2 100%   BMI 29.57 kg/m²

## 2025-06-16 NOTE — DISCHARGE INSTRUCTIONS
Post-op Laryngoscopy  Denise Rodriguez MD  Otolaryngology - [facility] - [phone]    Pain and Activity  Expect sore throat for 3-5 days. Typically you do not require narcotic pain medication, but your provider will provide a prescription if needed.  It is common to cough up a small amount of blood in the first 24-48 hours. This will improve quickly.  Light activity for 1-2 days, then you can resume your normal activity if you are feeling well.  No voice rest required.    Diet  Avoid anything that may burn or scratch your throat for the next 5-7 days for comfort. You can resume your usual diet following this.     Medication  Give only medications approved by your doctor. Follow directions closely when giving your medications.  Antibiotics are typically not needed following this surgery. Dr. Rodriguez will let you know if this is different.  Take Tylenol or Ibuprofen for pain, alternating one every 3 hours and not exceeding 3500 milligrams of either in a 24 hour period.       When to Call the Doctor  Mild pain and a slight fever are normal after surgery. Call if you have any of the following:  Fever:   > 101  Trouble breathing  Inability to swallow  Heart palpitations  Bright red bleeding  Any other concerns

## 2025-06-16 NOTE — ANESTHESIA PREPROCEDURE EVALUATION
06/16/2025  Cade Johnston is a 59 y.o., male.      Pre-op Assessment    I have reviewed the NPO Status.   I have reviewed the Medications.     Review of Systems  Anesthesia Hx:  No problems with previous Anesthesia                Cardiovascular:     Hypertension   CAD   CABG/stent    Denies Angina.     hyperlipidemia   ECG has been reviewed.      Coronary Artery Disease:                            Hypertension         Pulmonary:   COPD   Shortness of breath  Sleep Apnea, CPAP    Chronic Obstructive Pulmonary Disease (COPD):           Obstructive Sleep Apnea (VÍCTOR).      Education provided regarding risk of obstructive sleep apnea            Renal/:  Chronic Renal Disease        Kidney Function/Disease             Hepatic/GI:     GERD Liver Disease,   Taking GLP-1 Agonists Instructed to Hold for 7 Days No Reported GI Symptoms   Gerd       Liver Disease        Musculoskeletal:  Arthritis               Neurological:    Neuromuscular Disease,                                 Neuromuscular Disease   Endocrine:  Diabetes, type 2    Diabetes                    Obesity / BMI > 30      Physical Exam  General: Well nourished    Airway:  Mallampati: II   Mouth Opening: Normal  TM Distance: Normal  Tongue: Normal  Neck ROM: Normal ROM    Dental:  Intact    Chest/Lungs:  Clear to auscultation, Normal Respiratory Rate        Anesthesia Plan  Type of Anesthesia, risks & benefits discussed:    Anesthesia Type: Gen ETT  Intra-op Monitoring Plan: Standard ASA Monitors  Post Op Pain Control Plan: multimodal analgesia and IV/PO Opioids PRN  Induction:  IV  Airway Plan: Direct, Post-Induction  Informed Consent: Informed consent signed with the Patient and all parties understand the risks and agree with anesthesia plan.  All questions answered.   ASA Score: 3    Ready For Surgery From Anesthesia Perspective.     .

## 2025-06-16 NOTE — DISCHARGE SUMMARY
[facility] - Surgery  Discharge Note  Short Stay    Procedure(s) (LRB):  LARYNGOSCOPY, DIRECT, WITH BIOPSY IF INDICATED (Bilateral)      OUTCOME: Patient tolerated treatment/procedure well without complication and is now ready for discharge.    DISPOSITION: Home or Self Care    FINAL DIAGNOSIS:  <principal problem not specified>    FOLLOWUP: In clinic    DISCHARGE INSTRUCTIONS:    Discharge Procedure Orders   NM PET CT FDG Skull Base to Mid Thigh   Standing Status: Future Standing Exp. Date: 06/16/26     Order Specific Question Answer Comments   May the Radiologist modify the order per protocol to meet the clinical needs of the patient? Yes         TIME SPENT ON DISCHARGE: 10 minutes

## 2025-06-16 NOTE — ANESTHESIA PROCEDURE NOTES
Intubation    Date/Time: 6/16/2025 9:19 AM    Performed by: Rylee Luna CRNA  Authorized by: Fer Rios MD    Intubation:     Induction:  Intravenous    Intubated:  Postinduction    Mask Ventilation:  Easy mask    Attempts:  1    Attempted By:  CRNA    Laryngeal View Grade: Grade I - full view of cords      Difficult Airway Encountered?: No      Complications:  None    Airway Device:  Oral endotracheal tube    Airway Device Size:  7.0    Style/Cuff Inflation:  Cuffed    Inflation Amount (mL):  3    Tube secured:  21    Secured at:  The lips    Placement Verified By:  Capnometry    Complicating Factors:  None    Findings Post-Intubation:  BS equal bilateral

## 2025-06-16 NOTE — PLAN OF CARE
Patient is being discharged. Patient remained free from falls, injuries, or accidents during stay. Patient education provided by this nurse and has been given discharge paperwork containing follow up orders and education.   
VSS, all questions answered. Denies recent fever or illness. Pt states ready for procedure.    
[FreeTextEntry2] : none

## 2025-06-19 ENCOUNTER — TELEPHONE (OUTPATIENT)
Dept: HEMATOLOGY/ONCOLOGY | Facility: CLINIC | Age: 60
End: 2025-06-19
Payer: MEDICARE

## 2025-06-19 ENCOUNTER — RESULTS FOLLOW-UP (OUTPATIENT)
Dept: OTOLARYNGOLOGY | Facility: CLINIC | Age: 60
End: 2025-06-19
Payer: MEDICARE

## 2025-06-19 LAB
DHEA SERPL-MCNC: ABNORMAL
ESTROGEN SERPL-MCNC: ABNORMAL PG/ML
INSULIN SERPL-ACNC: ABNORMAL U[IU]/ML
LAB AP CLINICAL INFORMATION: ABNORMAL
LAB AP DIAGNOSIS CATEGORY: ABNORMAL
LAB AP GROSS DESCRIPTION: ABNORMAL
LAB AP PERFORMING LOCATION(S): ABNORMAL
LAB AP REPORT FOOTNOTES: ABNORMAL
T3RU NFR SERPL: ABNORMAL %

## 2025-06-19 NOTE — TELEPHONE ENCOUNTER
Called patient to review pathology. He is seeing Dr. Fried next week and getting PET scan.     Denise Rodriguez MD

## 2025-06-19 NOTE — NURSING
Contacted patient and confirmed upcoming appointments for next week including PET, Dr. Fried and Dr. Ho. He has reviewed his portal and verbalized understanding    Oncology Navigation   Intake  Cancer Type: Head and Neck  Type of Referral: Internal  Initial Nurse Navigator Contact: 06/19/25  First Appointment Available: 06/26/25  Appointment Date: 06/26/25  First Available Date vs. Scheduled Date (days): 0     Treatment  Current Status: Staging work-up    Surgical Oncologist: Dr. Eda Fried (H&N surg onc)  Consult Date: 06/26/25       Radiation Oncologist: Dr. Mckay Ho             Radiation Oncologist: Dr. Mckay Ho        Acuity      Follow Up  No follow-ups on file.

## 2025-06-23 ENCOUNTER — PATIENT MESSAGE (OUTPATIENT)
Dept: HEMATOLOGY/ONCOLOGY | Facility: CLINIC | Age: 60
End: 2025-06-23
Payer: MEDICARE

## 2025-06-23 DIAGNOSIS — D00.07 SQUAMOUS CELL CARCINOMA IN SITU (SCCIS) OF LATERAL PORTION OF TONGUE: Primary | ICD-10-CM

## 2025-06-26 ENCOUNTER — TUMOR BOARD CONFERENCE (OUTPATIENT)
Dept: HEMATOLOGY/ONCOLOGY | Facility: CLINIC | Age: 60
End: 2025-06-26
Payer: MEDICARE

## 2025-06-26 ENCOUNTER — HOSPITAL ENCOUNTER (OUTPATIENT)
Dept: RADIOLOGY | Facility: HOSPITAL | Age: 60
Discharge: HOME | End: 2025-06-26
Attending: STUDENT IN AN ORGANIZED HEALTH CARE EDUCATION/TRAINING PROGRAM
Payer: MEDICARE

## 2025-06-26 ENCOUNTER — OFFICE VISIT (OUTPATIENT)
Dept: HEMATOLOGY/ONCOLOGY | Facility: CLINIC | Age: 60
End: 2025-06-26
Payer: MEDICARE

## 2025-06-26 VITALS
SYSTOLIC BLOOD PRESSURE: 142 MMHG | BODY MASS INDEX: 29.68 KG/M2 | HEART RATE: 65 BPM | TEMPERATURE: 98 F | WEIGHT: 219.13 LBS | OXYGEN SATURATION: 100 % | DIASTOLIC BLOOD PRESSURE: 89 MMHG | HEIGHT: 72 IN | RESPIRATION RATE: 15 BRPM

## 2025-06-26 DIAGNOSIS — C10.9 OROPHARYNGEAL CANCER: ICD-10-CM

## 2025-06-26 DIAGNOSIS — J39.2 OROPHARYNGEAL MASS: ICD-10-CM

## 2025-06-26 DIAGNOSIS — J39.2 OROPHARYNGEAL MASS: Primary | ICD-10-CM

## 2025-06-26 DIAGNOSIS — C10.8 CANCER OF OVERLAPPING SITES OF OROPHARYNX: ICD-10-CM

## 2025-06-26 LAB — GLUCOSE SERPL-MCNC: 123 MG/DL (ref 70–110)

## 2025-06-26 PROCEDURE — 78815 PET IMAGE W/CT SKULL-THIGH: CPT | Mod: TC,PN

## 2025-06-26 PROCEDURE — 78815 PET IMAGE W/CT SKULL-THIGH: CPT | Mod: 26,PI,, | Performed by: RADIOLOGY

## 2025-06-26 PROCEDURE — A9552 F18 FDG: HCPCS | Mod: PN | Performed by: STUDENT IN AN ORGANIZED HEALTH CARE EDUCATION/TRAINING PROGRAM

## 2025-06-26 PROCEDURE — 99999 PR PBB SHADOW E&M-EST. PATIENT-LVL IV: CPT | Mod: PBBFAC,,, | Performed by: STUDENT IN AN ORGANIZED HEALTH CARE EDUCATION/TRAINING PROGRAM

## 2025-06-26 RX ORDER — FLUDEOXYGLUCOSE F18 500 MCI/ML
13.4 INJECTION INTRAVENOUS
Status: COMPLETED | OUTPATIENT
Start: 2025-06-26 | End: 2025-06-26

## 2025-06-26 RX ADMIN — FLUDEOXYGLUCOSE F-18 13.4 MILLICURIE: 500 INJECTION INTRAVENOUS at 07:06

## 2025-06-26 NOTE — PATIENT INSTRUCTIONS
Transoral Robotic Surgery  [facility] [place]  Information for Patients    1. What is transoral robotic surgery?  In this surgery, a surgical robot is used to introduce and manipulate surgical instruments through the mouth. This robot features a 3-dimensional high-definition camera, which provides a magnified detailed image from inside the patient's throat, and a series of robotic arms, which are loaded with a variety of flexible instruments. The camera as well as these instruments are controlled by the surgeon sitting on a console located in the same room.    2. What are the benefits of transoral robotic surgery?  Due to its minimally invasive nature, robotic surgery allows precise removal of tumors located deep in the patient's throat while leaving normal surrounding tissues undisturbed. As a result, this type of surgery is associated with considerably faster recovery, fewer complications, shorter hospital stay, and quicker return to normal activities than other surgical methods.    3. What is the goal of performing transoral robotic surgery?  In cases where this surgery is utilized for treatment of cancer, the goal is to remove the entire cancer with clear margins. This can lead to a significant reduction in the amount of radiation and possible avoidance of chemotherapy afterwards. Some patients may be able to avoid these treatments altogether.    4. Will I have any additional procedures?  In addition to removal of the primary tumor within the throat, most patients will require a simultaneous removal of involved or at-risk lymph nodes through a modest, carefully planned incision in the neck. This portion of the operation is called a neck dissection.    5. How long does the surgery last?  Surgery, which includes removal of the primary tumor and clearance of neck lymph nodes, typically lasts 4-5 hours. During surgery, surgeon and pathologists collaborate closely to ensure that cancer has been  completely removed by performing a frozen section analysis. Length of the operation may vary depending on the location and extent of cancer and time needed for this analysis.    6. How long will I be in the hospital?  Most patients stay in hospital for 1-3 days. Longer stays are uncommon and may be indicated if additional reconstructive procedures are necessary.    7. What are the side effects of transoral robotic surgery?  Pain is the most common side effect after surgery. You may be given painkillers through a drip in your arm or orally in liquid or tablet form. Most patients find that pain is most bothersome in the first week but starts to resolve soon after. Sensation of having thick secretions in the throat that are at times difficult to clear is another common side effect. This starts to clear after the first week. Complications of surgery include a risk of abnormal bleeding from the mouth,  which can rarely require a procedure under anesthesia, aspiration, difficulty breathing, or wound infection, or shoulder stiffness that may require physical therapy.    8. Will I be able to eat and drink after surgery? Will my speech be affected?  A Speech-Language Pathologist (SLP) is an important member of your team. They are responsible for evaluating and managing your swallowing before, during, and after surgery. You will meet with the SLP before surgery to discuss expected changes in your swallow function. While you are in the hospital, the SLP will assess your swallow function to ensure your swallowing is safe and efficient. After surgery, you will see the SLP as an outpatient to monitor your recovery. At various times, the SLP may recommend an x-ray swallow test (e.g.,  modified barium swallow study) to evaluate your swallow function using an x-ray machine.  Most patients can resume a liquid or soft diet immediately after surgery. Some patients, based upon their cancer extent, may require a temporary feeding tube  through the nose for 1-2 weeks. Pain is often the leading cause for swallowing difficulty. However, some patients have trouble clearing liquids and/or foods through their throat. The SLP may recommend different strategies to help make swallowing easier. Most patients recover their swallowing ability and return to a regular diet after 3 weeks.  Swallowing therapy may include strategies that help the ease of swallowing and prevent food or liquid going into the airway (aspiration). These include modifications to the consistency and amount of swallowed liquids and foods, and the application of various head and neck postures or maneuvers. Swallowing exercises, similar to physical therapy for the mouth and throat may be introduced while you are in the hospital and continued following discharge.  Speech is often not affected with this type of surgery once healing has been completed. Initially, however, patients may find speaking difficult due to pain, tissue swelling and throat secretions. This typically improves after 1-2 weeks.    9. What is recovery like? What can I expect when I come home?  You will be fully mobile with minimal restrictions on activity when you are discharged. You may find that liquids, pureed or very soft foods are best suited for you in the first 1-2 weeks.  You may have a drainage tube in your neck for 1-3 days after surgery to allow fluid to escape the surgical site. If you are discharged home with a drain in place, you will be given instructions on how to care for it at home and asked to return to the office for drain removal. Suture holding the drain is cut and drain is pulled. This is an easy step, which causes minimal discomfort.  For control of pain, you can take oral painkillers as prescribed. Some patients find the use of over-the-counter sprays for sore throat relief very helpful. You may also be prescribed anesthetic liquids for gargling. Things to watch for and report to the doctor are  bleeding from the mouth, fever, inability to eat and drink adequately, swelling in the neck or nausea and vomiting.    10. When will I see the doctor again?  You will return to the office to be seen in the first 1-2 weeks of surgery for a checkup. You will see the doctor, other members of the surgical team as well as the speech pathologist at this visit. Subsequent visits will be planned at that time to monitor you recovery.    11. Will I need additional treatment after surgery?  Your case will be reviewed in a multi-disciplinary tumor board meeting. Based on surgical and imaging findings as well as information obtained from pathology, recommendations will be developed for individualized care. You may be advised to receive radiation with or without chemotherapy based on your risk-level. If you require radiation, this treatment will start approximately 6 weeks after surgery. You may also be referred for additional treatments such as speech-language pathology, nutritional assessment, physical therapy, psychological support and  counseling as needed.    12. What should I do to prepare for my surgery?  You should continue to eat well and exercise regularly while waiting for you   surgery. A balanced diet that includes fruits and vegetables as well as sources of  protein and enough calories is all that you need. If you are having difficulty swallowing because of your cancer and if you have lost weight as a result, please contact our team for referral to speech-language pathology and/or nutritional counseling.    It is natural to feel anxious or depressed as a result of being diagnosed with cancer. Do not hesitate to seek help. Contact us for referral to supportive oncology services if you are struggling with these feelings.     Our team will advise you regarding any imaging studies, biopsies, or blood tests you will need prior to surgery. Patients typically undergo a CT scan of the neck and chest as well as a PET scan  for evaluation of their cancer.    In addition to the surgeon, you will meet with a speech-language pathologist for preoperative assessment of your swallowing function, either in person or virtually.  You will also be referred to the preoperative clinic in preparation for surgery to make sure that you are medically ready for surgery.    On the day prior to surgery, you will receive a call from surgical services to let you know when to stop eating and drinking on the night before surgery, where to report for surgery, and what to bring to the hospital.

## 2025-06-26 NOTE — PROGRESS NOTES
Tumor Board Documentation      Cade Johnston was presented by Eda Fried MD at our Tumor Board on 6/26/2025, which included representatives from  .    Cade currently presents as   with  , with history of the following treatments:  .    Additionally, we reviewed previous medical and familial history, history of present illness, and recent lab results along with all available histopathologic and imaging studies. The tumor board considered available treatment options and made the following recommendations: Imaging reviewed. Will discuss case with his current pulm to review imaging. Will discuss need for biopsy lung prior to treatment. Per TB likely not needed due to known sarcoidosis. Proceed with TORS +/- Adjuvant RT pending path, if does not proceed with TORS will need CCRT.           The following procedures/referrals were also placed: No orders of the defined types were placed in this encounter.      Clinical Trial Status:       National site-specific guidelines were discussed with respect to the case.    Tumor board is a meeting of clinicians from various specialty areas who evaluate and discuss patients for whom a multidisciplinary approach is being considered. Final determinations in the plan of care are those of the provider(s). The responsibility for follow up of recommendations given during tumor board is that of the provider.     Eda Fried MD

## 2025-06-26 NOTE — PROGRESS NOTES
PET Imaging Questionnaire    Are you a Diabetic? Recent Blood Sugar level? Yes    Are you anemic? Bone Marrow Stimulation Meds? No    Have you had a CT Scan, if so when & where was your last one? Yes -     Have you had a PET Scan, if so when & where was your last one? No    Chemotherapy or currently on Chemotherapy? No    Radiation therapy? No    Surgical History:   Past Surgical History:   Procedure Laterality Date    ANGIOGRAM, CORONARY, WITH LEFT HEART CATHETERIZATION N/A 08/16/2022    Procedure: Angiogram, Coronary, with Left Heart Cath;  Surgeon: Refugio Mcdonough MD;  Location: [facility] CATH;  Service: Cardiology;  Laterality: N/A;    CARDIAC SURGERY      CIRCUMCISION      COLONOSCOPY N/A 01/08/2024    Procedure: COLONOSCOPY;  Surgeon: HALLEY Montes MD;  Location: [facility] ENDO;  Service: Endoscopy;  Laterality: N/A;    CORONARY ANGIOGRAPHY N/A 12/13/2019    Procedure: ANGIOGRAM, CORONARY ARTERY;  Surgeon: Amador Duran MD;  Location: [facility] CATH;  Service: Cardiology;  Laterality: N/A;    CORONARY STENT PLACEMENT  12/2019    x1    CYST REMOVAL Right 05/2022    thigh, Dr. Parkinson    elbow spur removal      right    EPIDURAL STEROID INJECTION INTO LUMBAR SPINE N/A 02/10/2021    Procedure: Injection-steroid-epidural-lumbar L4/5;  Surgeon: Richi Rangel MD;  Location: [facility] OR;  Service: Pain Management;  Laterality: N/A;    EPIDURAL STEROID INJECTION INTO LUMBAR SPINE N/A 03/15/2021    Procedure: Injection-steroid-epidural-lumbar L5/S1;  Surgeon: Richi Rangel MD;  Location: [facility] OR;  Service: Pain Management;  Laterality: N/A;    EPIDURAL STEROID INJECTION INTO LUMBAR SPINE N/A 10/16/2023    Procedure: Injection-steroid-epidural-lumbar;  Surgeon: Richi Rangel MD;  Location: [facility] OR;  Service: Pain Management;  Laterality: N/A;  L5/S1    EXTRACORPOREAL SHOCK WAVE LITHOTRIPSY  1996    [facility]    LARYNGOSCOPY, DIRECT, WITH BIOPSY IF INDICATED Bilateral 6/16/2025    Procedure: LARYNGOSCOPY, DIRECT,  WITH BIOPSY IF INDICATED;  Surgeon: Denise Rodriguez MD;  Location: [facility] OR;  Service: ENT;  Laterality: Bilateral;    LEFT HEART CATHETERIZATION Left 12/13/2019    Procedure: Left heart cath;  Surgeon: Amador Duran MD;  Location: [facility] CATH;  Service: Cardiology;  Laterality: Left;    removal of skin cancer      L side of face    ROBOT-ASSISTED CHOLECYSTECTOMY N/A 10/30/2024    Procedure: ROBOTIC CHOLECYSTECTOMY;  Surgeon: Michael Mg MD;  Location: [facility] OR;  Service: General;  Laterality: N/A;    URETERAL EXPLORATION  1996    exploration of the R ureter for ureteral injury [facility] - did well        Have you been fasting for at least 6 hours? Yes    Is there any chance you may be pregnant or breastfeeding? No    Assay: 14.1 @ 747   Injection Site:rt arm    Residual: .733 mCi@: 749   Technologist: India Ellis Injected:13.4mCi

## 2025-06-26 NOTE — PROGRESS NOTES
Note to patients: In accordance with the  Century Cures Act, patients are now granted immediate electronic access to their medical records. This note is primarily intended for communication among medical professionals. As a result, it may incorporate medical terminology, abbreviations, or language that could appear blunt or unfamiliar. If you have questions about this document, we encourage you to discuss it with your physician.      [facility]  Head & Neck [facility] Oncology [facility]    Patient: Cade Johnston    : 1965    MRN: [id number]  Primary Care Provider: Joni Smith MD  Referring Provider: Dr. Rodriguez  Date of Encounter: 25    DIAGNOSIS:    Cancer Staging   Oropharyngeal cancer  Staging form: Pharynx - HPV-Mediated Oropharynx, AJCC 8th Edition  - Clinical stage from 2025: Stage I (cT2, cN1, cM0, p16+) - Signed by Eda Fried MD on 2025      CC:   Chief Complaint   Patient presents with    new patient      Tongue/tonsil cancer         HPI:   Cade Johnston is a 59 y.o. male presenting for newly diagnosed oropharyngeal cancer. He reports persistent foreign body sensation in the left side of the throat starting several weeks ago, describing it as feeling similar to a popcorn husk. The sensation is most noticeable when brushing teeth and triggers a mild gag reflex. He denies throat soreness, pain, difficulties with eating, or masses or lesions in the neck    He has a history of sleep apnea for several years, cardiac stent placed 10 years ago, and sarcoidosis in lungs. Possible asbestosis was identified on CT in October, as noted by Dr. Espinosa. He underwent cholecystectomy in October. During the surgical evaluation, a CT reportedly indicated potential findings suggestive of asbestosis, as communicated by Dr. Espinosa.   He currently takes Plavix for a stent placed 10 years ago. He was previously on aspirin but this was discontinued by his provider.     He is a former  smoker with a history of smoking one pack per day for six years, having quit 40 years ago.     Patient lives in [place], [place].    TREATMENT HISTORY:  DL/Bx with Dr. Rodriguez    SUBSTANCE USE:  Smoking: former, 6 pack year, quit 40 years ago    ALLERGIES:  Review of patient's allergies indicates:   Allergen Reactions    Metformin      Severe abdominal bloating; diarrhea    Codeine Hives    Latex Rash     blisters         MEDICATIONS:  Current Medications[1]    PAST MEDICAL HISTORY:  Past Medical History:   Diagnosis Date    Anticoagulant long-term use     Cancer of skin of left ear     Colon polyp     Coronary artery disease, nonobstructive     DJD (degenerative joint disease) of lumbar spine     DM type 2 (diabetes mellitus, type 2)     with diet alone    Dyslipidemia 08/13/2019    Essential hypertension 12/29/2015    Fatty liver     GERD (gastroesophageal reflux disease)     Hepatosplenomegaly     HTN (hypertension)     Hyperlipidemia     resolved with diet    Hypogonadism male 02/17/2015    Lumbar radiculopathy     Lumbar spondylosis     Nephrolithiasis     last stone 1/11    VÍCTOR on CPAP     Palpitations 03/09/2016    Sarcoidosis of lung 1990    Ventricular hypokinesis     angiogram with mild LAD stenosis        PAST SURGICAL HISTORY:  Past Surgical History:   Procedure Laterality Date    ANGIOGRAM, CORONARY, WITH LEFT HEART CATHETERIZATION N/A 08/16/2022    Procedure: Angiogram, Coronary, with Left Heart Cath;  Surgeon: Refugio Mcdonough MD;  Location: [facility] CATH;  Service: Cardiology;  Laterality: N/A;    CARDIAC SURGERY      CIRCUMCISION      COLONOSCOPY N/A 01/08/2024    Procedure: COLONOSCOPY;  Surgeon: HALLEY Montes MD;  Location: [facility] ENDO;  Service: Endoscopy;  Laterality: N/A;    CORONARY ANGIOGRAPHY N/A 12/13/2019    Procedure: ANGIOGRAM, CORONARY ARTERY;  Surgeon: Amador Duran MD;  Location: [facility] CATH;  Service: Cardiology;  Laterality: N/A;    CORONARY STENT PLACEMENT  12/2019    x1    CYST REMOVAL  Right 05/2022    thigh, Dr. Parkinson    elbow spur removal      right    EPIDURAL STEROID INJECTION INTO LUMBAR SPINE N/A 02/10/2021    Procedure: Injection-steroid-epidural-lumbar L4/5;  Surgeon: Richi Rangel MD;  Location: [facility] OR;  Service: Pain Management;  Laterality: N/A;    EPIDURAL STEROID INJECTION INTO LUMBAR SPINE N/A 03/15/2021    Procedure: Injection-steroid-epidural-lumbar L5/S1;  Surgeon: Richi Rangel MD;  Location: [facility] OR;  Service: Pain Management;  Laterality: N/A;    EPIDURAL STEROID INJECTION INTO LUMBAR SPINE N/A 10/16/2023    Procedure: Injection-steroid-epidural-lumbar;  Surgeon: Richi Rangel MD;  Location: [facility] OR;  Service: Pain Management;  Laterality: N/A;  L5/S1    EXTRACORPOREAL SHOCK WAVE LITHOTRIPSY  1996    [facility]    LARYNGOSCOPY, DIRECT, WITH BIOPSY IF INDICATED Bilateral 6/16/2025    Procedure: LARYNGOSCOPY, DIRECT, WITH BIOPSY IF INDICATED;  Surgeon: Denise Rodriguez MD;  Location: [facility] OR;  Service: ENT;  Laterality: Bilateral;    LEFT HEART CATHETERIZATION Left 12/13/2019    Procedure: Left heart cath;  Surgeon: Amador Duran MD;  Location: [facility] CATH;  Service: Cardiology;  Laterality: Left;    removal of skin cancer      L side of face    ROBOT-ASSISTED CHOLECYSTECTOMY N/A 10/30/2024    Procedure: ROBOTIC CHOLECYSTECTOMY;  Surgeon: Michael Mg MD;  Location: [facility] OR;  Service: General;  Laterality: N/A;    URETERAL EXPLORATION  1996    exploration of the R ureter for ureteral injury [facility] - did well        FAMILY HISTORY:  Family History   Problem Relation Name Age of Onset    Hypertension Mother      Fibromyalgia Mother      Coronary artery disease Father      Diabetes Father      Liver disease Brother          hepatitis C from a blood transufusion    Colon cancer Neg Hx      Crohn's disease Neg Hx      Ulcerative colitis Neg Hx      Stomach cancer Neg Hx      Esophageal cancer Neg Hx      Glaucoma Neg Hx      Macular  degeneration Neg Hx         SOCIAL HISTORY:  Social History[2]  See above substance history    REVIEW OF SYSTEMS:   Comprehensive review of systems was discussed with the patient.  It is positive only for the above complaints.    PHYSICAL EXAMINATION:  Blood pressure (!) 142/89, pulse 65, temperature 98.1 °F (36.7 °C), temperature source Temporal, resp. rate 15, height 6' (1.829 m), weight 99.4 kg (219 lb 2.2 oz), SpO2 100%.    Constitutional: Non-toxic appearing.   Psychiatric: Appropriate mood and affect. Cooperative.  Voice: Non-dysphonic, speaking in full sentences.   Neurologic: Cranial nerves grossly intact, no focal deficits.  Head and face: Salivary glands are not enlarged. Face is symmetric. CN VII strength intact.  Skin: No concerning skin lesions.   Eyes: Vision grossly intact, bilateral extraocular movements intact  Ears: Bilateral pinna, mastoid, external ear canal normal. Hearing intact.   Nose: External nose appears normal.   Lips: No ulcers or lesions  Oral cavity: Mucosa is pink and moist. Buccal mucosa, gingiva, anterior tongue, floor of mouth, and hard palate appear normal. No leukoplakia, erythroplakia, ulceration, masses or lesions.  Oropharynx: Mucosa is pink and moist. Soft palate and base of tongue are normal. Posterior pharyngeal wall normal. Tonsils are normal. No lesions.  Neck: Soft and flat, 2cm palpable left level 2 mass. No palpable thyroid enlargement or nodules.  Respiratory: Chest expansion symmetric, no audible stridor or stertor. Breathing is unlabored. No active cough.    CLINICAL PHOTOS:        PROCEDURES:    FLEXIBLE LARYNGOSCOPY  Provider: Eda Fried MD  Indication: History of cancer   The patient was unable to tolerate mirror laryngoscopy.  The procedure was explained to the patient and verbal consent was obtained.   Anesthesia: topical lidocaine and neosynephrine applied within one or both nares with an atomizer    The scope was introduced in the usual  fashion.    Findings:  Mucosa: normal  Inferior turbinates: no polypoid changes or hypertrophy  Septum: no lesion or ulcer  Middle meatus: no purulence or polypoid change  Nasopharynx:  Adenoids: normal  Fossa of Rosenmuller: normal  Eustachian tubes: normal  Superior and posterior pharyngeal walls: normal   Epiglottis, vallecula, and base of tongue: sessile lesion at the base of tongue, sparing vallecula, extending from left inferior tonsil within glossotonsillar sulcus  Pyriform sinuses: no pooling of secretions or saliva in pyriform sinuses, mucosa normal  False vocal cords, arytenoids, aryepiglottic folds: normal  True vocal cords are symmetrically mobile on phonation and inspiration.   Laryngeal sensation appears intact. There are no masses or ulcerations.     The scope was withdrawn. The patient tolerated the procedure well with no complications.      DATA REVIEWED:     LABORATORY:      Latest Ref Rng & Units 10/30/2024     7:55 AM 2/20/2025     7:55 AM 6/12/2025     3:13 PM   Thyroid Labs   Sodium 136 - 145 mmol/L  140     Potassium 3.5 - 5.1 mmol/L  4.6     Chloride 95 - 110 mmol/L  109     Carbon Dioxide 23 - 29 mmol/L  22     Glucose 70 - 110 mg/dL  170     Blood Urea Nitrogen 8 - 23 mg/dL  17     Creatinine 0.5 - 1.4 mg/dL  1.1  1.0    Calcium 8.7 - 10.5 mg/dL  8.9     Total Protein 6.0 - 8.4 g/dL  7.0     Albumin 3.5 - 5.2 g/dL  3.7     Total Bilirubin 0.1 - 1.0 mg/dL  0.9     AST 10 - 40 U/L  19     ALT 10 - 44 U/L  17     Anion Gap 8 - 16 mmol/L  9     Prothrombin Time 9.0 - 12.5 sec 11.4      INR 0.8 - 1.2 1.1      APTT 21.0 - 32.0 sec 33.0           PATHOLOGY:  6/16/25  1. Tongue, left base, biopsy:  - Squamous cell carcinoma, HPV-associated  - See comment     2. Tonsil, left, mass, biopsy:  - Squamous cell carcinoma, HPV-associated  - See comment    IMAGING:  CT Neck 6/12/25  1. Enhancing soft tissue lesion involving the left palatine tonsillar fossa, left glossotonsillar sulcus and base of tongue,  worrisome for primary oropharyngeal malignancy.  2. Left level II metastatic lymph node with central necrosis/cystic change.  3. Few additional non pathologically enlarged left level II/III lymph nodes with rounded morphology.  4. Multiple scattered non pathologically enlarged calcified superior mediastinal lymph nodes, nonspecific and may reflect sequelae of prior granulomatous disease.  This report was flagged in Epic as abnormal.    PET 6/26/25  Known left oropharyngeal cancer involving the tonsil and base of tongue with findings concerning for metastatic level 2 left IJ lymph nodes.  No sites of distant disease are identified.     New non hypermetabolic 12 mm left lung nodule.  This is suspected to lie within an accessory fissure and may correspond to focal pleural thickening or an intra fissural lymph node.  A new lung nodule remains in the differential and follow-up with conventional chest CT would be helpful.  Additional findings in the thorax are unchanged and likely related to known sarcoidosis.     Asymmetric left lateral urinary bladder wall thickening of uncertain etiology.    RADIOLOGY REVIEWED:  I have independently viewed and agree with the CT and PET images and reports which demonstrate left oropharyngeal cancer as well as suspected involved left level 2 node. Pleural lesion more c/w worsening sarcoid per discussion at Tumor Board.    ASSESSMENT AND PLAN:  1. Oropharyngeal mass    2. Oropharyngeal cancer         Cade Johnston is a 59 y.o. male with <10 pack year smoking history with newly diagnosed Stage 1 p16+ SCC of the left oropharynx.    MALIGNANT NEOPLASM OF TONSIL AND BASE OF TONGUE:  - Clinically, this looks like a cancer of the tonsil/base of tongue that has spread to lymph nodes in the neck. This is probably (but we do not know for sure) related to a virus, the human papilloma virus, which is the virus that causes warts. The virus is everywhere, everyone has it, but no one knows why some  people get exposed and never know it and others get exposed and develop cancer 20-30 years later. The key thing is that exposure and cancer development are about 20-30 years apart in most cases.     - Head and neck tonsil/tongue base cancers were traditionally treated with surgery then radiation therapy, but the surgery often required a split of the lip or mandible simply for access. Because this was so dramatic, we changed to treating patients with radiation and chemotherapy over the past 20 years. However, this often comes with significant side effects, especially in terms of altered swallowing. Over the past 8-10 years, there has been an appreciation that many of these cancers are now caused by the human papilloma virus (HPV), and that they are particularly sensitive to a variety of treatments. So there is a general movement to make the treatments less toxic without decreasing the effectiveness of the treatment. As such, our treatment paradigms continue to evolve. Transoral surgery is one of those options, with the intent to clearly remove the tumor and the lymph nodes in the neck, allowing potentially lower doses of radiation and perhaps even elimination of chemotherapy, with an intent to decrease the long term side effects of treatment without sacrificing success. Transoral surgery may be accomplished with scopes and lasers, or the robot may be used. We tend to use the robot, but not all tumors or all patients are eligible for this.     -Based on the size, location, lack of significant infiltrative appearance on scans, and patient anatomy, I believe that your tumor may be amenable to transoral resection with a neck dissection. The intent would be to potentially (although no warranties or guarantees were made or implied) decrease the required dose of radiation therapy and alter the regimen or eliminate entirely the need for chemotherapy.     - NavDx ordered today    -Tumor Board presentation    SARCOIDOSIS OF  LUNG:  - Will discuss case at tumor board to determine if further chest workup needed given history of sarcoidosis.    PERSONAL HISTORY OF NICOTINE DEPENDENCE:  - Some concern for more aggressive behavior due to smoking history, may influence decision for adjuvant therapy.    FOLLOW-UP:  - Follow up after radiation oncology appointment to inform decision on surgery vs. radiation.  - Contact the office to schedule additional appointment before surgery to review details if needed.          Orders Placed This Encounter   Procedures    Miscellaneous Test, Sendout NavDx      Patient encouraged to call with any questions, concerns, or new or worsening symptoms.     Follow up after discussion with radiation oncology       [1]   Current Outpatient Medications:     albuterol (PROVENTIL/VENTOLIN HFA) 90 mcg/actuation inhaler, Inhale 2 puffs into the lungs every 6 (six) hours as needed for Wheezing. Rescue, Disp: 1 Inhaler, Rfl: 2    azelastine (ASTELIN) 137 mcg (0.1 %) nasal spray, 1 spray (137 mcg total) by Nasal route 2 (two) times daily., Disp: 30 mL, Rfl: 11    clopidogreL (PLAVIX) 75 mg tablet, TAKE 1 TABLET BY MOUTH EVERY DAY, Disp: 90 tablet, Rfl: 4    fluticasone propionate (FLONASE) 50 mcg/actuation nasal spray, 1 spray (50 mcg total) by Each Nostril route 2 (two) times a day., Disp: 16 g, Rfl: 11    ibuprofen (ADVIL,MOTRIN) 200 MG tablet, Take 200 mg by mouth every 6 (six) hours as needed for Pain., Disp: , Rfl:     ipratropium-albuteroL (COMBIVENT)  mcg/actuation inhaler, Inhale 1 puff into the lungs every 4 (four) hours as needed for Shortness of Breath. Rescue, Disp: 4 g, Rfl: 4    Lactobacillus acidophilus (PROBIOTIC ACIDOPHILUS ORAL), Take by mouth., Disp: , Rfl:     lancets Misc, To check BG BID, to use with insurance preferred meter, Disp: 100 each, Rfl: 3    losartan (COZAAR) 50 MG tablet, Take 1 tablet (50 mg total) by mouth once daily., Disp: 90 tablet, Rfl: 3    lubiprostone (AMITIZA) 8 MCG Cap,  Take 1 capsule (8 mcg total) by mouth 2 (two) times daily with meals., Disp: 180 capsule, Rfl: 2    ONETOUCH VERIO TEST STRIPS Strp, TO CHECK BLOOD GLUCOSE TWICE A DAY, Disp: 100 strip, Rfl: 3    sildenafil (REVATIO) 20 mg Tab, TAKE 1 TABLET BY MOUTH THREE TIMES A DAY, Disp: 30 tablet, Rfl: 5    tirzepatide (MOUNJARO) 5 mg/0.5 mL PnIj, Inject 5 mg into the skin every 7 days., Disp: 4 each, Rfl: 11    blood-glucose meter kit, To check BG BID , to use with insurance preferred meter, Disp: 1 each, Rfl: 0  [2]   Social History  Socioeconomic History    Marital status:     Number of children: 2   Occupational History    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     Employer: [employer]   Tobacco Use    Smoking status: Former     Current packs/day: 0.00     Types: Cigarettes     Quit date: 1991     Years since quittin.5     Passive exposure: Past    Smokeless tobacco: Never   Substance and Sexual Activity    Alcohol use: No    Drug use: No   Social History Narrative    ** Merged History Encounter **          Social Drivers of Health     Financial Resource Strain: Low Risk  (2025)    Overall Financial Resource Strain (CARDIA)     Difficulty of Paying Living Expenses: Not hard at all   Food Insecurity: No Food Insecurity (2025)    Hunger Vital Sign     Worried About Running Out of Food in the Last Year: Never true     Ran Out of Food in the Last Year: Never true   Transportation Needs: No Transportation Needs (2025)    PRAPARE - Transportation     Lack of Transportation (Medical): No     Lack of Transportation (Non-Medical): No   Physical Activity: Sufficiently Active (2025)    Exercise Vital Sign     Days of Exercise per Week: 7 days     Minutes of Exercise per Session: 40 min   Stress: No Stress Concern Present (2025)    [employer] - Occupational Stress Questionnaire     Feeling of Stress : Not at all   Housing Stability: Low Risk   (6/21/2025)    Housing Stability Vital Sign     Unable to Pay for Housing in the Last Year: No     Number of Times Moved in the Last Year: 0     Homeless in the Last Year: No

## 2025-06-27 ENCOUNTER — OFFICE VISIT (OUTPATIENT)
Dept: RADIATION ONCOLOGY | Facility: CLINIC | Age: 60
End: 2025-06-27
Payer: MEDICARE

## 2025-06-27 ENCOUNTER — LAB VISIT (OUTPATIENT)
Dept: LAB | Facility: HOSPITAL | Age: 60
End: 2025-06-27
Attending: INTERNAL MEDICINE
Payer: MEDICARE

## 2025-06-27 VITALS
SYSTOLIC BLOOD PRESSURE: 128 MMHG | WEIGHT: 220.25 LBS | DIASTOLIC BLOOD PRESSURE: 61 MMHG | HEIGHT: 72 IN | TEMPERATURE: 98 F | RESPIRATION RATE: 16 BRPM | OXYGEN SATURATION: 97 % | BODY MASS INDEX: 29.83 KG/M2 | HEART RATE: 74 BPM

## 2025-06-27 DIAGNOSIS — J39.2 OROPHARYNGEAL MASS: ICD-10-CM

## 2025-06-27 DIAGNOSIS — D00.07 SQUAMOUS CELL CARCINOMA IN SITU (SCCIS) OF LATERAL PORTION OF TONGUE: ICD-10-CM

## 2025-06-27 PROCEDURE — 36415 COLL VENOUS BLD VENIPUNCTURE: CPT | Mod: PN

## 2025-06-27 PROCEDURE — 99999 PR PBB SHADOW E&M-EST. PATIENT-LVL IV: CPT | Mod: PBBFAC,,, | Performed by: RADIOLOGY

## 2025-06-27 NOTE — PROGRESS NOTES
06/27/2025    [facility]   Radiation Oncology Consultation    ASSESSMENT  Cade Johnston is a 59 y.o. 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     PLAN    NavDx pending.  Treatment options were discussed with the patient including surgery +/- adjuvant radiation vs chemoradiation.  We discussed the goals of treatment to be curative.  The risks, benefits, scheduling, alternatives to and rationale of radiation therapy were explained in detail.    After this discussion, he elected to proceed with further deliberation.    Consent was obtained and all questions were answered to the best of my ability  He was given our contact information, and he was told that he could call our clinic at any time if he has any questions or concerns.    Radiation Treatment Details:   Pending further decision.    CC: I have head and neck cancer.    HPI: Mr. Johnston is a 59 year old who developed Left globus sensation in his throat and gag reflex. He was evaluated by Dr. Rodriguez 6/16/25 FFL revealed left BOT asymmetry:          Biopsy of the tonsil and left base of tongue revealed HPV mediated cancer.    PET/CT   Known left oropharyngeal cancer involving the tonsil and base of tongue with findings concerning for metastatic level 2 left IJ lymph nodes.  No sites of distant disease are identified.     New non hypermetabolic 12 mm left lung nodule.  This is suspected to lie within an accessory fissure and may correspond to focal pleural thickening or an intra fissural lymph node.  A new lung nodule remains in the differential and follow-up with conventional chest CT would be helpful.  Additional findings in the thorax are unchanged and likely related to known sarcoidosis.     Asymmetric left lateral urinary bladder wall thickening of uncertain etiology.    In clinic today he endorses this history.    Possibility of pregnancy:  No  History of prior irradiation: No  History of prior systemic anti-cancer therapy: No  History of collagen vascular disease: No  Implanted electronic device (pacer/defib/nerve stimulator): No    Past Medical History:   Diagnosis Date    Anticoagulant long-term use     Cancer of skin of left ear     Colon polyp     Coronary artery disease, nonobstructive     DJD (degenerative joint disease) of lumbar spine     DM type 2 (diabetes mellitus, type 2)     with diet alone    Dyslipidemia 08/13/2019    Essential hypertension 12/29/2015    Fatty liver     GERD (gastroesophageal reflux disease)     Hepatosplenomegaly     HTN (hypertension)     Hyperlipidemia     resolved with diet    Hypogonadism male 02/17/2015    Lumbar radiculopathy     Lumbar spondylosis     Nephrolithiasis     last stone 1/11    VÍCTOR on CPAP     Palpitations 03/09/2016    Sarcoidosis of lung 1990    Ventricular hypokinesis     angiogram with mild LAD stenosis       Past Surgical History:   Procedure Laterality Date    ANGIOGRAM, CORONARY, WITH LEFT HEART CATHETERIZATION N/A 08/16/2022    Procedure: Angiogram, Coronary, with Left Heart Cath;  Surgeon: Refugio Mcdonough MD;  Location: [facility] CATH;  Service: Cardiology;  Laterality: N/A;    CARDIAC SURGERY      CIRCUMCISION      COLONOSCOPY N/A 01/08/2024    Procedure: COLONOSCOPY;  Surgeon: HALLEY Montes MD;  Location: [facility] ENDO;  Service: Endoscopy;  Laterality: N/A;    CORONARY ANGIOGRAPHY N/A 12/13/2019    Procedure: ANGIOGRAM, CORONARY ARTERY;  Surgeon: Amador Duran MD;  Location: [facility] CATH;  Service: Cardiology;  Laterality: N/A;    CORONARY STENT PLACEMENT  12/2019    x1    CYST REMOVAL Right 05/2022    thigh, Dr. Parkinson    elbow spur removal      right    EPIDURAL STEROID INJECTION INTO LUMBAR SPINE N/A 02/10/2021    Procedure: Injection-steroid-epidural-lumbar L4/5;  Surgeon: Richi Rangel MD;  Location: [facility] OR;  Service: Pain Management;  Laterality: N/A;    EPIDURAL STEROID INJECTION  INTO LUMBAR SPINE N/A 03/15/2021    Procedure: Injection-steroid-epidural-lumbar L5/S1;  Surgeon: Richi Rangel MD;  Location: [facility] OR;  Service: Pain Management;  Laterality: N/A;    EPIDURAL STEROID INJECTION INTO LUMBAR SPINE N/A 10/16/2023    Procedure: Injection-steroid-epidural-lumbar;  Surgeon: Richi Rangel MD;  Location: [facility] OR;  Service: Pain Management;  Laterality: N/A;  L5/S1    EXTRACORPOREAL SHOCK WAVE LITHOTRIPSY  1996    [facility]    LARYNGOSCOPY, DIRECT, WITH BIOPSY IF INDICATED Bilateral 6/16/2025    Procedure: LARYNGOSCOPY, DIRECT, WITH BIOPSY IF INDICATED;  Surgeon: Denise Rodriguez MD;  Location: [facility] OR;  Service: ENT;  Laterality: Bilateral;    LEFT HEART CATHETERIZATION Left 12/13/2019    Procedure: Left heart cath;  Surgeon: Amador Duran MD;  [facility] CATH;  Service: Cardiology;  Laterality: Left;    removal of skin cancer      L side of face    ROBOT-ASSISTED CHOLECYSTECTOMY N/A 10/30/2024    Procedure: ROBOTIC CHOLECYSTECTOMY;  Surgeon: Michael Mg MD;  Location: [facility] OR;  Service: General;  Laterality: N/A;    URETERAL EXPLORATION  1996    exploration of the R ureter for ureteral injury [facility] - did well       Social History[1]    Cancer-related family history is negative for Esophageal cancer.    Medications Ordered Prior to Encounter[2]    Review of patient's allergies indicates:   Allergen Reactions    Metformin      Severe abdominal bloating; diarrhea    Codeine Hives    Latex Rash     blisters       Review of Systems:   Constistutional: Denies fever, chills. No recent weight changes. Denies nights sweats.  Eyes: No redness or dryness.  ENT: Denies dry mouth. No ulcers.  Card: Denies chest pain. No PND, or orthopnea.   Resp: Denies cough. Denies shortness of breath.  Gastro: Denies nausea or vomiting, constipation, diarrhea.  Genito: No kidney stones. Denies dysuria.  Skin: No rash, psoriasis, or alopecia.  Musculoskeletal:No  myalgia. No muscle weakness.  Neuro: No numbness or tingling. No history of seizures or psychosis.  Psych: Denies depression. Denies anxiety.  Endo: Denies history of diabetes. No thyroid disease.  Heme: Denies anemia. No blood clots or bleeding diathesis.  Allergic: Denies seasonal allergies.   All other systems negative    Vital Signs:   Vitals:    25 1442   BP: 128/61   Pulse: 74   Resp: 16   Temp: 98.4 °F (36.9 °C)   SpO2: 97%   Weight: 99.9 kg (220 lb 3.8 oz)   Height: 6' (1.829 m)        ECOG Performance Status: 0 - Fully Active    Physical Exam:  General:  Well-appearing, nontoxic  Eyes:  Equal and round pupils, EOMI, no scleral icterus  Mouth:  No lesions, moist  Cardiovascular:  Warm, well-perfused, no peripheral edema  Lungs:  Unlabored on room air, no wheezing  Neurologic:  Awake, alert and oriented, participating in the exam  Psych:  Appropriate mood and affect  Skin:  Normal pallor, No rashes  Heme:  No petechiae, no purpura       Imaging: I have personally reviewed the patient's available images and reports and summarized pertinent findings above in HPI.     Pathology: I have personally reviewed the patient's available pathology and summarized pertinent findings above in HPI.     This case was discussed with Dr. Corky Ho MD  Radiation Oncology       [1]   Social History  Tobacco Use    Smoking status: Former     Current packs/day: 0.00     Types: Cigarettes     Quit date: 1991     Years since quittin.5     Passive exposure: Past    Smokeless tobacco: Never   Substance Use Topics    Alcohol use: No    Drug use: No   [2]   Current Outpatient Medications on File Prior to Visit   Medication Sig Dispense Refill    albuterol (PROVENTIL/VENTOLIN HFA) 90 mcg/actuation inhaler Inhale 2 puffs into the lungs every 6 (six) hours as needed for Wheezing. Rescue 1 Inhaler 2    azelastine (ASTELIN) 137 mcg (0.1 %) nasal spray 1 spray (137 mcg total) by Nasal route 2 (two) times daily.  30 mL 11    blood-glucose meter kit To check BG BID , to use with insurance preferred meter 1 each 0    clopidogreL (PLAVIX) 75 mg tablet TAKE 1 TABLET BY MOUTH EVERY DAY 90 tablet 4    fluticasone propionate (FLONASE) 50 mcg/actuation nasal spray 1 spray (50 mcg total) by Each Nostril route 2 (two) times a day. 16 g 11    ibuprofen (ADVIL,MOTRIN) 200 MG tablet Take 200 mg by mouth every 6 (six) hours as needed for Pain.      ipratropium-albuteroL (COMBIVENT)  mcg/actuation inhaler Inhale 1 puff into the lungs every 4 (four) hours as needed for Shortness of Breath. Rescue 4 g 4    Lactobacillus acidophilus (PROBIOTIC ACIDOPHILUS ORAL) Take by mouth.      lancets Misc To check BG BID, to use with insurance preferred meter 100 each 3    losartan (COZAAR) 50 MG tablet Take 1 tablet (50 mg total) by mouth once daily. 90 tablet 3    lubiprostone (AMITIZA) 8 MCG Cap Take 1 capsule (8 mcg total) by mouth 2 (two) times daily with meals. 180 capsule 2    ONETOUCH VERIO TEST STRIPS Strp TO CHECK BLOOD GLUCOSE TWICE A  strip 3    sildenafil (REVATIO) 20 mg Tab TAKE 1 TABLET BY MOUTH THREE TIMES A DAY 30 tablet 5    tirzepatide (MOUNJARO) 5 mg/0.5 mL PnIj Inject 5 mg into the skin every 7 days. 4 each 11     No current facility-administered medications on file prior to visit.

## 2025-06-28 ENCOUNTER — PATIENT MESSAGE (OUTPATIENT)
Dept: HEMATOLOGY/ONCOLOGY | Facility: CLINIC | Age: 60
End: 2025-06-28
Payer: MEDICARE

## 2025-06-30 PROBLEM — C10.9 OROPHARYNGEAL CANCER: Status: ACTIVE | Noted: 2025-06-30

## 2025-07-01 ENCOUNTER — TELEPHONE (OUTPATIENT)
Dept: HEMATOLOGY/ONCOLOGY | Facility: CLINIC | Age: 60
End: 2025-07-01
Payer: MEDICARE

## 2025-07-01 DIAGNOSIS — C10.9 OROPHARYNGEAL CANCER: Primary | ICD-10-CM

## 2025-07-01 DIAGNOSIS — C01 SQUAMOUS CELL CARCINOMA OF BASE OF TONGUE: ICD-10-CM

## 2025-07-01 RX ORDER — LIDOCAINE HYDROCHLORIDE 10 MG/ML
1 INJECTION, SOLUTION EPIDURAL; INFILTRATION; INTRACAUDAL; PERINEURAL ONCE
OUTPATIENT
Start: 2025-07-01 | End: 2025-07-01

## 2025-07-01 RX ORDER — SODIUM CHLORIDE 0.9 % (FLUSH) 0.9 %
10 SYRINGE (ML) INJECTION EVERY 6 HOURS PRN
OUTPATIENT
Start: 2025-07-01

## 2025-07-01 NOTE — TELEPHONE ENCOUNTER
Copied from CRM #[id number]. Topic: General Inquiry - Patient Advice  >> Jul 1, 2025 11:37 ETHAN Cox wrote:  Type: Needs Medical Advice  Who Called:  jose Huynh Call Back Number: [phone]    Additional Information: jose is calling to talk to leydi to see about procedure , please call to further assist thank you .

## 2025-07-01 NOTE — TELEPHONE ENCOUNTER
Pt calling to touch base about scheduling surgery with Dr Fried.  Pt request that surgery be done after 7/11/25.  Discussed that Dr Fried is considering surgery on 7/25 or 7/23.  Pt states that either of those dates will work with his schedule.  Will update Dr Fried.

## 2025-07-02 ENCOUNTER — DOCUMENTATION ONLY (OUTPATIENT)
Dept: HEMATOLOGY/ONCOLOGY | Facility: CLINIC | Age: 60
End: 2025-07-02
Payer: MEDICARE

## 2025-07-02 NOTE — NURSING
Chart reviewed after visit with H&n surg onc. Plan is for surgery by Dr. Fried which has been scheduled already. Will f/u after surgery to determine any Med/Onc navigation needs

## 2025-07-16 ENCOUNTER — TELEPHONE (OUTPATIENT)
Dept: FAMILY MEDICINE | Facility: CLINIC | Age: 60
End: 2025-07-16
Payer: MEDICARE

## 2025-07-16 ENCOUNTER — E-CONSULT (OUTPATIENT)
Dept: CARDIOLOGY | Facility: CLINIC | Age: 60
End: 2025-07-16
Payer: MEDICARE

## 2025-07-16 ENCOUNTER — PATIENT MESSAGE (OUTPATIENT)
Dept: HEMATOLOGY/ONCOLOGY | Facility: CLINIC | Age: 60
End: 2025-07-16
Payer: MEDICARE

## 2025-07-16 DIAGNOSIS — Z01.818 PREOP TESTING: Primary | ICD-10-CM

## 2025-07-16 DIAGNOSIS — Z01.810 PREOP CARDIOVASCULAR EXAM: Primary | ICD-10-CM

## 2025-07-16 DIAGNOSIS — R73.09 BLOOD GLUCOSE ABNORMAL: ICD-10-CM

## 2025-07-16 DIAGNOSIS — M79.609 PAIN IN EXTREMITY, UNSPECIFIED EXTREMITY: ICD-10-CM

## 2025-07-16 DIAGNOSIS — C10.9 OROPHARYNGEAL CANCER: Primary | ICD-10-CM

## 2025-07-16 PROCEDURE — 99499 UNLISTED E&M SERVICE: CPT | Mod: S$GLB,,, | Performed by: INTERNAL MEDICINE

## 2025-07-16 NOTE — TELEPHONE ENCOUNTER
----- Message from TONIA Aldana sent at 7/16/2025  2:41 PM CDT -----  Gisell Mixon,       Your patient indicated above requires Pre-Op Clearance for a Robotic Transoral Surgery, Radical neck Dissection with Dr. Fried on 07/25/25 (General anesthesia, 315 minutes).  Anesthesia review is in progress.     If a chart review is appropriate for clearance, please indicate in a note in the EMR.       If not, please advise timely, so that your clinic may schedule an appointment.  The following labs/tests have been ordered:   A1C  CMP  EKG  Hematology Profile  PT/INR  PTT  T&S    If further diagnostics are required please feel free to initiate.       Thank you  Cullen Chilel RN   [facility], [facility] [place]

## 2025-07-16 NOTE — TELEPHONE ENCOUNTER
----- Message from TONIA Aldana sent at 7/16/2025  2:41 PM CDT -----  Gisell Mixon,       Your patient indicated above requires Pre-Op Clearance for a Robotic Transoral Surgery, Radical neck Dissection with Dr. Fried on 07/25/25 (General anesthesia, 315 minutes).  Anesthesia review is in progress.     If a chart review is appropriate for clearance, please indicate in a note in the EMR.       If not, please advise timely, so that your clinic may schedule an appointment.  The following labs/tests have been ordered:   A1C  CMP  EKG  Hematology Profile  PT/INR  PTT  T&S    If further diagnostics are required please feel free to initiate.       Thank you  Cullen Chilel RN   [facility]

## 2025-07-16 NOTE — ANESTHESIA PAT ROS NOTE
07/16/2025  Cade Johnston is a 59 y.o., male.      Pre-op Assessment          Review of Systems  Anesthesia Hx:  No problems with previous Anesthesia   History of prior surgery of interest to airway management or planning:  Previous anesthesia: General 06/16/25 - LARYNGOSCOPY, DIRECT, WITH BIOPSY IF INDICATED (Bilateral) with general anesthesia.  Procedure performed at an [facility].      Airway issues documented on chart review include GETA, mask, easy, videolaryngoscope used     Denies Family Hx of Anesthesia complications.    Denies Personal Hx of Anesthesia complications.                    Social:  Former Smoker, No Alcohol Use       Hematology/Oncology:                      Current/Recent Cancer.  --  Cancer in past history:              surgery       EENT/Dental:   Oropharyngeal cancer          Cardiovascular:     Hypertension   CAD   CABG/stent (stent about 6 years ago)    Denies Angina.     hyperlipidemia       Functional Capacity good / => 4 METS                         Pulmonary:       Denies Shortness of breath.  Denies Recent URI.  Sarcoidosis    Obstructive Sleep Apnea (VÍCTOR), CPAP used.           Hepatic/GI:     GERD Liver Disease, (Fatty Liver Disease)               Musculoskeletal:         Spine Disorders:   Degenerative Joint Disease (Lumbar)          Endocrine:  Diabetes                    Anesthesia Assessment: Preoperative EQUATION    Planned Procedure: Procedure(s) (LRB):  ROBOTIC TRANSORAL SURGERY (TORS) (Right)  DISSECTION, NECK, MODIFIED RADICAL (Right)  Requested Anesthesia Type:General  Surgeon: Eda Fried MD  Service: ENT  Known or anticipated Date of Surgery:7/25/2025    Surgeon notes: reviewed    Electronic QUestionnaire Assessment completed via nurse interview with patient.        Triage considerations:     The patient has no apparent active cardiac condition (No  unstable coronary Syndrome such as severe unstable angina or recent [<1 month] myocardial infarction, decompensated CHF, severe valvular   disease or significant arrhythmia)    Previous anesthesia records:GETA and No problems  06/16/25 -   LARYNGOSCOPY, DIRECT, WITH BIOPSY IF INDICATED (Bilateral)   Airway:  Mallampati: II   Mouth Opening: Normal  TM Distance: Normal  Tongue: Normal  Neck ROM: Normal ROM  Placement Date: 06/16/25 Placement Time: 0919 , created via procedure documentation Mask Ventilation: Easy Intubated: Postinduction Airway Device Size: 7.0 Placement Verified By: Capnometry Complicating Factors: None Findings Post-Intubation: Bilateral breath sounds Secured at: Lips Complications: None Removal Date: 06/16/25 Removal Time: 0945     Last PCP note: 3-6 months ago , within [facility]   Subspecialty notes: Cardiology: General, ENT, Gastroenterology, Hematology/Oncology, Pulmonary, Radiology/ONC    Other important co-morbidities: COPD, DM2, GERD, HLD, HTN, VÍCTOR, and Sarcoidosis, Pulmonary HTN, CAD with stent,       Tests already available:  Available tests,  > 1 year ago , within [facility] .   07/22/22 Stress test          Instructions given. (See in Nurse's note)    Optimization:  Anesthesia Preop Clinic Assessment   NOT Indicated    Medical Opinion Indicated       Sub-specialist consult indicated:  Cardiology       Plan:    Testing:  A1C, CMP, EKG, Hematology Profile, PT/INR, PTT, and T&S     Consultation:Patient's PCP for a statement of optimization      Patient  has previously scheduled Medical Appointment: Not at this time    Navigation: Tests Scheduled.              Consults scheduled.             Results will be tracked by Preop Clinic.

## 2025-07-16 NOTE — CONSULTS
[facility] Cardiology - [facility]  Response for E-Consult     Patient Name: Cade Johnston  MRN: [id number]  Primary Care Provider: Joni Smith MD   Requesting Provider: Augustina Jc MD  E-Consult to General Cardiology  Consult performed by: Jacinda Roman MD  Consult ordered by: Augustina Jc MD          After evaluation of your eConsult clinical questions, I believe the patient should be scheduled for an office visit in our specialty due to Patient sees Dr. Mcdonough and has not been seen in over a year. He requires an in person visit for cardiac risk stratification and optimization prior to surgery.    Total time of Consultation: 5 minute    *This eConsult is based on the clinical data available to me and is furnished without benefit of a physician examination.  The eConsult will need to be interpreted in light of any clinical issues of changes in patient status not available to me at the time rime of filing this eConsults.  Significant changes in patient condition of level of acuity should result in a referral for in person consultation and reevaluation.  Please alert me if you have any furth questions.     Thank you for this eConsult referral.     Jacinda Roman MD  [facility] Cardiology [address]

## 2025-07-17 ENCOUNTER — TELEPHONE (OUTPATIENT)
Dept: CARDIOLOGY | Facility: CLINIC | Age: 60
End: 2025-07-17
Payer: MEDICARE

## 2025-07-17 ENCOUNTER — PATIENT MESSAGE (OUTPATIENT)
Dept: CARDIOLOGY | Facility: CLINIC | Age: 60
End: 2025-07-17
Payer: MEDICARE

## 2025-07-18 ENCOUNTER — HOSPITAL ENCOUNTER (OUTPATIENT)
Dept: CARDIOLOGY | Facility: HOSPITAL | Age: 60
Discharge: HOME | End: 2025-07-18
Attending: ANESTHESIOLOGY
Payer: MEDICARE

## 2025-07-18 DIAGNOSIS — Z01.818 PREOP TESTING: ICD-10-CM

## 2025-07-18 LAB
OHS QRS DURATION: 94 MS
OHS QTC CALCULATION: 424 MS

## 2025-07-18 PROCEDURE — 93010 ELECTROCARDIOGRAM REPORT: CPT | Mod: ,,, | Performed by: INTERNAL MEDICINE

## 2025-07-18 PROCEDURE — 93005 ELECTROCARDIOGRAM TRACING: CPT | Mod: PO

## 2025-07-21 ENCOUNTER — TELEPHONE (OUTPATIENT)
Dept: CARDIOLOGY | Facility: CLINIC | Age: 60
End: 2025-07-21

## 2025-07-21 ENCOUNTER — OFFICE VISIT (OUTPATIENT)
Dept: CARDIOLOGY | Facility: CLINIC | Age: 60
End: 2025-07-21
Payer: MEDICARE

## 2025-07-21 ENCOUNTER — OFFICE VISIT (OUTPATIENT)
Dept: FAMILY MEDICINE | Facility: CLINIC | Age: 60
End: 2025-07-21
Payer: MEDICARE

## 2025-07-21 ENCOUNTER — TELEPHONE (OUTPATIENT)
Dept: CARDIOLOGY | Facility: CLINIC | Age: 60
End: 2025-07-21
Payer: MEDICARE

## 2025-07-21 VITALS
WEIGHT: 220.44 LBS | SYSTOLIC BLOOD PRESSURE: 136 MMHG | HEIGHT: 72 IN | HEART RATE: 62 BPM | DIASTOLIC BLOOD PRESSURE: 79 MMHG | BODY MASS INDEX: 29.86 KG/M2

## 2025-07-21 VITALS
DIASTOLIC BLOOD PRESSURE: 82 MMHG | TEMPERATURE: 98 F | WEIGHT: 222.44 LBS | BODY MASS INDEX: 30.13 KG/M2 | SYSTOLIC BLOOD PRESSURE: 134 MMHG | HEIGHT: 72 IN | OXYGEN SATURATION: 98 % | HEART RATE: 68 BPM

## 2025-07-21 DIAGNOSIS — D86.0 SARCOIDOSIS OF LUNG: ICD-10-CM

## 2025-07-21 DIAGNOSIS — I25.10 CORONARY ARTERY DISEASE, UNSPECIFIED VESSEL OR LESION TYPE, UNSPECIFIED WHETHER ANGINA PRESENT, UNSPECIFIED WHETHER NATIVE OR TRANSPLANTED HEART: ICD-10-CM

## 2025-07-21 DIAGNOSIS — C10.9 OROPHARYNGEAL CANCER: ICD-10-CM

## 2025-07-21 DIAGNOSIS — Z01.818 PRE-OP EVALUATION: Primary | ICD-10-CM

## 2025-07-21 DIAGNOSIS — I10 ESSENTIAL HYPERTENSION: ICD-10-CM

## 2025-07-21 DIAGNOSIS — Z01.818 ENCOUNTER FOR PREOPERATIVE ASSESSMENT: Primary | ICD-10-CM

## 2025-07-21 PROCEDURE — 3072F LOW RISK FOR RETINOPATHY: CPT | Mod: CPTII,S$GLB,, | Performed by: INTERNAL MEDICINE

## 2025-07-21 PROCEDURE — 99999 PR PBB SHADOW E&M-EST. PATIENT-LVL IV: CPT | Mod: PBBFAC,GC,, | Performed by: STUDENT IN AN ORGANIZED HEALTH CARE EDUCATION/TRAINING PROGRAM

## 2025-07-21 PROCEDURE — 1159F MED LIST DOCD IN RCRD: CPT | Mod: CPTII,S$GLB,, | Performed by: INTERNAL MEDICINE

## 2025-07-21 PROCEDURE — 3044F HG A1C LEVEL LT 7.0%: CPT | Mod: CPTII,S$GLB,, | Performed by: INTERNAL MEDICINE

## 2025-07-21 PROCEDURE — 4010F ACE/ARB THERAPY RXD/TAKEN: CPT | Mod: CPTII,S$GLB,, | Performed by: INTERNAL MEDICINE

## 2025-07-21 PROCEDURE — 3008F BODY MASS INDEX DOCD: CPT | Mod: CPTII,S$GLB,, | Performed by: INTERNAL MEDICINE

## 2025-07-21 PROCEDURE — G2211 COMPLEX E/M VISIT ADD ON: HCPCS | Mod: S$GLB,,, | Performed by: INTERNAL MEDICINE

## 2025-07-21 PROCEDURE — 99999 PR PBB SHADOW E&M-EST. PATIENT-LVL IV: CPT | Mod: PBBFAC,,, | Performed by: INTERNAL MEDICINE

## 2025-07-21 PROCEDURE — 3079F DIAST BP 80-89 MM HG: CPT | Mod: CPTII,S$GLB,, | Performed by: INTERNAL MEDICINE

## 2025-07-21 PROCEDURE — 3075F SYST BP GE 130 - 139MM HG: CPT | Mod: CPTII,S$GLB,, | Performed by: INTERNAL MEDICINE

## 2025-07-21 PROCEDURE — 99213 OFFICE O/P EST LOW 20 MIN: CPT | Mod: S$GLB,,, | Performed by: INTERNAL MEDICINE

## 2025-07-21 PROCEDURE — 1160F RVW MEDS BY RX/DR IN RCRD: CPT | Mod: CPTII,S$GLB,, | Performed by: INTERNAL MEDICINE

## 2025-07-21 PROCEDURE — 99214 OFFICE O/P EST MOD 30 MIN: CPT | Mod: S$GLB,,, | Performed by: INTERNAL MEDICINE

## 2025-07-21 PROCEDURE — 3078F DIAST BP <80 MM HG: CPT | Mod: CPTII,S$GLB,, | Performed by: INTERNAL MEDICINE

## 2025-07-21 RX ORDER — LUBIPROSTONE 0.01 MG/1
8 CAPSULE ORAL 2 TIMES DAILY
Status: ON HOLD | COMMUNITY
Start: 2025-07-05

## 2025-07-21 NOTE — PROGRESS NOTES
Subjective:       Patient ID: Cade Johnston is a 59 y.o. male.  Chief Complaint: Pre-op Exam     HPI    Will have oropharyngeal mass removed 7/25/25.    Patient set for cardiology clearance at [facility] today bc Dr Lopez on vacation, but he has had no issues (see below).   Unfortunately, he will need clearance from his lung doctor as he will be under general anesthesia and has a severely restricted pattern on PFTs.      Dose a lot of heavy work in his garden, which is 100 ft x 150 ft area.  Hoes, weeds by hand with no chest pain.  Does have some SOB with exertion, but has to take breaks to recover.  Activity still > 4 METS.    Had cholecystectomy 10/2024 with no issues.    EKG 7/18/2025 wnl.     Dr Faustin - severely restricted PFT's  Sarcoidosis:  some SOB, but stable.  Had to retire.  Had several episodes at work where he had trouble breathing and was alone not to be found.  45% lung fnx, sees pulmonary - increased sob - now with talking.  States lungs slightly worse.  On new inhaler Breo from pulm.  pulm recommended retiring.    low testosterone, Dr Larson;  Secondary erythrocytosis - gets frequent phlebotomies.  2nd to testosterone and sarcoidosis    COPD - stable    CAD s/p stent.  Angio 2022 clear.  No chest pain  Sees Dr Duran cardiology.         HTN - controlled; some low at home with sbp upper 90s.  Will cut his losartan in half if low = ok.       DM - controlled; could not tolerate metformin (diarrhea/felt bad) or glipizide (sv constipation); tolerating 2.5 mounjaro.  outside eye Dr LIU - controlled ldl < 70 goal; refusing statin in past.    High triglycerides - uncontrolled     PND/ seasonal allergies.  controlled.  Tried Singulair in past, but makes eyes to dry.  Zyrtec and Xyzal = tired.      VÍCTOR - + fatigue; could not tolerate cpap 10 yr ago.     Refuses further COVID vaccines     Assessment:       1. Pre-op evaluation    2. Sarcoidosis of lung    3. Oropharyngeal cancer    4. Coronary artery disease,  unspecified vessel or lesion type, unspecified whether angina present, unspecified whether native or transplanted heart    5. Essential hypertension        Plan:       Pre-op evaluation    Sarcoidosis of lung    Oropharyngeal cancer    Coronary artery disease, unspecified vessel or lesion type, unspecified whether angina present, unspecified whether native or transplanted heart    Essential hypertension            EKG wnl.    Okay to hold Plavix 5 days prior to surgery    Cleared for surgery pending pulmonary clearance.  We will reach out to his pulmonologist as well to expedite clearance for this Friday.     Visit today included increased complexity associated with the care of the episodic problem DM addressed and managing the longitudinal care of the patient due to the serious and/or complex managed problem(s) DM.  Continue current management and monitor.  Other diagnoses were reviewed and found stable and will continue to monitor.  Counseled on regular exercise, maintenance of a healthy weight, balanced diet rich in fruits/vegetables and lean protein, and avoidance of unhealthy habits like smoking and excessive alcohol intake.   Also, counseled on importance of being compliant with medication, health appointments, diet and exercise.     No follow-ups on file. Keep future      Medication List with Changes/Refills   Current Medications    ALBUTEROL (PROVENTIL/VENTOLIN HFA) 90 MCG/ACTUATION INHALER    Inhale 2 puffs into the lungs every 6 (six) hours as needed for Wheezing. Rescue    AZELASTINE (ASTELIN) 137 MCG (0.1 %) NASAL SPRAY    1 spray (137 mcg total) by Nasal route 2 (two) times daily.    BLOOD-GLUCOSE METER KIT    To check BG BID , to use with insurance preferred meter    CLOPIDOGREL (PLAVIX) 75 MG TABLET    TAKE 1 TABLET BY MOUTH EVERY DAY    FLUTICASONE PROPIONATE (FLONASE) 50 MCG/ACTUATION NASAL SPRAY    1 spray (50 mcg total) by Each Nostril route 2 (two) times a day.    IBUPROFEN (ADVIL,MOTRIN) 200 MG  TABLET    Take 200 mg by mouth every 6 (six) hours as needed for Pain.    IPRATROPIUM-ALBUTEROL (COMBIVENT)  MCG/ACTUATION INHALER    Inhale 1 puff into the lungs every 4 (four) hours as needed for Shortness of Breath. Rescue    LACTOBACILLUS ACIDOPHILUS (PROBIOTIC ACIDOPHILUS ORAL)    Take by mouth.    LANCETS MISC    To check BG BID, to use with insurance preferred meter    LOSARTAN (COZAAR) 50 MG TABLET    Take 1 tablet (50 mg total) by mouth once daily.    ONETOUCH VERIO TEST STRIPS STRP    TO CHECK BLOOD GLUCOSE TWICE A DAY    SILDENAFIL (REVATIO) 20 MG TAB    TAKE 1 TABLET BY MOUTH THREE TIMES A DAY    TIRZEPATIDE (MOUNJARO) 5 MG/0.5 ML PNIJ    Inject 5 mg into the skin every 7 days.       BP Readings from Last 3 Encounters:   07/21/25 134/82   06/27/25 128/61   06/26/25 (!) 142/89     Hemoglobin A1C   Date Value Ref Range Status   02/20/2025 5.7 (H) 4.0 - 5.6 % Final     Comment:     ADA Screening Guidelines:  5.7-6.4%  Consistent with prediabetes  >or=6.5%  Consistent with diabetes    High levels of fetal hemoglobin interfere with the HbA1C  assay. Heterozygous hemoglobin variants (HbS, HgC, etc)do  not significantly interfere with this assay.   However, presence of multiple variants may affect accuracy.     11/27/2024 6.6 (H) 4.0 - 5.6 % Final     Comment:     ADA Screening Guidelines:  5.7-6.4%  Consistent with prediabetes  >or=6.5%  Consistent with diabetes    High levels of fetal hemoglobin interfere with the HbA1C  assay. Heterozygous hemoglobin variants (HbS, HgC, etc)do  not significantly interfere with this assay.   However, presence of multiple variants may affect accuracy.     09/27/2024 8.2 (H) 4.0 - 5.6 % Final     Comment:     ADA Screening Guidelines:  5.7-6.4%  Consistent with prediabetes  >or=6.5%  Consistent with diabetes    High levels of fetal hemoglobin interfere with the HbA1C  assay. Heterozygous hemoglobin variants (HbS, HgC, etc)do  not significantly interfere with this assay.    However, presence of multiple variants may affect accuracy.       Hemoglobin A1c   Date Value Ref Range Status   07/18/2025 6.0 (H) 4.0 - 5.6 % Final     Comment:     ADA Screening Guidelines:  5.7-6.4%  Consistent with prediabetes  >=6.5%  Consistent with diabetes    High levels of fetal hemoglobin interfere with the HbA1C  assay. Heterozygous hemoglobin variants (HbS, HgC, etc)do  not significantly interfere with this assay.   However, presence of multiple variants may affect accuracy.     Lab Results   Component Value Date    TSH 1.326 05/09/2024     Lab Results   Component Value Date    LDLCALC 63.2 02/20/2025    LDLCALC Invalid, Trig>400.0 09/27/2024    LDLCALC 92.6 10/20/2023     Lab Results   Component Value Date    TRIG 269 (H) 02/20/2025    TRIG 546 (H) 09/27/2024    TRIG 132 10/20/2023     Wt Readings from Last 3 Encounters:   07/21/25 100.9 kg (222 lb 7.1 oz)   06/27/25 99.9 kg (220 lb 3.8 oz)   06/26/25 99.4 kg (219 lb 2.2 oz)     Lab Results   Component Value Date    HGB 17.6 07/18/2025    HCT 54.5 (H) 07/18/2025    WBC 7.10 07/18/2025    ALT 21 07/18/2025    AST 18 07/18/2025     07/18/2025    K 4.5 07/18/2025    CREATININE 1.0 07/18/2025    PSA 1.3 09/27/2024           Review of Systems   Constitutional:  Negative for diaphoresis and fever.   HENT:  Negative for drooling and nosebleeds.    Eyes:  Negative for discharge and redness.   Respiratory:  Positive for shortness of breath. Negative for apnea and choking.    Cardiovascular:  Negative for chest pain and palpitations.   Gastrointestinal:  Negative for abdominal pain and nausea.   Skin:  Negative for color change.   Neurological:  Negative for seizures and syncope.   Psychiatric/Behavioral:  Negative for behavioral problems.            Objective:      Vitals:    07/21/25 1052   BP: 134/82   Pulse: 68   Temp: 98.4 °F (36.9 °C)     Physical Exam  Vitals reviewed.   Constitutional:       Appearance: Normal appearance.   Eyes:       Conjunctiva/sclera: Conjunctivae normal.   Cardiovascular:      Rate and Rhythm: Normal rate.   Pulmonary:      Effort: Pulmonary effort is normal.      Breath sounds: Normal breath sounds.   Musculoskeletal:      Cervical back: Normal range of motion.      Comments: Normal ROM bilateral    Skin:     General: Skin is warm and dry.   Neurological:      Mental Status: He is alert.      Cranial Nerves: Cranial nerve deficit: grossly intact.   Psychiatric:      Comments: Alert and orientated

## 2025-07-21 NOTE — TELEPHONE ENCOUNTER
----- Message from TONIA Anderson sent at 7/21/2025  8:32 AM CDT -----  Regarding: RE: Plavix  Thank you, Alix. I appreciate your help.     Sonya  ----- Message -----  From: Alix Carrasquillo RN  Sent: 7/18/2025   5:42 PM CDT  To: Monica Arevalo RN; Alix Carrasquillo RN  Subject: Plavix                                           Pt has never been seen in this clinic and has not seen a cardiologist since July 2024.  I spoke w. dr Richey who is covering the hospital and updated him on Plavix for hx of stent. Per dr Richey OK to hold Plavix if no card symptoms. Pt denied any symptom/ CP, stent placed in 2019. He said that the surgeon mentioned starting to hold his Plavix on Sun 7/20 at night. Told pt to go ahead and do this which will have him hold it for 5 days before the surgery. Pt is coming on 7/21 for his cardiac clearance and should arrive 30 mn early. Pt verbalized understanding and agreed to date/time of appointment(s).  ----- Message -----  From: Amanda Arevalo MA  Sent: 7/18/2025   5:10 PM CDT  To: Alix Carrasquillo RN      ----- Message -----  From: Monica Arevalo RN  Sent: 7/18/2025  11:46 AM CDT  To: Katya Lewis RN; Aftab Brandt MD;     Good morning Dr. Brandt,     This patient is scheduled for a pre op visit with you on 7/21 for an ENT cancer surgery on 7/25. Patient is on plavix. Is it possible to received plavix instructions prior to 7/21 visit? Please advise.     Monica Mariee RN  [facility] - Anesthesia  ----- Message -----  From: Monica Arevalo RN  Sent: 7/18/2025  11:46 AM CDT  To: Aftab Brandt MD; Rikki Dixon    Good morning Dr. Brandt,     This patient is scheduled for a pre op visit with you on 7/21 for an ENT cancer surgery on 7/25. Patient is on plavix. Is it possible to received plavix instructions prior to 7/21 visit? Please advise.     Best regards,     TONIA Andesron  [facility] - [facility]

## 2025-07-21 NOTE — PROGRESS NOTES
Clinic Note  7/20/2025      Subjective:       Patient ID:  Rodrigo is a 59 y.o. male being seen for an established visit.    Chief Complaint: No chief complaint on file.    Mr. Johnston is a 59 year old man with CAD s/p LAD PCI (12/2019 mLAD ALINA), HTN, HLD, sarcoidosis, obesity, T2DM, and oropharyngeal mass presenting for pre-operative cardiovascular risk assessment.     Patient Patient has recently discovered malignant neoplasm of the back of his throat with nearby lymph node involvement after noticing globus sensation. Plan for resection with likely post-operative radiation therapy. Patient presenting for pre-op eval. Patient last ischemic eval in 2022 with [facility] which should patent LAD stent and otherwise non-obstructive coronary disease. Patient has lost some weight with mounjaro. It is held for surgery. Patient holding plavix at the advice of his cardiologist on Friday. No CAD, no SOB beyond baseline, no orthopnea, no LE edema. Denies prior complications of surgery/anesthesia. No bleeding issues.         Review of Systems   All other systems reviewed and are negative.      Past Medical History:   Diagnosis Date    Anticoagulant long-term use     Cancer of skin of left ear     Colon polyp     Coronary artery disease, nonobstructive     DJD (degenerative joint disease) of lumbar spine     DM type 2 (diabetes mellitus, type 2)     with diet alone    Dyslipidemia 08/13/2019    Essential hypertension 12/29/2015    Fatty liver     GERD (gastroesophageal reflux disease)     Hepatosplenomegaly     HTN (hypertension)     Hyperlipidemia     resolved with diet    Hypogonadism male 02/17/2015    Lumbar radiculopathy     Lumbar spondylosis     Nephrolithiasis     last stone 1/11    VÍCTOR on CPAP     Palpitations 03/09/2016    Sarcoidosis of lung 1990    Ventricular hypokinesis     angiogram with mild LAD stenosis       Family History   Problem Relation Name Age of Onset    Hypertension Mother      Fibromyalgia Mother      Coronary  artery disease Father      Diabetes Father      Liver disease Brother          hepatitis C from a blood transufusion    Colon cancer Neg Hx      Crohn's disease Neg Hx      Ulcerative colitis Neg Hx      Stomach cancer Neg Hx      Esophageal cancer Neg Hx      Glaucoma Neg Hx      Macular degeneration Neg Hx          reports that he quit smoking about 33 years ago. His smoking use included cigarettes. He has been exposed to tobacco smoke. He has never used smokeless tobacco. He reports that he does not drink alcohol and does not use drugs.    Medication List with Changes/Refills   Current Medications    ALBUTEROL (PROVENTIL/VENTOLIN HFA) 90 MCG/ACTUATION INHALER    Inhale 2 puffs into the lungs every 6 (six) hours as needed for Wheezing. Rescue    AZELASTINE (ASTELIN) 137 MCG (0.1 %) NASAL SPRAY    1 spray (137 mcg total) by Nasal route 2 (two) times daily.    BLOOD-GLUCOSE METER KIT    To check BG BID , to use with insurance preferred meter    CLOPIDOGREL (PLAVIX) 75 MG TABLET    TAKE 1 TABLET BY MOUTH EVERY DAY    FLUTICASONE PROPIONATE (FLONASE) 50 MCG/ACTUATION NASAL SPRAY    1 spray (50 mcg total) by Each Nostril route 2 (two) times a day.    IBUPROFEN (ADVIL,MOTRIN) 200 MG TABLET    Take 200 mg by mouth every 6 (six) hours as needed for Pain.    IPRATROPIUM-ALBUTEROL (COMBIVENT)  MCG/ACTUATION INHALER    Inhale 1 puff into the lungs every 4 (four) hours as needed for Shortness of Breath. Rescue    LACTOBACILLUS ACIDOPHILUS (PROBIOTIC ACIDOPHILUS ORAL)    Take by mouth.    LANCETS MISC    To check BG BID, to use with insurance preferred meter    LOSARTAN (COZAAR) 50 MG TABLET    Take 1 tablet (50 mg total) by mouth once daily.    ONETOUCH VERIO TEST STRIPS STRP    TO CHECK BLOOD GLUCOSE TWICE A DAY    SILDENAFIL (REVATIO) 20 MG TAB    TAKE 1 TABLET BY MOUTH THREE TIMES A DAY    TIRZEPATIDE (MOUNJARO) 5 MG/0.5 ML PNIJ    Inject 5 mg into the skin every 7 days.     Review of patient's allergies indicates:    Allergen Reactions    Metformin      Severe abdominal bloating; diarrhea    Codeine Hives    Latex Rash     blisters       Problem List[1]        Objective:      There were no vitals taken for this visit.  Estimated body mass index is 29.87 kg/m² as calculated from the following:    Height as of 6/27/25: 6' (1.829 m).    Weight as of 6/27/25: 99.9 kg (220 lb 3.8 oz).  Physical Exam  Vitals reviewed.   Constitutional:       General: He is not in acute distress.     Appearance: He is not ill-appearing.   Eyes:      General: No scleral icterus.  Cardiovascular:      Rate and Rhythm: Normal rate and regular rhythm.      Heart sounds: No murmur heard.  Pulmonary:      Effort: Pulmonary effort is normal. No respiratory distress.      Breath sounds: No wheezing or rales.   Abdominal:      Palpations: Abdomen is soft.      Tenderness: There is no abdominal tenderness.   Musculoskeletal:      Right lower leg: No edema.      Left lower leg: No edema.   Skin:     General: Skin is warm and dry.   Neurological:      Mental Status: He is alert and oriented to person, place, and time.   Psychiatric:         Mood and Affect: Mood normal.         Thought Content: Thought content normal.           Lab Results   Component Value Date    WBC 7.10 07/18/2025    HGB 17.6 07/18/2025    HCT 54.5 (H) 07/18/2025    MCV 87 07/18/2025     07/18/2025       BMP  Lab Results   Component Value Date     07/18/2025    K 4.5 07/18/2025     07/18/2025    CO2 26 07/18/2025    BUN 13 07/18/2025    CREATININE 1.0 07/18/2025    CALCIUM 8.8 07/18/2025    ANIONGAP 8 07/18/2025    EGFRNORACEVR >60 07/18/2025       Lab Results   Component Value Date    CHOL 145 02/20/2025    CHOL 159 09/27/2024     Lab Results   Component Value Date    HDL 28 (L) 02/20/2025    HDL 26 (L) 09/27/2024     Lab Results   Component Value Date    LDLCALC 63.2 02/20/2025    LDLCALC Invalid, Trig>400.0 09/27/2024     Lab Results   Component Value Date    TRIG 269  "(H) 02/20/2025    TRIG 546 (H) 09/27/2024       Lab Results   Component Value Date    CHOLHDL 19.3 (L) 02/20/2025    CHOLHDL 16.4 (L) 09/27/2024        Lab Results   Component Value Date    HGBA1C 6.0 (H) 07/18/2025        No results found for: "CRP"    The 10-year ASCVD risk score (Louis TORIBIO, et al., 2019) is: 22.3%    Values used to calculate the score:      Age: 59 years      Sex: Male      Is Non- : No      Diabetic: Yes      Tobacco smoker: No      Systolic Blood Pressure: 128 mmHg      Is BP treated: Yes      HDL Cholesterol: 28 mg/dL      Total Cholesterol: 145 mg/dL    Cardiac Assessments:  Results for orders placed in visit on 09/30/21    Echo Color Flow Doppler? Yes    Interpretation Summary  · The left ventricle is normal in size with concentric remodeling and normal systolic function.  · The estimated ejection fraction is 55%.  · Normal left ventricular diastolic function.  · Normal right ventricular size with normal right ventricular systolic function.  · Mild mitral regurgitation.  · Normal central venous pressure (3 mmHg).  · The estimated PA systolic pressure is 39 mmHg.     12/2021 cMRI:  Conclusion:    LV volumes are normal. LV mass is normal. LVEF = 53%.   RV volumes are normal. RVEF = 54%.  No LGE appreciated.  No CMR findings consistent with cardiac sarcoid appreciated.        LHC/Stress Tests:  8/2022 LHC:  Nonobstructive coronary artery disease is a widely patent stent in the LAD  The left ventricular systolic function was normal.  The left ventricular end diastolic pressure was elevated.      EP Studies:    Assessment and Plan:         There are no diagnoses linked to this encounter.           Surgical Risk Assessment   Active cardiac issues:  Active decompensated heart failure? No   Unstable angina?  No   Significant uncontrolled arrhythmias? No   Severe valvular heart disease-Aortic or Mitral Stenosis? No   Recent MI or coronary revascularization < 30 days? No "     Cardiac Risk Factors  History of CAD/ischemic heart disease? Yes   History of cerebrovascular disease? No   History of compensated heart failure? No   Type 2 diabetes requiring insulin? No   Serum Creatinine > 2? No   Total cardiac risk factors 1     Functional mets > 4    < 4* METs -unable to walk > 2 blocks on level ground without stopping due to symptoms  - eating, dressing, toileting, walking indoors, light housework. POOR   > 4* METs -climbing > 1 flight of stairs without stopping  -walking up hill > 1-2 blocks  -scrubbing floors  -moving furniture  - golf, bowling, dancing or tennis  -running short distance MODERATE to EXCELLENT   * performance of any one of the activities     Assessment/Plan:   Cardiovascular Risk Assessment:  Non-emergent surgery.  No active cardiac problems (such as unstable angina, decompensated heart failure, significant uncontrolled arrhythmias or severe valvular disease).  Surgery is intermediate risk  Functional Status: able to climb a flight of stairs (> 4 METS)    Revised Cardiac Risk Index   1. History of ischemic heart disease   2. History of congestive heart failure   3. History of cerebrovascular disease (stroke or transient ischemic attack)   4. History of diabetes requiring preoperative insulin use 5. Chronic kidney disease (creatinine > 2 mg/dL)   5. Undergoing suprainguinal vascular, intraperitoneal, or intrathoracic surgery Risk for cardiac death, nonfatal myocardial infarction, and nonfatal cardiac arrest     0 predictors = 0.4%, 1 predictor = 0.9%, 2 predictors = 6.6%, >=3 predictors = >11%    RCRI Calculator Class and Risk percentage:      1 (0.9%)          Other Issues:       Anticoagulation:   N/A      Coronary Stenting: Prior stenting with Plavix 75mg monotherapy held per primary cardiologist since 7/18/25, okay to hold through surgery and restart as soon as safe to do so afterward per surgical team    Recommendation:  - Patient has no decompensated conditions to  preclude him for surgery and no symptoms that warrant further pre-operative cardiac testing prior to surgery  - Patient has appropriately held his plavix and his GLP-1 prior to surgery  - If patient and surgical team accepting of inherent risk  of intermediate surgery for this low risk patient then there are no cardiovascular reasons to hold up surgery.  - return to care with primary cardiologist after surgery.      No follow-ups on file.    Other Orders Placed This Visit:  No orders of the defined types were placed in this encounter.        Aftab Brandt  Cardiovascular Disease Fellow PGY-5           [1]   Patient Active Problem List  Diagnosis    DM type 2 (diabetes mellitus, type 2)    Sarcoidosis of lung    Ventral hernia    Allergic conjunctivitis    Neuropathy in diabetes    Tinea pedis    Stasis dermatitis    Fatigue    ED (erectile dysfunction)    RLQ abdominal pain    Hx of colonic polyps    Testosterone deficiency    Essential hypertension    Palpitations    Polycythemia    Encounter for monitoring testosterone replacement therapy    Dyslipidemia    Stented coronary artery    CAD (coronary artery disease)    Lumbar radiculopathy    Pulmonary hypertension    Sacroiliitis, not elsewhere classified    Abnormal nuclear stress test    Obesity (BMI 30.0-34.9)    Sleep apnea    Restrictive lung disease    Disorder of arteries and arterioles, unspecified    Centrilobular emphysema    Encounter for preoperative assessment    Calcified pleural plaque due to asbestos exposure    Calculus of gallbladder without cholecystitis without obstruction    Hyperlipemia, mixed    Oropharyngeal cancer

## 2025-07-22 NOTE — ANESTHESIA PAT ROS NOTE
07/22/2025  Cade Johnston is a 59 y.o., male.      Pre-op Assessment          Review of Systems  Anesthesia Hx:  No problems with previous Anesthesia   History of prior surgery of interest to airway management or planning:  Previous anesthesia: General 06/16/25 - LARYNGOSCOPY, DIRECT, WITH BIOPSY IF INDICATED (Bilateral) with general anesthesia.  Procedure performed at an [facility].      Airway issues documented on chart review include GETA, mask, easy, videolaryngoscope used     Denies Family Hx of Anesthesia complications.    Denies Personal Hx of Anesthesia complications.                    Social:  Former Smoker, No Alcohol Use       Hematology/Oncology:                      Current/Recent Cancer.  --  Cancer in past history:              surgery       EENT/Dental:   Oropharyngeal cancer          Cardiovascular:     Hypertension   CAD   CABG/stent (stent about 6 years ago)    Denies Angina.     hyperlipidemia       Functional Capacity good / => 4 METS                         Pulmonary:       Denies Shortness of breath.  Denies Recent URI.  Sarcoidosis    Obstructive Sleep Apnea (VÍCTOR), CPAP used.           Hepatic/GI:     GERD Liver Disease, (Fatty Liver Disease)               Musculoskeletal:         Spine Disorders:   Degenerative Joint Disease (Lumbar)          Endocrine:  Diabetes                    Anesthesia Assessment: Preoperative EQUATION    Planned Procedure: Procedure(s) (LRB):  ROBOTIC TRANSORAL SURGERY (TORS) (Right)  DISSECTION, NECK, MODIFIED RADICAL (Right)  Requested Anesthesia Type:General  Surgeon: Eda Fried MD  Service: ENT  Known or anticipated Date of Surgery:7/25/2025    Surgeon notes: reviewed    Electronic QUestionnaire Assessment completed via nurse interview with patient.        Triage considerations:     The patient has no apparent active cardiac condition (No  unstable coronary Syndrome such as severe unstable angina or recent [<1 month] myocardial infarction, decompensated CHF, severe valvular   disease or significant arrhythmia)    Previous anesthesia records:GETA and No problems  06/16/25 -   LARYNGOSCOPY, DIRECT, WITH BIOPSY IF INDICATED (Bilateral)   Airway:  Mallampati: II   Mouth Opening: Normal  TM Distance: Normal  Tongue: Normal  Neck ROM: Normal ROM  Placement Date: 06/16/25 Placement Time: 0919 , created via procedure documentation Mask Ventilation: Easy Intubated: Postinduction Airway Device Size: 7.0 Placement Verified By: Capnometry Complicating Factors: None Findings Post-Intubation: Bilateral breath sounds Secured at: Lips Complications: None Removal Date: 06/16/25 Removal Time: 0945     Last PCP note: 3-6 months ago , within [facility]   Subspecialty notes: Cardiology: General, ENT, Gastroenterology, Hematology/Oncology, Pulmonary, Radiology/ONC    Other important co-morbidities: COPD, DM2, GERD, HLD, HTN, VÍCTOR, and Sarcoidosis, Pulmonary HTN, CAD with stent,       Tests already available:  Available tests,  > 1 year ago , within [facility] .   07/22/22 Stress test          Instructions given. (See in Nurse's note)    Optimization:  Anesthesia Preop Clinic Assessment   NOT Indicated    Medical Opinion Indicated       Sub-specialist consult indicated:  Cardiology       Plan:    Testing:  A1C, CMP, EKG, Hematology Profile, PT/INR, PTT, and T&S     Consultation:Patient's PCP for a statement of optimization      Patient  has previously scheduled Medical Appointment: Not at this time    Navigation: Tests Scheduled.              Consults scheduled.             Results will be tracked by Preop Clinic.    CARDIAC OPTIMIZATION 7/21/25:  Cardiovascular Risk Assessment:  Non-emergent surgery.  No active cardiac problems (such as unstable angina, decompensated heart failure, significant uncontrolled arrhythmias or severe valvular disease).  Surgery is intermediate  risk  Functional Status: able to climb a flight of stairs (> 4 METS)     Revised Cardiac Risk Index   1. History of ischemic heart disease   2. History of congestive heart failure   3. History of cerebrovascular disease (stroke or transient ischemic attack)   4. History of diabetes requiring preoperative insulin use 5. Chronic kidney disease (creatinine > 2 mg/dL)   5. Undergoing suprainguinal vascular, intraperitoneal, or intrathoracic surgery Risk for cardiac death, nonfatal myocardial infarction, and nonfatal cardiac arrest      0 predictors = 0.4%, 1 predictor = 0.9%, 2 predictors = 6.6%, >=3 predictors = >11%     RCRI Calculator Class and Risk percentage:      1 (0.9%)           Other Issues:       Anticoagulation:   N/A      Coronary Stenting: Prior stenting with Plavix 75mg monotherapy held per primary cardiologist since 7/18/25, okay to hold through surgery and restart as soon as safe to do so afterward per surgical team     Recommendation:  - Patient has no decompensated conditions to preclude him for surgery and no symptoms that warrant further pre-operative cardiac testing prior to surgery  - Patient has appropriately held his plavix and his GLP-1 prior to surgery  - If patient and surgical team accepting of inherent risk  of intermediate surgery for this low risk patient then there are no cardiovascular reasons to hold up surgery.       Aftab Brandt  Cardiovascular Disease Fellow PGY-5    CARDIOLOGY MEDICATION RECOMMENDATIONS PER DR. MORA:  From: Refugio Mora MD   Sent: 7/18/2025   5:55 PM CDT   To: Ceasar RANDOLPH Staff     Okay to hold Plavix 5-7 days if necessary   No contraindication for surgery/anesthesia from cardiac standpoint      MEDICAL OPTIMIZATION 7/21/25:  EKG wnl.    Okay to hold Plavix 5 days prior to surgery     Cleared for surgery pending pulmonary clearance.  We will reach out to his pulmonologist as well to expedite clearance for this Friday.   Joni Smith,  MD  Internal Medicine    PULMONARY OPTIMIZATION PER NP RICKY APPLE IN [facility] 7/22/25:  Cleared from pulmonary standpoint to proceed with upcoming head and neck cancer.   Ricky Apple, SUMEET  Pulmonary Disease    7/22/25 PFTs AVAILABLE IN [facility]  Pulmonary PFTs  Row Name 07/22/25 1002       PFTs   FVC 2.56 liters       FVC% 51       FEV1 1.63 liters       FEV1% 42       FEV1/FVC 63.7 %       TLC (liters) 4.71 liters       TLC% 59       RV 2.15       RV% 86       DLCO (ml/mmHg sec) 17.34 ml/mmHg sec       DLCO% 58

## 2025-07-22 NOTE — PROGRESS NOTES
I have reviewed the patient's chart and the fellow's clinic note, as well as discussed the case with the fellow. I agree with the assessment and plan.      Jeffery Sellers MD  Consultative Cardiology - [place]

## 2025-07-24 ENCOUNTER — ANESTHESIA EVENT (OUTPATIENT)
Dept: SURGERY | Facility: HOSPITAL | Age: 60
DRG: 145 | End: 2025-07-24
Payer: MEDICARE

## 2025-07-24 ENCOUNTER — E-CONSULT (OUTPATIENT)
Dept: CARDIOLOGY | Facility: CLINIC | Age: 60
End: 2025-07-24
Payer: MEDICARE

## 2025-07-24 ENCOUNTER — TELEPHONE (OUTPATIENT)
Dept: SURGERY | Facility: CLINIC | Age: 60
End: 2025-07-24
Payer: MEDICARE

## 2025-07-24 ENCOUNTER — PATIENT MESSAGE (OUTPATIENT)
Dept: OTOLARYNGOLOGY | Facility: CLINIC | Age: 60
End: 2025-07-24
Payer: MEDICARE

## 2025-07-24 DIAGNOSIS — I25.10 CORONARY ARTERY DISEASE, UNSPECIFIED VESSEL OR LESION TYPE, UNSPECIFIED WHETHER ANGINA PRESENT, UNSPECIFIED WHETHER NATIVE OR TRANSPLANTED HEART: ICD-10-CM

## 2025-07-24 DIAGNOSIS — Z01.818 ENCOUNTER FOR PREOPERATIVE ASSESSMENT: Primary | ICD-10-CM

## 2025-07-24 NOTE — TELEPHONE ENCOUNTER
Copied from CRM #[id number]. Topic: General Inquiry - Patient Advice  >> Jul 24, 2025  9:36 AM Leah wrote:  Type: Needs Medical Advice  Who Called:  pt   Symptoms (please be specific):    How long has patient had these symptoms:    Pharmacy name and phone #:    Best Call Back Number: [phone]  Additional Information: pt is requesting a call back from nurse regarding his procedure tomorrow with Dr Fried

## 2025-07-24 NOTE — ANESTHESIA PREPROCEDURE EVALUATION
[facility]  Anesthesia Pre-Operative Evaluation         Patient Name: Cade Johnston  YOB: 1965  MRN: [id number]    SUBJECTIVE:     Pre-operative evaluation for Procedure(s) (LRB):  ROBOTIC TRANSORAL SURGERY (TORS) (Right)  DISSECTION, NECK, MODIFIED RADICAL (Right)     07/24/2025    Cade Johnston is a 59 y.o. 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    Has KOROMA, no home O2 use.     Patient now presents for the above procedure(s).    Flex laryngoscopy ENT 06/26/25  Findings:  Mucosa: normal  Inferior turbinates: no polypoid changes or hypertrophy  Septum: no lesion or ulcer  Middle meatus: no purulence or polypoid change  Nasopharynx:  Adenoids: normal  Fossa of Rosenmuller: normal  Eustachian tubes: normal  Superior and posterior pharyngeal walls: normal   Epiglottis, vallecula, and base of tongue: sessile lesion at the base of tongue, sparing vallecula, extending from left inferior tonsil within glossotonsillar sulcus  Pyriform sinuses: no pooling of secretions or saliva in pyriform sinuses, mucosa normal  False vocal cords, arytenoids, aryepiglottic folds: normal  True vocal cords are symmetrically mobile on phonation and inspiration.   Laryngeal sensation appears intact. There are no masses or ulcerations.     Prev airway: Intubation:     Induction:  Intravenous    Intubated:  Postinduction    Mask Ventilation:  Easy mask    Attempts:  1    Attempted By:  CRNA    Laryngeal View Grade: Grade I - full view of cords      Difficult Airway Encountered?: No      Complications:  None    Airway Device:  Oral endotracheal tube    Airway Device Size:  7.0    Style/Cuff Inflation:  Cuffed    Inflation Amount (mL):  3    Tube secured:   21    Secured at:  The lips    Placement Verified By:  Capnometry    Complicating Factors:  None    Findings Post-Intubation:  BS equal bilateral    Drips: None documented.      Problem List[1]    Review of patient's allergies indicates:   Allergen Reactions    Metformin      Severe abdominal bloating; diarrhea    Codeine Hives    Latex Rash     blisters       Current Inpatient Medications:      Medications Ordered Prior to Encounter[2]    Past Surgical History:   Procedure Laterality Date    ANGIOGRAM, CORONARY, WITH LEFT HEART CATHETERIZATION N/A 08/16/2022    Procedure: Angiogram, Coronary, with Left Heart Cath;  Surgeon: Refugio Mcdonough MD;  Location: [facility] CATH;  Service: Cardiology;  Laterality: N/A;    CARDIAC SURGERY      CIRCUMCISION      COLONOSCOPY N/A 01/08/2024    Procedure: COLONOSCOPY;  Surgeon: HALLEY Montes MD;  Location: [facility] ENDO;  Service: Endoscopy;  Laterality: N/A;    CORONARY ANGIOGRAPHY N/A 12/13/2019    Procedure: ANGIOGRAM, CORONARY ARTERY;  Surgeon: Amador Duran MD;  Location: [facility] CATH;  Service: Cardiology;  Laterality: N/A;    CORONARY STENT PLACEMENT  12/2019    x1    CYST REMOVAL Right 05/2022    thigh, Dr. Parkinson    elbow spur removal      right    EPIDURAL STEROID INJECTION INTO LUMBAR SPINE N/A 02/10/2021    Procedure: Injection-steroid-epidural-lumbar L4/5;  Surgeon: Richi Rangel MD;  Location: [facility] OR;  Service: Pain Management;  Laterality: N/A;    EPIDURAL STEROID INJECTION INTO LUMBAR SPINE N/A 03/15/2021    Procedure: Injection-steroid-epidural-lumbar L5/S1;  Surgeon: Richi Rangel MD;  Location: [facility] OR;  Service: Pain Management;  Laterality: N/A;    EPIDURAL STEROID INJECTION INTO LUMBAR SPINE N/A 10/16/2023    Procedure: Injection-steroid-epidural-lumbar;  Surgeon: Richi Rangel MD;  Location: [facility] OR;  Service: Pain Management;  Laterality: N/A;  L5/S1    EXTRACORPOREAL SHOCK WAVE LITHOTRIPSY  1996    [facility]    LARYNGOSCOPY, DIRECT, WITH  BIOPSY IF INDICATED Bilateral 6/16/2025    Procedure: LARYNGOSCOPY, DIRECT, WITH BIOPSY IF INDICATED;  Surgeon: Denise Rodriguez MD;  Location: [facility] OR;  Service: ENT;  Laterality: Bilateral;    LEFT HEART CATHETERIZATION Left 12/13/2019    Procedure: Left heart cath;  Surgeon: Amador Duran MD;  Location: [facility] CATH;  Service: Cardiology;  Laterality: Left;    removal of skin cancer      L side of face    ROBOT-ASSISTED CHOLECYSTECTOMY N/A 10/30/2024    Procedure: ROBOTIC CHOLECYSTECTOMY;  Surgeon: Michael Mg MD;  Location: [facility] OR;  Service: General;  Laterality: N/A;    URETERAL EXPLORATION  1996    exploration of the R ureter for ureteral injury [facility] - did well       Social History[3]    OBJECTIVE:     Vital Signs Range (Last 24H):         Significant Labs:  Lab Results   Component Value Date    WBC 7.10 07/18/2025    HGB 17.6 07/18/2025    HCT 54.5 (H) 07/18/2025     07/18/2025    CHOL 145 02/20/2025    TRIG 269 (H) 02/20/2025    HDL 28 (L) 02/20/2025    ALT 21 07/18/2025    AST 18 07/18/2025     07/18/2025    K 4.5 07/18/2025     07/18/2025    CREATININE 1.0 07/18/2025    BUN 13 07/18/2025    CO2 26 07/18/2025    TSH 1.326 05/09/2024    PSA 1.3 09/27/2024    INR 1.1 07/18/2025    HGBA1C 6.0 (H) 07/18/2025       Diagnostic Studies: No relevant studies.    EKG:   Results for orders placed or performed during the hospital encounter of 07/18/25   EKG 12-lead    Collection Time: 07/18/25  7:43 AM   Result Value Ref Range    QRS Duration 94 ms    [facility] QTC Calculation 424 ms    Narrative    Test Reason : Z01.818,    Vent. Rate :  69 BPM     Atrial Rate :  69 BPM     P-R Int : 184 ms          QRS Dur :  94 ms      QT Int : 396 ms       P-R-T Axes :  40   0  35 degrees    QTcB Int : 424 ms    Normal sinus rhythm  Normal ECG  When compared with ECG of 30-Oct-2024 08:02,  No significant change was found  Confirmed by Lan Jennings ([id number]) on 7/18/2025 10:39:48  "AM    Referred By: MITESH ESCOTO           Confirmed By: Lan Jennings       2D ECHO:  TTE:  Results for orders placed or performed in visit on 09/30/21   Echo Color Flow Doppler? Yes   Result Value Ref Range    Ascending aorta 2.77 cm    STJ 2.57 cm    AV mean gradient 3 mmHg    Ao peak roberto 1.22 m/s    Ao VTI 24.92 cm    IVRT 139.87 msec    IVS 1.17 (A) 0.6 - 1.1 cm    LA size 3.37 cm    Left Atrium Major Axis 4.59 cm    Left Atrium Minor Axis 4.80 cm    LVIDd 3.87 3.5 - 6.0 cm    LVIDs 2.79 2.1 - 4.0 cm    LVOT diameter 2.29 cm    LVOT peak VTI 16.52 cm    PW 1.27 (A) 0.6 - 1.1 cm    MV Peak A Roberto 0.80 m/s    E wave deceleration time 160.32 msec    MV Peak E Roberto 0.80 m/s    PV Peak D Roberto 0.62 m/s    PV Peak S Roberto 0.65 m/s    RA Major Axis 4.28 cm    RA Width 3.35 cm    RVDD 3.90 cm    Sinus 3.02 cm    TAPSE 2.89 cm    TR Max Roberto 3.00 m/s    TDI LATERAL 0.12 m/s    TDI SEPTAL 0.08 m/s    LA WIDTH 3.69 cm    LV Diastolic Volume 64.70 mL    LV Systolic Volume 29.23 mL    LVOT peak roberto 0.90 m/s    MV "A" wave duration 10.28 msec    RV S' 15.37 cm/s    LV LATERAL E/E' RATIO 6.67 m/s    LV SEPTAL E/E' RATIO 10.00 m/s    FS 28 %    LA Vol 49.60 cm3    LV mass 161.41 g    Left Ventricle Relative Wall Thickness 0.66 cm    AV valve area 2.73 cm2    AV Velocity Ratio 0.74     AV index (prosthetic) 0.66     E/A ratio 1.00     Mean e' 0.10 m/s    Pulm vein S/D ratio 1.05     LVOT area 4.1 cm2    LVOT stroke volume 68.01 cm3    AV peak gradient 6 mmHg    E/E' ratio 8.00 m/s    Triscuspid Valve Regurgitation Peak Gradient 36 mmHg    BSA 2.35 m2    LV Systolic Volume Index 12.7 mL/m2    LV Diastolic Volume Index 28.01 mL/m2    AV 21.5 mL/m2    LV Mass Index 70 g/m2    Right Atrial Pressure (from IVC) 3 mmHg    EF 55 %    TV resting pulmonary artery pressure 39 mmHg    Narrative    · The left ventricle is normal in size with concentric remodeling and   normal systolic function.  · The estimated ejection fraction is 55%.  · Normal " left ventricular diastolic function.  · Normal right ventricular size with normal right ventricular systolic   function.  · Mild mitral regurgitation.  · Normal central venous pressure (3 mmHg).  · The estimated PA systolic pressure is 39 mmHg.          WILFRIDO:  No results found for this or any previous visit.    ASSESSMENT/PLAN:         Pre-op Assessment    I have reviewed the Patient Summary Reports.     I have reviewed the Nursing Notes. I have reviewed the NPO Status.   I have reviewed the Medications.     Review of Systems  Anesthesia Hx:  No problems with previous Anesthesia   History of prior surgery of interest to airway management or planning:          Denies Family Hx of Anesthesia complications.    Denies Personal Hx of Anesthesia complications.                    Social:  Former Smoker       Hematology/Oncology:  Hematology Normal                     Current/Recent Cancer.                EENT/Dental:   SCC of oropharynx          Cardiovascular:     Hypertension   CAD   CABG/stent                 Coronary Artery Disease:                            Hypertension         Pulmonary:   COPD     Sleep Apnea    Chronic Obstructive Pulmonary Disease (COPD):           Obstructive Sleep Apnea (VÍCTOR).           Renal/:  Chronic Renal Disease        Kidney Function/Disease             Hepatic/GI:     GERD Liver Disease,        Gerd       Liver Disease        Musculoskeletal:  Musculoskeletal Normal                Neurological:    Neuromuscular Disease,                                 Neuromuscular Disease   Endocrine:  Diabetes, type 2    Diabetes                      Psych:  Psychiatric Normal                    Physical Exam  General: Well nourished, Cooperative, Alert and Oriented    Airway:  Mallampati: II   Mouth Opening: Normal  TM Distance: Normal  Tongue: Normal  Neck ROM: Normal ROM    Dental:  Intact    Chest/Lungs:  Normal Respiratory Rate    Heart:  Rate: Normal        Anesthesia Plan  Type of Anesthesia,  risks & benefits discussed:    Anesthesia Type: Gen ETT  Intra-op Monitoring Plan: Standard ASA Monitors and Art Line  Post Op Pain Control Plan: multimodal analgesia and IV/PO Opioids PRN  Induction:  IV  Airway Plan: Video and Fiberoptic, Post-Induction  Informed Consent: Informed consent signed with the Patient and all parties understand the risks and agree with anesthesia plan.  All questions answered. Patient consented to blood products? Yes  ASA Score: 3  Day of Surgery Review of History & Physical: H&P Update referred to the surgeon/provider.  Anesthesia Plan Notes: Discussed plan for General endotracheal anesthesia    Ready For Surgery From Anesthesia Perspective.     .           [1]   Patient Active Problem List  Diagnosis    DM type 2 (diabetes mellitus, type 2)    Sarcoidosis of lung    Ventral hernia    Allergic conjunctivitis    Neuropathy in diabetes    Tinea pedis    Stasis dermatitis    Fatigue    ED (erectile dysfunction)    RLQ abdominal pain    Hx of colonic polyps    Testosterone deficiency    Essential hypertension    Palpitations    Polycythemia    Encounter for monitoring testosterone replacement therapy    Dyslipidemia    Stented coronary artery    CAD (coronary artery disease)    Lumbar radiculopathy    Pulmonary hypertension    Sacroiliitis, not elsewhere classified    Abnormal nuclear stress test    Obesity (BMI 30.0-34.9)    Sleep apnea    Restrictive lung disease    Disorder of arteries and arterioles, unspecified    Centrilobular emphysema    Encounter for preoperative assessment    Calcified pleural plaque due to asbestos exposure    Calculus of gallbladder without cholecystitis without obstruction    Hyperlipemia, mixed    Oropharyngeal cancer   [2]   No current facility-administered medications on file prior to encounter.     Current Outpatient Medications on File Prior to Encounter   Medication Sig Dispense Refill    albuterol (PROVENTIL/VENTOLIN HFA) 90 mcg/actuation inhaler Inhale  2 puffs into the lungs every 6 (six) hours as needed for Wheezing. Rescue 1 Inhaler 2    azelastine (ASTELIN) 137 mcg (0.1 %) nasal spray 1 spray (137 mcg total) by Nasal route 2 (two) times daily. 30 mL 11    blood-glucose meter kit To check BG BID , to use with insurance preferred meter 1 each 0    clopidogreL (PLAVIX) 75 mg tablet TAKE 1 TABLET BY MOUTH EVERY DAY 90 tablet 4    fluticasone propionate (FLONASE) 50 mcg/actuation nasal spray 1 spray (50 mcg total) by Each Nostril route 2 (two) times a day. 16 g 11    ibuprofen (ADVIL,MOTRIN) 200 MG tablet Take 200 mg by mouth every 6 (six) hours as needed for Pain.      ipratropium-albuteroL (COMBIVENT)  mcg/actuation inhaler Inhale 1 puff into the lungs every 4 (four) hours as needed for Shortness of Breath. Rescue 4 g 4    Lactobacillus acidophilus (PROBIOTIC ACIDOPHILUS ORAL) Take by mouth.      lancets Misc To check BG BID, to use with insurance preferred meter 100 each 3    losartan (COZAAR) 50 MG tablet Take 1 tablet (50 mg total) by mouth once daily. 90 tablet 3    ONETOUCH VERIO TEST STRIPS Strp TO CHECK BLOOD GLUCOSE TWICE A  strip 3    sildenafil (REVATIO) 20 mg Tab TAKE 1 TABLET BY MOUTH THREE TIMES A DAY 30 tablet 5    tirzepatide (MOUNJARO) 5 mg/0.5 mL PnIj Inject 5 mg into the skin every 7 days. 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     Employer: [employer]   Tobacco Use    Smoking status: Former     Current packs/day: 0.00     Types: Cigarettes     Quit date: 1991     Years since quittin.6     Passive exposure: Past    Smokeless tobacco: Never   Substance and Sexual Activity    Alcohol use: No    Drug use: No   Social History Narrative    ** Merged History Encounter **          Social Drivers of Health     Financial Resource Strain: Low Risk  (2025)    Overall Financial  Resource Strain (CARDIA)     Difficulty of Paying Living Expenses: Not hard at all   Food Insecurity: No Food Insecurity (6/21/2025)    Hunger Vital Sign     Worried About Running Out of Food in the Last Year: Never true     Ran Out of Food in the Last Year: Never true   Transportation Needs: No Transportation Needs (6/21/2025)    PRAPARE - Transportation     Lack of Transportation (Medical): No     Lack of Transportation (Non-Medical): No   Physical Activity: Sufficiently Active (6/21/2025)    Exercise Vital Sign     Days of Exercise per Week: 7 days     Minutes of Exercise per Session: 40 min   Stress: No Stress Concern Present (6/21/2025)    [place] [employer] - Occupational Stress Questionnaire     Feeling of Stress : Not at all   Housing Stability: Low Risk  (6/21/2025)    Housing Stability Vital Sign     Unable to Pay for Housing in the Last Year: No     Number of Times Moved in the Last Year: 0     Homeless in the Last Year: No

## 2025-07-24 NOTE — TELEPHONE ENCOUNTER
RN returned patient's call -- reviewed DOS instructions and arrival time. Pt voiced understanding. Advised to call the clinic with any additional questions or concerns.

## 2025-07-24 NOTE — CONSULTS
[place] - Cardiology  Response for E-Consult     Patient Name: Cade Johnston  MRN: [id number]  Primary Care Provider: Joni Smith MD   Requesting Provider: Eda Fried MD  E-Consult to General Cardiology  Consult performed by: Refugio Mcdonough MD  Consult ordered by: Eda Fried MD  Reason for consult: Preop clearance        59-year-old man with history of coronary artery disease.  No recent stenting.    Okay to hold Plavix 5-7 days before procedure  No contraindication for surgery/anesthesia from cardiac standpoint      Total time of Consultation: 15 minute    I did not speak to the requesting provider verbally about this.     *This eConsult is based on the clinical data available to me and is furnished without benefit of a physical examination. The eConsult will need to be interpreted in light of any clinical issues or changes in patient status not available to me at the time of filing this eConsults. Significant changes in patient condition or level of acuity should result in immediate formal consultation and reevaluation. Please alert me if you have further questions.    Thank you for this eConsult referral.     Refugio Mcdonough MD  [place] - Cardiology

## 2025-07-25 ENCOUNTER — ANESTHESIA (OUTPATIENT)
Dept: SURGERY | Facility: HOSPITAL | Age: 60
DRG: 145 | End: 2025-07-25
Payer: MEDICARE

## 2025-07-25 ENCOUNTER — HOSPITAL ENCOUNTER (INPATIENT)
Facility: HOSPITAL | Age: 60
LOS: 3 days | Discharge: HOME | DRG: 145 | End: 2025-07-28
Attending: STUDENT IN AN ORGANIZED HEALTH CARE EDUCATION/TRAINING PROGRAM | Admitting: STUDENT IN AN ORGANIZED HEALTH CARE EDUCATION/TRAINING PROGRAM
Payer: MEDICARE

## 2025-07-25 PROCEDURE — 25000003 PHARM REV CODE 250

## 2025-07-25 PROCEDURE — 63600175 PHARM REV CODE 636 W HCPCS

## 2025-07-25 PROCEDURE — 36620 INSERTION CATHETER ARTERY: CPT | Mod: XU,,, | Performed by: ANESTHESIOLOGY

## 2025-07-25 PROCEDURE — D9220A PRA ANESTHESIA: Mod: ,,, | Performed by: ANESTHESIOLOGY

## 2025-07-25 RX ORDER — MIDAZOLAM HYDROCHLORIDE 1 MG/ML
INJECTION INTRAMUSCULAR; INTRAVENOUS
Status: DISCONTINUED | OUTPATIENT
Start: 2025-07-25 | End: 2025-07-25

## 2025-07-25 RX ORDER — ROCURONIUM BROMIDE 10 MG/ML
INJECTION, SOLUTION INTRAVENOUS
Status: DISCONTINUED | OUTPATIENT
Start: 2025-07-25 | End: 2025-07-25

## 2025-07-25 RX ORDER — KETAMINE HCL IN 0.9 % NACL 50 MG/5 ML
SYRINGE (ML) INTRAVENOUS
Status: DISCONTINUED | OUTPATIENT
Start: 2025-07-25 | End: 2025-07-25

## 2025-07-25 RX ORDER — FENTANYL CITRATE 50 UG/ML
INJECTION, SOLUTION INTRAMUSCULAR; INTRAVENOUS
Status: DISCONTINUED | OUTPATIENT
Start: 2025-07-25 | End: 2025-07-25

## 2025-07-25 RX ORDER — LIDOCAINE HYDROCHLORIDE 20 MG/ML
INJECTION, SOLUTION EPIDURAL; INFILTRATION; INTRACAUDAL; PERINEURAL
Status: DISCONTINUED | OUTPATIENT
Start: 2025-07-25 | End: 2025-07-25

## 2025-07-25 RX ORDER — PROPOFOL 10 MG/ML
VIAL (ML) INTRAVENOUS
Status: DISCONTINUED | OUTPATIENT
Start: 2025-07-25 | End: 2025-07-25

## 2025-07-25 RX ORDER — CEFAZOLIN SODIUM 1 G/3ML
INJECTION, POWDER, FOR SOLUTION INTRAMUSCULAR; INTRAVENOUS
Status: DISCONTINUED | OUTPATIENT
Start: 2025-07-25 | End: 2025-07-25

## 2025-07-25 RX ORDER — DEXAMETHASONE SODIUM PHOSPHATE 4 MG/ML
INJECTION, SOLUTION INTRA-ARTICULAR; INTRALESIONAL; INTRAMUSCULAR; INTRAVENOUS; SOFT TISSUE
Status: DISCONTINUED | OUTPATIENT
Start: 2025-07-25 | End: 2025-07-25

## 2025-07-25 RX ORDER — DEXMEDETOMIDINE HYDROCHLORIDE 100 UG/ML
INJECTION, SOLUTION INTRAVENOUS
Status: DISCONTINUED | OUTPATIENT
Start: 2025-07-25 | End: 2025-07-25

## 2025-07-25 RX ORDER — ONDANSETRON HYDROCHLORIDE 2 MG/ML
INJECTION, SOLUTION INTRAVENOUS
Status: DISCONTINUED | OUTPATIENT
Start: 2025-07-25 | End: 2025-07-25

## 2025-07-25 RX ORDER — PHENYLEPHRINE HYDROCHLORIDE 10 MG/ML
INJECTION INTRAVENOUS
Status: DISCONTINUED | OUTPATIENT
Start: 2025-07-25 | End: 2025-07-25

## 2025-07-25 RX ADMIN — FENTANYL CITRATE 25 MCG: 50 INJECTION, SOLUTION INTRAMUSCULAR; INTRAVENOUS at 10:07

## 2025-07-25 RX ADMIN — PHENYLEPHRINE HYDROCHLORIDE 100 MCG: 10 INJECTION INTRAVENOUS at 07:07

## 2025-07-25 RX ADMIN — ROCURONIUM BROMIDE 20 MG: 10 INJECTION, SOLUTION INTRAVENOUS at 08:07

## 2025-07-25 RX ADMIN — SODIUM CHLORIDE: 0.9 INJECTION, SOLUTION INTRAVENOUS at 07:07

## 2025-07-25 RX ADMIN — CEFAZOLIN 2 G: 330 INJECTION, POWDER, FOR SOLUTION INTRAMUSCULAR; INTRAVENOUS at 07:07

## 2025-07-25 RX ADMIN — Medication 10 MG: at 07:07

## 2025-07-25 RX ADMIN — PROPOFOL 30 MG: 10 INJECTION, EMULSION INTRAVENOUS at 11:07

## 2025-07-25 RX ADMIN — PHENYLEPHRINE HYDROCHLORIDE 100 MCG: 10 INJECTION INTRAVENOUS at 08:07

## 2025-07-25 RX ADMIN — PHENYLEPHRINE HYDROCHLORIDE 50 MCG: 10 INJECTION INTRAVENOUS at 08:07

## 2025-07-25 RX ADMIN — PROPOFOL 100 MG: 10 INJECTION, EMULSION INTRAVENOUS at 07:07

## 2025-07-25 RX ADMIN — Medication 10 MG: at 08:07

## 2025-07-25 RX ADMIN — DEXAMETHASONE SODIUM PHOSPHATE 8 MG: 4 INJECTION, SOLUTION INTRAMUSCULAR; INTRAVENOUS at 07:07

## 2025-07-25 RX ADMIN — SUGAMMADEX 200 MG: 100 INJECTION, SOLUTION INTRAVENOUS at 12:07

## 2025-07-25 RX ADMIN — CEFAZOLIN 2 G: 330 INJECTION, POWDER, FOR SOLUTION INTRAMUSCULAR; INTRAVENOUS at 11:07

## 2025-07-25 RX ADMIN — LIDOCAINE HYDROCHLORIDE 100 MG: 20 INJECTION, SOLUTION EPIDURAL; INFILTRATION; INTRACAUDAL; PERINEURAL at 07:07

## 2025-07-25 RX ADMIN — MIDAZOLAM HYDROCHLORIDE 2 MG: 1 INJECTION, SOLUTION INTRAMUSCULAR; INTRAVENOUS at 07:07

## 2025-07-25 RX ADMIN — PHENYLEPHRINE HYDROCHLORIDE 200 MCG: 10 INJECTION INTRAVENOUS at 11:07

## 2025-07-25 RX ADMIN — PHENYLEPHRINE HYDROCHLORIDE 0.25 MCG/KG/MIN: 10 INJECTION INTRAVENOUS at 09:07

## 2025-07-25 RX ADMIN — FENTANYL CITRATE 50 MCG: 50 INJECTION, SOLUTION INTRAMUSCULAR; INTRAVENOUS at 07:07

## 2025-07-25 RX ADMIN — DEXMEDETOMIDINE 8 MCG: 200 INJECTION, SOLUTION INTRAVENOUS at 09:07

## 2025-07-25 RX ADMIN — DEXAMETHASONE SODIUM PHOSPHATE 8 MG: 4 INJECTION, SOLUTION INTRAMUSCULAR; INTRAVENOUS at 11:07

## 2025-07-25 RX ADMIN — ONDANSETRON 4 MG: 2 INJECTION INTRAMUSCULAR; INTRAVENOUS at 11:07

## 2025-07-25 RX ADMIN — PHENYLEPHRINE HYDROCHLORIDE 100 MCG: 10 INJECTION INTRAVENOUS at 09:07

## 2025-07-25 RX ADMIN — SODIUM CHLORIDE, SODIUM GLUCONATE, SODIUM ACETATE, POTASSIUM CHLORIDE, MAGNESIUM CHLORIDE, SODIUM PHOSPHATE, DIBASIC, AND POTASSIUM PHOSPHATE: .53; .5; .37; .037; .03; .012; .00082 INJECTION, SOLUTION INTRAVENOUS at 07:07

## 2025-07-25 RX ADMIN — DEXMEDETOMIDINE 8 MCG: 200 INJECTION, SOLUTION INTRAVENOUS at 11:07

## 2025-07-25 RX ADMIN — Medication 20 MG: at 07:07

## 2025-07-25 RX ADMIN — Medication 10 MG: at 09:07

## 2025-07-25 RX ADMIN — FENTANYL CITRATE 25 MCG: 50 INJECTION, SOLUTION INTRAMUSCULAR; INTRAVENOUS at 11:07

## 2025-07-25 RX ADMIN — ROCURONIUM BROMIDE 50 MG: 10 INJECTION, SOLUTION INTRAVENOUS at 07:07

## 2025-07-25 RX ADMIN — PROPOFOL 30 MG: 10 INJECTION, EMULSION INTRAVENOUS at 07:07

## 2025-07-25 RX ADMIN — ROCURONIUM BROMIDE 30 MG: 10 INJECTION, SOLUTION INTRAVENOUS at 07:07

## 2025-07-25 NOTE — NURSING TRANSFER
Nursing Transfer Note      7/25/2025   2:49 PM    Nurse giving handoff:Jaimie RANDALL PACU  Nurse receiving handoff:Richard RANDALL PCU    Reason patient is being transferred: s/p anesthesia recovery    Transfer To: Room 88764    Transfer via bed    Transfer with cardiac monitoring, iv pole    Transported by PACU RN and PCT    Transfer Vital Signs: see flowsheets    Telemetry: Rate 82 and Rhythm NSR  Order for Tele Monitor? Yes    Medicines sent: iv fluids infusing    Any special needs or follow-up needed: routine    Patient belongings transferred with patient: No    Chart send with patient: Yes    Notified: family    Patient reassessed at: 7/25/25 at 1430 (date, time)    Upon arrival to floor: cardiac monitor applied, patient oriented to room, call bell in reach, and bed in lowest position

## 2025-07-25 NOTE — ASSESSMENT & PLAN NOTE
58 yo M on POD 1 after transoral robotic surgery - left tonsillectomy and BOT resection, L MRND. Has PMHx of sarcoidosis, DM, VÍCTOR, PCI with Stents on plavix.    ENT:       -- Okay for stepdown to floor  -- ELIAS drain with serosanguinous output; will continue to monitor   -- Healing appropriately on POD 1  -- Tolerating CLD    Neuro:  -- MMPC  -- Robaxin scheduled  -- Dilaudid and oxy 5 and 10 prn     -- No NSAIDs    CV:  -- Continue telemetry   -- Discontinue A line   -- SubQ Heparin     Resp:  -- NCO2 as needed     FEN/GI:      -- Transition to FLD   -- SLP consult today for swallow   -- Half IVF given tolerating diet  -- zofran IV PRN  -- pantoprazole qd, famotidine bid  -- carafate PO qid  -- S/p decadron 8 mg 3 doses  -- miralax BID    Renal      -- santiago out     Heme/ID   -- daily CBC  -- SubQ Heparin for ppx  -- hold plavix until POD 2 (7/27) pod2     Endocrine   -- SSI     Dispo: PT/OT, Floor

## 2025-07-25 NOTE — ANESTHESIA PROCEDURE NOTES
R Radial Arterial Line    Diagnosis: pulmonary sarcoidosis    Patient location during procedure: done in OR    Staffing  Authorizing Provider: Jorden Gottlieb MD  Performing Provider: Denise Lara MD    Staffing  Performed by: Denise Lara MD  Authorized by: Jorden Gottlieb MD    Anesthesiologist was present at the time of the procedure.    Preanesthetic Checklist  Completed: patient identified, IV checked, site marked, risks and benefits discussed, surgical consent, monitors and equipment checked, pre-op evaluation, timeout performed and anesthesia consent givenR Radial Arterial Line  Skin Prep: chlorhexidine gluconate  Local Infiltration: none  Orientation: right  Location: radial    Catheter Size: 20 G  Catheter placement by Ultrasound guidance. Heme positive aspiration all ports.   Vessel Caliber: patent  Needle advanced into vessel with real time Ultrasound guidance.Insertion Attempts: 1  Assessment  Dressing: secured with tape and tegaderm  Patient: Tolerated well

## 2025-07-25 NOTE — ANESTHESIA PROCEDURE NOTES
Intubation    Date/Time: 7/25/2025 7:22 AM    Performed by: Denise Lara MD  Authorized by: Jorden Gottlieb MD    Intubation:     Induction:  Intravenous    Intubated:  Postinduction    Mask Ventilation:  Easy with oral airway    Attempts:  1    Attempted By:  Student    Method of Intubation:  Video laryngoscopy    Blade:  Dorado 3    Laryngeal View Grade: Grade I - full view of cords      Difficult Airway Encountered?: No      Complications:  None    Airway Device:  Coil wire tube    Airway Device Size:  7.5    Style/Cuff Inflation:  Cuffed (inflated to minimal occlusive pressure)    Tube secured:  24    Secured at:  The teeth    Placement Verified By:  Capnometry and Revisualization with laryngoscopy    Complicating Factors:  None    Findings Post-Intubation:  BS equal bilateral and atraumatic/condition of teeth unchanged

## 2025-07-25 NOTE — PLAN OF CARE
PCU Plan of Care    Patient Dx: Oropharyngeal cancer    Vital Signs (last 12 hours):   Temp:  [97.7 °F (36.5 °C)-98.4 °F (36.9 °C)]   Pulse:  [61-86]   Resp:  [13-21]   BP: (137-163)/(72-85)   SpO2:  [92 %-100 %]      Neuro: AAOx4, Follows Commands, and Moves all extremities spontaneously     Cardiac: normal sinus rhythm    Respiratory: Room Air    Gtts:     Frequent Checks: None     Urine Output: Voids Spontaneously  unmeasured occurrence mL/shift    Drains: ELIAS #1, total output 12mL /shift    Diet: Clear Liquid     Mobility: Bed Rest ; Assistance Required: 1-person Assistance      Skin:  see LDA for lines, drains, and incisions.  Patient turned q2h, bony prominences protected, and mattress inflated/working correctly.    .    Shift Events:    See flowsheet for further assessment/details.  Family updated on current condition/plan of care, questions answered, and emotional support provided.  MD updated on current condition, vitals, labs, and gtts.

## 2025-07-25 NOTE — BRIEF OP NOTE
[facility] - Surgery ([facility])  Brief Operative Note    SUMMARY     Surgery Date: 7/25/2025     Surgeons and Role:     * Eda Fried MD - Primary     * Marcelino Newman MD - Resident - Assisting        Pre-op Diagnosis:  Oropharyngeal cancer [C10.9]    Post-op Diagnosis:  Post-Op Diagnosis Codes:     * Oropharyngeal cancer [C10.9]    Procedure(s) (LRB):  ROBOTIC TRANSORAL SURGERY (TORS) (Left)  DISSECTION, NECK, MODIFIED RADICAL (Left)    Anesthesia: General    Implants:  * No implants in log *    Operative Findings:TORS mucosectomy BOT and left tonsillectomy. Frozen + at BOT  and tonsil, margins -. Left MRND.    Estimated Blood Loss: * No values recorded between 7/25/2025  7:08 AM and 7/25/2025 12:25 PM *    Estimated Blood Loss has been documented.         Specimens:   Specimen (24h ago, onward)       Start     Ordered    07/25/25 0834  Specimen to Pathology ENT  RELEASE UPON ORDERING        References:    Click here for ordering Quick Tip   Question:  Release to patient  Answer:  Immediate    07/25/25 0834                  ID Type Source Tests Collected by Time Destination   1 : left oropharyngeal tumor Tissue Throat SPECIMEN TO PATHOLOGY Eda Fried MD 7/25/2025 0833    2 : additional anterior base of tongue Tissue Tongue SPECIMEN TO PATHOLOGY Eda Fried MD 7/25/2025 0834    3 : left neck dissection levels 2, 3, and 4 Tissue Neck SPECIMEN TO PATHOLOGY Eda Fried MD 7/25/2025 1115    4 : left neck dissection level 2B Tissue Neck SPECIMEN TO PATHOLOGY Eda Fried MD 7/25/2025 1135    5 : additional left level 4 neck dissection Tissue Neck SPECIMEN TO PATHOLOGY Eda Fried MD 7/25/2025 1156        [id number]

## 2025-07-25 NOTE — H&P
I have seen and examined the patient in the pre-op area. There have been no significant interval changes to the history or physical examination as noted below. Plan is to proceed to the OR for Procedure(s):  ROBOTIC TRANSORAL SURGERY (TORS)  DISSECTION, NECK, MODIFIED RADICAL - LEFT VS BILATERAL     Marcelino Newman MD  [place] [facility], PGY4  2025 6:38 AM       [facility]  Head & Neck [facility] Oncology [facility]     Patient: Cade Johnston               : 1965                        MRN: [id number]  Primary Care Provider: Joni Smith MD  Referring Provider: Dr. Rodriguez  Date of Encounter: 25     DIAGNOSIS:    Cancer Staging   Oropharyngeal cancer  Staging form: Pharynx - HPV-Mediated Oropharynx, AJCC 8th Edition  - Clinical stage from 2025: Stage I (cT2, cN1, cM0, p16+) - Signed by Eda Fried MD on 2025        CC:           Chief Complaint   Patient presents with    new patient        Tongue/tonsil cancer            HPI:   Cade Johnston is a 59 y.o. male presenting for newly diagnosed oropharyngeal cancer. He reports persistent foreign body sensation in the left side of the throat starting several weeks ago, describing it as feeling similar to a popcorn husk. The sensation is most noticeable when brushing teeth and triggers a mild gag reflex. He denies throat soreness, pain, difficulties with eating, or masses or lesions in the neck     He has a history of sleep apnea for several years, cardiac stent placed 10 years ago, and sarcoidosis in lungs. Possible asbestosis was identified on CT in October, as noted by Dr. Espinosa. He underwent cholecystectomy in October. During the surgical evaluation, a CT reportedly indicated potential findings suggestive of asbestosis, as communicated by Dr. Espinosa.   He currently takes Plavix for a stent placed 10 years ago. He was previously on aspirin but this was discontinued by his provider.      He is a former smoker with a  history of smoking one pack per day for six years, having quit 40 years ago.      Patient lives in [place], [place].     TREATMENT HISTORY:  DL/Bx with Dr. Rodriguez     SUBSTANCE USE:  Smoking: former, 6 pack year, quit 40 years ago     ALLERGIES:            Review of patient's allergies indicates:   Allergen Reactions    Metformin         Severe abdominal bloating; diarrhea    Codeine Hives    Latex Rash       blisters                    MEDICATIONS:  [Current Medications]    [Current Medications]     Current Outpatient Medications:     albuterol (PROVENTIL/VENTOLIN HFA) 90 mcg/actuation inhaler, Inhale 2 puffs into the lungs every 6 (six) hours as needed for Wheezing. Rescue, Disp: 1 Inhaler, Rfl: 2    azelastine (ASTELIN) 137 mcg (0.1 %) nasal spray, 1 spray (137 mcg total) by Nasal route 2 (two) times daily., Disp: 30 mL, Rfl: 11    clopidogreL (PLAVIX) 75 mg tablet, TAKE 1 TABLET BY MOUTH EVERY DAY, Disp: 90 tablet, Rfl: 4    fluticasone propionate (FLONASE) 50 mcg/actuation nasal spray, 1 spray (50 mcg total) by Each Nostril route 2 (two) times a day., Disp: 16 g, Rfl: 11    ibuprofen (ADVIL,MOTRIN) 200 MG tablet, Take 200 mg by mouth every 6 (six) hours as needed for Pain., Disp: , Rfl:     ipratropium-albuteroL (COMBIVENT)  mcg/actuation inhaler, Inhale 1 puff into the lungs every 4 (four) hours as needed for Shortness of Breath. Rescue, Disp: 4 g, Rfl: 4    Lactobacillus acidophilus (PROBIOTIC ACIDOPHILUS ORAL), Take by mouth., Disp: , Rfl:     lancets Misc, To check BG BID, to use with insurance preferred meter, Disp: 100 each, Rfl: 3    losartan (COZAAR) 50 MG tablet, Take 1 tablet (50 mg total) by mouth once daily., Disp: 90 tablet, Rfl: 3    lubiprostone (AMITIZA) 8 MCG Cap, Take 1 capsule (8 mcg total) by mouth 2 (two) times daily with meals., Disp: 180 capsule, Rfl: 2    ONETOUCH VERIO TEST STRIPS Strp, TO CHECK BLOOD GLUCOSE TWICE A DAY, Disp: 100 strip, Rfl: 3    sildenafil (REVATIO) 20 mg Tab, TAKE 1  TABLET BY MOUTH THREE TIMES A DAY, Disp: 30 tablet, Rfl: 5    tirzepatide (MOUNJARO) 5 mg/0.5 mL PnIj, Inject 5 mg into the skin every 7 days., Disp: 4 each, Rfl: 11    blood-glucose meter kit, To check BG BID , to use with insurance preferred meter, Disp: 1 each, Rfl: 0     PAST MEDICAL HISTORY:          Past Medical History:   Diagnosis Date    Anticoagulant long-term use      Cancer of skin of left ear      Colon polyp      Coronary artery disease, nonobstructive      DJD (degenerative joint disease) of lumbar spine      DM type 2 (diabetes mellitus, type 2)       with diet alone    Dyslipidemia 08/13/2019    Essential hypertension 12/29/2015    Fatty liver      GERD (gastroesophageal reflux disease)      Hepatosplenomegaly      HTN (hypertension)      Hyperlipidemia       resolved with diet    Hypogonadism male 02/17/2015    Lumbar radiculopathy      Lumbar spondylosis      Nephrolithiasis       last stone 1/11    VÍCTOR on CPAP      Palpitations 03/09/2016    Sarcoidosis of lung 1990    Ventricular hypokinesis       angiogram with mild LAD stenosis                    PAST SURGICAL HISTORY:            Past Surgical History:   Procedure Laterality Date    ANGIOGRAM, CORONARY, WITH LEFT HEART CATHETERIZATION N/A 08/16/2022     Procedure: Angiogram, Coronary, with Left Heart Cath;  Surgeon: Refugio Mcdonough MD;  Location: [facility] CATH;  Service: Cardiology;  Laterality: N/A;    CARDIAC SURGERY        CIRCUMCISION        COLONOSCOPY N/A 01/08/2024     Procedure: COLONOSCOPY;  Surgeon: HALLEY Montes MD;  Location: [facility] ENDO;  Service: Endoscopy;  Laterality: N/A;    CORONARY ANGIOGRAPHY N/A 12/13/2019     Procedure: ANGIOGRAM, CORONARY ARTERY;  Surgeon: Amador Duran MD;  Location: [facility] CATH;  Service: Cardiology;  Laterality: N/A;    CORONARY STENT PLACEMENT   12/2019     x1    CYST REMOVAL Right 05/2022     thigh, Dr. Parkinson    elbow spur removal         right    EPIDURAL STEROID INJECTION INTO LUMBAR SPINE N/A  02/10/2021     Procedure: Injection-steroid-epidural-lumbar L4/5;  Surgeon: Richi Rangel MD;  Location: [facility] OR;  Service: Pain Management;  Laterality: N/A;    EPIDURAL STEROID INJECTION INTO LUMBAR SPINE N/A 03/15/2021     Procedure: Injection-steroid-epidural-lumbar L5/S1;  Surgeon: Richi Rangel MD;  Location: [facility] OR;  Service: Pain Management;  Laterality: N/A;    EPIDURAL STEROID INJECTION INTO LUMBAR SPINE N/A 10/16/2023     Procedure: Injection-steroid-epidural-lumbar;  Surgeon: Richi Rangel MD;  Location: [facility] OR;  Service: Pain Management;  Laterality: N/A;  L5/S1    EXTRACORPOREAL SHOCK WAVE LITHOTRIPSY   1996     [facility]    LARYNGOSCOPY, DIRECT, WITH BIOPSY IF INDICATED Bilateral 6/16/2025     Procedure: LARYNGOSCOPY, DIRECT, WITH BIOPSY IF INDICATED;  Surgeon: Denise Rodriguez MD;  Location: [facility] OR;  Service: ENT;  Laterality: Bilateral;    LEFT HEART CATHETERIZATION Left 12/13/2019     Procedure: Left heart cath;  Surgeon: Amador Duran MD;  Location: [facility] CATH;  Service: Cardiology;  Laterality: Left;    removal of skin cancer         L side of face    ROBOT-ASSISTED CHOLECYSTECTOMY N/A 10/30/2024     Procedure: ROBOTIC CHOLECYSTECTOMY;  Surgeon: Michael Mg MD;  Location: [facility] OR;  Service: General;  Laterality: N/A;    URETERAL EXPLORATION   1996     exploration of the R ureter for ureteral injury [facility] - did well                    FAMILY HISTORY:              Family History   Problem Relation Name Age of Onset    Hypertension Mother        Fibromyalgia Mother        Coronary artery disease Father        Diabetes Father        Liver disease Brother             hepatitis C from a blood transufusion    Colon cancer Neg Hx        Crohn's disease Neg Hx        Ulcerative colitis Neg Hx        Stomach cancer Neg Hx        Esophageal cancer Neg Hx        Glaucoma Neg Hx        Macular degeneration Neg Hx             SOCIAL HISTORY:  [Social  History]    [Social History]            Socioeconomic History    Marital status:     Number of children: 2   Occupational History    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       Employer: [employer]   Tobacco Use    Smoking status: Former       Current packs/day: 0.00       Types: Cigarettes       Quit date: 1991       Years since quittin.5       Passive exposure: Past    Smokeless tobacco: Never   Substance and Sexual Activity    Alcohol use: No    Drug use: No   Social History Narrative     ** Merged History Encounter **            Social Drivers of Health              Financial Resource Strain: Low Risk  (2025)     Overall Financial Resource Strain (CARDIA)      Difficulty of Paying Living Expenses: Not hard at all   Food Insecurity: No Food Insecurity (2025)     Hunger Vital Sign      Worried About Running Out of Food in the Last Year: Never true      Ran Out of Food in the Last Year: Never true   Transportation Needs: No Transportation Needs (2025)     PRAPARE - Transportation      Lack of Transportation (Medical): No      Lack of Transportation (Non-Medical): No   Physical Activity: Sufficiently Active (2025)     Exercise Vital Sign      Days of Exercise per Week: 7 days      Minutes of Exercise per Session: 40 min   Stress: No Stress Concern Present (2025)     [employer] [place] [employer] - Occupational Stress Questionnaire      Feeling of Stress : Not at all   Housing Stability: Low Risk  (2025)     Housing Stability Vital Sign      Unable to Pay for Housing in the Last Year: No      Number of Times Moved in the Last Year: 0      Homeless in the Last Year: No      See above substance history     REVIEW OF SYSTEMS:   Comprehensive review of systems was discussed with the patient.  It is positive only for the above complaints.     PHYSICAL EXAMINATION:  Blood pressure (!) 142/89, pulse 65, temperature 98.1 °F (36.7 °C),  temperature source Temporal, resp. rate 15, height 6' (1.829 m), weight 99.4 kg (219 lb 2.2 oz), SpO2 100%.     Constitutional: Non-toxic appearing.   Psychiatric: Appropriate mood and affect. Cooperative.  Voice: Non-dysphonic, speaking in full sentences.   Neurologic: Cranial nerves grossly intact, no focal deficits.  Head and face: Salivary glands are not enlarged. Face is symmetric. CN VII strength intact.  Skin: No concerning skin lesions.   Eyes: Vision grossly intact, bilateral extraocular movements intact  Ears: Bilateral pinna, mastoid, external ear canal normal. Hearing intact.   Nose: External nose appears normal.   Lips: No ulcers or lesions  Oral cavity: Mucosa is pink and moist. Buccal mucosa, gingiva, anterior tongue, floor of mouth, and hard palate appear normal. No leukoplakia, erythroplakia, ulceration, masses or lesions.  Oropharynx: Mucosa is pink and moist. Soft palate and base of tongue are normal. Posterior pharyngeal wall normal. Tonsils are normal. No lesions.  Neck: Soft and flat, 2cm palpable left level 2 mass. No palpable thyroid enlargement or nodules.  Respiratory: Chest expansion symmetric, no audible stridor or stertor. Breathing is unlabored. No active cough.     CLINICAL PHOTOS:          PROCEDURES:     FLEXIBLE LARYNGOSCOPY  Provider: Eda Fried MD  Indication: History of cancer   The patient was unable to tolerate mirror laryngoscopy.  The procedure was explained to the patient and verbal consent was obtained.   Anesthesia: topical lidocaine and neosynephrine applied within one or both nares with an atomizer     The scope was introduced in the usual fashion.     Findings:  Mucosa: normal  Inferior turbinates: no polypoid changes or hypertrophy  Septum: no lesion or ulcer  Middle meatus: no purulence or polypoid change  Nasopharynx:  Adenoids: normal  Fossa of Rosenmuller: normal  Eustachian tubes: normal  Superior and posterior pharyngeal walls: normal   Epiglottis, vallecula,  and base of tongue: sessile lesion at the base of tongue, sparing vallecula, extending from left inferior tonsil within glossotonsillar sulcus  Pyriform sinuses: no pooling of secretions or saliva in pyriform sinuses, mucosa normal  False vocal cords, arytenoids, aryepiglottic folds: normal  True vocal cords are symmetrically mobile on phonation and inspiration.   Laryngeal sensation appears intact. There are no masses or ulcerations.      The scope was withdrawn. The patient tolerated the procedure well with no complications.       DATA REVIEWED:      LABORATORY:       Latest Ref Rng & Units 10/30/2024     7:55 AM 2/20/2025     7:55 AM 6/12/2025     3:13 PM   Thyroid Labs   Sodium 136 - 145 mmol/L   140      Potassium 3.5 - 5.1 mmol/L   4.6      Chloride 95 - 110 mmol/L   109      Carbon Dioxide 23 - 29 mmol/L   22      Glucose 70 - 110 mg/dL   170      Blood Urea Nitrogen 8 - 23 mg/dL   17      Creatinine 0.5 - 1.4 mg/dL   1.1  1.0    Calcium 8.7 - 10.5 mg/dL   8.9      Total Protein 6.0 - 8.4 g/dL   7.0      Albumin 3.5 - 5.2 g/dL   3.7      Total Bilirubin 0.1 - 1.0 mg/dL   0.9      AST 10 - 40 U/L   19      ALT 10 - 44 U/L   17      Anion Gap 8 - 16 mmol/L   9      Prothrombin Time 9.0 - 12.5 sec 11.4        INR 0.8 - 1.2 1.1        APTT 21.0 - 32.0 sec 33.0              PATHOLOGY:  6/16/25  1. Tongue, left base, biopsy:  - Squamous cell carcinoma, HPV-associated  - See comment     2. Tonsil, left, mass, biopsy:  - Squamous cell carcinoma, HPV-associated  - See comment     IMAGING:  CT Neck 6/12/25  1. Enhancing soft tissue lesion involving the left palatine tonsillar fossa, left glossotonsillar sulcus and base of tongue, worrisome for primary oropharyngeal malignancy.  2. Left level II metastatic lymph node with central necrosis/cystic change.  3. Few additional non pathologically enlarged left level II/III lymph nodes with rounded morphology.  4. Multiple scattered non pathologically enlarged calcified  superior mediastinal lymph nodes, nonspecific and may reflect sequelae of prior granulomatous disease.  This report was flagged in Epic as abnormal.     PET 6/26/25  Known left oropharyngeal cancer involving the tonsil and base of tongue with findings concerning for metastatic level 2 left IJ lymph nodes.  No sites of distant disease are identified.     New non hypermetabolic 12 mm left lung nodule.  This is suspected to lie within an accessory fissure and may correspond to focal pleural thickening or an intra fissural lymph node.  A new lung nodule remains in the differential and follow-up with conventional chest CT would be helpful.  Additional findings in the thorax are unchanged and likely related to known sarcoidosis.     Asymmetric left lateral urinary bladder wall thickening of uncertain etiology.     RADIOLOGY REVIEWED:  I have independently viewed and agree with the CT and PET images and reports which demonstrate left oropharyngeal cancer as well as suspected involved left level 2 node. Pleural lesion more c/w worsening sarcoid per discussion at Tumor Board.     ASSESSMENT AND PLAN:  1. Oropharyngeal mass    2. Oropharyngeal cancer          Cade Johnston is a 59 y.o. male with <10 pack year smoking history with newly diagnosed Stage 1 p16+ SCC of the left oropharynx.     MALIGNANT NEOPLASM OF TONSIL AND BASE OF TONGUE:  - Clinically, this looks like a cancer of the tonsil/base of tongue that has spread to lymph nodes in the neck. This is probably (but we do not know for sure) related to a virus, the human papilloma virus, which is the virus that causes warts. The virus is everywhere, everyone has it, but no one knows why some people get exposed and never know it and others get exposed and develop cancer 20-30 years later. The key thing is that exposure and cancer development are about 20-30 years apart in most cases.      - Head and neck tonsil/tongue base cancers were traditionally treated with surgery  then radiation therapy, but the surgery often required a split of the lip or mandible simply for access. Because this was so dramatic, we changed to treating patients with radiation and chemotherapy over the past 20 years. However, this often comes with significant side effects, especially in terms of altered swallowing. Over the past 8-10 years, there has been an appreciation that many of these cancers are now caused by the human papilloma virus (HPV), and that they are particularly sensitive to a variety of treatments. So there is a general movement to make the treatments less toxic without decreasing the effectiveness of the treatment. As such, our treatment paradigms continue to evolve. Transoral surgery is one of those options, with the intent to clearly remove the tumor and the lymph nodes in the neck, allowing potentially lower doses of radiation and perhaps even elimination of chemotherapy, with an intent to decrease the long term side effects of treatment without sacrificing success. Transoral surgery may be accomplished with scopes and lasers, or the robot may be used. We tend to use the robot, but not all tumors or all patients are eligible for this.      -Based on the size, location, lack of significant infiltrative appearance on scans, and patient anatomy, I believe that your tumor may be amenable to transoral resection with a neck dissection. The intent would be to potentially (although no warranties or guarantees were made or implied) decrease the required dose of radiation therapy and alter the regimen or eliminate entirely the need for chemotherapy.      - NavDx ordered today     -Tumor Board presentation     SARCOIDOSIS OF LUNG:  - Will discuss case at tumor board to determine if further chest workup needed given history of sarcoidosis.     PERSONAL HISTORY OF NICOTINE DEPENDENCE:  - Some concern for more aggressive behavior due to smoking history, may influence decision for adjuvant therapy.      FOLLOW-UP:  - Follow up after radiation oncology appointment to inform decision on surgery vs. radiation.  - Contact the office to schedule additional appointment before surgery to review details if needed.                 Orders Placed This Encounter   Procedures    Miscellaneous Test, Sendout NavDx      Patient encouraged to call with any questions, concerns, or new or worsening symptoms.      Follow up after discussion with radiation oncology

## 2025-07-26 NOTE — PLAN OF CARE
PCU Plan of Care    Patient Dx: Oropharyngeal cancer    Vital Signs (last 12 hours):   Temp:  [96.9 °F (36.1 °C)-98.4 °F (36.9 °C)]   Pulse:  []   Resp:  [14-22]   BP: (123-143)/(63-75)   SpO2:  [91 %-96 %]   Arterial Line BP: (111-152)/(50-68)      Neuro: AAOx4    Cardiac: normal sinus rhythm    Respiratory: 2L Nasal Cannula     Gtts: MIVF    Frequent Checks: None     Urine Output: Voids Spontaneously  1000 mL/shift    Drains: ELIAS #1, total output 61mL /shift    Diet: Clear Liquid     Mobility: Up to Chair  and Edge of Bed Sitting; Assistance Required: 1-person Assistance      Skin:  Surgical incision to left neck with staples in place.  Patient changes position independently, bony prominences protected, and mattress inflated/working correctly.   Mason Score: 17.    Shift Events:  .  See flowsheet for further assessment/details.  Family updated on current condition/plan of care, questions answered, and emotional support provided.  MD updated on current condition, vitals, labs, and gtts.

## 2025-07-26 NOTE — PLAN OF CARE
PCU Plan of Care    Patient Dx: Oropharyngeal cancer    Vital Signs (last 12 hours):   Temp:  [97 °F (36.1 °C)-97.6 °F (36.4 °C)]   Pulse:  [74-84]   Resp:  [16-21]   BP: (116-161)/(68-84)   SpO2:  [93 %-98 %]      Neuro: AAOx4, Follows Commands, and Moves all extremities spontaneously     Cardiac: normal sinus rhythm    Respiratory: Room Air    Gtts:     Frequent Checks: None     Urine Output: Voids Spontaneously  1100 mL/shift    Drains: ELIAS #1, total output 26mL /shift    Diet: Full Liquid    Mobility: Up to Chair  and Ambulated in Room ; Assistance Required: 1-person Assistance      Skin:  see LDA for lines, drains, incisions.  Patient turned q2h, bony prominences protected, and mattress inflated/working correctly.   Mason Score: 17.    Shift Events:    See flowsheet for further assessment/details.  Family updated on current condition/plan of care, questions answered, and emotional support provided.  MD updated on current condition, vitals, labs, and gtts.

## 2025-07-26 NOTE — PLAN OF CARE
Problem: Physical Therapy  Goal: Physical Therapy Goal  Description: Goals to be met by: 25     Patient will increase functional independence with mobility by performin. Supine to sit with Stand-by Assistance  2. Sit to stand transfer with Stand-by Assistance  3. Gait  x 350 feet with Supervision .     Outcome: Progressing   Evaluation completed and goals appropriate. 2025

## 2025-07-26 NOTE — PLAN OF CARE
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]    Admission Date: 7/25/2025  Expected Discharge Date:     Transition of Care Barriers: (P) None    Payor: BCBS MGD MEDICARE / [facility] Product Type: Medicare Advantage /     Extended Emergency Contact Information  Primary Emergency Contact: Naomi Jhonston  Address: [address]           [place] [address] [facility]  Home Phone: [phone]  Mobile Phone: [phone]  Relation: Spouse  Secondary Emergency Contact: Jr Johnston   [place]  Mobile Phone: [phone]  Relation: Son    Discharge Plan A: (P) Home with family  Discharge Plan B: (P) Home      [id number] - [place], [place] - [address] [facility]  [address]  [facility] [address]  Phone: [phone] Fax: [phone]      Initial Assessment (most recent)       Adult Discharge Assessment - 07/26/25 1344          Discharge Assessment    Assessment Type Discharge Planning Assessment (P)      Confirmed/corrected address, phone number and insurance Yes (P)      Confirmed Demographics Correct on Facesheet (P)      Source of Information patient;family (P)      People in Home spouse (P)      Do you expect to return to your current living situation? Yes (P)      Do you have help at home or someone to help you manage your care at home? Yes (P)      Prior to hospitilization cognitive status: Alert/Oriented (P)      Current cognitive status: Alert/Oriented (P)      Walking or Climbing Stairs Difficulty no (P)      Dressing/Bathing Difficulty no (P)      Home Layout Able to live on 1st floor (P)      Equipment Currently Used at Home CPAP (P)      Readmission within 30 days? No (P)      Patient currently being followed by outpatient case management? No (P)       Do you currently have service(s) that help you manage your care at home? No (P)      Do you take prescription medications? Yes (P)      Do you have prescription coverage? Yes (P)      Do you have any problems affording any of your prescribed medications? No (P)      Is the patient taking medications as prescribed? yes (P)      How do you get to doctors appointments? family or friend will provide (P)      Are you on dialysis? No (P)      Do you take coumadin? No (P)      Discharge Plan A Home with family (P)      Discharge Plan B Home (P)      DME Needed Upon Discharge  other (see comments) (P)    TBD    Discharge Plan discussed with: Patient;Spouse/sig other (P)      Name(s) and Number(s) Naomi (P)      Transition of Care Barriers None (P)         Physical Activity    On average, how many days per week do you engage in moderate to strenuous exercise (like a brisk walk)? 0 days (P)      On average, how many minutes do you engage in exercise at this level? 0 min (P)         Financial Resource Strain    How hard is it for you to pay for the very basics like food, housing, medical care, and heating? Not very hard (P)         Housing Stability    In the last 12 months, was there a time when you were not able to pay the mortgage or rent on time? No (P)      At any time in the past 12 months, were you homeless or living in a shelter (including now)? No (P)         Transportation Needs    In the past 12 months, has lack of transportation kept you from medical appointments or from getting medications? No (P)      In the past 12 months, has lack of transportation kept you from meetings, work, or from getting things needed for daily living? No (P)         Food Insecurity    Within the past 12 months, you worried that your food would run out before you got the money to buy more. Never true (P)      Within the past 12 months, the food you bought just didn't last and you didn't have money to get more. Never true (P)          Stress    Do you feel stress - tense, restless, nervous, or anxious, or unable to sleep at night because your mind is troubled all the time - these days? Only a little (P)         Social Isolation    How often do you feel lonely or isolated from those around you?  Never (P)         Alcohol Use    Q1: How often do you have a drink containing alcohol? Never (P)      Q2: How many drinks containing alcohol do you have on a typical day when you are drinking? Patient does not drink (P)         Utilities    In the past 12 months has the electric, gas, oil, or water company threatened to shut off services in your home? No (P)         Health Literacy    How often do you need to have someone help you when you read instructions, pamphlets, or other written material from your doctor or pharmacy? Rarely (P)                         Met with patient and spouse bedside.  Pt resting and most questions answered by spouse.  Pt stays with spouse in 1 story home.  PT independent with ADLs and function.  Pt uses CPAP at home.  No other DME or home health services.  CM to continue to follow for care coordination.    Discharge Plan A and Plan B have been determined by review of patient's clinical status, future medical and therapeutic needs, and coverage/benefits for post-acute care in coordination with multidisciplinary team members.     Caitlin North RN, BSN  Case Management  [phone]

## 2025-07-26 NOTE — PT/OT/SLP EVAL
Physical Therapy Co-Evaluation and treatment with OT    Patient Name:  Cade Johnston   MRN:  [id number]    Recommendations:     Discharge Recommendations: No Therapy Indicated   Discharge Equipment Recommendations: none   Barriers to discharge: None    Assessment:     Cade Johnston is a 59 y.o. male admitted with a medical diagnosis of Oropharyngeal cancer.  He presents with the following impairments/functional limitations: impaired endurance, impaired functional mobility, gait instability, impaired balance, decreased safety awareness pt tolerated treatment well and will benefit from skilled PT 3x/wk to progress physically. Pt will be able to discharge home with no need when medically stable . Pt is s/p robotic transoral surgery, modified radical neck dissection 7/25/25.    Rehab Prognosis: Good; patient would benefit from acute skilled PT services to address these deficits and reach maximum level of function.    Recent Surgery: Procedure(s) (LRB):  ROBOTIC TRANSORAL SURGERY (TORS) (Left)  DISSECTION, NECK, MODIFIED RADICAL (Left) 1 Day Post-Op    Plan:     During this hospitalization, patient to be seen 3 x/week to address the identified rehab impairments via gait training, therapeutic activities and progress toward the following goals:    Plan of Care Expires:  08/24/25    Subjective     Chief Complaint: pt c/o pain and slight SOB during gait.   Patient/Family Comments/goals: to get better and go home.   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 Pt lives with his wife who works 3 minutes away. They live in [address] [address] [facility] [place] [address]   Prior to admission, patients level of function was Independent .  Equipment used at home:  (WIS with grab bars).  DME owned (not currently used): none.  Upon discharge,  patient will have assistance from wife.    Objective:     Communicated with nurse  prior to session.  Patient found up in chair with telemetry, arterial line, pulse ox (continuous), blood pressure cuff, ELIAS drain, oxygen, peripheral IV  upon PT entry to room.    General Precautions: Standard, fall  Orthopedic Precautions:    Braces:    Respiratory Status: Nasal cannula, flow 3 L/min    Exams:  Cognitive Exam:  Patient is oriented to Person, Place, Time, and Situation  RLE ROM: WFL  RLE Strength: WFL  LLE ROM: WFL  LLE Strength: WFL    Functional Mobility:  Transfers:     Sit to Stand:  contact guard assistance with hand-held assist    Gait: pt received gait training ~ 260 ft with SBA and O2 intact. Pt had 1 standing rest period due to SOB. Pt received gait training with gait belt in use and no monitor per RN.     Balance: pt stood at the sink and performed ADLS with OT with SBA.     PT concentrated on BLE MMT, ROM and gait training with evaluation and treatment  Pt white board updated with current therapists name and level of mobility assistance needed.         AM-PAC 6 CLICK MOBILITY  Total Score:18       Treatment & Education:  Pt received verbal instructions in role of PT and POC. Pt verbally expressed understanding of such.     Patient left up in chair with all lines intact, call button in reach, and RN  notified.    GOALS:   Multidisciplinary Problems       Physical Therapy Goals          Problem: Physical Therapy    Goal Priority Disciplines Outcome Interventions   Physical Therapy Goal     PT, PT/OT Progressing    Description: Goals to be met by: 25     Patient will increase functional independence with mobility by performin. Supine to sit with Stand-by Assistance  2. Sit to stand transfer with Stand-by Assistance  3. Gait  x 350 feet with Supervision .                          DME Justifications:  No DME recommended requiring DME justifications    History:     Past Medical History:   Diagnosis  Date    Anticoagulant long-term use     Cancer of skin of left ear     Colon polyp     Coronary artery disease, nonobstructive     DJD (degenerative joint disease) of lumbar spine     DM type 2 (diabetes mellitus, type 2)     with diet alone    Dyslipidemia 08/13/2019    Essential hypertension 12/29/2015    Fatty liver     GERD (gastroesophageal reflux disease)     Hepatosplenomegaly     HTN (hypertension)     Hyperlipidemia     resolved with diet    Hypogonadism male 02/17/2015    Lumbar radiculopathy     Lumbar spondylosis     Nephrolithiasis     last stone 1/11    VÍCTOR on CPAP     Palpitations 03/09/2016    Sarcoidosis of lung 1990    Ventricular hypokinesis     angiogram with mild LAD stenosis       Past Surgical History:   Procedure Laterality Date    ANGIOGRAM, CORONARY, WITH LEFT HEART CATHETERIZATION N/A 08/16/2022    Procedure: Angiogram, Coronary, with Left Heart Cath;  Surgeon: Refugio Mcdonough MD;  Location: [facility] CATH;  Service: Cardiology;  Laterality: N/A;    CARDIAC SURGERY      CIRCUMCISION      COLONOSCOPY N/A 01/08/2024    Procedure: COLONOSCOPY;  Surgeon: HALLEY Montes MD;  Location: [facility] ENDO;  Service: Endoscopy;  Laterality: N/A;    CORONARY ANGIOGRAPHY N/A 12/13/2019    Procedure: ANGIOGRAM, CORONARY ARTERY;  Surgeon: Amador Duran MD;  Location: [facility] CATH;  Service: Cardiology;  Laterality: N/A;    CORONARY STENT PLACEMENT  12/2019    x1    CYST REMOVAL Right 05/2022    thigh, Dr. Parkinson    elbow spur removal      right    EPIDURAL STEROID INJECTION INTO LUMBAR SPINE N/A 02/10/2021    Procedure: Injection-steroid-epidural-lumbar L4/5;  Surgeon: Richi Rangel MD;  Location: [facility] OR;  Service: Pain Management;  Laterality: N/A;    EPIDURAL STEROID INJECTION INTO LUMBAR SPINE N/A 03/15/2021    Procedure: Injection-steroid-epidural-lumbar L5/S1;  Surgeon: Richi Rangel MD;  Location: [facility] OR;  Service: Pain Management;  Laterality: N/A;    EPIDURAL STEROID INJECTION INTO LUMBAR SPINE  N/A 10/16/2023    Procedure: Injection-steroid-epidural-lumbar;  Surgeon: Richi Rangel MD;  Location: [facility] OR;  Service: Pain Management;  Laterality: N/A;  L5/S1    EXTRACORPOREAL SHOCK WAVE LITHOTRIPSY  1996    [facility]    LARYNGOSCOPY, DIRECT, WITH BIOPSY IF INDICATED Bilateral 6/16/2025    Procedure: LARYNGOSCOPY, DIRECT, WITH BIOPSY IF INDICATED;  Surgeon: Denise Rodriguez MD;  Location: [facility] OR;  Service: ENT;  Laterality: Bilateral;    LEFT HEART CATHETERIZATION Left 12/13/2019    Procedure: Left heart cath;  Surgeon: Amador Duran MD;  Location: [facility] CATH;  Service: Cardiology;  Laterality: Left;    removal of skin cancer      L side of face    ROBOT-ASSISTED CHOLECYSTECTOMY N/A 10/30/2024    Procedure: ROBOTIC CHOLECYSTECTOMY;  Surgeon: Michael Mg MD;  Location: [facility] OR;  Service: General;  Laterality: N/A;    URETERAL EXPLORATION  1996    exploration of the R ureter for ureteral injury [facility] - did well       Time Tracking:     PT Received On: 07/26/25  PT Start Time: 0852     PT Stop Time: 0912  PT Total Time (min): 20 min     Billable Minutes: Evaluation 8 min  and Gait Training 12 min       07/26/2025

## 2025-07-26 NOTE — PT/OT/SLP EVAL
Occupational Therapy   Co-Evaluation and Discharge Note    Name: Cade Johnston  MRN: [id number]  Admitting Diagnosis: Oropharyngeal cancer  Recent Surgery: Procedure(s) (LRB):  ROBOTIC TRANSORAL SURGERY (TORS) (Left)  DISSECTION, NECK, MODIFIED RADICAL (Left) 1 Day Post-Op    Recommendations:     Discharge Recommendations: No Therapy Indicated  Discharge Equipment Recommendations: none  Barriers to discharge:  None    Assessment:     Cade Johnston is a 59 y.o. male with a medical diagnosis of Oropharyngeal cancer. At this time, patient is functioning at their prior level of function and does not require further acute OT services.     Plan:     During this hospitalization, patient does not require further acute OT services.  Please re-consult if situation changes.    Plan of Care Reviewed with: patient    Subjective     Chief Complaint: denies  Patient/Family Comments/goals: to go home    Occupational Profile:  Living Environment: lives with spouse in [facility] with  entrance; WIS with grab bar  Previous level of function: (I) with ADL and ambulation; drives  Roles and Routines: retired, but takes care of cattle farm  Equipment Used at home: none  Assistance upon Discharge: family    Pain/Comfort:  Pain Rating 1: 6/10  Location - Side 1: Left  Location 1: neck  Pain Addressed 1: Reposition, Distraction  Pain Rating Post-Intervention 1: 6/10    Patients cultural, spiritual, Quaker conflicts given the current situation: no    Objective:     Communicated with: RN prior to session.  Patient found up in chair with blood pressure cuff, pulse ox (continuous), telemetry, arterial line, ELIAS drain upon OT entry to room.    General Precautions: Standard, fall  Orthopedic Precautions:    Braces:    Respiratory Status: Room air     Occupational Performance:    Bed Mobility:    NT    Functional Mobility/Transfers:  Patient completed Sit <> Stand Transfer with stand by assistance  with  no assistive device from EOB  Functional  Mobility: CGA in hallway to simulate HH distances    Activities of Daily Living:  Feeding:  independence    Grooming: independence    Upper Body Dressing: modified independence    Lower Body Dressing: modified independence    Toileting: modified independence simulated     Cognitive/Visual Perceptual:  Oriented to: Person, Place, Time and Situation  Follows Commands/attention: Follows multistep  commands  Communication: clear/fluent  Memory:  No Deficits noted  Safety awareness/insight to disability: intact  Coping skills/emotional control: Appropriate to situation    Physical Exam:  Postural examination/scapula alignment:    -       No postural abnormalities identified  Sensation:    -       Intact  Upper Extremity Range of Motion:     -       Right Upper Extremity: WFL  -       Left Upper Extremity: WFL  Upper Extremity Strength:    -       Right Upper Extremity: WFL  -       Left Upper Extremity: WFL   Strength:    -       Right Upper Extremity: WFL  -       Left Upper Extremity: WFL  Fine Motor Coordination:    -       Intact  Gross motor coordination:   WFL    AMPAC 6 Click ADL:  AMPAC Total Score: 23    Treatment & Education:  Pt ed on OT POC  Pt ed on L shoulder ROM ex's per MD  Pt ed on continued ADL and mobility with staff while in PCU  Pt ed on ROM ex's daily for increased overall strength and endurance to reduce risk of hospital acquired weakness  Pt ed on safety with ADL and fall risk  Reinforced use of call button to contact nursing staff for assistance with mobility    Patient left up in chair with all lines intact, call button in reach, and RN notified    GOALS:   Multidisciplinary Problems       Occupational Therapy Goals       Not on file              Multidisciplinary Problems (Resolved)          Problem: Occupational Therapy    Goal Priority Disciplines Outcome Interventions   Occupational Therapy Goal   (Resolved)     OT, PT/OT Met                        DME Justifications:  No DME recommended  requiring DME justifications    History:     Past Medical History:   Diagnosis Date    Anticoagulant long-term use     Cancer of skin of left ear     Colon polyp     Coronary artery disease, nonobstructive     DJD (degenerative joint disease) of lumbar spine     DM type 2 (diabetes mellitus, type 2)     with diet alone    Dyslipidemia 08/13/2019    Essential hypertension 12/29/2015    Fatty liver     GERD (gastroesophageal reflux disease)     Hepatosplenomegaly     HTN (hypertension)     Hyperlipidemia     resolved with diet    Hypogonadism male 02/17/2015    Lumbar radiculopathy     Lumbar spondylosis     Nephrolithiasis     last stone 1/11    VÍCTOR on CPAP     Palpitations 03/09/2016    Sarcoidosis of lung 1990    Ventricular hypokinesis     angiogram with mild LAD stenosis         Past Surgical History:   Procedure Laterality Date    ANGIOGRAM, CORONARY, WITH LEFT HEART CATHETERIZATION N/A 08/16/2022    Procedure: Angiogram, Coronary, with Left Heart Cath;  Surgeon: Refugio Mcdonough MD;  Location: [facility] CATH;  Service: Cardiology;  Laterality: N/A;    CARDIAC SURGERY      CIRCUMCISION      COLONOSCOPY N/A 01/08/2024    Procedure: COLONOSCOPY;  Surgeon: HALLEY Montes MD;  Location: [facility] ENDO;  Service: Endoscopy;  Laterality: N/A;    CORONARY ANGIOGRAPHY N/A 12/13/2019    Procedure: ANGIOGRAM, CORONARY ARTERY;  Surgeon: Amador Duran MD;  Location: [facility] CATH;  Service: Cardiology;  Laterality: N/A;    CORONARY STENT PLACEMENT  12/2019    x1    CYST REMOVAL Right 05/2022    thigh, Dr. Parkinson    elbow spur removal      right    EPIDURAL STEROID INJECTION INTO LUMBAR SPINE N/A 02/10/2021    Procedure: Injection-steroid-epidural-lumbar L4/5;  Surgeon: Richi Rangel MD;  Location: [facility] OR;  Service: Pain Management;  Laterality: N/A;    EPIDURAL STEROID INJECTION INTO LUMBAR SPINE N/A 03/15/2021    Procedure: Injection-steroid-epidural-lumbar L5/S1;  Surgeon: Richi Rangel MD;  Location: [facility] OR;  Service:  Pain Management;  Laterality: N/A;    EPIDURAL STEROID INJECTION INTO LUMBAR SPINE N/A 10/16/2023    Procedure: Injection-steroid-epidural-lumbar;  Surgeon: Richi Rangel MD;  Location: [facility] OR;  Service: Pain Management;  Laterality: N/A;  L5/S1    EXTRACORPOREAL SHOCK WAVE LITHOTRIPSY  1996    [facility]    LARYNGOSCOPY, DIRECT, WITH BIOPSY IF INDICATED Bilateral 6/16/2025    Procedure: LARYNGOSCOPY, DIRECT, WITH BIOPSY IF INDICATED;  Surgeon: Denise Rodriguez MD;  Location: [facility] OR;  Service: ENT;  Laterality: Bilateral;    LEFT HEART CATHETERIZATION Left 12/13/2019    Procedure: Left heart cath;  Surgeon: Amador Duran MD;  Location: [facility] CATH;  Service: Cardiology;  Laterality: Left;    removal of skin cancer      L side of face    ROBOT-ASSISTED CHOLECYSTECTOMY N/A 10/30/2024    Procedure: ROBOTIC CHOLECYSTECTOMY;  Surgeon: Michael Mg MD;  Location: [facility] OR;  Service: General;  Laterality: N/A;    URETERAL EXPLORATION  1996    exploration of the R ureter for ureteral injury [facility] - did well       Time Tracking:     OT Date of Treatment: 07/26/25  OT Start Time: 0852  OT Stop Time: 0913  OT Total Time (min): 21 min    Billable Minutes:Evaluation 10  Therapeutic Activity 10    7/26/2025

## 2025-07-26 NOTE — PLAN OF CARE
Problem: Occupational Therapy  Goal: Occupational Therapy Goal  Outcome: Met   OT eval completed; no goals established as pt is performing ADL safely and without A. [place] Palomo

## 2025-07-26 NOTE — SUBJECTIVE & OBJECTIVE
Interval History: NAEON. Patient reports feeling much better this morning. Tolerating CLD    Medications:  Continuous Infusions:   0.9% NaCl   Intravenous Continuous   Stopped at 07/25/25 1254    dextrose 5 % and 0.45 % NaCl with KCl 20 mEq   Intravenous Continuous 50 mL/hr at 07/26/25 1056 New Bag at 07/26/25 1056     Scheduled Meds:   acetaminophen  650 mg Oral Q6H    bacitracin   Topical (Top) BID    famotidine  20 mg Oral BID    losartan  50 mg Oral Daily    methocarbamol injection  500 mg Intravenous Q8H    pantoprazole  40 mg Oral Daily    polyethylene glycol  17 g Oral Daily    sucralfate  2 g Oral BID     PRN Meds:  Current Facility-Administered Medications:     albuterol-ipratropium, 3 mL, Nebulization, Q4H PRN    bisacodyL, 10 mg, Rectal, Daily PRN    dextrose 50%, 12.5 g, Intravenous, PRN    dextrose 50%, 25 g, Intravenous, PRN    glucagon (human recombinant), 1 mg, Intramuscular, PRN    glucose, 16 g, Oral, PRN    glucose, 24 g, Oral, PRN    hydrALAZINE, 10 mg, Intravenous, Q6H PRN    HYDROmorphone (PF), 0.5 mg, Intravenous, Q1H PRN    HYDROmorphone, 1 mg, Intravenous, Q2H PRN    insulin aspart U-100, 0-5 Units, Subcutaneous, QID (AC + HS) PRN    melatonin, 6 mg, Oral, Nightly PRN    ondansetron, 4 mg, Intravenous, Q12H PRN    oxyCODONE, 10 mg, Oral, Q4H PRN    oxyCODONE, 5 mg, Oral, Q4H PRN     Review of patient's allergies indicates:   Allergen Reactions    Metformin      Severe abdominal bloating; diarrhea    Codeine Hives    Latex Rash     blisters     Objective:     Vital Signs (24h Range):  Temp:  [96.9 °F (36.1 °C)-98.4 °F (36.9 °C)] 97.6 °F (36.4 °C)  Pulse:  [] 75  Resp:  [13-22] 18  SpO2:  [91 %-97 %] 93 %  BP: (116-163)/(63-85) 116/68  Arterial Line BP: (111-152)/(50-68) 120/50     Date 07/26/25 0700 - 07/27/25 0659   Shift 5517-8421 8595-0643 5615-3153 24 Hour Total   INTAKE   Shift Total(mL/kg)       OUTPUT   Drains 17   17   Shift Total(mL/kg) 17(0.2)   17(0.2)   Weight (kg) 99.9 99.9  99.9 99.9     Lines/Drains/Airways       Drain  Duration                  Closed/Suction Drain 07/25/25 1206 Tube - 1 Left Neck Bulb 10 Fr. 1 day              Peripheral Intravenous Line  Duration             Peripheral IV 07/25/25 0730 18 G Left Forearm 1 day    Peripheral IV Single Lumen 07/25/25 0552 18 G Posterior;Right Forearm 1 day                     Physical Exam   NAD  Awake and alert  Left neck soft and nontender  Left neck incision c/d/I, ELIAS with serosanguinous output  L tonsillar fossa without signs of bleeding  Normal WOB, no stridor or stertor    Significant Labs:  CBC:   Recent Labs   Lab 07/26/25  0248   WBC 13.86*   RBC 5.53   HGB 16.2   HCT 45.9      MCV 83   MCH 29.3   MCHC 35.3     CMP:   Recent Labs   Lab 07/26/25  0248   *   CALCIUM 7.9*      K 4.3   CO2 21*   *   BUN 14   CREATININE 0.8       Significant Diagnostics:  None

## 2025-07-26 NOTE — PROGRESS NOTES
[facility] - Inpatient General Surgery  Otorhinolaryngology-Head & Neck Surgery  Progress Note    Subjective:     Post-Op Info:  Procedure(s) (LRB):  ROBOTIC TRANSORAL SURGERY (TORS) (Left)  DISSECTION, NECK, MODIFIED RADICAL (Left)   1 Day Post-Op  Hospital Day: 2     Interval History: NAEON. Patient reports feeling much better this morning. Tolerating CLD    Medications:  Continuous Infusions:   0.9% NaCl   Intravenous Continuous   Stopped at 07/25/25 1254    dextrose 5 % and 0.45 % NaCl with KCl 20 mEq   Intravenous Continuous 50 mL/hr at 07/26/25 1056 New Bag at 07/26/25 1056     Scheduled Meds:   acetaminophen  650 mg Oral Q6H    bacitracin   Topical (Top) BID    famotidine  20 mg Oral BID    losartan  50 mg Oral Daily    methocarbamol injection  500 mg Intravenous Q8H    pantoprazole  40 mg Oral Daily    polyethylene glycol  17 g Oral Daily    sucralfate  2 g Oral BID     PRN Meds:  Current Facility-Administered Medications:     albuterol-ipratropium, 3 mL, Nebulization, Q4H PRN    bisacodyL, 10 mg, Rectal, Daily PRN    dextrose 50%, 12.5 g, Intravenous, PRN    dextrose 50%, 25 g, Intravenous, PRN    glucagon (human recombinant), 1 mg, Intramuscular, PRN    glucose, 16 g, Oral, PRN    glucose, 24 g, Oral, PRN    hydrALAZINE, 10 mg, Intravenous, Q6H PRN    HYDROmorphone (PF), 0.5 mg, Intravenous, Q1H PRN    HYDROmorphone, 1 mg, Intravenous, Q2H PRN    insulin aspart U-100, 0-5 Units, Subcutaneous, QID (AC + HS) PRN    melatonin, 6 mg, Oral, Nightly PRN    ondansetron, 4 mg, Intravenous, Q12H PRN    oxyCODONE, 10 mg, Oral, Q4H PRN    oxyCODONE, 5 mg, Oral, Q4H PRN     Review of patient's allergies indicates:   Allergen Reactions    Metformin      Severe abdominal bloating; diarrhea    Codeine Hives    Latex Rash     blisters     Objective:     Vital Signs (24h Range):  Temp:  [96.9 °F (36.1 °C)-98.4 °F (36.9 °C)] 97.6 °F (36.4 °C)  Pulse:  [] 75  Resp:  [13-22] 18  SpO2:  [91 %-97 %] 93 %  BP:  (116-163)/(63-85) 116/68  Arterial Line BP: (111-152)/(50-68) 120/50     Date 07/26/25 0700 - 07/27/25 0659   Shift 0383-1429 9553-0668 4974-5934 24 Hour Total   INTAKE   Shift Total(mL/kg)       OUTPUT   Drains 17   17   Shift Total(mL/kg) 17(0.2)   17(0.2)   Weight (kg) 99.9 99.9 99.9 99.9     Lines/Drains/Airways       Drain  Duration                  Closed/Suction Drain 07/25/25 1206 Tube - 1 Left Neck Bulb 10 Fr. 1 day              Peripheral Intravenous Line  Duration             Peripheral IV 07/25/25 0730 18 G Left Forearm 1 day    Peripheral IV Single Lumen 07/25/25 0552 18 G Posterior;Right Forearm 1 day                     Physical Exam   NAD  Awake and alert  Left neck soft and nontender  Left neck incision c/d/I, ELIAS with serosanguinous output  L tonsillar fossa without signs of bleeding  Normal WOB, no stridor or stertor    Significant Labs:  CBC:   Recent Labs   Lab 07/26/25  0248   WBC 13.86*   RBC 5.53   HGB 16.2   HCT 45.9      MCV 83   MCH 29.3   MCHC 35.3     CMP:   Recent Labs   Lab 07/26/25  0248   *   CALCIUM 7.9*      K 4.3   CO2 21*   *   BUN 14   CREATININE 0.8       Significant Diagnostics:  None  Assessment/Plan:     * Oropharyngeal cancer  58 yo M on POD 1 after transoral robotic surgery - left tonsillectomy and BOT resection, L MRND. Has PMHx of sarcoidosis, DM, VÍCTOR, PCI with Stents on plavix.    ENT:       -- Okay for stepdown to floor  -- ELIAS drain with serosanguinous output; will continue to monitor   -- Healing appropriately on POD 1  -- Tolerating CLD    Neuro:  -- MMPC  -- Robaxin scheduled  -- Dilaudid and oxy 5 and 10 prn     -- No NSAIDs    CV:  -- Continue telemetry   -- Discontinue A line   -- SubQ Heparin     Resp:  -- NCO2 as needed     FEN/GI:      -- Transition to FLD   -- SLP consult today for swallow   -- Half IVF given tolerating diet  -- zofran IV PRN  -- pantoprazole qd, famotidine bid  -- carafate PO qid  -- S/p decadron 8 mg 3 doses  --  miralax BID    Renal      -- santiago out     Heme/ID   -- daily CBC  -- SubQ Heparin for ppx  -- hold plavix until POD 2 (7/27) pod2     Endocrine   -- SSI     Dispo: PT/OT, Floor            Fernanda Bran MD  Otorhinolaryngology-Head & Neck Surgery  [facility] - Inpatient General Surgery

## 2025-07-27 ENCOUNTER — PATIENT MESSAGE (OUTPATIENT)
Dept: HEMATOLOGY/ONCOLOGY | Facility: CLINIC | Age: 60
End: 2025-07-27
Payer: MEDICARE

## 2025-07-27 NOTE — SUBJECTIVE & OBJECTIVE
Interval History: Patient reported anxiety episode overnight while hypertensive. Feeling better this morning. Tolerating full liquid diet without significant pain or difficulty, pending SLP eval    Medications:  Continuous Infusions:   0.9% NaCl   Intravenous Continuous   Stopped at 07/25/25 1254    dextrose 5 % and 0.45 % NaCl with KCl 20 mEq   Intravenous Continuous 50 mL/hr at 07/27/25 0621 New Bag at 07/27/25 0621     Scheduled Meds:   acetaminophen  650 mg Oral Q6H    bacitracin   Topical (Top) BID    clopidogreL  75 mg Oral Daily    famotidine  20 mg Oral BID    heparin (porcine)  5,000 Units Subcutaneous Q8H    losartan  50 mg Oral Daily    methocarbamol injection  500 mg Intravenous Q8H    pantoprazole  40 mg Oral Daily    polyethylene glycol  17 g Oral Daily    sucralfate  2 g Oral BID     PRN Meds:  Current Facility-Administered Medications:     albuterol-ipratropium, 3 mL, Nebulization, Q4H PRN    bisacodyL, 10 mg, Rectal, Daily PRN    dextrose 50%, 12.5 g, Intravenous, PRN    dextrose 50%, 25 g, Intravenous, PRN    glucagon (human recombinant), 1 mg, Intramuscular, PRN    glucose, 16 g, Oral, PRN    glucose, 24 g, Oral, PRN    hydrALAZINE, 10 mg, Intravenous, Q6H PRN    HYDROmorphone (PF), 0.5 mg, Intravenous, Q1H PRN    HYDROmorphone, 1 mg, Intravenous, Q2H PRN    insulin aspart U-100, 0-5 Units, Subcutaneous, QID (AC + HS) PRN    melatonin, 6 mg, Oral, Nightly PRN    ondansetron, 4 mg, Intravenous, Q12H PRN    oxyCODONE, 10 mg, Oral, Q4H PRN    oxyCODONE, 5 mg, Oral, Q4H PRN     Review of patient's allergies indicates:   Allergen Reactions    Metformin      Severe abdominal bloating; diarrhea    Codeine Hives    Latex Rash     blisters     Objective:     Vital Signs (24h Range):  Temp:  [97.6 °F (36.4 °C)-98.5 °F (36.9 °C)] 97.7 °F (36.5 °C)  Pulse:  [68-81] 78  Resp:  [14-22] 16  SpO2:  [93 %-98 %] 96 %  BP: (116-161)/(63-84) 122/63       Lines/Drains/Airways       Drain  Duration                   Closed/Suction Drain 07/25/25 1206 Tube - 1 Left Neck Bulb 10 Fr. 1 day              Peripheral Intravenous Line  Duration             Peripheral IV 07/25/25 0730 18 G Left Forearm 2 days    Peripheral IV Single Lumen 07/25/25 0552 18 G Posterior;Right Forearm 2 days                     Physical Exam   NAD  Awake and alert  Left neck soft and nontender  Left neck incision c/d/I, ELIAS with serosanguinous output  L tonsillar fossa healing appropriately without signs of bleeding  Normal WOB, no stridor or stertor      Significant Labs:  CBC:   Recent Labs   Lab 07/27/25  0600   WBC 19.29*   RBC 6.04   HGB 17.1   HCT 54.2*      MCV 90   MCH 28.3   MCHC 31.5*     CMP:   Recent Labs   Lab 07/27/25  0600   *   CALCIUM 8.4*      K 4.5   CO2 20*      BUN 16   CREATININE 0.8       Significant Diagnostics:  None

## 2025-07-27 NOTE — PROGRESS NOTES
[facility] - Inpatient General [facility]  Otorhinolaryngology-Head & Neck Surgery  Progress Note    Subjective:     Post-Op Info:  Procedure(s) (LRB):  ROBOTIC TRANSORAL SURGERY (TORS) (Left)  DISSECTION, NECK, MODIFIED RADICAL (Left)   2 Days Post-Op  Hospital Day: 3     Interval History: Patient reported anxiety episode overnight while hypertensive. Feeling better this morning. Tolerating full liquid diet without significant pain or difficulty, pending SLP eval    Medications:  Continuous Infusions:   0.9% NaCl   Intravenous Continuous   Stopped at 07/25/25 1254    dextrose 5 % and 0.45 % NaCl with KCl 20 mEq   Intravenous Continuous 50 mL/hr at 07/27/25 0621 New Bag at 07/27/25 0621     Scheduled Meds:   acetaminophen  650 mg Oral Q6H    bacitracin   Topical (Top) BID    clopidogreL  75 mg Oral Daily    famotidine  20 mg Oral BID    heparin (porcine)  5,000 Units Subcutaneous Q8H    losartan  50 mg Oral Daily    methocarbamol injection  500 mg Intravenous Q8H    pantoprazole  40 mg Oral Daily    polyethylene glycol  17 g Oral Daily    sucralfate  2 g Oral BID     PRN Meds:  Current Facility-Administered Medications:     albuterol-ipratropium, 3 mL, Nebulization, Q4H PRN    bisacodyL, 10 mg, Rectal, Daily PRN    dextrose 50%, 12.5 g, Intravenous, PRN    dextrose 50%, 25 g, Intravenous, PRN    glucagon (human recombinant), 1 mg, Intramuscular, PRN    glucose, 16 g, Oral, PRN    glucose, 24 g, Oral, PRN    hydrALAZINE, 10 mg, Intravenous, Q6H PRN    HYDROmorphone (PF), 0.5 mg, Intravenous, Q1H PRN    HYDROmorphone, 1 mg, Intravenous, Q2H PRN    insulin aspart U-100, 0-5 Units, Subcutaneous, QID (AC + HS) PRN    melatonin, 6 mg, Oral, Nightly PRN    ondansetron, 4 mg, Intravenous, Q12H PRN    oxyCODONE, 10 mg, Oral, Q4H PRN    oxyCODONE, 5 mg, Oral, Q4H PRN     Review of patient's allergies indicates:   Allergen Reactions    Metformin      Severe abdominal bloating; diarrhea    Codeine Hives    Latex Rash     blisters      Objective:     Vital Signs (24h Range):  Temp:  [97.6 °F (36.4 °C)-98.5 °F (36.9 °C)] 97.7 °F (36.5 °C)  Pulse:  [68-81] 78  Resp:  [14-22] 16  SpO2:  [93 %-98 %] 96 %  BP: (116-161)/(63-84) 122/63       Lines/Drains/Airways       Drain  Duration                  Closed/Suction Drain 07/25/25 1206 Tube - 1 Left Neck Bulb 10 Fr. 1 day              Peripheral Intravenous Line  Duration             Peripheral IV 07/25/25 0730 18 G Left Forearm 2 days    Peripheral IV Single Lumen 07/25/25 0552 18 G Posterior;Right Forearm 2 days                     Physical Exam   NAD  Awake and alert  Left neck soft and nontender  Left neck incision c/d/I, ELIAS with serosanguinous output  L tonsillar fossa healing appropriately without signs of bleeding  Normal WOB, no stridor or stertor      Significant Labs:  CBC:   Recent Labs   Lab 07/27/25  0600   WBC 19.29*   RBC 6.04   HGB 17.1   HCT 54.2*      MCV 90   MCH 28.3   MCHC 31.5*     CMP:   Recent Labs   Lab 07/27/25  0600   *   CALCIUM 8.4*      K 4.5   CO2 20*      BUN 16   CREATININE 0.8       Significant Diagnostics:  None  Assessment/Plan:     * Oropharyngeal cancer  58 yo M on POD 1 after transoral robotic surgery - left tonsillectomy and BOT resection, L MRND. Has PMHx of sarcoidosis, DM, VÍCTOR, PCI with Stents on plavix.    ENT:       -- Okay for stepdown to floor  -- ELIAS drain with serosanguinous output; will continue to monitor   -- Tonsil fossa healing appropriately   -- Tolerating FLD    Neuro:  -- MMPC  -- Robaxin scheduled  -- Dilaudid and oxy 5 and 10 prn     -- No NSAIDs    CV:  -- Continue telemetry   -- SubQ Heparin     Resp:  -- NCO2 as needed     FEN/GI:      -- Continue FLD   -- SLP consulted, pending recommendations   -- Half IVF given tolerating diet  -- zofran IV PRN  -- pantoprazole qd, famotidine bid  -- carafate PO qid  -- S/p decadron 8 mg 3 doses  -- miralax BID    Renal      -- santiago removed     Heme/ID   -- daily CBC  -- SubQ  Heparin for ppx  -- Resume plavix today     Endocrine   -- SSI     Dispo: cleared by PT ok for floor, possible discharge tomorrow            Sinan Santana MD  Otorhinolaryngology-Head & Neck Surgery  [facility] - Inpatient General [facility]

## 2025-07-27 NOTE — PT/OT/SLP EVAL
Speech Language Pathology Evaluation  Bedside Swallow    Patient Name:  Cade Johnston   MRN:  [id number]  Admitting Diagnosis: Oropharyngeal cancer    Recommendations:                 General Recommendations:  Dysphagia therapy  Diet recommendations:   (purees for comfort), Full liquids   Aspiration Precautions: 1 bite/sip at a time, Eliminate distractions, Feed only when awake/alert, Frequent oral care, HOB to 90 degrees, Meds crushed in puree, Meds whole buried in puree, Remain upright 30 minutes post meal, Small bites/sips, and Standard aspiration precautions   General Precautions: Standard, aspiration  Communication strategies:  none  Discharge recommendations:  Low Intensity Therapy   Barriers to Discharge:  None    Assessment:     Cade Johnston is a 59 y.o. male with an SLP diagnosis of largely functional swallow.  He presents with limited oral acceptance due to post-op discomfort, but no observable s/sx airway threat. .    History:     Past Medical History:   Diagnosis Date    Anticoagulant long-term use     Cancer of skin of left ear     Colon polyp     Coronary artery disease, nonobstructive     DJD (degenerative joint disease) of lumbar spine     DM type 2 (diabetes mellitus, type 2)     with diet alone    Dyslipidemia 08/13/2019    Essential hypertension 12/29/2015    Fatty liver     GERD (gastroesophageal reflux disease)     Hepatosplenomegaly     HTN (hypertension)     Hyperlipidemia     resolved with diet    Hypogonadism male 02/17/2015    Lumbar radiculopathy     Lumbar spondylosis     Nephrolithiasis     last stone 1/11    VÍCTOR on CPAP     Palpitations 03/09/2016    Sarcoidosis of lung 1990    Ventricular hypokinesis     angiogram with mild LAD stenosis       Past Surgical History:   Procedure Laterality Date    ANGIOGRAM, CORONARY, WITH LEFT HEART CATHETERIZATION N/A 08/16/2022    Procedure: Angiogram, Coronary, with Left Heart Cath;  Surgeon: Refugio Mcdonough MD;  Location: [facility] CATH;  Service:  Cardiology;  Laterality: N/A;    CARDIAC SURGERY      CIRCUMCISION      COLONOSCOPY N/A 01/08/2024    Procedure: COLONOSCOPY;  Surgeon: HALLEY Montes MD;  Location: [facility] ENDO;  Service: Endoscopy;  Laterality: N/A;    CORONARY ANGIOGRAPHY N/A 12/13/2019    Procedure: ANGIOGRAM, CORONARY ARTERY;  Surgeon: Amador Duran MD;  Location: [facility] CATH;  Service: Cardiology;  Laterality: N/A;    CORONARY STENT PLACEMENT  12/2019    x1    CYST REMOVAL Right 05/2022    thigh, Dr. Parkinson    elbow spur removal      right    EPIDURAL STEROID INJECTION INTO LUMBAR SPINE N/A 02/10/2021    Procedure: Injection-steroid-epidural-lumbar L4/5;  Surgeon: Richi Rangel MD;  Location: [facility] OR;  Service: Pain Management;  Laterality: N/A;    EPIDURAL STEROID INJECTION INTO LUMBAR SPINE N/A 03/15/2021    Procedure: Injection-steroid-epidural-lumbar L5/S1;  Surgeon: Richi Rangel MD;  Location: [facility] OR;  Service: Pain Management;  Laterality: N/A;    EPIDURAL STEROID INJECTION INTO LUMBAR SPINE N/A 10/16/2023    Procedure: Injection-steroid-epidural-lumbar;  Surgeon: Richi Rangel MD;  Location: [facility] OR;  Service: Pain Management;  Laterality: N/A;  L5/S1    EXTRACORPOREAL SHOCK WAVE LITHOTRIPSY  1996    [facility]    LARYNGOSCOPY, DIRECT, WITH BIOPSY IF INDICATED Bilateral 6/16/2025    Procedure: LARYNGOSCOPY, DIRECT, WITH BIOPSY IF INDICATED;  Surgeon: Denise Rodriguez MD;  Location: [facility] OR;  Service: ENT;  Laterality: Bilateral;    LEFT HEART CATHETERIZATION Left 12/13/2019    Procedure: Left heart cath;  Surgeon: Amador Duran MD;  Location: [facility] CATH;  Service: Cardiology;  Laterality: Left;    removal of skin cancer      L side of face    ROBOT-ASSISTED CHOLECYSTECTOMY N/A 10/30/2024    Procedure: ROBOTIC CHOLECYSTECTOMY;  Surgeon: Michael Mg MD;  Location: [facility] OR;  Service: General;  Laterality: N/A;    URETERAL EXPLORATION  1996    exploration of the R ureter for ureteral injury [facility]  hospital - did well     HPI: 58 yo M on POD 2 after transoral robotic surgery - left tonsillectomy and BOT resection, L MRND. Has PMHx of sarcoidosis, DM, VÍCTOR, PCI with Stents on plavix.     Social History: Patient lives with spouse.    Prior Intubation HX:  7/25 for surgery    Modified Barium Swallow: none on file    Chest X-Rays: none this admission    Prior diet: regular/thins    Subjective     Spoke with RN prior to entering pt's room . Pt seen bedside for session. Alert and agreeable to ST despite c/o significant pain. RN notified.       Pain/Comfort:  Pain Rating 1: 10/10  Location 1: throat  Pain Addressed 1: Distraction, Nurse notified  Pain Rating Post-Intervention 1: 10/10    Respiratory Status: Room air    Objective:     Oral Musculature Evaluation  Oral Musculature: WFL  Dentition: present and adequate  Secretion Management: adequate  Mucosal Quality: adequate  Oral Labial Strength and Mobility: WFL  Lingual Strength and Mobility: other (see comments) (limited mobility due to pain)  Volitional Cough: WFL  Volitional Swallow: WFL  Voice Prior to PO Intake: WFL    Bedside Swallow Eval:   Consistencies Assessed:  Thin liquids via tsp, cup, straw  Puree pudding   Politely declined further trials due to post-op pain    Oral Phase:   WFL    Pharyngeal Phase:   no overt clinical signs/symptoms of aspiration  no overt clinical signs/symptoms of pharyngeal dysphagia    Compensatory Strategies  None    Treatment: When given diet options, pt preferred to remain on full liquid diet at this time. Provided education to pt re: role of ST, POC, swallow precautions. He verbalized understanding. At end of session, pt remained bedside with call light and all needs in reach. White board updated. RN and team notified of results and recs.    Goals:   Multidisciplinary Problems       SLP Goals          Problem: SLP    Goal Priority Disciplines Outcome   SLP Goal     SLP    Description: Goals expected to be met by 8/3:  1. Pt  will tolerate least restrictive diet without overt s/sx airway threat.                              Plan:     Patient to be seen:  3 x/week   Plan of Care expires:  08/25/25  Plan of Care reviewed with:  patient   SLP Follow-Up:  Yes       Time Tracking:     SLP Treatment Date:   07/27/25  Speech Start Time:  0732  Speech Stop Time:  0740     Speech Total Time (min):  8 min    Billable Minutes: Eval Swallow and Oral Function 8    07/27/2025

## 2025-07-27 NOTE — ASSESSMENT & PLAN NOTE
60 yo M on POD 1 after transoral robotic surgery - left tonsillectomy and BOT resection, L MRND. Has PMHx of sarcoidosis, DM, VÍCTOR, PCI with Stents on plavix.    ENT:       -- Okay for stepdown to floor  -- ELIAS drain with serosanguinous output; will continue to monitor   -- Tonsil fossa healing appropriately   -- Tolerating FLD    Neuro:  -- MMPC  -- Robaxin scheduled  -- Dilaudid and oxy 5 and 10 prn     -- No NSAIDs    CV:  -- Continue telemetry   -- SubQ Heparin     Resp:  -- NCO2 as needed     FEN/GI:      -- Continue FLD   -- SLP consulted, pending recommendations   -- Half IVF given tolerating diet  -- zofran IV PRN  -- pantoprazole qd, famotidine bid  -- carafate PO qid  -- S/p decadron 8 mg 3 doses  -- miralax BID    Renal      -- santiago removed     Heme/ID   -- daily CBC  -- SubQ Heparin for ppx  -- Resume plavix today     Endocrine   -- SSI     Dispo: PT/OT, Floor

## 2025-07-27 NOTE — PLAN OF CARE
Assumed care at 1. Pt is non-verbal and not follow commands. Right sided flaccid. Repositioned every 2 hours and PRN. Plan to go to [facility]. Plan of care discussed with family. Call light within reach.     Altered Communication/Language Barrier    • P Patient transferred to [facility] for continued medical management.     CM/SW has been discussing discharge plan    Discharge Plan A: Home with family  Discharge Plan B: Home    with patient and Extended Emergency Contact Information  Primary Emergency Contact: PrestonNaomi  Address: [address] [id number] [place] [address]  Home Phone: [phone]  Mobile Phone: [phone]  Relation: Spouse  Secondary Emergency Contact: Jr Johnston   [place]  Mobile Phone: [phone]  Relation: Son.         Caitlin North RN, BSN  Case Management  [phone]

## 2025-07-27 NOTE — PLAN OF CARE
Problem: Adult Inpatient Plan of Care  Goal: Plan of Care Review  Outcome: Progressing  Goal: Patient-Specific Goal (Individualized)  Outcome: Progressing  Goal: Absence of Hospital-Acquired Illness or Injury  Outcome: Progressing  Goal: Optimal Comfort and Wellbeing  Outcome: Progressing  Goal: Readiness for Transition of Care  Outcome: Progressing     Problem: Diabetes Comorbidity  Goal: Blood Glucose Level Within Targeted Range  Outcome: Progressing     Problem: Wound  Goal: Optimal Coping  Outcome: Progressing  Goal: Optimal Functional Ability  Outcome: Progressing  Goal: Absence of Infection Signs and Symptoms  Outcome: Progressing  Goal: Improved Oral Intake  Outcome: Progressing  Goal: Optimal Pain Control and Function  Outcome: Progressing  Goal: Skin Health and Integrity  Outcome: Progressing  Goal: Optimal Wound Healing  Outcome: Progressing     Problem: Infection  Goal: Absence of Infection Signs and Symptoms  Outcome: Progressing     Problem: Fall Injury Risk  Goal: Absence of Fall and Fall-Related Injury  Outcome: Progressing     Problem: Skin Injury Risk Increased  Goal: Skin Health and Integrity  Outcome: Progressing     Problem: Diabetes Comorbidity  Goal: Blood Glucose Level Within Targeted Range  Outcome: Progressing     Problem: Wound  Goal: Optimal Coping  Outcome: Progressing     Problem: Infection  Goal: Absence of Infection Signs and Symptoms  Outcome: Progressing     Problem: Fall Injury Risk  Goal: Absence of Fall and Fall-Related Injury  Outcome: Progressing     Problem: Skin Injury Risk Increased  Goal: Skin Health and Integrity  Outcome: Progressing

## 2025-07-27 NOTE — PLAN OF CARE
PCU PLAN OF CARE     Dx: Oropharyngeal cancer     Goals of Care: SBP<170 Pain Management     Vital Signs (last 12 hours):   Temp:  [97.7 - 98.5]  Pulse:  [73 - 81]  Resp:  [14-20]  BP: Last /63  SpO2:  [ 96%]          Neuro: AA&Ox4 moves all extremities      Cardiac: NSR      Respiratory: Rm Air (2L NC @night)      Gtts: D5 1/2NS w/20mEqKCL     Urine Output: 1900 cc/shift     Diet: Full Liquid     Drain: ELIAS #1 24mL/shift      Labs/Accuchecks: Daily Labs      Skin: WNL Mason Score  21     Shift Events:   No acute events overnight. Pt walked to restroom independently.  Family updated on current condition/plan of care, questions answered, and emotional support provided.

## 2025-07-27 NOTE — PLAN OF CARE
Bedside swallow assessment completed. Pt is appropriate to continue a full liquid diet with purees for comfort as tolerated. Oral acceptance limited due to pain, but no overt s/sx airway compromise noted.   Diann Jimenez CCC-SLP  7/27/2025  12:16 PM

## 2025-07-28 ENCOUNTER — PATIENT MESSAGE (OUTPATIENT)
Dept: SURGERY | Facility: CLINIC | Age: 60
End: 2025-07-28
Payer: MEDICARE

## 2025-07-28 NOTE — ANESTHESIA POSTPROCEDURE EVALUATION
Anesthesia Post Evaluation    Patient: Cade Johnston    Procedure(s) Performed: Procedure(s) (LRB):  ROBOTIC TRANSORAL SURGERY (TORS) (Left)  DISSECTION, NECK, MODIFIED RADICAL (Left)    Final Anesthesia Type: general      Patient location during evaluation: PACU  Patient participation: Yes- Able to Participate  Level of consciousness: awake and alert  Post-procedure vital signs: reviewed and stable  Pain management: adequate  Airway patency: patent    PONV status at discharge: No PONV  Anesthetic complications: no      Cardiovascular status: hemodynamically stable  Respiratory status: unassisted and spontaneous ventilation  Hydration status: euvolemic  Follow-up not needed.              Vitals Value Taken Time   /75 07/28/25 04:45   Temp 36.4 °C (97.6 °F) 07/28/25 04:45   Pulse 73 07/28/25 04:45   Resp 18 07/28/25 04:45   SpO2 98 % 07/28/25 04:45         Event Time   Out of Recovery 13:00:00         Pain/Peterson Score: Pain Rating Prior to Med Admin: 3 (7/28/2025  5:31 AM)  Pain Rating Post Med Admin: 0 (7/28/2025  6:31 AM)

## 2025-07-28 NOTE — DISCHARGE SUMMARY
[facility]  Otorhinolaryngology-Head & Neck Surgery  Discharge Summary      Patient Name: Cade Johnston  MRN: [id number]  Admission Date: 7/25/2025  Hospital Length of Stay: 3 days  Discharge Date and Time:  07/28/2025 8:59 AM  Attending Physician: Eda Fried MD   Discharging Provider: Marcelino Newman MD  Primary Care Provider: Joni Smith MD    HPI:   Cade Johnston is a 59 y.o. male presenting for newly diagnosed oropharyngeal cancer. He reports persistent foreign body sensation in the left side of the throat starting several weeks ago, describing it as feeling similar to a popcorn husk. The sensation is most noticeable when brushing teeth and triggers a mild gag reflex. He denies throat soreness, pain, difficulties with eating, or masses or lesions in the neck     He has a history of sleep apnea for several years, cardiac stent placed 10 years ago, and sarcoidosis in lungs. Possible asbestosis was identified on CT in October, as noted by Dr. Espinosa. He underwent cholecystectomy in October. During the surgical evaluation, a CT reportedly indicated potential findings suggestive of asbestosis, as communicated by Dr. Espinosa.   He currently takes Plavix for a stent placed 10 years ago. He was previously on aspirin but this was discontinued by his provider.      He is a former smoker with a history of smoking one pack per day for six years, having quit 40 years ago.     Procedure(s) (LRB):  ROBOTIC TRANSORAL SURGERY (TORS) (Left)  DISSECTION, NECK, MODIFIED RADICAL (Left)        Hospital Course:   Cade Johnston is a 59 y.o. male that presents to the hospital for surgery for Oropharyngeal cancer [C10.9]. Patient underwent procedures listed below with Eda Fried MD on 7/25/2025.   Patient was admitted to the step-down unit for postop monitoring he was started on clear liquid diet.  He was kept on all appropriate DVT and antibiotic prophylaxis.  On postoperative day 3 patient tolerated advancement to minced  and moist per SLP recommendations.  There was no evidence of postoperative hematoma, hemorrhage, salivary fistula. Pain is controlled on oral medications.  Patient is stable for discharge on soft diet.  Drain output remains too high for removal while inpatient.  We will arrange follow-up for drain removal.  Discussed return precautions for bleeding, signs of infection.    Procedure(s):  ROBOTIC TRANSORAL SURGERY (TORS)  DISSECTION, NECK, MODIFIED RADICAL    Discharge Physical Exam  NAD  Awake and alert  Left neck soft and nontender  Left neck incision c/d/I, ELIAS with serosanguinous output  L tonsillar fossa healing appropriately without signs of bleeding  Normal WOB, no stridor or stertor       Indwelling Lines/Drains at time of discharge:   Lines/Drains/Airways       Drain  Duration                  Closed/Suction Drain 07/25/25 1206 Tube - 1 Left Neck Bulb 10 Fr. 2 days                    No notes on file    Goals of Care Treatment Preferences:  Code Status: Full Code        Consults: SLP. PT/OT    Significant Diagnostic Studies: surgery    Pending Diagnostic Studies:       None          Final Active Diagnoses:    Diagnosis Date Noted POA    PRINCIPAL PROBLEM:  Oropharyngeal cancer [C10.9] 06/30/2025 Yes      Problems Resolved During this Admission:        Discharged Condition: stable    Disposition: Home or Self Care    Follow Up:  July 30 with Eda rFied MD      Patient Instructions:   No discharge procedures on file.    Medications:  Reconciled Home Medications:      Medication List        START taking these medications      famotidine 40 MG tablet  Commonly known as: PEPCID  Take 1 tablet (40 mg total) by mouth once daily.     omeprazole 40 MG capsule  Commonly known as: PRILOSEC  Take 1 capsule (40 mg total) by mouth once daily.     oxyCODONE 5 mg/5 mL Soln  Commonly known as: ROXICODONE  Take 10 mLs (10 mg total) by mouth every 4 (four) hours as needed.     polyethylene glycol 17 gram Pwpk  Commonly known  as: GLYCOLAX  Take 17 g by mouth 2 (two) times daily as needed for Constipation.            CONTINUE taking these medications      albuterol 90 mcg/actuation inhaler  Commonly known as: PROVENTIL/VENTOLIN HFA  Inhale 2 puffs into the lungs every 6 (six) hours as needed for Wheezing. Rescue     azelastine 137 mcg (0.1 %) nasal spray  Commonly known as: ASTELIN  1 spray (137 mcg total) by Nasal route 2 (two) times daily.     blood-glucose meter kit  To check BG BID , to use with insurance preferred meter     clopidogreL 75 mg tablet  Commonly known as: PLAVIX  TAKE 1 TABLET BY MOUTH EVERY DAY     fluticasone propionate 50 mcg/actuation nasal spray  Commonly known as: FLONASE  1 spray (50 mcg total) by Each Nostril route 2 (two) times a day.     ipratropium-albuteroL  mcg/actuation inhaler  Commonly known as: CombiVENT  Inhale 1 puff into the lungs every 4 (four) hours as needed for Shortness of Breath. Rescue     lancets Misc  To check BG BID, to use with insurance preferred meter     losartan 50 MG tablet  Commonly known as: COZAAR  Take 1 tablet (50 mg total) by mouth once daily.     lubiprostone 8 MCG Cap  Commonly known as: AMITIZA  Take 8 mcg by mouth 2 (two) times daily.     MOUNJARO 5 mg/0.5 mL Pnij  Generic drug: tirzepatide  Inject 5 mg into the skin every 7 days.     ONETOUCH VERIO TEST STRIPS Strp  Generic drug: blood sugar diagnostic  TO CHECK BLOOD GLUCOSE TWICE A DAY     PROBIOTIC ACIDOPHILUS ORAL  Take by mouth.     sildenafil 20 mg Tab  Commonly known as: REVATIO  TAKE 1 TABLET BY MOUTH THREE TIMES A DAY            STOP taking these medications      ibuprofen 200 MG tablet  Commonly known as: ADVIL,MOTRIN              Time spent on the discharge of patient: 40 minutes    Marcelino Newman MD  Otorhinolaryngology-Head & Neck Surgery  [facility] [place]

## 2025-07-28 NOTE — DISCHARGE INSTRUCTIONS
Follow Up/Patient Instructions:   You are okay to shower starting tomorrow. You can get your neck wet but do not scrub. Apply aquaphor ointment to the incisions to help with the scarring.  Your staples will be removed in clinic on Wednesday  No heavy lifting - nothing heavier than 10 lbs. Otherwise walk around to prevent complications.  Use Tylenol and Motrin over the counter as needed for pain. Alternate between them every 3 hours so that you maintained good pain control. If you need something stronger, you will be given a narcotic pain medication. Restart all your home medications.  Continue MINCED AND MOIST diet. See below for foods included.  Call your doctor or report to the emergency room if severe pain, bleeding from the mouth or nose, trouble breathing, or fever higher than 101.  Empty drain and record output as shown daily.  No CPAP for 2 weeks after surgery    ------  SUITABLE FOODS TO CHOOSE & AVOID  To assist in your recovery from jaw surgery, our surgeons will recommend that you follow a No Chew diet for 4-6 weeks.  Below is an overview of suitable foods to choose and avoid to ensure you maintain a healthy food intake whilst recovering from surgery.  If you need more information contact your dietitian.    WHAT IS A NO CHEW DIET?  Following your surgery, your jaw will be very tender and sore for some time.  To prevent any additional strain on the surgical area, it is recommended that the jaw is rested and you do minimal chewing during your recovery.  Therefore, only very soft and moist foods should be chosen.    FOODS TO CHOOSE AND AVOID  Protein  Choose  Scrambled or runny poached eggs  Very soft and moist pureed cooked meats  Well mashed beans or legumes  Avoid  Hard boiled or fried eggs  Chunks of meat  Fried fish, chicken or meat  Crunchy peanut butter     Dairy  Choose  Milk  Ice-cream or frozen yogurt  Melted cheese  Ricotta or cottage cheese  Avoid  Hard cheese     Breads &  Cereals  Choose  Porridge or semolina  Weetbix or other cereal soaked well with milk  Very soft, well cooked small pasta (eg risoni, small maccaroni)  Very soft, well cooked rice (eg plain moist risotto, moist rice pudding)  Avoid  Dry cereals  Cereals with dried fruit and/or nuts  Bread, toast, bread rolls, donuts, pastries or muffins  Sweet or savoury crackers/biscuits (unless soaked in milk or tea/coffee)     Vegetables  Choose  Pureed soft cooked vegetables  Finely mashed soft cooked vegetables  Avoid  Raw vegetables  Fried vegetables  Lettuce/salad  Stringy or very fibrous vegetables (eg celery)     Fruit  Choose  Pureed fruit  Mashed banana  Stewed and mashed soft fruit (eg peaches or apples)  Mashed avocado  Mashed soft tinned fruit (eg tinned pear)  Avoid  Raw fruit (except banana and avocado)  Fruit skins  Fibrous fruit (eg pineapple, citrus fruits)  Dried fruits    SIMPLE MEAL IDEAS  Breakfast  Porridge, Weetbix, Magali Brits, Weeties, semolina - all cereals need to be softened with warm milk.  Very moist scrambled or runny poached eggs.  Mashed stewed or tinned fruit (eg apricot, apple, peach) or mashed soft, fresh fruit (eg banana)  Fruit smoothie or milkshake.  Lunch / dinner  Pureed canned or home made soups with meat, vegetables, barley, canned legumes, cooked split peas or pasta.  Pureed casseroles or stews.  Very moist scrambled eggs or moist steamed fish. Serve with well mashed or pureed vegetables.  Snacks  Custards, yoghurt & other dairy desserts  Puree fruit - canned, stewed, fresh  Jelly, mousse, crème caramel  Moist creamed rice or sago pudding  Cheesecake without crust  Milk drinks - Milo, smoothies, milkshakes  Nutritional supplement drinks (eg SustagenTM, ProformTM, EnsureTM)    KEEPING HEALTHY  To keep your body healthy and prevent loss of muscle mass during your recovery, it is recommended that you eat a good balanced variety of foods.  Each day you should aim to consume/eat some foods from  all of the food groups listed above. You should be eating enough food to satisfy your hunger.  However, because eating may be difficult or painful, it is likely that you will be eating less food than you would have before the surgery.  This will mean you will be eating less energy (calories) and protein which could lead to fat and/or muscle loss. You may also be eating less vitamins and minerals which can lead to deficiencies in the long term (eg iron deficiency anaemia).  To prevent weight and muscle loss you should choose high energy and protein foods.  Eat small, frequent meals.  Boost the energy in your meals and snacks by adding extra:  Margarine, butter, oil, gravy, cream, mayonnaise, grated cheese, smooth peanut butter, hazelnut spread.  Sugar, honey, syrup, ice-cream.  Boost the protein in your meals by adding: eggs, cheese, milk, ice cream, yoghurt, moist, pureed meat chicken and fish.  Choose full fat products unless you are overweight.  Include 1-2 nutritional supplement drinks a day (eg: SustagenTM, ProformTM, EnsureTM)  Monitor your weight and notify your surgeon or contact your dietitian if you are loosing weight.    ARE SUPPLEMENT DRINKS REALLY NECESSARY?  In addition to choosing high energy and protein foods, a nutritional supplement can be an easy and comfortable way to consume some additional energy, protein, vitamins and minerals.  There are many nutritional supplement drinks on the market available from most pharmacies and some supermarkets.  As a guide, choose a supplement that includes the followin Calories or 1300Kj per serve  10-20 g protein per serve  Added vitamins and minerals  It is likely that you will need to drink at least 1-2 nutritional supplement drinks a day to prevent weight loss.    This information is provided as a back up to information provided verbally by a qualified dietitian.  It is not intended to replace advice or recommendations from a medical specialist or health  professional. If you should have any concerns or questions in relation to your medical condition(s), you should contact your medical practitioner.    Source: [email or web address]/patient-info/[email or web address]-care-[email or web address]-[email or web address]-surgery/no-chew-diet/

## 2025-07-28 NOTE — OP NOTE
[facility]  Surgery Department  Operative Note    SUMMARY     Date of Procedure: 7/25/25    Procedure:   1) TransOral Robotic Surgery for left radical tonsillectomy   2) TransOral Robotic Surgery for left base of tongue resection  2)  Left selective neck dissection levels 2, 3, 4    Pre-Operative Diagnosis: Oropharyngeal cancer [C10.9]    Post-Operative Diagnosis: Post-Op Diagnosis Codes:     * Oropharyngeal cancer [C10.9]    Attending Surgeon(s): Eda Fried MD     Assistant(s): Marcelino Newman MD    Anesthesia: General    Indications for operation:  Cade Johnston is a 59 y.o. male who presents with a  p16+ SCC of the left oropharynx. He underwent a multidisciplinary evaluation, and the recommendation either for TORS with adjuvant therapy as indicated versus chemoradiation. He opted to start with surgery. The risks, benefits, indications, and alternatives to this procedure were thoroughly discussed with the patient preoperatively, and informed consent was obtained.      Findings:  1) The tumor was located in the left base of tongue with an additional focus in the left tonsil  2) Optimal exposure was obtained with the 30 degree endoscope using the FLEX retractor  3) Frozen section margins were negative  4) The lingual artery was ligated in the neck to decrease the risk of postoperative hemorrhage.  5) There was not clinical evidence of extranodal extension.  6) There was not clinical evidence of a significant inflammatory response to the tumor.  7) Reconstruction was not required with a local flap.     Detailed Account of Technique Employed:     The patient was brought into the operating room and placed supine on the operating table. The patient was intubated transnasally by the anesthesiology service, and an adequate level of general endotracheal anesthesia was obtained. A timeout was performed according to the universal protocol. The bed was turned 180. Custom tooth guards were placed.     A suture was  placed in the tongue for retraction. The FLEX retractor was inserted, and the left tongue base and tonsil were visualized. The [employer] surgical robot system was docked, with the 30 degree scope angled up, and the monopolar cautery on the ipsilateral side to the tumor and the [place] bipolar on the contralateral arm.  Once the area was visualized, an abnormal area of the tonsil was identified as well as the previously identified base of tongue cancer.     The initial incision was made in the lateral aspect of the soft palate through to the superior constrictor for the tonsillectomy. Dissection was carried in a plane within the superior constrictor muscle to avoid injury to structures within the parapharyngeal space. The incision was then made along the posterior pharyngeal wall to define the medial aspect of the tumor. The soft palate cut was then completed to connect the two areas of dissection. The tumor within the tonsil was gently rolled inferiorly. The specimen was then retracted superiorly.     An additional incision was made at the lateral border of the tongue base component and continued anteriorly to allow the base of tongue tumor to drop into the field. The previously dissected tonsil was used for retraction. Dissection proceeded through tongue musculature. The medial aspect of the dissection was defined. The dissection proceeded posteriorly until the epiglottis and vallecula were visualized.  The specimen was amputated off the epiglottis. The specimen was then examined. The tumor was noted to be close along the anterior aspect of the tongue, and so an additional 2mm cuff of normal tissue was taken. The tissue was oriented by me in pathology and sent for frozen section assessment. Frozen sections were negative.      Attention was then turned to the left neck for the neck dissection. A selective level II-IV neck dissection was performed. A horizontal neck incision was carefully designed within a skin  crease and injected with 1% lidocaine with 1:100,000 epinephrine.  The skin was incised with a scalpel and subplatysmal flaps were elevated superiorly and inferiorly.  The marginal mandibular nerve was protected.  The anterior border of the sternocleidomastoid was skeletonized. Next, the fascia was divided inferior to the submandibular gland and the posterior belly of the digastric was identified.  This was traced posteriorly to the sternocleidomastoid muscle, preserving the facial vein and reflecting the tail of parotid superiorly.  The palpable node was identified. The dissection then continued along the sternocleidomastoid muscle working medially to identify the accessory nerve.  The accessory nerve was carefully skeletonized and .  Area 2b nodes were removed from superior to the nerve and sent separately. The inferior border of the dissection was defined by identifying the omohyoid muscle and reflecting it inferiorly. The lateral border of the internal jugular vein was identified inferiorly and the packet was divided superior to the transverse cervical vessels. Cervical rootlets were then identified deep to the sternocleidomastoid approximately 1-2 cm lateral to the internal jugular vein. These were followed superomedially to the carotid sheath.  The packet was then rolled out of level II, III and IV and off of the carotid sheath structures, oriented and was sent to pathology for review. The hypoglossal nerve was then carefully dissected from its location medially to the internal jugular vein. This was used to identify the lingual artery, which was ligated with suture.    The surgical bed in the neck was irrigated and hemostasis was ensured.  One drains was placed in the left neck.  The neck wounds were closed in layered fashion with 3-0 Vicryl sutures to close playsma and deep dermals, with staples for the skin. This marked the end of the procedure.  The patient was awakened, extubated, and transferred  to the PACU in stable condition. All surgical pauses were observed.  Standard operating room protocol and universal precautions were utilized throughout the procedure.    Complications: No    Estimated Blood Loss (EBL): * No values recorded between 7/25/2025  7:08 AM and 7/25/2025 12:42 PM *           Implants: * No implants in log *    Specimens:   Specimen (24h ago, onward)          Start     Ordered     07/25/25 0834   Specimen to Pathology ENT  RELEASE UPON ORDERING        References:    Click here for ordering Quick Tip   Question:  Release to patient  Answer:  Immediate    07/25/25 0834                       ID Type Source Tests Collected by Time Destination   1 : left oropharyngeal tumor Tissue Throat SPECIMEN TO PATHOLOGY Eda Fried MD 7/25/2025 0833     2 : additional anterior base of tongue Tissue Tongue SPECIMEN TO PATHOLOGY Eda Fried MD 7/25/2025 0834     3 : left neck dissection levels 2, 3, and 4 Tissue Neck SPECIMEN TO PATHOLOGY Eda Fried MD 7/25/2025 1115     4 : left neck dissection level 2B Tissue Neck SPECIMEN TO PATHOLOGY Eda Fried MD 7/25/2025 1135     5 : additional left level 4 neck dissection Tissu                   Condition: Good    Disposition: PACU - hemodynamically stable.    Attestation: I was present and scrubbed for the entire procedure.

## 2025-07-28 NOTE — PLAN OF CARE
Problem: Adult Inpatient Plan of Care  Goal: Plan of Care Review  Outcome: Progressing  Goal: Patient-Specific Goal (Individualized)  Outcome: Progressing  Goal: Absence of Hospital-Acquired Illness or Injury  Outcome: Progressing  Goal: Optimal Comfort and Wellbeing  Outcome: Progressing  Goal: Readiness for Transition of Care  Outcome: Progressing     Problem: Adult Inpatient Plan of Care  Goal: Plan of Care Review  Outcome: Progressing     Problem: Wound  Goal: Optimal Coping  Outcome: Progressing  Goal: Optimal Functional Ability  Outcome: Progressing  Goal: Absence of Infection Signs and Symptoms  Outcome: Progressing  Goal: Improved Oral Intake  Outcome: Progressing  Goal: Optimal Pain Control and Function  Outcome: Progressing  Goal: Skin Health and Integrity  Outcome: Progressing  Goal: Optimal Wound Healing  Outcome: Progressing

## 2025-07-28 NOTE — PLAN OF CARE
[facility]  Discharge Final Note    Primary Care Provider: Joni Smith MD  Expected Discharge Date: 7/28/2025    Patient medically ready for discharge to home with wife.     Transportation by wife.     Is family/patient aware of discharge: yes     Hospital follow up scheduled: yes       Final Discharge Note (most recent)       Final Note - 07/28/25 1650          Final Note    Assessment Type Final Discharge Note     Anticipated Discharge Disposition Home or Self Care     Hospital Resources/Appts/Education Provided Provided patient/caregiver with written discharge plan information                     Important Message from Medicare         Referral Info (most recent)       Referral Info    No documentation.                 Contact Info       Eda Fried MD   Specialty: Otolaryngology    [address] [place] [address]   Phone: [phone]       Next Steps: Follow up on 7/30/2025    Instructions: For wound re-check, For suture removal          Future Appointments   Date Time Provider Department Center   7/30/2025 11:00 AM Alicia Zhao NP [facility]   8/7/2025  2:00 PM Eda Fried MD [facility]   9/23/2025  7:10 AM LAB, [facility]   9/30/2025 10:00 AM Joni Smith MD [facility]   10/22/2025  9:00 AM Shekhar Espinosa MD [facility]     Ramakrishna Briceño RN  Case Management  Ext: [phone]  07/28/2025

## 2025-07-28 NOTE — PT/OT/SLP PROGRESS
Speech Language Pathology Treatment    Patient Name:  Cade Johnston   MRN:  [id number]  Admitting Diagnosis: Oropharyngeal cancer    Recommendations:                 General Recommendations:  Dysphagia therapy  Diet recommendations:  Minced & Moist Diet - IDDSI Level 5, Liquid Diet Level: Thin liquids - IDDSI Level 0   Aspiration Precautions: Small bites/sips and Standard aspiration precautions   General Precautions: Standard, aspiration  Communication strategies:  none  Discharge recommendations:  Low Intensity Therapy   Barriers to Discharge:  None    Assessment:     Cade Johnston is a 59 y.o. male with an SLP diagnosis of Dysphagia.  He presents with s/p TORS    Subjective     Awake/alert    Pain/Comfort:  Pain Rating 1: 0/10  Pain Rating Post-Intervention 1: 0/10    Respiratory Status: Room air    Objective:     Has the patient been evaluated by SLP for swallowing?   Yes  Keep patient NPO? No     Pt upright in bedside chair upon entry. Pt seen for ongoing swallow assessment after MD reached out to see if pt appropriate for diet upgrade. He tolerated thin liquids x7 via cup and saltine cracker x4. Timely oral clearance and bolus control noted across consistencies. Pt with multiple swallows and liquid wash required to assist in clearance of solid. Pt is agreeable to starting soft diet at this time. Recommend minced and moist diet with thin liquids at this time. SLP educated pt on diet recommendations, swallow precautions, foods within soft diet, and SLP POC. He verbalized understanding and asked appropriate questions throughout. SLP will follow up to ensure tolerance.     Goals:   Multidisciplinary Problems       SLP Goals          Problem: SLP    Goal Priority Disciplines Outcome   SLP Goal     SLP    Description: Goals expected to be met by 8/3:  1. Pt will tolerate least restrictive diet without overt s/sx airway threat.                              Plan:     Patient to be seen:  4 x/week   Plan of Care expires:   08/25/25  Plan of Care reviewed with:  patient   SLP Follow-Up:  Yes       Time Tracking:     SLP Treatment Date:   07/28/25  Speech Start Time:  0752  Speech Stop Time:  0806     Speech Total Time (min):  14 min    Billable Minutes: Treatment Swallowing Dysfunction 6 and Self Care/Home Management Training 8    07/28/2025

## 2025-07-28 NOTE — PLAN OF CARE
Problem: Adult Inpatient Plan of Care  Goal: Plan of Care Review  7/28/2025 0725 by Suki Mackenzie RN  Outcome: Progressing  7/28/2025 0722 by Suki Mackenzie RN  Outcome: Progressing  Goal: Patient-Specific Goal (Individualized)  7/28/2025 0725 by Suki Mackenzie RN  Outcome: Progressing  7/28/2025 0722 by Suki Mackenzie RN  Outcome: Progressing  Goal: Absence of Hospital-Acquired Illness or Injury  7/28/2025 0725 by Suki Mackenzie RN  Outcome: Progressing  7/28/2025 0722 by Suki Mackenzie RN  Outcome: Progressing  Goal: Optimal Comfort and Wellbeing  7/28/2025 0725 by Suki Mackenzie RN  Outcome: Progressing  7/28/2025 0722 by Suki Mackenzie RN  Outcome: Progressing  Goal: Readiness for Transition of Care  7/28/2025 0725 by Suki Mackenzie RN  Outcome: Progressing  7/28/2025 0722 by Suki Mackenzie RN  Outcome: Progressing     Problem: Diabetes Comorbidity  Goal: Blood Glucose Level Within Targeted Range  7/28/2025 0725 by Suki Mackenzie RN  Outcome: Progressing  7/28/2025 0722 by Suki Mackenzie RN  Outcome: Progressing     Problem: Fall Injury Risk  Goal: Absence of Fall and Fall-Related Injury  7/28/2025 0725 by Suki Mackenzie RN  Outcome: Progressing  7/28/2025 0722 by Suki Mackenzie RN  Outcome: Progressing     Problem: Infection  Goal: Absence of Infection Signs and Symptoms  7/28/2025 0725 by Suki Mackenzie RN  Outcome: Progressing  7/28/2025 0722 by Suki Mackenzie RN  Outcome: Progressing

## 2025-07-28 NOTE — PLAN OF CARE
Recommend minced and moist diet with thin liquids at this time. SLP will follow up to ensure tolerance  7/28/2025

## 2025-07-28 NOTE — CARE UPDATE
Unit [facility] Care Support Interaction      I have reviewed the chart of Cade Johnston who is hospitalized for Oropharyngeal cancer. The patient is currently located in the following unit: [facility]    I have seen and examined the patient and provided the following support:     Patient experience rounds - positive experience reported       Mark Reina, SCARP-C  Unit Based [facility]

## 2025-07-29 ENCOUNTER — NURSE TRIAGE (OUTPATIENT)
Dept: ADMINISTRATIVE | Facility: CLINIC | Age: 60
End: 2025-07-29
Payer: MEDICARE

## 2025-07-29 ENCOUNTER — PATIENT OUTREACH (OUTPATIENT)
Dept: ADMINISTRATIVE | Facility: CLINIC | Age: 60
End: 2025-07-29
Payer: MEDICARE

## 2025-07-29 NOTE — TELEPHONE ENCOUNTER
Wife calling    Reports he was discharged yesterday   Reports pulse ox is 89-90% now  Only 4 oz water and 2 tsp of pudding since getting home    Feels very weak  Reports he says he feels confused at times and takes a while to become reoriented when waking him, reports this is new for him.     Dispo is call 911 now  Advised wife to call 911  Wife wants to bring him to ER.   Reiterated dispo multiple times.       Reason for Disposition   Difficult to awaken or acting confused (e.g., disoriented, slurred speech)    Additional Information   Negative: SEVERE difficulty breathing (e.g., struggling for each breath, speaks in single words)   Negative: [1] Breathing stopped AND [2] hasn't returned   Negative: Choking on something   Negative: Bluish (or gray) lips or face now    Protocols used: Breathing Difficulty-A-AH

## 2025-07-29 NOTE — PROGRESS NOTES
Pt is currently being seen in ED for evaluation. Will check on patient status later to determine if [facility] outreach is still appropriate.

## 2025-07-30 ENCOUNTER — TELEPHONE (OUTPATIENT)
Dept: HEMATOLOGY/ONCOLOGY | Facility: CLINIC | Age: 60
End: 2025-07-30
Payer: MEDICARE

## 2025-07-30 NOTE — TELEPHONE ENCOUNTER
Pt s/p surgery with Dr Fried 7/25/25 and currently admitted to [facility].  Wife states that pt is feeling better, receiving IV hydration and pain medication.  Wife states that pt had a difficult time at [facility], provided pt advocacy contact information. Wife verbalized gratitude for call.

## 2025-08-04 ENCOUNTER — OFFICE VISIT (OUTPATIENT)
Dept: HEMATOLOGY/ONCOLOGY | Facility: CLINIC | Age: 60
End: 2025-08-04
Payer: MEDICARE

## 2025-08-04 VITALS
RESPIRATION RATE: 16 BRPM | BODY MASS INDEX: 28.46 KG/M2 | DIASTOLIC BLOOD PRESSURE: 82 MMHG | SYSTOLIC BLOOD PRESSURE: 147 MMHG | HEIGHT: 72 IN | TEMPERATURE: 98 F | WEIGHT: 210.13 LBS | HEART RATE: 70 BPM | OXYGEN SATURATION: 97 %

## 2025-08-04 DIAGNOSIS — C10.9 OROPHARYNGEAL CANCER: Primary | ICD-10-CM

## 2025-08-04 DIAGNOSIS — C01 SQUAMOUS CELL CARCINOMA OF BASE OF TONGUE: ICD-10-CM

## 2025-08-04 PROCEDURE — 3079F DIAST BP 80-89 MM HG: CPT | Mod: CPTII,S$GLB,, | Performed by: NURSE PRACTITIONER

## 2025-08-04 PROCEDURE — 1159F MED LIST DOCD IN RCRD: CPT | Mod: CPTII,S$GLB,, | Performed by: NURSE PRACTITIONER

## 2025-08-04 PROCEDURE — 99024 POSTOP FOLLOW-UP VISIT: CPT | Mod: S$GLB,,, | Performed by: NURSE PRACTITIONER

## 2025-08-04 PROCEDURE — 3077F SYST BP >= 140 MM HG: CPT | Mod: CPTII,S$GLB,, | Performed by: NURSE PRACTITIONER

## 2025-08-04 PROCEDURE — 99999 PR PBB SHADOW E&M-EST. PATIENT-LVL IV: CPT | Mod: PBBFAC,,, | Performed by: NURSE PRACTITIONER

## 2025-08-04 PROCEDURE — 3044F HG A1C LEVEL LT 7.0%: CPT | Mod: CPTII,S$GLB,, | Performed by: NURSE PRACTITIONER

## 2025-08-04 PROCEDURE — 3072F LOW RISK FOR RETINOPATHY: CPT | Mod: CPTII,S$GLB,, | Performed by: NURSE PRACTITIONER

## 2025-08-04 PROCEDURE — 4010F ACE/ARB THERAPY RXD/TAKEN: CPT | Mod: CPTII,S$GLB,, | Performed by: NURSE PRACTITIONER

## 2025-08-04 NOTE — PROGRESS NOTES
Note to patients: In accordance with the  [employer] Cures Act, patients are now granted immediate electronic access to their medical records. This note is primarily intended for communication among medical professionals. As a result, it may incorporate medical terminology, abbreviations, or language that could appear blunt or unfamiliar. If you have questions about this document, we encourage you to discuss it with your physician.      [facility]  Head & Neck Surgical Oncology Clinic    Patient: Cade Johnston    : 1965    MRN: [id number]  Primary Care Provider: Joni Smith MD  Referring Provider: Dr. Rodriguez  Date of Encounter: 25    DIAGNOSIS:    Cancer Staging   Oropharyngeal cancer  Staging form: Pharynx - HPV-Mediated Oropharynx, AJCC 8th Edition  - Clinical stage from 2025: Stage I (cT2, cN1, cM0, p16+) - Signed by Eda Fried MD on 2025      CC:   Chief Complaint   Patient presents with    Globus sensation       HPI:   Cade Johnston is a 59 y.o. male presenting for newly diagnosed oropharyngeal cancer. He reports persistent foreign body sensation in the left side of the throat starting several weeks ago, describing it as feeling similar to a popcorn husk. The sensation is most noticeable when brushing teeth and triggers a mild gag reflex. He denies throat soreness, pain, difficulties with eating, or masses or lesions in the neck    He has a history of sleep apnea for several years, cardiac stent placed 10 years ago, and sarcoidosis in lungs. Possible asbestosis was identified on CT in October, as noted by Dr. Espinosa. He underwent cholecystectomy in October. During the surgical evaluation, a CT reportedly indicated potential findings suggestive of asbestosis, as communicated by Dr. Espinosa.   He currently takes Plavix for a stent placed 10 years ago. He was previously on aspirin but this was discontinued by his provider.     He is a former smoker with a history  "of smoking one pack per day for six years, having quit 40 years ago.     Patient lives in [place], [place].    8/4/25: History of Present Illness    Mr. Johnston presents today for post-operative follow up after  surgery.  He is currently taking Oxycodone 5 mg, reduced from 10 mg due to excessive sedation, and continues Tylenol for pain management. He uses prescribed magic mouthwash and reports the incision site as tender with minimal discomfort. He is tolerating the pain medication well with gradual dosage reduction. He reports ongoing ear numbness following surgery. He reports poor nutritional intake and is currently in "survival mode" with minimal food consumption. He acknowledges weakness associated with inadequate nutrition and his nutritional status appears compromised with minimal oral intake. He reports significant dissatisfaction with his previous hospitalization, describing poor care and feeling neglected with inadequate response to his pain complaints. During hospitalization, he experienced a hypertensive episode with blood pressure reaching approximately 196/100, accompanied by head pressure and feeling like his "head was going to blow off." He felt forgotten after being transferred from ICU and described the overall experience as "awful." Despite this negative experience, he denies any desire to avoid necessary medical procedures but emphasizes the importance of improved care and patient advocacy in future hospitalizations.      ROS:  ROS as indicated in HPI.          TREATMENT HISTORY:  DL/Bx with Dr. Rodriguez    7/25/25:  1) TransOral Robotic Surgery for left radical tonsillectomy   2) TransOral Robotic Surgery for left base of tongue resection  2)  Left selective neck dissection levels 2, 3, 4    SUBSTANCE USE:  Smoking: former, 6 pack year, quit 40 years ago    ALLERGIES:  Review of patient's allergies indicates:   Allergen Reactions    Metformin      Severe abdominal bloating; diarrhea    Codeine Hives    " Latex Rash     blisters         MEDICATIONS:  Current Medications[1]    PAST MEDICAL HISTORY:  Past Medical History:   Diagnosis Date    Anticoagulant long-term use     Cancer of skin of left ear     Colon polyp     Coronary artery disease, nonobstructive     DJD (degenerative joint disease) of lumbar spine     DM type 2 (diabetes mellitus, type 2)     with diet alone    Dyslipidemia 08/13/2019    Essential hypertension 12/29/2015    Fatty liver     GERD (gastroesophageal reflux disease)     Hepatosplenomegaly     HTN (hypertension)     Hyperlipidemia     resolved with diet    Hypogonadism male 02/17/2015    Lumbar radiculopathy     Lumbar spondylosis     Nephrolithiasis     last stone 1/11    VÍCTOR on CPAP     Palpitations 03/09/2016    Sarcoidosis of lung 1990    Ventricular hypokinesis     angiogram with mild LAD stenosis        PAST SURGICAL HISTORY:  Past Surgical History:   Procedure Laterality Date    ANGIOGRAM, CORONARY, WITH LEFT HEART CATHETERIZATION N/A 08/16/2022    Procedure: Angiogram, Coronary, with Left Heart Cath;  Surgeon: Refugio Mcdonough MD;  Location: [facility] CATH;  Service: Cardiology;  Laterality: N/A;    CARDIAC SURGERY      CIRCUMCISION      COLONOSCOPY N/A 01/08/2024    Procedure: COLONOSCOPY;  Surgeon: HALLEY Montes MD;  Location: [facility] ENDO;  Service: Endoscopy;  Laterality: N/A;    CORONARY ANGIOGRAPHY N/A 12/13/2019    Procedure: ANGIOGRAM, CORONARY ARTERY;  Surgeon: Amador Duran MD;  Location: [facility] CATH;  Service: Cardiology;  Laterality: N/A;    CORONARY STENT PLACEMENT  12/2019    x1    CYST REMOVAL Right 05/2022    thigh, Dr. Parkinson    elbow spur removal      right    EPIDURAL STEROID INJECTION INTO LUMBAR SPINE N/A 02/10/2021    Procedure: Injection-steroid-epidural-lumbar L4/5;  Surgeon: Richi Rangel MD;  Location: [facility] OR;  Service: Pain Management;  Laterality: N/A;    EPIDURAL STEROID INJECTION INTO LUMBAR SPINE N/A 03/15/2021    Procedure:  Injection-steroid-epidural-lumbar L5/S1;  Surgeon: Richi Rangel MD;  Location: [facility] OR;  Service: Pain Management;  Laterality: N/A;    EPIDURAL STEROID INJECTION INTO LUMBAR SPINE N/A 10/16/2023    Procedure: Injection-steroid-epidural-lumbar;  Surgeon: Richi Rangel MD;  Location: [facility] OR;  Service: Pain Management;  Laterality: N/A;  L5/S1    EXTRACORPOREAL SHOCK WAVE LITHOTRIPSY  1996    [facility]    LARYNGOSCOPY, DIRECT, WITH BIOPSY IF INDICATED Bilateral 6/16/2025    Procedure: LARYNGOSCOPY, DIRECT, WITH BIOPSY IF INDICATED;  Surgeon: Denise Rodriguez MD;  Location: [facility] OR;  Service: ENT;  Laterality: Bilateral;    LEFT HEART CATHETERIZATION Left 12/13/2019    Procedure: Left heart cath;  Surgeon: Amador Duran MD;  Location: [facility] CATH;  Service: Cardiology;  Laterality: Left;    MODIFIED RADICAL NECK DISSECTION Left 7/25/2025    Procedure: DISSECTION, NECK, MODIFIED RADICAL;  Surgeon: Eda Fried MD;  [facility];  Service: ENT;  Laterality: Left;    removal of skin cancer      L side of face    ROBOT-ASSISTED CHOLECYSTECTOMY N/A 10/30/2024    Procedure: ROBOTIC CHOLECYSTECTOMY;  Surgeon: Michael Mg MD;  Location: [facility] OR;  Service: General;  Laterality: N/A;    ROBOT-ASSISTED TRANSORAL SURGERY Left 7/25/2025    Procedure: ROBOTIC TRANSORAL SURGERY (TORS);  Surgeon: Eda Fried MD;  Location: [facility] OR 2ND FLR;  Service: ENT;  Laterality: Left;    URETERAL EXPLORATION  1996    exploration of the R ureter for ureteral injury [facility] - did well        FAMILY HISTORY:  Family History   Problem Relation Name Age of Onset    Hypertension Mother      Fibromyalgia Mother      Coronary artery disease Father      Diabetes Father      Liver disease Brother          hepatitis C from a blood transufusion    Colon cancer Neg Hx      Crohn's disease Neg Hx      Ulcerative colitis Neg Hx      Stomach cancer Neg Hx      Esophageal cancer Neg Hx      Glaucoma  Neg Hx      Macular degeneration Neg Hx         SOCIAL HISTORY:  Social History[2]  See above substance history    REVIEW OF SYSTEMS:   Comprehensive review of systems was discussed with the patient.  It is positive only for the above complaints.    PHYSICAL EXAMINATION:  Blood pressure (!) 147/82, pulse 70, temperature 97.5 °F (36.4 °C), temperature source Temporal, resp. rate 16, height 6' (1.829 m), weight 95.3 kg (210 lb 1.6 oz), SpO2 97%.    Constitutional: Non-toxic appearing.   Psychiatric: Appropriate mood and affect. Cooperative.  Voice: Non-dysphonic, speaking in full sentences.   Neurologic: Cranial nerves grossly intact, no focal deficits.  Head and face: Salivary glands are not enlarged. Face is symmetric. CN VII strength intact.  Skin: No concerning skin lesions. Neck Incision healing well.   Eyes: Vision grossly intact, bilateral extraocular movements intact  Ears: Bilateral pinna, mastoid, external ear canal normal. Hearing intact.   Nose: External nose appears normal.   Lips: No ulcers or lesions  Oral cavity: Mucosa is pink and moist. Buccal mucosa, gingiva, anterior tongue, floor of mouth, and hard palate appear normal. No leukoplakia, erythroplakia, ulceration, masses or lesions.  Oropharynx: Mucosa is pink and moist. Soft palate and base of tongue are normal. Posterior pharyngeal wall normal. L tonsillar fossa with eschar from surgery.  No lesions.  Neck: incision healing. No palpable thyroid enlargement or nodules.  Respiratory: Chest expansion symmetric, no audible stridor or stertor. Breathing is unlabored. No active cough.    CLINICAL PHOTOS:        PROCEDURES:    FLEXIBLE LARYNGOSCOPY  Provider: Eda Fried MD  Indication: History of cancer   The patient was unable to tolerate mirror laryngoscopy.  The procedure was explained to the patient and verbal consent was obtained.   Anesthesia: topical lidocaine and neosynephrine applied within one or both nares with an atomizer    The scope was  introduced in the usual fashion.    Findings:  Mucosa: normal  Inferior turbinates: no polypoid changes or hypertrophy  Septum: no lesion or ulcer  Middle meatus: no purulence or polypoid change  Nasopharynx:  Adenoids: normal  Fossa of Rosenmuller: normal  Eustachian tubes: normal  Superior and posterior pharyngeal walls: normal   Epiglottis, vallecula, and base of tongue: sessile lesion at the base of tongue, sparing vallecula, extending from left inferior tonsil within glossotonsillar sulcus  Pyriform sinuses: no pooling of secretions or saliva in pyriform sinuses, mucosa normal  False vocal cords, arytenoids, aryepiglottic folds: normal  True vocal cords are symmetrically mobile on phonation and inspiration.   Laryngeal sensation appears intact. There are no masses or ulcerations.     The scope was withdrawn. The patient tolerated the procedure well with no complications.      DATA REVIEWED:     LABORATORY:      Latest Ref Rng & Units 7/27/2025     6:00 AM 7/28/2025     5:52 AM 7/29/2025     9:19 AM   Thyroid Labs   Sodium 136 - 145 mmol/L 138  136  139    Potassium 3.5 - 5.1 mmol/L 4.5  3.8  4.1    Chloride 95 - 110 mmol/L 109  106  104    Carbon Dioxide 23 - 29 mmol/L 20  21  27    Glucose 70 - 110 mg/dL 149  154  146    Blood Urea Nitrogen 8 - 23 mg/dL 16  21  15    Creatinine 0.50 - 1.40 mg/dL 0.8  0.8  0.68    Calcium 8.7 - 10.5 mg/dL 8.4  8.0  8.4    Total Protein 6.0 - 8.4 g/dL   6.7    Albumin 3.5 - 5.2 g/dL   3.5    Total Bilirubin 0.2 - 1.0 mg/dL   1.9    AST 10 - 40 U/L   20    ALT 10 - 44 U/L   28    Anion Gap 8 - 16 mmol/L 9  9  8    WBC 3.90 - 12.70 K/uL 19.29  9.82  8.00    RBC 4.60 - 6.20 M/uL 6.04  5.76  6.01    Hemoglobin 14.0 - 18.0 g/dL 17.1  16.4  17.2    Hematocrit 40.0 - 54.0 % 54.2  50.9  51.4    MCV 82 - 98 fL 90  88  86    MCH 27.0 - 31.0 pg 28.3  28.5  28.6    MCHC 32.0 - 36.0 g/dL 31.5  32.2  33.5    RDW 11.5 - 14.5 % 13.8  14.1  13.2    Platelets 150 - 450 K/uL 183  163  156    MPV  9.2 - 12.9 fL 10.5  10.4  10.1    Gran # 1.8 - 7.7 K/uL 16.18  7.14  6.1    Lymph # 1.0 - 4.8 K/uL 1.22  1.25  1.0    Mono # 0.3 - 1.0 K/uL 1.67  1.30  0.8    Eos # 0.0 - 0.5 K/uL 0.01  0.04  0.1    Baso # 0.00 - 0.20 K/uL 0.05  0.04  0.04    Gran % 38.0 - 73.0 %   76.1    Lymph % 18.0 - 48.0 % 6.3  12.7  12.0    Mono% 4.0 - 15.0 % 8.7  13.2  10.1    Eos % 0.0 - 8.0 % 0.1  0.4  0.8    Baso % 0.0 - 1.9 % 0.3  0.4  0.5         PATHOLOGY:   Contains abnormal data Specimen to Pathology ENT: [id number]  Order: [id number]   Status: Final result       Next appt: 08/07/2025 at 02:00 PM in Otolaryngology (Eda Fried MD)       Dx: Oropharyngeal cancer    Test Result Released: Yes (not seen)    0 Result Notes       Component  Ref Range & Units (hover)  Resulting Agency   Case Report   [facility]                           Case: [id number]                                 Authorizing Provider:  Eda Fried MD           Collected:           07/25/2025 08:33 AM           Ordering Location:     [facility] - Surgery (2nd    Received:            07/25/2025 03:25 PM                                  Fl)                                                                           Pathologist:           Shekhar Smith MD                                                         Specimens:   1) - Throat, left oropharyngeal tumor                                                                2) - Tongue, additional anterior base of tongue                                                      3) - Neck, left neck dissection levels 2, 3, and 4                                                  4) - Neck, left neck dissection level 2B                                                            5) - Neck, additional left level 4 neck dissection                                        Clinical Information  [facility]   Procedure:  ROBOTIC TRANSORAL SURGERY (TORS) - Left  DISSECTION, NECK, MODIFIED RADICAL - Left  Pre-op  Diagnosis:  Oropharyngeal cancer [C10.9]  Post-op Diagnosis:  C10.9 - Oropharyngeal cancer [ICD-10-CM]   Final Diagnosis   1. Throat, Left Oropharyngeal Tumor, Transoral Surgery (TORS) Left Base of Tongue Resection:     - Multifocal oropharyngeal squamous cell carcinoma, HPV-associated. See Comment.    - Size in greatest dimension of largest focus: 2.0 cm    - Lymphovascular invasion: Not identified.    - Perineural invasion: Not identified.    - Margins: The deep margin for Lesion 2 is focally POSITIVE for tumor.    - Primary tumor stage: pT2(m)     2. Tongue, Additional Anterior Base of Tongue, Biopsy:     - Benign squamous mucosa with partial cautery artifact and associated chronic inflammation.  - Benign salivary gland tissue, adipose tissue, and skeletal muscle.  - Negative for dysplasia and malignancy.     3. Lymph Nodes, Left Neck Levels 2, 3, and 4, Modified Dissection:     - One of fourteen lymph nodes POSITIVE for metastatic carcinoma (1/14).    - Size of largest metastatic deposit: 2.4 cm.     4. Lymph Nodes, Left Neck Level 2B, Modified Dissection:     - Five benign lymph nodes, negative for malignancy (0/5).     5. Lymph Nodes, Additional Left Level 4 Neck, Modified Dissection:     - Two benign lymph nodes, negative for malignancy (0/2).     See synoptic checklist.   Electronically signed by Shekhar Smith MD on 7/30/2025 at 1314   Comments  [facility]            6/16/25  1. Tongue, left base, biopsy:  - Squamous cell carcinoma, HPV-associated  - See comment     2. Tonsil, left, mass, biopsy:  - Squamous cell carcinoma, HPV-associated  - See comment    IMAGING:  CT Neck 6/12/25  1. Enhancing soft tissue lesion involving the left palatine tonsillar fossa, left glossotonsillar sulcus and base of tongue, worrisome for primary oropharyngeal malignancy.  2. Left level II metastatic lymph node with central necrosis/cystic change.  3. Few additional non pathologically enlarged left level II/III lymph nodes with  rounded morphology.  4. Multiple scattered non pathologically enlarged calcified superior mediastinal lymph nodes, nonspecific and may reflect sequelae of prior granulomatous disease.  This report was flagged in Epic as abnormal.    PET 6/26/25  Known left oropharyngeal cancer involving the tonsil and base of tongue with findings concerning for metastatic level 2 left IJ lymph nodes.  No sites of distant disease are identified.     New non hypermetabolic 12 mm left lung nodule.  This is suspected to lie within an accessory fissure and may correspond to focal pleural thickening or an intra fissural lymph node.  A new lung nodule remains in the differential and follow-up with conventional chest CT would be helpful.  Additional findings in the thorax are unchanged and likely related to known sarcoidosis.     Asymmetric left lateral urinary bladder wall thickening of uncertain etiology.    RADIOLOGY REVIEWED:  I have independently viewed and agree with the CT and PET images and reports which demonstrate left oropharyngeal cancer as well as suspected involved left level 2 node. Pleural lesion more c/w worsening sarcoid per discussion at Tumor Board.    ASSESSMENT AND PLAN:  1. Oropharyngeal cancer    2. Squamous cell carcinoma of base of tongue        Cade Johnston is a 59 y.o. male with <10 pack year smoking history with newly diagnosed Stage 1 p16+ SCC of the left oropharynx. He is here for post-op appointment.     Assessment & Plan    - Reviewed pathology results showing positive deep margin for tumor at base of tongue and one positive lymph node out of 14 removed.  - Considered recommendation for revision surgery to ensure clear margins and complete cancer removal.  - Placing back on tumor board for discussion on Thursday to determine optimal treatment plan.  - Follow up on Thursday to discuss tumor board recommendations and treatment plan.  - Explained typical post-op course, noting first 2 weeks are typically very  difficult.  - Discussed expected timeline for improvement in symptoms and recovery.  - Educated on potential for permanent numbness in ear lobe following surgery, with possibility for improvement in other areas over time.  - Mr. Johnston to apply Aquaphor to incision site.  - Mr. Johnston to clean incision site normally, as it has healed sufficiently.  - Decreased oxycodone from 10 mg to 5 mg as needed for pain but if he is unable to continue to get in the required amount of liquids he will need to increase back to 10. .  - Continued Tylenol as currently prescribed.  - Continued magic mouthwash as currently prescribed.  - Consider utilizing counseling resources available at the cancer center.  - Contact patient advocacy regarding hospital experience if desired.               No orders of the defined types were placed in this encounter.     Patient encouraged to call with any questions, concerns, or new or worsening symptoms.     Follow up Thursday to see Dr. Fried and discuss next steps            [1]   Current Outpatient Medications:     acetaminophen (TYLENOL) 160 mg/5 mL Liqd, Take 30 mLs by mouth daily as needed (for post-op pain)., Disp: , Rfl:     albuterol (PROVENTIL/VENTOLIN HFA) 90 mcg/actuation inhaler, Inhale 2 puffs into the lungs every 6 (six) hours as needed for Wheezing. Rescue, Disp: 1 Inhaler, Rfl: 2    azelastine (ASTELIN) 137 mcg (0.1 %) nasal spray, 1 spray (137 mcg total) by Nasal route 2 (two) times daily., Disp: 30 mL, Rfl: 11    clopidogreL (PLAVIX) 75 mg tablet, TAKE 1 TABLET BY MOUTH EVERY DAY (Patient taking differently: Take 75 mg by mouth once daily.), Disp: 90 tablet, Rfl: 4    famotidine (PEPCID) 40 MG tablet, Take 1 tablet (40 mg total) by mouth once daily., Disp: 30 tablet, Rfl: 0    fluticasone propionate (FLONASE) 50 mcg/actuation nasal spray, 1 spray (50 mcg total) by Each Nostril route 2 (two) times a day., Disp: 16 g, Rfl: 11    ipratropium-albuteroL (COMBIVENT)  mcg/actuation  inhaler, Inhale 1 puff into the lungs every 4 (four) hours as needed for Shortness of Breath. Rescue, Disp: 4 g, Rfl: 4    losartan (COZAAR) 50 MG tablet, Take 1 tablet (50 mg total) by mouth once daily., Disp: 90 tablet, Rfl: 3    lubiprostone (AMITIZA) 8 MCG Cap, Take 8 mcg by mouth 2 (two) times daily., Disp: , Rfl:     magic mouthwash diphen/antac/lidoc, Swish and spit 10 mLs every 4 (four) hours as needed (gargle for 10 seconds and spit)., Disp: 450 mL, Rfl: 0    omeprazole (PRILOSEC) 40 MG capsule, Take 1 capsule (40 mg total) by mouth once daily., Disp: 30 capsule, Rfl: 0    oxyCODONE (ROXICODONE) 5 mg/5 mL Soln, Take 10 mLs (10 mg total) by mouth every 4 (four) hours as needed., Disp: 420 mL, Rfl: 0    polyethylene glycol (GLYCOLAX) 17 gram/dose powder, Use cap to measure 17 grams.  Dissolve as directed and take by mouth 2 (two) times daily as needed for Constipation., Disp: 1020 g, Rfl: 0    Lactobacillus acidophilus (PROBIOTIC ACIDOPHILUS ORAL), Take by mouth. (Patient not taking: Reported on 2025), Disp: , Rfl:     sildenafil (REVATIO) 20 mg Tab, TAKE 1 TABLET BY MOUTH THREE TIMES A DAY (Patient not taking: Reported on 2025), Disp: 30 tablet, Rfl: 5    tirzepatide (MOUNJARO) 5 mg/0.5 mL PnIj, Inject 5 mg into the skin every 7 days. 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     Employer: [employer]   Tobacco Use    Smoking status: Former     Current packs/day: 0.00     Types: Cigarettes     Quit date: 1991     Years since quittin.6     Passive exposure: Past    Smokeless tobacco: Never   Substance and Sexual Activity    Alcohol use: No    Drug use: No   Social History Narrative    ** Merged History Encounter **          Social Drivers of Health     Financial Resource Strain: Low Risk   (7/29/2025)    Overall Financial Resource Strain (CARDIA)     Difficulty of Paying Living Expenses: Not hard at all   Food Insecurity: No Food Insecurity (7/29/2025)    Hunger Vital Sign     Worried About Running Out of Food in the Last Year: Never true     Ran Out of Food in the Last Year: Never true   Transportation Needs: No Transportation Needs (7/29/2025)    PRAPARE - Transportation     Lack of Transportation (Medical): No     Lack of Transportation (Non-Medical): No   Physical Activity: Inactive (7/26/2025)    Exercise Vital Sign     Days of Exercise per Week: 0 days     Minutes of Exercise per Session: 0 min   Stress: No Stress Concern Present (7/29/2025)    [employer] - Occupational Stress Questionnaire     Feeling of Stress : Only a little   Housing Stability: Low Risk  (7/29/2025)    Housing Stability Vital Sign     Unable to Pay for Housing in the Last Year: No     Number of Times Moved in the Last Year: 0     Homeless in the Last Year: No

## 2025-08-07 ENCOUNTER — OFFICE VISIT (OUTPATIENT)
Dept: HEMATOLOGY/ONCOLOGY | Facility: CLINIC | Age: 60
End: 2025-08-07
Payer: MEDICARE

## 2025-08-07 ENCOUNTER — TUMOR BOARD CONFERENCE (OUTPATIENT)
Dept: OTOLARYNGOLOGY | Facility: CLINIC | Age: 60
End: 2025-08-07
Payer: MEDICARE

## 2025-08-07 VITALS
SYSTOLIC BLOOD PRESSURE: 144 MMHG | RESPIRATION RATE: 16 BRPM | TEMPERATURE: 98 F | BODY MASS INDEX: 27.83 KG/M2 | OXYGEN SATURATION: 97 % | DIASTOLIC BLOOD PRESSURE: 82 MMHG | HEART RATE: 58 BPM | HEIGHT: 72 IN | WEIGHT: 205.5 LBS

## 2025-08-07 DIAGNOSIS — C10.9 OROPHARYNGEAL CANCER: Primary | ICD-10-CM

## 2025-08-07 PROCEDURE — 3044F HG A1C LEVEL LT 7.0%: CPT | Mod: CPTII,S$GLB,, | Performed by: STUDENT IN AN ORGANIZED HEALTH CARE EDUCATION/TRAINING PROGRAM

## 2025-08-07 PROCEDURE — 99024 POSTOP FOLLOW-UP VISIT: CPT | Mod: S$GLB,,, | Performed by: STUDENT IN AN ORGANIZED HEALTH CARE EDUCATION/TRAINING PROGRAM

## 2025-08-07 PROCEDURE — 1159F MED LIST DOCD IN RCRD: CPT | Mod: CPTII,S$GLB,, | Performed by: STUDENT IN AN ORGANIZED HEALTH CARE EDUCATION/TRAINING PROGRAM

## 2025-08-07 PROCEDURE — 3072F LOW RISK FOR RETINOPATHY: CPT | Mod: CPTII,S$GLB,, | Performed by: STUDENT IN AN ORGANIZED HEALTH CARE EDUCATION/TRAINING PROGRAM

## 2025-08-07 PROCEDURE — 3079F DIAST BP 80-89 MM HG: CPT | Mod: CPTII,S$GLB,, | Performed by: STUDENT IN AN ORGANIZED HEALTH CARE EDUCATION/TRAINING PROGRAM

## 2025-08-07 PROCEDURE — 4010F ACE/ARB THERAPY RXD/TAKEN: CPT | Mod: CPTII,S$GLB,, | Performed by: STUDENT IN AN ORGANIZED HEALTH CARE EDUCATION/TRAINING PROGRAM

## 2025-08-07 PROCEDURE — 99999 PR PBB SHADOW E&M-EST. PATIENT-LVL V: CPT | Mod: PBBFAC,,, | Performed by: STUDENT IN AN ORGANIZED HEALTH CARE EDUCATION/TRAINING PROGRAM

## 2025-08-07 PROCEDURE — 3077F SYST BP >= 140 MM HG: CPT | Mod: CPTII,S$GLB,, | Performed by: STUDENT IN AN ORGANIZED HEALTH CARE EDUCATION/TRAINING PROGRAM

## 2025-08-07 RX ORDER — SODIUM CHLORIDE 0.9 % (FLUSH) 0.9 %
10 SYRINGE (ML) INJECTION
OUTPATIENT
Start: 2025-08-07

## 2025-08-07 NOTE — PROGRESS NOTES
Presenting Hospital / Clinic: [facility]  Presenter: Dr Fried  Date Presented to Tumor Board: 08/07/25  Specialties Present: Medical Oncology; Radiation Oncology; Navigation; Pathology; Radiology; Head and Neck; Nutrition; Speech Pathology  Cancer Type: Head and neck cancer (SCC base of tongue and tonsil)  Recommended Plan Note: s/p TORS and neck dissection. Pathology and imaging reviewed. Will follow to discuss re-resection of tonsillar lesion followed by radiation therapy. If does not want another surgery - can consider chemo and radiation.

## 2025-08-08 NOTE — PROGRESS NOTES
Note to patients: In accordance with the  Century Cures Act, patients are now granted immediate electronic access to their medical records. This note is primarily intended for communication among medical professionals. As a result, it may incorporate medical terminology, abbreviations, or language that could appear blunt or unfamiliar. If you have questions about this document, we encourage you to discuss it with your physician.      [facility]  Head & Neck Surgical Oncology Clinic    Patient: Cade Johnston    : 1965    MRN: [id number]  Primary Care Provider: Joni Smith MD  Referring Provider: Dr. Rodriguez  Date of Encounter: 25    DIAGNOSIS:    Cancer Staging   Oropharyngeal cancer  Staging form: Pharynx - HPV-Mediated Oropharynx, AJCC 8th Edition  - Clinical stage from 2025: Stage I (cT2, cN1, cM0, p16+) - Signed by Eda Fried MD on 2025  - Pathologic stage from 2025: Stage I (pT2, pN1, cM0, p16+) - Signed by Eda Fried MD on 2025      CC:   Chief Complaint   Patient presents with    Pre-op Exam     Oropharyngeal mass         HPI:   Cade Johnston is a 59 y.o. male presenting for newly diagnosed oropharyngeal cancer. He reports persistent foreign body sensation in the left side of the throat starting several weeks ago, describing it as feeling similar to a popcorn husk. The sensation is most noticeable when brushing teeth and triggers a mild gag reflex. He denies throat soreness, pain, difficulties with eating, or masses or lesions in the neck    He has a history of sleep apnea for several years, cardiac stent placed 10 years ago, and sarcoidosis in lungs. Possible asbestosis was identified on CT in October, as noted by Dr. Espinosa. He underwent cholecystectomy in October. During the surgical evaluation, a CT reportedly indicated potential findings suggestive of asbestosis, as communicated by Dr. Espinosa.   He currently takes Plavix for a stent placed  10 years ago. He was previously on aspirin but this was discontinued by his provider.     He is a former smoker with a history of smoking one pack per day for six years, having quit 40 years ago.     Patient lives in [place], [place].    Interval: Patient presents today for post-operative follow-up after TORS/ND 7/25/25. He had a very bad experience with inattentive nursing care in the [facility], and so he requested discharge sooner than needed. He reports significant distress during the admission related to bed positioning and delayed staff response to call button requests. He promptly returned to [facility] for admission for IV pain control and IVF. He reports gradual improvement in recovery with increasing energy levels. He is able to consume soft foods including pancakes and spinach. He experiences throat soreness rated at 4/10 and notes tight neck movement, which he describes as tolerable.     His case was discussed at  with recommendation for surgical re-resection, with discussion for RT versus observation.         TREATMENT HISTORY:  DL/Bx with GARY Turcios ND 7/25/25    SUBSTANCE USE:  Smoking: former, 6 pack year, quit 40 years ago    ALLERGIES:  Review of patient's allergies indicates:   Allergen Reactions    Metformin      Severe abdominal bloating; diarrhea    Codeine Hives    Latex Rash     blisters         MEDICATIONS:  Current Medications[1]    PAST MEDICAL HISTORY:  Past Medical History:   Diagnosis Date    Anticoagulant long-term use     Cancer of skin of left ear     Colon polyp     Coronary artery disease, nonobstructive     DJD (degenerative joint disease) of lumbar spine     DM type 2 (diabetes mellitus, type 2)     with diet alone    Dyslipidemia 08/13/2019    Essential hypertension 12/29/2015    Fatty liver     GERD (gastroesophageal reflux disease)     Hepatosplenomegaly     HTN (hypertension)     Hyperlipidemia     resolved with diet    Hypogonadism male 02/17/2015    Lumbar radiculopathy     Lumbar  spondylosis     Nephrolithiasis     last stone 1/11    VÍCTOR on CPAP     Palpitations 03/09/2016    Sarcoidosis of lung 1990    Ventricular hypokinesis     angiogram with mild LAD stenosis        PAST SURGICAL HISTORY:  Past Surgical History:   Procedure Laterality Date    ANGIOGRAM, CORONARY, WITH LEFT HEART CATHETERIZATION N/A 08/16/2022    Procedure: Angiogram, Coronary, with Left Heart Cath;  Surgeon: Refugio Mcdonough MD;  Location: [facility] CATH;  Service: Cardiology;  Laterality: N/A;    CARDIAC SURGERY      CIRCUMCISION      COLONOSCOPY N/A 01/08/2024    Procedure: COLONOSCOPY;  Surgeon: HALLEY Montes MD;  Location: [facility] ENDO;  Service: Endoscopy;  Laterality: N/A;    CORONARY ANGIOGRAPHY N/A 12/13/2019    Procedure: ANGIOGRAM, CORONARY ARTERY;  Surgeon: Amador Duran MD;  Location: [facility] CATH;  Service: Cardiology;  Laterality: N/A;    CORONARY STENT PLACEMENT  12/2019    x1    CYST REMOVAL Right 05/2022    thigh, Dr. Parkinson    elbow spur removal      right    EPIDURAL STEROID INJECTION INTO LUMBAR SPINE N/A 02/10/2021    Procedure: Injection-steroid-epidural-lumbar L4/5;  Surgeon: Richi Rangel MD;  Location: [facility] OR;  Service: Pain Management;  Laterality: N/A;    EPIDURAL STEROID INJECTION INTO LUMBAR SPINE N/A 03/15/2021    Procedure: Injection-steroid-epidural-lumbar L5/S1;  Surgeon: Richi Rangel MD;  Location: [facility] OR;  Service: Pain Management;  Laterality: N/A;    EPIDURAL STEROID INJECTION INTO LUMBAR SPINE N/A 10/16/2023    Procedure: Injection-steroid-epidural-lumbar;  Surgeon: Richi Rangel MD;  Location: [facility] OR;  Service: Pain Management;  Laterality: N/A;  L5/S1    EXTRACORPOREAL SHOCK WAVE LITHOTRIPSY  1996    [facility]    LARYNGOSCOPY, DIRECT, WITH BIOPSY IF INDICATED Bilateral 6/16/2025    Procedure: LARYNGOSCOPY, DIRECT, WITH BIOPSY IF INDICATED;  Surgeon: Denise Rodriguez MD;  Location: [facility] OR;  Service: ENT;  Laterality: Bilateral;    LEFT HEART CATHETERIZATION Left  12/13/2019    Procedure: Left heart cath;  Surgeon: Amador Duran MD;  Location: [facility] CATH;  Service: Cardiology;  Laterality: Left;    MODIFIED RADICAL NECK DISSECTION Left 7/25/2025    Procedure: DISSECTION, NECK, MODIFIED RADICAL;  Surgeon: Eda Fried MD;  Location: [address];  Service: ENT;  Laterality: Left;    removal of skin cancer      L side of face    ROBOT-ASSISTED CHOLECYSTECTOMY N/A 10/30/2024    Procedure: ROBOTIC CHOLECYSTECTOMY;  Surgeon: Michael Mg MD;  Location: [facility] OR;  Service: General;  Laterality: N/A;    ROBOT-ASSISTED TRANSORAL SURGERY Left 7/25/2025    Procedure: ROBOTIC TRANSORAL SURGERY (TORS);  Surgeon: Eda Fried MD;  [facility];  Service: ENT;  Laterality: Left;    URETERAL EXPLORATION  1996    exploration of the R ureter for ureteral injury [facility] - did well        FAMILY HISTORY:  Family History   Problem Relation Name Age of Onset    Hypertension Mother      Fibromyalgia Mother      Coronary artery disease Father      Diabetes Father      Liver disease Brother          hepatitis C from a blood transufusion    Colon cancer Neg Hx      Crohn's disease Neg Hx      Ulcerative colitis Neg Hx      Stomach cancer Neg Hx      Esophageal cancer Neg Hx      Glaucoma Neg Hx      Macular degeneration Neg Hx         SOCIAL HISTORY:  Social History[2]  See above substance history    REVIEW OF SYSTEMS:   Comprehensive review of systems was discussed with the patient.  It is positive only for the above complaints.    PHYSICAL EXAMINATION:  Blood pressure (!) 144/82, pulse (!) 58, temperature 98 °F (36.7 °C), temperature source Temporal, resp. rate 16, height 6' (1.829 m), weight 93.2 kg (205 lb 7.5 oz), SpO2 97%.    Constitutional: Non-toxic appearing.   Psychiatric: Appropriate mood and affect. Cooperative.  Voice: Non-dysphonic, speaking in full sentences.   Neurologic: Cranial nerves grossly intact, no focal deficits.  Head and face:  Salivary glands are not enlarged. Face is symmetric. CN VII strength intact.  Skin: No concerning skin lesions.   Eyes: Vision grossly intact, bilateral extraocular movements intact  Ears: Bilateral pinna, mastoid, external ear canal normal. Hearing intact.   Nose: External nose appears normal.   Lips: No ulcers or lesions  Oropharynx: Eschar wnl  Neck: Neck incision c/d/i  Respiratory: Chest expansion symmetric, no audible stridor or stertor. Breathing is unlabored. No active cough.    CLINICAL PHOTOS:        PROCEDURES:    FLEXIBLE LARYNGOSCOPY (preop)  Provider: Eda Fried MD  Indication: History of cancer   The patient was unable to tolerate mirror laryngoscopy.  The procedure was explained to the patient and verbal consent was obtained.   Anesthesia: topical lidocaine and neosynephrine applied within one or both nares with an atomizer    The scope was introduced in the usual fashion.    Findings:  Mucosa: normal  Inferior turbinates: no polypoid changes or hypertrophy  Septum: no lesion or ulcer  Middle meatus: no purulence or polypoid change  Nasopharynx:  Adenoids: normal  Fossa of Rosenmuller: normal  Eustachian tubes: normal  Superior and posterior pharyngeal walls: normal   Epiglottis, vallecula, and base of tongue: sessile lesion at the base of tongue, sparing vallecula, extending from left inferior tonsil within glossotonsillar sulcus  Pyriform sinuses: no pooling of secretions or saliva in pyriform sinuses, mucosa normal  False vocal cords, arytenoids, aryepiglottic folds: normal  True vocal cords are symmetrically mobile on phonation and inspiration.   Laryngeal sensation appears intact. There are no masses or ulcerations.     The scope was withdrawn. The patient tolerated the procedure well with no complications.      DATA REVIEWED:     LABORATORY:      Latest Ref Rng & Units 7/27/2025     6:00 AM 7/28/2025     5:52 AM 7/29/2025     9:19 AM   Thyroid Labs   Sodium 136 - 145 mmol/L 138  136  139     Potassium 3.5 - 5.1 mmol/L 4.5  3.8  4.1    Chloride 95 - 110 mmol/L 109  106  104    Carbon Dioxide 23 - 29 mmol/L 20  21  27    Glucose 70 - 110 mg/dL 149  154  146    Blood Urea Nitrogen 8 - 23 mg/dL 16  21  15    Creatinine 0.50 - 1.40 mg/dL 0.8  0.8  0.68    Calcium 8.7 - 10.5 mg/dL 8.4  8.0  8.4    Total Protein 6.0 - 8.4 g/dL   6.7    Albumin 3.5 - 5.2 g/dL   3.5    Total Bilirubin 0.2 - 1.0 mg/dL   1.9    AST 10 - 40 U/L   20    ALT 10 - 44 U/L   28    Anion Gap 8 - 16 mmol/L 9  9  8    WBC 3.90 - 12.70 K/uL 19.29  9.82  8.00    RBC 4.60 - 6.20 M/uL 6.04  5.76  6.01    Hemoglobin 14.0 - 18.0 g/dL 17.1  16.4  17.2    Hematocrit 40.0 - 54.0 % 54.2  50.9  51.4    MCV 82 - 98 fL 90  88  86    MCH 27.0 - 31.0 pg 28.3  28.5  28.6    MCHC 32.0 - 36.0 g/dL 31.5  32.2  33.5    RDW 11.5 - 14.5 % 13.8  14.1  13.2    Platelets 150 - 450 K/uL 183  163  156    MPV 9.2 - 12.9 fL 10.5  10.4  10.1    Gran # 1.8 - 7.7 K/uL 16.18  7.14  6.1    Lymph # 1.0 - 4.8 K/uL 1.22  1.25  1.0    Mono # 0.3 - 1.0 K/uL 1.67  1.30  0.8    Eos # 0.0 - 0.5 K/uL 0.01  0.04  0.1    Baso # 0.00 - 0.20 K/uL 0.05  0.04  0.04    Gran % 38.0 - 73.0 %   76.1    Lymph % 18.0 - 48.0 % 6.3  12.7  12.0    Mono% 4.0 - 15.0 % 8.7  13.2  10.1    Eos % 0.0 - 8.0 % 0.1  0.4  0.8    Baso % 0.0 - 1.9 % 0.3  0.4  0.5         PATHOLOGY:  7/30/35  1. Throat, Left Oropharyngeal Tumor, Transoral Surgery (TORS) Left Base of Tongue Resection:     - Multifocal oropharyngeal squamous cell carcinoma, HPV-associated. See Comment.    - Size in greatest dimension of largest focus: 2.0 cm    - Lymphovascular invasion: Not identified.    - Perineural invasion: Not identified.    - Margins: The deep margin for Lesion 2 is focally POSITIVE for tumor.    - Primary tumor stage: pT2(m)     2. Tongue, Additional Anterior Base of Tongue, Biopsy:     - Benign squamous mucosa with partial cautery artifact and associated chronic inflammation.  - Benign salivary gland tissue, adipose  tissue, and skeletal muscle.  - Negative for dysplasia and malignancy.     3. Lymph Nodes, Left Neck Levels 2, 3, and 4, Modified Dissection:     - One of fourteen lymph nodes POSITIVE for metastatic carcinoma (1/14).    - Size of largest metastatic deposit: 2.4 cm.     4. Lymph Nodes, Left Neck Level 2B, Modified Dissection:     - Five benign lymph nodes, negative for malignancy (0/5).     5. Lymph Nodes, Additional Left Level 4 Neck, Modified Dissection:     - Two benign lymph nodes, negative for malignancy (0/2).      6/16/25  1. Tongue, left base, biopsy:  - Squamous cell carcinoma, HPV-associated  - See comment     2. Tonsil, left, mass, biopsy:  - Squamous cell carcinoma, HPV-associated  - See comment    IMAGING:  CT Neck 6/12/25  1. Enhancing soft tissue lesion involving the left palatine tonsillar fossa, left glossotonsillar sulcus and base of tongue, worrisome for primary oropharyngeal malignancy.  2. Left level II metastatic lymph node with central necrosis/cystic change.  3. Few additional non pathologically enlarged left level II/III lymph nodes with rounded morphology.  4. Multiple scattered non pathologically enlarged calcified superior mediastinal lymph nodes, nonspecific and may reflect sequelae of prior granulomatous disease.  This report was flagged in Epic as abnormal.    PET 6/26/25  Known left oropharyngeal cancer involving the tonsil and base of tongue with findings concerning for metastatic level 2 left IJ lymph nodes.  No sites of distant disease are identified.     New non hypermetabolic 12 mm left lung nodule.  This is suspected to lie within an accessory fissure and may correspond to focal pleural thickening or an intra fissural lymph node.  A new lung nodule remains in the differential and follow-up with conventional chest CT would be helpful.  Additional findings in the thorax are unchanged and likely related to known sarcoidosis.     Asymmetric left lateral urinary bladder wall  thickening of uncertain etiology.    RADIOLOGY REVIEWED:  I have independently viewed and agree with the CT and PET images and reports which demonstrate left oropharyngeal cancer as well as suspected involved left level 2 node. Pleural lesion more c/w worsening sarcoid per discussion at Tumor Board.    ASSESSMENT AND PLAN:  1. Oropharyngeal cancer         Cade Johnston is a 59 y.o. male with <10 pack year smoking history with Stage 1 p16+ SCC of the left oropharynx, s/p TORS/ND 7/2025.    MALIGNANT NEOPLASM OF OROPHARYNX:  - Pathology report revealed mixed results: 1 positive lymph node, no evidence of perineural or lymphovascular invasion, but deep margin focally positive on tonsil with cautery artifact.  - Multifocal cancer (tonsil and tongue) without connection raises concern for higher risk.  - Recommend revision surgery to ensure complete removal of potential residual cancer cells.  - Considered radiation therapy due to multifocal nature of cancer, but at potentially reduced dose given relatively low-risk features.  - Chemotherapy not indicated unless significant cancer found during revision surgery.  - Explained pathology report findings, including positive and negative prognostic factors.  - Discussed rationale for considering additional surgery and radiation therapy.  - Provided information on expected side effects and recovery process for revision surgery and potential radiation therapy.    FOLLOW-UP AND PROCEDURES:  - Scheduled revision surgery for the following Tuesday  - Post-op, patient to stay overnight in the PCU with morning evaluation to determine further care needs.  - Referred to Dr. Ho, radiation oncologist, for consultation on potential radiation therapy.  - Office will confirm exact surgery time and provide further details.        Patient encouraged to call with any questions, concerns, or new or worsening symptoms.     Follow up for surgical revision       [1]   Current Outpatient  Medications:     acetaminophen (TYLENOL) 160 mg/5 mL Liqd, Take 30 mLs by mouth daily as needed (for post-op pain)., Disp: , Rfl:     albuterol (PROVENTIL/VENTOLIN HFA) 90 mcg/actuation inhaler, Inhale 2 puffs into the lungs every 6 (six) hours as needed for Wheezing. Rescue, Disp: 1 Inhaler, Rfl: 2    azelastine (ASTELIN) 137 mcg (0.1 %) nasal spray, 1 spray (137 mcg total) by Nasal route 2 (two) times daily., Disp: 30 mL, Rfl: 11    clopidogreL (PLAVIX) 75 mg tablet, TAKE 1 TABLET BY MOUTH EVERY DAY (Patient taking differently: Take 75 mg by mouth once daily.), Disp: 90 tablet, Rfl: 4    famotidine (PEPCID) 40 MG tablet, Take 1 tablet (40 mg total) by mouth once daily., Disp: 30 tablet, Rfl: 0    fluticasone propionate (FLONASE) 50 mcg/actuation nasal spray, 1 spray (50 mcg total) by Each Nostril route 2 (two) times a day., Disp: 16 g, Rfl: 11    ipratropium-albuteroL (COMBIVENT)  mcg/actuation inhaler, Inhale 1 puff into the lungs every 4 (four) hours as needed for Shortness of Breath. Rescue, Disp: 4 g, Rfl: 4    Lactobacillus acidophilus (PROBIOTIC ACIDOPHILUS ORAL), Take by mouth., Disp: , Rfl:     losartan (COZAAR) 50 MG tablet, Take 1 tablet (50 mg total) by mouth once daily., Disp: 90 tablet, Rfl: 3    lubiprostone (AMITIZA) 8 MCG Cap, Take 8 mcg by mouth 2 (two) times daily., Disp: , Rfl:     magic mouthwash diphen/antac/lidoc, Swish and spit 10 mLs every 4 (four) hours as needed (gargle for 10 seconds and spit)., Disp: 450 mL, Rfl: 0    omeprazole (PRILOSEC) 40 MG capsule, Take 1 capsule (40 mg total) by mouth once daily., Disp: 30 capsule, Rfl: 0    oxyCODONE (ROXICODONE) 5 mg/5 mL Soln, Take 10 mLs (10 mg total) by mouth every 4 (four) hours as needed., Disp: 420 mL, Rfl: 0    polyethylene glycol (GLYCOLAX) 17 gram/dose powder, Use cap to measure 17 grams.  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     Employer: [employer]   Tobacco Use    Smoking status: Former     Current packs/day: 0.00     Types: Cigarettes     Quit date: 1991     Years since quittin.6     Passive exposure: Past    Smokeless tobacco: Never   Substance and Sexual Activity    Alcohol use: No    Drug use: No   Social History Narrative    ** Merged History Encounter **          Social Drivers of Health     Financial Resource Strain: Low Risk  (2025)    Overall Financial Resource Strain (CARDIA)     Difficulty of Paying Living Expenses: Not hard at all   Food Insecurity: No Food Insecurity (2025)    Hunger Vital Sign     Worried About Running Out of Food in the Last Year: Never true     Ran Out of Food in the Last Year: Never true   Transportation Needs: No Transportation Needs (2025)    PRAPARE - Transportation     Lack of Transportation (Medical): No     Lack of Transportation (Non-Medical): No   Physical Activity: Inactive (2025)    Exercise Vital Sign     Days of Exercise per Week: 0 days     Minutes of Exercise per Session: 0 min   Stress: No Stress Concern Present (2025)    Citizen of [place] [employer] - Occupational Stress Questionnaire     Feeling of Stress : Only a little   Housing Stability: Low Risk  (2025)    Housing Stability Vital Sign     Unable to Pay for Housing in the Last Year: No     Number of Times Moved in the Last Year: 0     Homeless in the Last Year: No

## 2025-08-11 ENCOUNTER — PATIENT MESSAGE (OUTPATIENT)
Dept: OTOLARYNGOLOGY | Facility: CLINIC | Age: 60
End: 2025-08-11
Payer: MEDICARE

## 2025-08-12 ENCOUNTER — ANESTHESIA EVENT (OUTPATIENT)
Dept: SURGERY | Facility: HOSPITAL | Age: 60
End: 2025-08-12
Payer: MEDICARE

## 2025-08-12 ENCOUNTER — ANESTHESIA (OUTPATIENT)
Dept: SURGERY | Facility: HOSPITAL | Age: 60
End: 2025-08-12
Payer: MEDICARE

## 2025-08-12 ENCOUNTER — DOCUMENTATION ONLY (OUTPATIENT)
Dept: HEMATOLOGY/ONCOLOGY | Facility: CLINIC | Age: 60
End: 2025-08-12
Payer: MEDICARE

## 2025-08-12 ENCOUNTER — HOSPITAL ENCOUNTER (OUTPATIENT)
Facility: HOSPITAL | Age: 60
LOS: 1 days | Discharge: HOME | End: 2025-08-13
Attending: STUDENT IN AN ORGANIZED HEALTH CARE EDUCATION/TRAINING PROGRAM | Admitting: STUDENT IN AN ORGANIZED HEALTH CARE EDUCATION/TRAINING PROGRAM
Payer: MEDICARE

## 2025-08-12 DIAGNOSIS — C10.9 OROPHARYNGEAL CANCER: ICD-10-CM

## 2025-08-12 DIAGNOSIS — C01 SQUAMOUS CELL CARCINOMA OF BASE OF TONGUE: ICD-10-CM

## 2025-08-12 LAB
POCT GLUCOSE: 122 MG/DL (ref 70–110)
POCT GLUCOSE: 140 MG/DL (ref 70–110)
POCT GLUCOSE: 211 MG/DL (ref 70–110)

## 2025-08-12 PROCEDURE — 63600175 PHARM REV CODE 636 W HCPCS

## 2025-08-12 PROCEDURE — 37000009 HC ANESTHESIA EA ADD 15 MINS: Performed by: STUDENT IN AN ORGANIZED HEALTH CARE EDUCATION/TRAINING PROGRAM

## 2025-08-12 PROCEDURE — 25000242 PHARM REV CODE 250 ALT 637 W/ HCPCS

## 2025-08-12 PROCEDURE — 82962 GLUCOSE BLOOD TEST: CPT | Performed by: STUDENT IN AN ORGANIZED HEALTH CARE EDUCATION/TRAINING PROGRAM

## 2025-08-12 PROCEDURE — 88342 IMHCHEM/IMCYTCHM 1ST ANTB: CPT | Mod: TC,91 | Performed by: STUDENT IN AN ORGANIZED HEALTH CARE EDUCATION/TRAINING PROGRAM

## 2025-08-12 PROCEDURE — 25000003 PHARM REV CODE 250: Performed by: STUDENT IN AN ORGANIZED HEALTH CARE EDUCATION/TRAINING PROGRAM

## 2025-08-12 PROCEDURE — 20600001 HC STEP DOWN PRIVATE ROOM

## 2025-08-12 PROCEDURE — 37000008 HC ANESTHESIA 1ST 15 MINUTES: Performed by: STUDENT IN AN ORGANIZED HEALTH CARE EDUCATION/TRAINING PROGRAM

## 2025-08-12 PROCEDURE — 41120 PARTIAL REMOVAL OF TONGUE: CPT | Mod: 58,,, | Performed by: STUDENT IN AN ORGANIZED HEALTH CARE EDUCATION/TRAINING PROGRAM

## 2025-08-12 PROCEDURE — 63600175 PHARM REV CODE 636 W HCPCS: Performed by: STUDENT IN AN ORGANIZED HEALTH CARE EDUCATION/TRAINING PROGRAM

## 2025-08-12 PROCEDURE — 71000033 HC RECOVERY, INTIAL HOUR: Performed by: STUDENT IN AN ORGANIZED HEALTH CARE EDUCATION/TRAINING PROGRAM

## 2025-08-12 PROCEDURE — 71000016 HC POSTOP RECOV ADDL HR: Performed by: STUDENT IN AN ORGANIZED HEALTH CARE EDUCATION/TRAINING PROGRAM

## 2025-08-12 PROCEDURE — 25000003 PHARM REV CODE 250

## 2025-08-12 PROCEDURE — 63600175 PHARM REV CODE 636 W HCPCS: Performed by: ANESTHESIOLOGY

## 2025-08-12 PROCEDURE — 27201423 OPTIME MED/SURG SUP & DEVICES STERILE SUPPLY: Performed by: STUDENT IN AN ORGANIZED HEALTH CARE EDUCATION/TRAINING PROGRAM

## 2025-08-12 PROCEDURE — 36000711: Performed by: STUDENT IN AN ORGANIZED HEALTH CARE EDUCATION/TRAINING PROGRAM

## 2025-08-12 PROCEDURE — 36000710: Performed by: STUDENT IN AN ORGANIZED HEALTH CARE EDUCATION/TRAINING PROGRAM

## 2025-08-12 PROCEDURE — 71000015 HC POSTOP RECOV 1ST HR: Performed by: STUDENT IN AN ORGANIZED HEALTH CARE EDUCATION/TRAINING PROGRAM

## 2025-08-12 RX ORDER — IBUPROFEN 200 MG
16 TABLET ORAL
Status: DISCONTINUED | OUTPATIENT
Start: 2025-08-12 | End: 2025-08-13 | Stop reason: HOSPADM

## 2025-08-12 RX ORDER — ACETAMINOPHEN 325 MG/1
650 TABLET ORAL EVERY 6 HOURS
Status: DISCONTINUED | OUTPATIENT
Start: 2025-08-12 | End: 2025-08-12

## 2025-08-12 RX ORDER — PHENYLEPHRINE HCL IN 0.9% NACL 1 MG/10 ML
SYRINGE (ML) INTRAVENOUS
Status: DISCONTINUED | OUTPATIENT
Start: 2025-08-12 | End: 2025-08-12

## 2025-08-12 RX ORDER — TALC
6 POWDER (GRAM) TOPICAL NIGHTLY PRN
Status: DISCONTINUED | OUTPATIENT
Start: 2025-08-12 | End: 2025-08-13 | Stop reason: HOSPADM

## 2025-08-12 RX ORDER — SODIUM CHLORIDE 0.9 % (FLUSH) 0.9 %
10 SYRINGE (ML) INJECTION
Status: DISCONTINUED | OUTPATIENT
Start: 2025-08-12 | End: 2025-08-12 | Stop reason: HOSPADM

## 2025-08-12 RX ORDER — PROPOFOL 10 MG/ML
VIAL (ML) INTRAVENOUS
Status: DISCONTINUED | OUTPATIENT
Start: 2025-08-12 | End: 2025-08-12

## 2025-08-12 RX ORDER — HYDROMORPHONE HYDROCHLORIDE 1 MG/ML
0.5 INJECTION, SOLUTION INTRAMUSCULAR; INTRAVENOUS; SUBCUTANEOUS EVERY 6 HOURS PRN
Status: DISCONTINUED | OUTPATIENT
Start: 2025-08-12 | End: 2025-08-13 | Stop reason: HOSPADM

## 2025-08-12 RX ORDER — ONDANSETRON HYDROCHLORIDE 2 MG/ML
4 INJECTION, SOLUTION INTRAVENOUS EVERY 8 HOURS PRN
Status: DISCONTINUED | OUTPATIENT
Start: 2025-08-12 | End: 2025-08-13 | Stop reason: HOSPADM

## 2025-08-12 RX ORDER — SUCRALFATE 1 G/10ML
2 SUSPENSION ORAL EVERY 6 HOURS
Status: DISCONTINUED | OUTPATIENT
Start: 2025-08-12 | End: 2025-08-13 | Stop reason: HOSPADM

## 2025-08-12 RX ORDER — LUBIPROSTONE 0.01 MG/1
8 CAPSULE ORAL 2 TIMES DAILY
Status: DISCONTINUED | OUTPATIENT
Start: 2025-08-12 | End: 2025-08-13 | Stop reason: HOSPADM

## 2025-08-12 RX ORDER — OXYCODONE HYDROCHLORIDE 5 MG/1
5 TABLET ORAL
Status: DISCONTINUED | OUTPATIENT
Start: 2025-08-12 | End: 2025-08-12 | Stop reason: HOSPADM

## 2025-08-12 RX ORDER — OXYMETAZOLINE HCL 0.05 %
SPRAY, NON-AEROSOL (ML) NASAL
Status: DISCONTINUED | OUTPATIENT
Start: 2025-08-12 | End: 2025-08-12

## 2025-08-12 RX ORDER — ONDANSETRON HYDROCHLORIDE 2 MG/ML
INJECTION, SOLUTION INTRAVENOUS
Status: DISCONTINUED | OUTPATIENT
Start: 2025-08-12 | End: 2025-08-12

## 2025-08-12 RX ORDER — GLUCAGON 1 MG
1 KIT INJECTION
Status: DISCONTINUED | OUTPATIENT
Start: 2025-08-12 | End: 2025-08-13 | Stop reason: HOSPADM

## 2025-08-12 RX ORDER — DEXAMETHASONE SODIUM PHOSPHATE 4 MG/ML
8 INJECTION, SOLUTION INTRA-ARTICULAR; INTRALESIONAL; INTRAMUSCULAR; INTRAVENOUS; SOFT TISSUE EVERY 8 HOURS
Status: COMPLETED | OUTPATIENT
Start: 2025-08-12 | End: 2025-08-13

## 2025-08-12 RX ORDER — DEXAMETHASONE SODIUM PHOSPHATE 4 MG/ML
INJECTION, SOLUTION INTRA-ARTICULAR; INTRALESIONAL; INTRAMUSCULAR; INTRAVENOUS; SOFT TISSUE
Status: DISCONTINUED | OUTPATIENT
Start: 2025-08-12 | End: 2025-08-12

## 2025-08-12 RX ORDER — OXYMETAZOLINE HCL 0.05 %
3 SPRAY, NON-AEROSOL (ML) NASAL 2 TIMES DAILY PRN
Status: DISCONTINUED | OUTPATIENT
Start: 2025-08-12 | End: 2025-08-13 | Stop reason: HOSPADM

## 2025-08-12 RX ORDER — OXYCODONE HCL 5 MG/5 ML
10 SOLUTION, ORAL ORAL EVERY 4 HOURS PRN
Status: DISCONTINUED | OUTPATIENT
Start: 2025-08-12 | End: 2025-08-13 | Stop reason: HOSPADM

## 2025-08-12 RX ORDER — ACETAMINOPHEN 10 MG/ML
INJECTION, SOLUTION INTRAVENOUS
Status: DISCONTINUED | OUTPATIENT
Start: 2025-08-12 | End: 2025-08-12

## 2025-08-12 RX ORDER — OXYCODONE HCL 5 MG/5 ML
5 SOLUTION, ORAL ORAL EVERY 4 HOURS PRN
Status: DISCONTINUED | OUTPATIENT
Start: 2025-08-12 | End: 2025-08-13 | Stop reason: HOSPADM

## 2025-08-12 RX ORDER — MIDAZOLAM HYDROCHLORIDE 1 MG/ML
INJECTION INTRAMUSCULAR; INTRAVENOUS
Status: DISCONTINUED | OUTPATIENT
Start: 2025-08-12 | End: 2025-08-12

## 2025-08-12 RX ORDER — DEXMEDETOMIDINE HYDROCHLORIDE 100 UG/ML
INJECTION, SOLUTION INTRAVENOUS
Status: DISCONTINUED | OUTPATIENT
Start: 2025-08-12 | End: 2025-08-12

## 2025-08-12 RX ORDER — ACETAMINOPHEN 325 MG/1
650 TABLET ORAL EVERY 6 HOURS
Status: DISCONTINUED | OUTPATIENT
Start: 2025-08-12 | End: 2025-08-13 | Stop reason: HOSPADM

## 2025-08-12 RX ORDER — ACETAMINOPHEN 325 MG/1
650 TABLET ORAL EVERY 6 HOURS PRN
Status: DISCONTINUED | OUTPATIENT
Start: 2025-08-12 | End: 2025-08-12

## 2025-08-12 RX ORDER — PANTOPRAZOLE SODIUM 40 MG/10ML
40 INJECTION, POWDER, LYOPHILIZED, FOR SOLUTION INTRAVENOUS DAILY
Status: DISCONTINUED | OUTPATIENT
Start: 2025-08-12 | End: 2025-08-13 | Stop reason: HOSPADM

## 2025-08-12 RX ORDER — GLUCAGON 1 MG
1 KIT INJECTION
Status: DISCONTINUED | OUTPATIENT
Start: 2025-08-12 | End: 2025-08-12 | Stop reason: HOSPADM

## 2025-08-12 RX ORDER — FENTANYL CITRATE 50 UG/ML
25 INJECTION, SOLUTION INTRAMUSCULAR; INTRAVENOUS EVERY 5 MIN PRN
Status: COMPLETED | OUTPATIENT
Start: 2025-08-12 | End: 2025-08-12

## 2025-08-12 RX ORDER — SODIUM CHLORIDE AND POTASSIUM CHLORIDE 150; 900 MG/100ML; MG/100ML
INJECTION, SOLUTION INTRAVENOUS CONTINUOUS
Status: DISCONTINUED | OUTPATIENT
Start: 2025-08-12 | End: 2025-08-13

## 2025-08-12 RX ORDER — FENTANYL CITRATE 50 UG/ML
INJECTION, SOLUTION INTRAMUSCULAR; INTRAVENOUS
Status: DISCONTINUED | OUTPATIENT
Start: 2025-08-12 | End: 2025-08-12

## 2025-08-12 RX ORDER — INSULIN ASPART 100 [IU]/ML
0-5 INJECTION, SOLUTION INTRAVENOUS; SUBCUTANEOUS
Status: DISCONTINUED | OUTPATIENT
Start: 2025-08-12 | End: 2025-08-13 | Stop reason: HOSPADM

## 2025-08-12 RX ORDER — SODIUM CHLORIDE 0.9 % (FLUSH) 0.9 %
10 SYRINGE (ML) INJECTION
Status: DISCONTINUED | OUTPATIENT
Start: 2025-08-12 | End: 2025-08-12

## 2025-08-12 RX ORDER — FAMOTIDINE 40 MG/5ML
20 POWDER, FOR SUSPENSION ORAL 2 TIMES DAILY
Status: DISCONTINUED | OUTPATIENT
Start: 2025-08-12 | End: 2025-08-13 | Stop reason: HOSPADM

## 2025-08-12 RX ORDER — IBUPROFEN 200 MG
24 TABLET ORAL
Status: DISCONTINUED | OUTPATIENT
Start: 2025-08-12 | End: 2025-08-13 | Stop reason: HOSPADM

## 2025-08-12 RX ORDER — LIDOCAINE HYDROCHLORIDE 20 MG/ML
INJECTION INTRAVENOUS
Status: DISCONTINUED | OUTPATIENT
Start: 2025-08-12 | End: 2025-08-12

## 2025-08-12 RX ORDER — ROCURONIUM BROMIDE 10 MG/ML
INJECTION, SOLUTION INTRAVENOUS
Status: DISCONTINUED | OUTPATIENT
Start: 2025-08-12 | End: 2025-08-12

## 2025-08-12 RX ORDER — HALOPERIDOL LACTATE 5 MG/ML
INJECTION, SOLUTION INTRAMUSCULAR
Status: DISCONTINUED | OUTPATIENT
Start: 2025-08-12 | End: 2025-08-12

## 2025-08-12 RX ORDER — HALOPERIDOL LACTATE 5 MG/ML
0.5 INJECTION, SOLUTION INTRAMUSCULAR EVERY 10 MIN PRN
Status: DISCONTINUED | OUTPATIENT
Start: 2025-08-12 | End: 2025-08-12 | Stop reason: HOSPADM

## 2025-08-12 RX ORDER — KETAMINE HCL IN 0.9 % NACL 50 MG/5 ML
SYRINGE (ML) INTRAVENOUS
Status: DISCONTINUED | OUTPATIENT
Start: 2025-08-12 | End: 2025-08-12

## 2025-08-12 RX ADMIN — AMPICILLIN SODIUM AND SULBACTAM SODIUM 3 G: 2; 1 INJECTION, POWDER, FOR SOLUTION INTRAMUSCULAR; INTRAVENOUS at 10:08

## 2025-08-12 RX ADMIN — Medication 50 MCG: at 03:08

## 2025-08-12 RX ADMIN — OXYMETAZOLINE HYDROCHLORIDE 2 SPRAY: 0.05 SPRAY NASAL at 01:08

## 2025-08-12 RX ADMIN — SODIUM CHLORIDE: 9 INJECTION, SOLUTION INTRAVENOUS at 01:08

## 2025-08-12 RX ADMIN — ROCURONIUM BROMIDE 20 MG: 10 INJECTION, SOLUTION INTRAVENOUS at 02:08

## 2025-08-12 RX ADMIN — MIDAZOLAM HYDROCHLORIDE 1 MG: 2 INJECTION, SOLUTION INTRAMUSCULAR; INTRAVENOUS at 01:08

## 2025-08-12 RX ADMIN — ONDANSETRON 4 MG: 2 INJECTION INTRAMUSCULAR; INTRAVENOUS at 02:08

## 2025-08-12 RX ADMIN — HALOPERIDOL LACTATE 1 MG: 5 INJECTION, SOLUTION INTRAMUSCULAR at 02:08

## 2025-08-12 RX ADMIN — SODIUM CHLORIDE AND POTASSIUM CHLORIDE: 9; 1.49 INJECTION, SOLUTION INTRAVENOUS at 04:08

## 2025-08-12 RX ADMIN — OXYCODONE HYDROCHLORIDE 10 MG: 5 SOLUTION ORAL at 04:08

## 2025-08-12 RX ADMIN — DEXAMETHASONE SODIUM PHOSPHATE 8 MG: 4 INJECTION INTRA-ARTICULAR; INTRALESIONAL; INTRAMUSCULAR; INTRAVENOUS; SOFT TISSUE at 10:08

## 2025-08-12 RX ADMIN — DEXAMETHASONE SODIUM PHOSPHATE 10 MG: 4 INJECTION, SOLUTION INTRAMUSCULAR; INTRAVENOUS at 02:08

## 2025-08-12 RX ADMIN — HALOPERIDOL LACTATE 0.5 MG: 5 INJECTION, SOLUTION INTRAMUSCULAR at 04:08

## 2025-08-12 RX ADMIN — AMPICILLIN SODIUM AND SULBACTAM SODIUM 3 G: 2; 1 INJECTION, POWDER, FOR SOLUTION INTRAMUSCULAR; INTRAVENOUS at 02:08

## 2025-08-12 RX ADMIN — FENTANYL CITRATE 100 MCG: 50 INJECTION, SOLUTION INTRAMUSCULAR; INTRAVENOUS at 01:08

## 2025-08-12 RX ADMIN — SUGAMMADEX 200 MG: 100 INJECTION, SOLUTION INTRAVENOUS at 03:08

## 2025-08-12 RX ADMIN — OXYCODONE HYDROCHLORIDE 5 MG: 5 SOLUTION ORAL at 07:08

## 2025-08-12 RX ADMIN — PROPOFOL 150 MG: 10 INJECTION, EMULSION INTRAVENOUS at 01:08

## 2025-08-12 RX ADMIN — ROCURONIUM BROMIDE 50 MG: 10 INJECTION, SOLUTION INTRAVENOUS at 01:08

## 2025-08-12 RX ADMIN — FENTANYL CITRATE 25 MCG: 50 INJECTION INTRAMUSCULAR; INTRAVENOUS at 04:08

## 2025-08-12 RX ADMIN — LUBIPROSTONE 8 MCG: 8 CAPSULE ORAL at 09:08

## 2025-08-12 RX ADMIN — ACETAMINOPHEN 650 MG: 325 TABLET ORAL at 07:08

## 2025-08-12 RX ADMIN — LIDOCAINE HYDROCHLORIDE 100 MG: 20 INJECTION INTRAVENOUS at 01:08

## 2025-08-12 RX ADMIN — Medication 30 MG: at 02:08

## 2025-08-12 RX ADMIN — DEXMEDETOMIDINE 12 MCG: 100 INJECTION, SOLUTION, CONCENTRATE INTRAVENOUS at 02:08

## 2025-08-12 RX ADMIN — PANTOPRAZOLE SODIUM 40 MG: 40 INJECTION, POWDER, FOR SOLUTION INTRAVENOUS at 04:08

## 2025-08-12 RX ADMIN — FAMOTIDINE 20 MG: 40 POWDER, FOR SUSPENSION ORAL at 09:08

## 2025-08-12 RX ADMIN — FENTANYL CITRATE 25 MCG: 50 INJECTION INTRAMUSCULAR; INTRAVENOUS at 05:08

## 2025-08-12 RX ADMIN — INSULIN ASPART 1 UNITS: 100 INJECTION, SOLUTION INTRAVENOUS; SUBCUTANEOUS at 10:08

## 2025-08-12 RX ADMIN — PROPOFOL 50 MG: 10 INJECTION, EMULSION INTRAVENOUS at 02:08

## 2025-08-12 RX ADMIN — ACETAMINOPHEN 1000 MG: 10 INJECTION INTRAVENOUS at 02:08

## 2025-08-13 VITALS
HEIGHT: 72 IN | TEMPERATURE: 99 F | OXYGEN SATURATION: 98 % | WEIGHT: 198.44 LBS | RESPIRATION RATE: 26 BRPM | BODY MASS INDEX: 26.88 KG/M2 | DIASTOLIC BLOOD PRESSURE: 71 MMHG | SYSTOLIC BLOOD PRESSURE: 134 MMHG | HEART RATE: 74 BPM

## 2025-08-13 LAB
ABSOLUTE EOSINOPHIL (OHS): 0 K/UL
ABSOLUTE MONOCYTE (OHS): 0.08 K/UL (ref 0.3–1)
ABSOLUTE NEUTROPHIL COUNT (OHS): 7.48 K/UL (ref 1.8–7.7)
ANION GAP (OHS): 10 MMOL/L (ref 8–16)
BASOPHILS # BLD AUTO: 0.01 K/UL
BASOPHILS NFR BLD AUTO: 0.1 %
BUN SERPL-MCNC: 14 MG/DL (ref 8–23)
CALCIUM SERPL-MCNC: 8.5 MG/DL (ref 8.7–10.5)
CHLORIDE SERPL-SCNC: 105 MMOL/L (ref 95–110)
CO2 SERPL-SCNC: 22 MMOL/L (ref 23–29)
CREAT SERPL-MCNC: 0.9 MG/DL (ref 0.5–1.4)
ERYTHROCYTE [DISTWIDTH] IN BLOOD BY AUTOMATED COUNT: 13.9 % (ref 11.5–14.5)
GFR SERPLBLD CREATININE-BSD FMLA CKD-EPI: >60 ML/MIN/1.73/M2
GLUCOSE SERPL-MCNC: 156 MG/DL (ref 70–110)
HCT VFR BLD AUTO: 49.1 % (ref 40–54)
HGB BLD-MCNC: 16.4 GM/DL (ref 14–18)
IMM GRANULOCYTES # BLD AUTO: 0.03 K/UL (ref 0–0.04)
IMM GRANULOCYTES NFR BLD AUTO: 0.4 % (ref 0–0.5)
LYMPHOCYTES # BLD AUTO: 0.46 K/UL (ref 1–4.8)
MCH RBC QN AUTO: 29 PG (ref 27–31)
MCHC RBC AUTO-ENTMCNC: 33.4 G/DL (ref 32–36)
MCV RBC AUTO: 87 FL (ref 82–98)
NUCLEATED RBC (/100WBC) (OHS): 0 /100 WBC
PLATELET # BLD AUTO: 229 K/UL (ref 150–450)
PMV BLD AUTO: 9.7 FL (ref 9.2–12.9)
POCT GLUCOSE: 149 MG/DL (ref 70–110)
POCT GLUCOSE: 173 MG/DL (ref 70–110)
POTASSIUM SERPL-SCNC: 4.6 MMOL/L (ref 3.5–5.1)
RBC # BLD AUTO: 5.66 M/UL (ref 4.6–6.2)
RELATIVE EOSINOPHIL (OHS): 0 %
RELATIVE LYMPHOCYTE (OHS): 5.7 % (ref 18–48)
RELATIVE MONOCYTE (OHS): 1 % (ref 4–15)
RELATIVE NEUTROPHIL (OHS): 92.8 % (ref 38–73)
SODIUM SERPL-SCNC: 137 MMOL/L (ref 136–145)
WBC # BLD AUTO: 8.06 K/UL (ref 3.9–12.7)

## 2025-08-13 PROCEDURE — 25000242 PHARM REV CODE 250 ALT 637 W/ HCPCS

## 2025-08-13 PROCEDURE — 25000003 PHARM REV CODE 250

## 2025-08-13 PROCEDURE — 85025 COMPLETE CBC W/AUTO DIFF WBC: CPT

## 2025-08-13 PROCEDURE — 92610 EVALUATE SWALLOWING FUNCTION: CPT

## 2025-08-13 PROCEDURE — 25000003 PHARM REV CODE 250: Performed by: STUDENT IN AN ORGANIZED HEALTH CARE EDUCATION/TRAINING PROGRAM

## 2025-08-13 PROCEDURE — 63600175 PHARM REV CODE 636 W HCPCS

## 2025-08-13 PROCEDURE — 80048 BASIC METABOLIC PNL TOTAL CA: CPT

## 2025-08-13 RX ORDER — OMEPRAZOLE 40 MG/1
40 CAPSULE, DELAYED RELEASE ORAL DAILY
Qty: 30 CAPSULE | Refills: 0 | Status: SHIPPED | OUTPATIENT
Start: 2025-08-13 | End: 2025-09-12

## 2025-08-13 RX ORDER — OXYCODONE HCL 5 MG/5 ML
10 SOLUTION, ORAL ORAL EVERY 4 HOURS PRN
Qty: 420 ML | Refills: 0 | Status: SHIPPED | OUTPATIENT
Start: 2025-08-13

## 2025-08-13 RX ADMIN — AMPICILLIN SODIUM AND SULBACTAM SODIUM 3 G: 2; 1 INJECTION, POWDER, FOR SOLUTION INTRAMUSCULAR; INTRAVENOUS at 06:08

## 2025-08-13 RX ADMIN — OXYCODONE HYDROCHLORIDE 5 MG: 5 SOLUTION ORAL at 10:08

## 2025-08-13 RX ADMIN — SUCRALFATE 2 G: 1 SUSPENSION ORAL at 12:08

## 2025-08-13 RX ADMIN — OXYCODONE HYDROCHLORIDE 10 MG: 5 SOLUTION ORAL at 03:08

## 2025-08-13 RX ADMIN — DEXAMETHASONE SODIUM PHOSPHATE 8 MG: 4 INJECTION INTRA-ARTICULAR; INTRALESIONAL; INTRAMUSCULAR; INTRAVENOUS; SOFT TISSUE at 06:08

## 2025-08-13 RX ADMIN — AMPICILLIN SODIUM AND SULBACTAM SODIUM 3 G: 2; 1 INJECTION, POWDER, FOR SOLUTION INTRAMUSCULAR; INTRAVENOUS at 02:08

## 2025-08-13 RX ADMIN — FAMOTIDINE 20 MG: 40 POWDER, FOR SUSPENSION ORAL at 09:08

## 2025-08-13 RX ADMIN — DEXAMETHASONE SODIUM PHOSPHATE 8 MG: 4 INJECTION INTRA-ARTICULAR; INTRALESIONAL; INTRAMUSCULAR; INTRAVENOUS; SOFT TISSUE at 02:08

## 2025-08-13 RX ADMIN — ACETAMINOPHEN 650 MG: 325 TABLET ORAL at 06:08

## 2025-08-13 RX ADMIN — ACETAMINOPHEN 650 MG: 325 TABLET ORAL at 12:08

## 2025-08-13 RX ADMIN — SUCRALFATE 2 G: 1 SUSPENSION ORAL at 06:08

## 2025-08-13 RX ADMIN — PANTOPRAZOLE SODIUM 40 MG: 40 INJECTION, POWDER, FOR SOLUTION INTRAVENOUS at 09:08

## 2025-08-18 LAB
DHEA SERPL-MCNC: NORMAL
ESTROGEN SERPL-MCNC: NORMAL PG/ML
INSULIN SERPL-ACNC: NORMAL U[IU]/ML
LAB AP CLINICAL INFORMATION: NORMAL
LAB AP DIAGNOSIS CATEGORY: NORMAL
LAB AP GROSS DESCRIPTION: NORMAL
LAB AP INTRA OP: NORMAL
LAB AP PERFORMING LOCATION(S): NORMAL
LAB AP REPORT FOOTNOTES: NORMAL

## 2025-08-20 ENCOUNTER — OFFICE VISIT (OUTPATIENT)
Dept: HEMATOLOGY/ONCOLOGY | Facility: CLINIC | Age: 60
End: 2025-08-20
Payer: MEDICARE

## 2025-08-20 ENCOUNTER — LAB VISIT (OUTPATIENT)
Dept: LAB | Facility: HOSPITAL | Age: 60
End: 2025-08-20
Payer: MEDICARE

## 2025-08-20 VITALS
WEIGHT: 199.75 LBS | OXYGEN SATURATION: 99 % | DIASTOLIC BLOOD PRESSURE: 73 MMHG | SYSTOLIC BLOOD PRESSURE: 114 MMHG | RESPIRATION RATE: 15 BRPM | HEART RATE: 68 BPM | BODY MASS INDEX: 27.06 KG/M2 | TEMPERATURE: 98 F | HEIGHT: 72 IN

## 2025-08-20 DIAGNOSIS — C01 SQUAMOUS CELL CARCINOMA OF BASE OF TONGUE: ICD-10-CM

## 2025-08-20 DIAGNOSIS — C01 SQUAMOUS CELL CARCINOMA OF BASE OF TONGUE: Primary | ICD-10-CM

## 2025-08-20 LAB
ABSOLUTE EOSINOPHIL (OHS): 0.2 K/UL
ABSOLUTE MONOCYTE (OHS): 1.05 K/UL (ref 0.3–1)
ABSOLUTE NEUTROPHIL COUNT (OHS): 7.32 K/UL (ref 1.8–7.7)
ANION GAP (OHS): 9 MMOL/L (ref 8–16)
BASOPHILS # BLD AUTO: 0.04 K/UL
BASOPHILS NFR BLD AUTO: 0.4 %
BUN SERPL-MCNC: 18 MG/DL (ref 8–23)
CALCIUM SERPL-MCNC: 8.8 MG/DL (ref 8.7–10.5)
CHLORIDE SERPL-SCNC: 103 MMOL/L (ref 95–110)
CO2 SERPL-SCNC: 28 MMOL/L (ref 23–29)
CREAT SERPL-MCNC: 0.8 MG/DL (ref 0.5–1.4)
ERYTHROCYTE [DISTWIDTH] IN BLOOD BY AUTOMATED COUNT: 14.5 % (ref 11.5–14.5)
GFR SERPLBLD CREATININE-BSD FMLA CKD-EPI: >60 ML/MIN/1.73/M2
GLUCOSE SERPL-MCNC: 121 MG/DL (ref 70–110)
HCT VFR BLD AUTO: 51.8 % (ref 40–54)
HGB BLD-MCNC: 17.4 GM/DL (ref 14–18)
IMM GRANULOCYTES # BLD AUTO: 0.03 K/UL (ref 0–0.04)
IMM GRANULOCYTES NFR BLD AUTO: 0.3 % (ref 0–0.5)
LYMPHOCYTES # BLD AUTO: 1.42 K/UL (ref 1–4.8)
MAGNESIUM SERPL-MCNC: 2 MG/DL (ref 1.6–2.6)
MCH RBC QN AUTO: 29 PG (ref 27–31)
MCHC RBC AUTO-ENTMCNC: 33.6 G/DL (ref 32–36)
MCV RBC AUTO: 86 FL (ref 82–98)
NUCLEATED RBC (/100WBC) (OHS): 0 /100 WBC
PHOSPHATE SERPL-MCNC: 3.4 MG/DL (ref 2.7–4.5)
PLATELET # BLD AUTO: 231 K/UL (ref 150–450)
PMV BLD AUTO: 9.4 FL (ref 9.2–12.9)
POTASSIUM SERPL-SCNC: 4.3 MMOL/L (ref 3.5–5.1)
RBC # BLD AUTO: 6.01 M/UL (ref 4.6–6.2)
RELATIVE EOSINOPHIL (OHS): 2 %
RELATIVE LYMPHOCYTE (OHS): 14.1 % (ref 18–48)
RELATIVE MONOCYTE (OHS): 10.4 % (ref 4–15)
RELATIVE NEUTROPHIL (OHS): 72.8 % (ref 38–73)
SODIUM SERPL-SCNC: 140 MMOL/L (ref 136–145)
WBC # BLD AUTO: 10.06 K/UL (ref 3.9–12.7)

## 2025-08-20 PROCEDURE — 3078F DIAST BP <80 MM HG: CPT | Mod: CPTII,S$GLB,, | Performed by: NURSE PRACTITIONER

## 2025-08-20 PROCEDURE — 1159F MED LIST DOCD IN RCRD: CPT | Mod: CPTII,S$GLB,, | Performed by: NURSE PRACTITIONER

## 2025-08-20 PROCEDURE — 3072F LOW RISK FOR RETINOPATHY: CPT | Mod: CPTII,S$GLB,, | Performed by: NURSE PRACTITIONER

## 2025-08-20 PROCEDURE — 99999 PR PBB SHADOW E&M-EST. PATIENT-LVL IV: CPT | Mod: PBBFAC,,, | Performed by: NURSE PRACTITIONER

## 2025-08-20 PROCEDURE — 85025 COMPLETE CBC W/AUTO DIFF WBC: CPT | Mod: PN

## 2025-08-20 PROCEDURE — 3074F SYST BP LT 130 MM HG: CPT | Mod: CPTII,S$GLB,, | Performed by: NURSE PRACTITIONER

## 2025-08-20 PROCEDURE — 36415 COLL VENOUS BLD VENIPUNCTURE: CPT | Mod: PN

## 2025-08-20 PROCEDURE — 84100 ASSAY OF PHOSPHORUS: CPT | Mod: PN

## 2025-08-20 PROCEDURE — 4010F ACE/ARB THERAPY RXD/TAKEN: CPT | Mod: CPTII,S$GLB,, | Performed by: NURSE PRACTITIONER

## 2025-08-20 PROCEDURE — 3044F HG A1C LEVEL LT 7.0%: CPT | Mod: CPTII,S$GLB,, | Performed by: NURSE PRACTITIONER

## 2025-08-20 PROCEDURE — 99024 POSTOP FOLLOW-UP VISIT: CPT | Mod: S$GLB,,, | Performed by: NURSE PRACTITIONER

## 2025-08-20 PROCEDURE — 83735 ASSAY OF MAGNESIUM: CPT | Mod: PN

## 2025-08-20 PROCEDURE — 82310 ASSAY OF CALCIUM: CPT | Mod: PN

## 2025-08-25 ENCOUNTER — OFFICE VISIT (OUTPATIENT)
Dept: RADIATION ONCOLOGY | Facility: CLINIC | Age: 60
End: 2025-08-25
Payer: MEDICARE

## 2025-08-25 VITALS
SYSTOLIC BLOOD PRESSURE: 156 MMHG | WEIGHT: 196.44 LBS | DIASTOLIC BLOOD PRESSURE: 76 MMHG | HEART RATE: 62 BPM | RESPIRATION RATE: 16 BRPM | OXYGEN SATURATION: 99 % | HEIGHT: 72 IN | BODY MASS INDEX: 26.61 KG/M2 | TEMPERATURE: 98 F

## 2025-08-25 DIAGNOSIS — C10.9 OROPHARYNGEAL CANCER: Primary | ICD-10-CM

## 2025-08-25 PROCEDURE — 99213 OFFICE O/P EST LOW 20 MIN: CPT | Mod: S$GLB,,, | Performed by: RADIOLOGY

## 2025-08-25 PROCEDURE — 3072F LOW RISK FOR RETINOPATHY: CPT | Mod: CPTII,S$GLB,, | Performed by: RADIOLOGY

## 2025-08-25 PROCEDURE — 3008F BODY MASS INDEX DOCD: CPT | Mod: CPTII,S$GLB,, | Performed by: RADIOLOGY

## 2025-08-25 PROCEDURE — 1159F MED LIST DOCD IN RCRD: CPT | Mod: CPTII,S$GLB,, | Performed by: RADIOLOGY

## 2025-08-25 PROCEDURE — 3044F HG A1C LEVEL LT 7.0%: CPT | Mod: CPTII,S$GLB,, | Performed by: RADIOLOGY

## 2025-08-25 PROCEDURE — 99999 PR PBB SHADOW E&M-EST. PATIENT-LVL IV: CPT | Mod: PBBFAC,,, | Performed by: RADIOLOGY

## 2025-08-25 PROCEDURE — 4010F ACE/ARB THERAPY RXD/TAKEN: CPT | Mod: CPTII,S$GLB,, | Performed by: RADIOLOGY

## 2025-08-25 PROCEDURE — 1111F DSCHRG MED/CURRENT MED MERGE: CPT | Mod: CPTII,S$GLB,, | Performed by: RADIOLOGY

## 2025-08-25 PROCEDURE — 3077F SYST BP >= 140 MM HG: CPT | Mod: CPTII,S$GLB,, | Performed by: RADIOLOGY

## 2025-08-25 PROCEDURE — 3078F DIAST BP <80 MM HG: CPT | Mod: CPTII,S$GLB,, | Performed by: RADIOLOGY

## 2025-08-27 ENCOUNTER — HOSPITAL ENCOUNTER (OUTPATIENT)
Dept: RADIATION THERAPY | Facility: HOSPITAL | Age: 60
Discharge: HOME | End: 2025-08-27
Attending: INTERNAL MEDICINE
Payer: MEDICARE

## 2025-08-27 PROCEDURE — 77014 PR  CT GUIDANCE PLACEMENT RAD THERAPY FIELDS: CPT | Mod: 26,,, | Performed by: RADIOLOGY

## 2025-08-27 PROCEDURE — 77014 HC CT GUIDANCE RADIATION THERAPY FLDS PLACEMENT: CPT | Mod: TC,PN | Performed by: RADIOLOGY

## 2025-08-27 PROCEDURE — 77263 THER RADIOLOGY TX PLNG CPLX: CPT | Mod: ,,, | Performed by: RADIOLOGY

## 2025-08-27 PROCEDURE — 77334 RADIATION TREATMENT AID(S): CPT | Mod: 26,,, | Performed by: RADIOLOGY

## 2025-08-27 PROCEDURE — 77334 RADIATION TREATMENT AID(S): CPT | Mod: TC,PN | Performed by: RADIOLOGY

## (undated) DEVICE — APPLICATOR CHLORAPREP CLR 10.5

## (undated) DEVICE — PAD CURAD NONADH 3X4IN

## (undated) DEVICE — UNDERGLOVES BIOGEL PI SZ 6 LF

## (undated) DEVICE — BAG TISS RETRV MONARCH 10MM

## (undated) DEVICE — OBTURATOR BLADELESS 8MM XI CLR

## (undated) DEVICE — SEAL UNIVERSAL 5MM-8MM XI

## (undated) DEVICE — TRAY NERVE BLOCK

## (undated) DEVICE — TOWEL OR DISP STRL BLUE 4/PK

## (undated) DEVICE — HANDLE SURG LIGHT NONRIGID

## (undated) DEVICE — SOL SOD CHLORIDE 0.9% 10ML

## (undated) DEVICE — TRAY MINOR GEN SURG OMC

## (undated) DEVICE — SET TUBE PNEUMOCLEAR SE HI FLO

## (undated) DEVICE — IRRIGATOR ENDOWRIST XI SUCTION

## (undated) DEVICE — GLOVE SENSICARE PI GRN 6.5

## (undated) DEVICE — SOL ELECTROLUBE ANTI-STIC

## (undated) DEVICE — CLIP LIGATING MEDIUM

## (undated) DEVICE — ELECTRODE NEEDLE 1IN

## (undated) DEVICE — ELECTRODE BLADE INSULATED 1 IN

## (undated) DEVICE — TUBING SUC UNIV W/CONN 12FT

## (undated) DEVICE — SUT MONOCRYL 4-0 PS-2

## (undated) DEVICE — STRAP OR TABLE 5IN X 72IN

## (undated) DEVICE — CONTAINER SPECIMEN OR STER 4OZ

## (undated) DEVICE — DRAPE STERI INSTRUMENT 1018

## (undated) DEVICE — CLIP HEMO-LOK MLX LARGE LF

## (undated) DEVICE — TUBING SUCTION 3/16X10 2 CONN

## (undated) DEVICE — SOL 0.9% NACL IRRI.IN STERIL

## (undated) DEVICE — SUCTION COAGULATOR 10FR 6IN

## (undated) DEVICE — APPLIER CLIP ENDO LIGAMAX 5MM

## (undated) DEVICE — SOL CLEARIFY VISUALIZATION LAP

## (undated) DEVICE — KIT SAHARA DRAPE DRAW/LIFT

## (undated) DEVICE — SLEEVE SCD EXPRESS KNEE MEDIUM

## (undated) DEVICE — COVER PROXIMA MAYO STAND

## (undated) DEVICE — Device

## (undated) DEVICE — NDL SAFETY 21G X 1 1/2 ECLPSE

## (undated) DEVICE — PENCIL ROCKER SWITCH 10FT CORD

## (undated) DEVICE — KIT ANTIFOG

## (undated) DEVICE — APPLICATOR CHLORAPREP ORN 26ML

## (undated) DEVICE — GLOVE 7.5 PROTEXIS PI MICRO

## (undated) DEVICE — TRAY ENT 4/CS

## (undated) DEVICE — DRAPE EENT SPLIT STERILE

## (undated) DEVICE — LINER SUCTION 3000CC

## (undated) DEVICE — TROCAR ENDO Z THREAD KII 5X100

## (undated) DEVICE — DRAPE SCOPE PILLOW WARMER

## (undated) DEVICE — PACK CUSTOM ENDO CHOLO SLI

## (undated) DEVICE — CANNULA REDUCER 12-8MM

## (undated) DEVICE — GOWN POLY REINF BRTH SLV XL

## (undated) DEVICE — DRAPE ARM DAVINCI XI

## (undated) DEVICE — ELECTRODE REM PLYHSV RETURN 9

## (undated) DEVICE — SUT VICRYL+ 27 UR-6 VIOL

## (undated) DEVICE — DRESSING SURGICAL 1/2X1/2

## (undated) DEVICE — CANNULA SEAL 12MM

## (undated) DEVICE — SUT EASE CROSSBOW CLSR SYS

## (undated) DEVICE — GLOVE SENSICARE PI ALOE 7.5

## (undated) DEVICE — MARKER FN REG DUAL UTIL RULER

## (undated) DEVICE — SYR ONLY LUER LOCK 20CC

## (undated) DEVICE — GAUZE SPONGE PEANUT STRL

## (undated) DEVICE — TUBE KANGAROO FEED 12FR 109CM

## (undated) DEVICE — ELECTRODE MEGADYNE RETURN DUAL

## (undated) DEVICE — ADHESIVE DERMABOND ADVANCED

## (undated) DEVICE — GLOVE SENSICARE PI GRN 7

## (undated) DEVICE — DRAPE COLUMN DAVINCI XI

## (undated) DEVICE — GOWN POLY REINF X-LONG XL

## (undated) DEVICE — KIT SURGIFLO HEMOSTATIC MATRIX

## (undated) DEVICE — SYR 10CC LUER LOCK

## (undated) DEVICE — TRAY CATH 1-LYR URIMTR 16FR

## (undated) DEVICE — SPONGE TONSIL MEDIUM

## (undated) DEVICE — KIT ANTIFOG W/SPONG & FLUID

## (undated) DEVICE — FORCEP STRAIGHT DISP